# Patient Record
Sex: FEMALE | Race: OTHER | NOT HISPANIC OR LATINO | URBAN - METROPOLITAN AREA
[De-identification: names, ages, dates, MRNs, and addresses within clinical notes are randomized per-mention and may not be internally consistent; named-entity substitution may affect disease eponyms.]

---

## 2022-11-02 ENCOUNTER — INPATIENT (INPATIENT)
Facility: HOSPITAL | Age: 67
LOS: 6 days | Discharge: HOME | DRG: 628 | End: 2022-11-09
Attending: HOSPITALIST | Admitting: HOSPITALIST
Payer: MEDICARE

## 2022-11-02 VITALS
DIASTOLIC BLOOD PRESSURE: 89 MMHG | TEMPERATURE: 98 F | RESPIRATION RATE: 18 BRPM | WEIGHT: 130.07 LBS | HEIGHT: 57 IN | HEART RATE: 146 BPM | SYSTOLIC BLOOD PRESSURE: 138 MMHG | OXYGEN SATURATION: 99 %

## 2022-11-02 DIAGNOSIS — E11.10 TYPE 2 DIABETES MELLITUS WITH KETOACIDOSIS WITHOUT COMA: ICD-10-CM

## 2022-11-02 LAB
ACETONE SERPL-MCNC: ABNORMAL
ALBUMIN SERPL ELPH-MCNC: 3.9 G/DL — SIGNIFICANT CHANGE UP (ref 3.3–5)
ALP SERPL-CCNC: 74 U/L — SIGNIFICANT CHANGE UP (ref 30–120)
ALT FLD-CCNC: 29 U/L DA — SIGNIFICANT CHANGE UP (ref 10–60)
ANION GAP SERPL CALC-SCNC: 27 MMOL/L — HIGH (ref 5–17)
APPEARANCE UR: CLEAR — SIGNIFICANT CHANGE UP
APTT BLD: 24.4 SEC — LOW (ref 27.5–35.5)
AST SERPL-CCNC: 6 U/L — LOW (ref 10–40)
BASE EXCESS BLDV CALC-SCNC: -18.7 MMOL/L — LOW (ref -2–3)
BASOPHILS # BLD AUTO: 0.01 K/UL — SIGNIFICANT CHANGE UP (ref 0–0.2)
BASOPHILS NFR BLD AUTO: 0.1 % — SIGNIFICANT CHANGE UP (ref 0–2)
BILIRUB SERPL-MCNC: 0.6 MG/DL — SIGNIFICANT CHANGE UP (ref 0.2–1.2)
BILIRUB UR-MCNC: NEGATIVE — SIGNIFICANT CHANGE UP
BUN SERPL-MCNC: 34 MG/DL — HIGH (ref 7–23)
CALCIUM SERPL-MCNC: 9.6 MG/DL — SIGNIFICANT CHANGE UP (ref 8.4–10.5)
CHLORIDE SERPL-SCNC: 95 MMOL/L — LOW (ref 96–108)
CO2 SERPL-SCNC: 11 MMOL/L — LOW (ref 22–31)
COLOR SPEC: YELLOW — SIGNIFICANT CHANGE UP
CREAT SERPL-MCNC: 1.41 MG/DL — HIGH (ref 0.5–1.3)
DIFF PNL FLD: NEGATIVE — SIGNIFICANT CHANGE UP
EGFR: 40 ML/MIN/1.73M2 — LOW
EOSINOPHIL # BLD AUTO: 0.01 K/UL — SIGNIFICANT CHANGE UP (ref 0–0.5)
EOSINOPHIL NFR BLD AUTO: 0.1 % — SIGNIFICANT CHANGE UP (ref 0–6)
FLUAV AG NPH QL: SIGNIFICANT CHANGE UP
FLUBV AG NPH QL: SIGNIFICANT CHANGE UP
GLUCOSE SERPL-MCNC: 711 MG/DL — CRITICAL HIGH (ref 70–99)
GLUCOSE UR QL: 1000 MG/DL
HCO3 BLDV-SCNC: 10 MMOL/L — CRITICAL LOW (ref 22–29)
HCT VFR BLD CALC: 38.8 % — SIGNIFICANT CHANGE UP (ref 34.5–45)
HGB BLD-MCNC: 11.7 G/DL — SIGNIFICANT CHANGE UP (ref 11.5–15.5)
HOROWITZ INDEX BLDV+IHG-RTO: 21 — SIGNIFICANT CHANGE UP
IMM GRANULOCYTES NFR BLD AUTO: 0.6 % — SIGNIFICANT CHANGE UP (ref 0–0.9)
INR BLD: 0.97 RATIO — SIGNIFICANT CHANGE UP (ref 0.88–1.16)
KETONES UR-MCNC: ABNORMAL
LACTATE SERPL-SCNC: <0.3 MMOL/L — LOW (ref 0.7–2)
LEUKOCYTE ESTERASE UR-ACNC: NEGATIVE — SIGNIFICANT CHANGE UP
LYMPHOCYTES # BLD AUTO: 15.2 % — SIGNIFICANT CHANGE UP (ref 13–44)
LYMPHOCYTES # BLD AUTO: 2.06 K/UL — SIGNIFICANT CHANGE UP (ref 1–3.3)
MCHC RBC-ENTMCNC: 21.5 PG — LOW (ref 27–34)
MCHC RBC-ENTMCNC: 30.2 GM/DL — LOW (ref 32–36)
MCV RBC AUTO: 71.5 FL — LOW (ref 80–100)
MONOCYTES # BLD AUTO: 0.6 K/UL — SIGNIFICANT CHANGE UP (ref 0–0.9)
MONOCYTES NFR BLD AUTO: 4.4 % — SIGNIFICANT CHANGE UP (ref 2–14)
NEUTROPHILS # BLD AUTO: 10.76 K/UL — HIGH (ref 1.8–7.4)
NEUTROPHILS NFR BLD AUTO: 79.6 % — HIGH (ref 43–77)
NITRITE UR-MCNC: NEGATIVE — SIGNIFICANT CHANGE UP
NRBC # BLD: 0 /100 WBCS — SIGNIFICANT CHANGE UP (ref 0–0)
PCO2 BLDV: 25 MMHG — LOW (ref 39–42)
PH BLDV: 7.19 — LOW (ref 7.32–7.43)
PH UR: 5 — SIGNIFICANT CHANGE UP (ref 5–8)
PLATELET # BLD AUTO: 520 K/UL — HIGH (ref 150–400)
PO2 BLDV: 38 MMHG — SIGNIFICANT CHANGE UP (ref 25–45)
POTASSIUM SERPL-MCNC: 6.2 MMOL/L — CRITICAL HIGH (ref 3.5–5.3)
POTASSIUM SERPL-SCNC: 6.2 MMOL/L — CRITICAL HIGH (ref 3.5–5.3)
PROT SERPL-MCNC: 7.8 G/DL — SIGNIFICANT CHANGE UP (ref 6–8.3)
PROT UR-MCNC: 30 MG/DL
PROTHROM AB SERPL-ACNC: 11.5 SEC — SIGNIFICANT CHANGE UP (ref 10.5–13.4)
RBC # BLD: 5.43 M/UL — HIGH (ref 3.8–5.2)
RBC # FLD: 22.4 % — HIGH (ref 10.3–14.5)
RSV RNA NPH QL NAA+NON-PROBE: SIGNIFICANT CHANGE UP
SAO2 % BLDV: 67.2 % — SIGNIFICANT CHANGE UP (ref 67–88)
SARS-COV-2 RNA SPEC QL NAA+PROBE: SIGNIFICANT CHANGE UP
SODIUM SERPL-SCNC: 133 MMOL/L — LOW (ref 135–145)
SP GR SPEC: 1.01 — SIGNIFICANT CHANGE UP (ref 1.01–1.02)
UROBILINOGEN FLD QL: NEGATIVE MG/DL — SIGNIFICANT CHANGE UP
WBC # BLD: 13.52 K/UL — HIGH (ref 3.8–10.5)
WBC # FLD AUTO: 13.52 K/UL — HIGH (ref 3.8–10.5)

## 2022-11-02 PROCEDURE — 93010 ELECTROCARDIOGRAM REPORT: CPT

## 2022-11-02 PROCEDURE — 71045 X-RAY EXAM CHEST 1 VIEW: CPT | Mod: 26

## 2022-11-02 PROCEDURE — 99285 EMERGENCY DEPT VISIT HI MDM: CPT

## 2022-11-02 PROCEDURE — 99223 1ST HOSP IP/OBS HIGH 75: CPT

## 2022-11-02 RX ORDER — INSULIN HUMAN 100 [IU]/ML
6 INJECTION, SOLUTION SUBCUTANEOUS
Qty: 100 | Refills: 0 | Status: DISCONTINUED | OUTPATIENT
Start: 2022-11-02 | End: 2022-11-03

## 2022-11-02 RX ORDER — HEPARIN SODIUM 5000 [USP'U]/ML
5000 INJECTION INTRAVENOUS; SUBCUTANEOUS EVERY 8 HOURS
Refills: 0 | Status: COMPLETED | OUTPATIENT
Start: 2022-11-02 | End: 2022-11-07

## 2022-11-02 RX ORDER — INSULIN HUMAN 100 [IU]/ML
6 INJECTION, SOLUTION SUBCUTANEOUS ONCE
Refills: 0 | Status: COMPLETED | OUTPATIENT
Start: 2022-11-02 | End: 2022-11-02

## 2022-11-02 RX ORDER — SIMVASTATIN 20 MG/1
40 TABLET, FILM COATED ORAL AT BEDTIME
Refills: 0 | Status: DISCONTINUED | OUTPATIENT
Start: 2022-11-02 | End: 2022-11-09

## 2022-11-02 RX ORDER — HYDRALAZINE HCL 50 MG
10 TABLET ORAL EVERY 4 HOURS
Refills: 0 | Status: DISCONTINUED | OUTPATIENT
Start: 2022-11-02 | End: 2022-11-09

## 2022-11-02 RX ORDER — CEFTRIAXONE 500 MG/1
1000 INJECTION, POWDER, FOR SOLUTION INTRAMUSCULAR; INTRAVENOUS EVERY 24 HOURS
Refills: 0 | Status: DISCONTINUED | OUTPATIENT
Start: 2022-11-02 | End: 2022-11-03

## 2022-11-02 RX ORDER — SODIUM CHLORIDE 9 MG/ML
1000 INJECTION, SOLUTION INTRAVENOUS ONCE
Refills: 0 | Status: COMPLETED | OUTPATIENT
Start: 2022-11-02 | End: 2022-11-02

## 2022-11-02 RX ORDER — ONDANSETRON 8 MG/1
4 TABLET, FILM COATED ORAL EVERY 4 HOURS
Refills: 0 | Status: DISCONTINUED | OUTPATIENT
Start: 2022-11-02 | End: 2022-11-09

## 2022-11-02 RX ORDER — SODIUM CHLORIDE 9 MG/ML
1850 INJECTION INTRAMUSCULAR; INTRAVENOUS; SUBCUTANEOUS ONCE
Refills: 0 | Status: COMPLETED | OUTPATIENT
Start: 2022-11-02 | End: 2022-11-02

## 2022-11-02 RX ORDER — SODIUM CHLORIDE 9 MG/ML
1000 INJECTION, SOLUTION INTRAVENOUS
Refills: 0 | Status: DISCONTINUED | OUTPATIENT
Start: 2022-11-02 | End: 2022-11-03

## 2022-11-02 RX ADMIN — INSULIN HUMAN 6 UNIT(S)/HR: 100 INJECTION, SOLUTION SUBCUTANEOUS at 23:44

## 2022-11-02 RX ADMIN — SODIUM CHLORIDE 1850 MILLILITER(S): 9 INJECTION INTRAMUSCULAR; INTRAVENOUS; SUBCUTANEOUS at 23:43

## 2022-11-02 RX ADMIN — INSULIN HUMAN 6 UNIT(S): 100 INJECTION, SOLUTION SUBCUTANEOUS at 23:12

## 2022-11-02 RX ADMIN — Medication 10 MILLIGRAM(S): at 23:43

## 2022-11-02 RX ADMIN — CEFTRIAXONE 100 MILLIGRAM(S): 500 INJECTION, POWDER, FOR SOLUTION INTRAMUSCULAR; INTRAVENOUS at 23:44

## 2022-11-02 NOTE — ED PROVIDER NOTE - NSICDXPASTMEDICALHX_GEN_ALL_CORE_FT
PAST MEDICAL HISTORY:  Depression     Eczema     H/O: hypertension     HLD (hyperlipidemia)     Psoriasis-like skin disease     Type 2 diabetes mellitus

## 2022-11-02 NOTE — H&P ADULT - NSHPREVIEWOFSYSTEMS_GEN_ALL_CORE
REVIEW OF SYSTEMS:  CONSTITUTIONAL:    +fever, no weight loss  EYES:    no eye pain or visual disturbances or discharge  ENMT:     no difficulty hearing or tinnitus or vertigo, no sinus or throat pain  NECK:    no pain or stiffness  RESPIRATORY:    no cough or wheezing or chills or hemoptysis, no shortness of breath  CARDIOVASCULAR:    no chest pain or palpitations or dizziness or leg swelling  GASTROINTESTINAL:    +nausea/vomiting, no abdominal or epigastric pain, no diarrhea or constipation. no melena or hematochezia.  GENITOURINARY:    no dysuria or frequency or hematuria or incontinence  NEUROLOGICAL:    no headaches or memory loss or loss of strength or numbness or tremors  SKIN:    no itching or burning or rashes or lesions   LYMPH NODES:    no enlarged glands  ENDOCRINE:    no heat or cold intolerance, no hair loss, no polydipsia or polyuria  MUSCULOSKELETAL:    chronic back pain, no joint pain or swelling, no muscle pain  PSYCHIATRIC:    no depression or anxiety or mood swings or difficulty sleeping  HEME/LYMPH:    no easy bruising or bleeding gums  ALLERGY AND IMMUNOLOGIC:    no hives or eczema

## 2022-11-02 NOTE — H&P ADULT - NSHPPHYSICALEXAM_GEN_ALL_CORE
PHYSICAL EXAM:  Vital Signs Last 24 Hrs  T(C): 36.7 (02 Nov 2022 21:40), Max: 36.7 (02 Nov 2022 21:40)  T(F): 98 (02 Nov 2022 21:40), Max: 98 (02 Nov 2022 21:40)  HR: 146 (02 Nov 2022 21:40) (146 - 146)  BP: 138/89 (02 Nov 2022 21:40) (138/89 - 138/89)  BP(mean): --  RR: 18 (02 Nov 2022 21:40) (18 - 18)  SpO2: 99% (02 Nov 2022 21:40) (99% - 99%)    Parameters below as of 02 Nov 2022 21:40  Patient On (Oxygen Delivery Method): room air      GENERAL:     age appropriate female in NAD  HEAD:     atraumatic, normocephalic  EYES:     EOMI, conjunctiva and sclera clear  RESPIRATORY:     clear to auscultation bilaterally, no rales or rhonchi or wheezing or rubs  CARDIOVASCULAR:     tachycardic, no murmurs or rubs or gallops, 2+ peripheral pulses  GASTROINTESTINAL:     soft, tender to palpation in lower quadrants but no rebound or guarding, slightly distended, bowel sounds present  EXTREMITIES:     no clubbing or cyanosis or edema  MUSCULOSKELETAL:     no joint pain or swelling or deformities  NERVOUS SYSTEM:     motor strength intact with 5/5 B/L upper and lower extremities, no gross sensory deficits  SKIN:     no rashes or lesions  PSYCH:     appropriate, alert and orientated x3, good concentration

## 2022-11-02 NOTE — H&P ADULT - HISTORY OF PRESENT ILLNESS
67F with DM2 not on insulin, HLD, HTN who present with one day of feeling  67F with DM2 not on insulin, HLD, HTN who present with one day of nausea/vomiting and fever.  Patient reports falling last week around 6 days ago but cannot recall mechanism of fall.  Injured her left side.  Went to urgent care over the weekend and was prescribed prednisone 10mg BID.  Has taken it for the past 4 days.  Then starting yesterday patient started to feel unwell.  Was nauseous with vomiting and also was found to have a fever.  Came here for further evaluation.     In the ED:    Triage vitals - /89    RR 18, 99%  T 98F  Labs significant for WBC 13.5, plt 520, K 6.2, Cr 1.41, glucose 711, large acetone  VBG 7.19/25/38  Was ordered for NS 1850 bolus and given 6 units of insulin    Patient is being admitted to [facility] for DKA management.

## 2022-11-02 NOTE — ED PROVIDER NOTE - NSICDXFAMILYHX_GEN_ALL_CORE_FT
FAMILY HISTORY:  Father  Still living? Unknown  Family history of coronary artery disease, Age at diagnosis: Age Unknown    Mother  Still living? Unknown  Family history of cerebrovascular accident (CVA), Age at diagnosis: Age Unknown  Family history of coronary artery disease, Age at diagnosis: Age Unknown    Sibling  Still living? Unknown  Family history of cerebrovascular accident (CVA), Age at diagnosis: Age Unknown
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
## 2022-11-02 NOTE — CONSULT NOTE ADULT - ASSESSMENT
68 y/o female pmhx NIDDM, HLD, HTN presented to ER nausea vomiting and abdominal pain.    Assessment:  1. DKA  2. BEN   3. Sepsis, unknown source   4. Hyperkalemia   5. HTN    Plan:  - Started on insulin infusion, accu checks q1hr.   - Aggressive IVF hydration. Give additional 1L LR. Start LR @ 125cc/hr, will switch to D5LR once BG <250  - Will transition to long acting insulin once AG is closed. Check HgA1c  - Serial BMPs q4hr. Monitor Lytes   - BEN likley pre renal from dehydration/ vomiting IVFs. Trend BUN/Crt. Hyperkalemia, hold off on treatment for now w/ IVFs and Insulin gtt. Repeat in 4 hours   - PRN hydralazine for SBP >170.   - Febrile/ WBC 13k. start Rocephin.  Send blood cultures. Check UA and urine culture. RVP pending. CT abd/pelvis pending. Procal in am  - Heparin for DVT ppx

## 2022-11-02 NOTE — H&P ADULT - NSHPLABSRESULTS_GEN_ALL_CORE
LABS:                        11.7   13.52<H> )-----------( 520<H>    ( 2022 22:36 )             38.8     133<L>  |  95<L>  |  34<H>  ----------------------------<  711<HH>    2022 22:36  6.2<HH>   |  11<L>  |  1.41<H>        Ca 9.6           2022 22:36        TPro  7.8    /  Alb  3.9    /  TBili  0.6    /  DBili  x      /  AST  6<L>   /  ALT  29     /  AlkPhos  74     2022 22:36    PT/INR - ( 2022 22:36 )   PT: 11.5 ;   INR: 0.97          PTT - ( 2022 22:36 )  PTT:24.4<L>    Urinalysis Basic - ( 2022 23:10 )    Color: Yellow / Appearance: Clear / S.010 / pH: x  Gluc: x / Ketone: Large  / Bili: Negative / Urobili: Negative mg/dL   Blood: x / Protein: 30 mg/dL / Nitrite: Negative   Leuk Esterase: Negative / RBC: x / WBC x   Sq Epi: x / Non Sq Epi: x / Bacteria: x    Acetone, Serum: Large: Method: Nitroprusside Chromatographic Assay (22 @ 22:36)  Blood Gas Profile - Venous (22 @ 22:25)    pH, Venous: 7.19    pCO2, Venous: 25 mmHg    pO2, Venous: 38 mmHg    HCO3, Venous: 10: TYPE:(C=Critical, N=Notification, A=Abnormal) C    Base Excess, Venous: -18.7 mmol/L    Oxygen Saturation, Venous: 67.2 %    FIO2, Venous: 21.0    EKG:   sinus tachycardia, TWI in aVL, questionable ST depression in aVL and V2  Radiology:
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
## 2022-11-02 NOTE — H&P ADULT - ASSESSMENT
67F with DM2 not on insulin, HLD, HTN who present with one day of nausea/vomiting and fever.   Found to be in DKA in setting of an unknown infection.      DKA - per previous notes, patient was on levemir 35units qHS and novolog 10units qAC but patient said she is not taking any insulin.  Also patient was on prednisone which would add to her hyperglycemia as well.    - admitted to [facility]  - continue with insulin drip  - BMP q4h -> monitor electrolytes, [place] K (though currently is hyperkalemic at the moment)  - FS q1h while on insulin drip and follow DKA protocol   - would need endocrinology consult  - likely will need home insulin regiment on discharge  - CC consult  - hold metformin     Abdominal pain/fever - currently no obvious source  - obtain UA and UCX  - f/u cultures   - obtain and f/u CT chest, abdomen, and pelvis    HTN/HLD  - cont with simvstatin  - was also on metoprolol at one point -> may need to be added back if BP not controlled    Preventive measures  - heparin subq for DVT ppx

## 2022-11-02 NOTE — ED PROVIDER NOTE - OBJECTIVE STATEMENT
67 y.o. F c/o 1 day vomiting, high BP and glucose, states started having fever yesterday, denies abd pain, last bm yesterday, has chronic low back pain, was put on steroids for this last week from [facility], no urinary symptoms

## 2022-11-02 NOTE — H&P ADULT - NSICDXFAMILYHX_GEN_ALL_CORE_FT
FAMILY HISTORY:  FH: diabetes mellitus  FH: hypertension    Father  Still living? Unknown  Family history of coronary artery disease, Age at diagnosis: Age Unknown    Mother  Still living? Unknown  Family history of cerebrovascular accident (CVA), Age at diagnosis: Age Unknown  Family history of coronary artery disease, Age at diagnosis: Age Unknown    Sibling  Still living? Unknown  Family history of cerebrovascular accident (CVA), Age at diagnosis: Age Unknown

## 2022-11-03 DIAGNOSIS — E11.65 TYPE 2 DIABETES MELLITUS WITH HYPERGLYCEMIA: ICD-10-CM

## 2022-11-03 LAB
A1C WITH ESTIMATED AVERAGE GLUCOSE RESULT: 12.7 % — HIGH (ref 4–5.6)
ALBUMIN SERPL ELPH-MCNC: 3 G/DL — LOW (ref 3.3–5)
ALP SERPL-CCNC: 61 U/L — SIGNIFICANT CHANGE UP (ref 30–120)
ALT FLD-CCNC: 24 U/L DA — SIGNIFICANT CHANGE UP (ref 10–60)
ANION GAP SERPL CALC-SCNC: 11 MMOL/L — SIGNIFICANT CHANGE UP (ref 5–17)
ANION GAP SERPL CALC-SCNC: 15 MMOL/L — SIGNIFICANT CHANGE UP (ref 5–17)
AST SERPL-CCNC: 8 U/L — LOW (ref 10–40)
BILIRUB SERPL-MCNC: 0.3 MG/DL — SIGNIFICANT CHANGE UP (ref 0.2–1.2)
BUN SERPL-MCNC: 21 MG/DL — SIGNIFICANT CHANGE UP (ref 7–23)
BUN SERPL-MCNC: 24 MG/DL — HIGH (ref 7–23)
CALCIUM SERPL-MCNC: 7.9 MG/DL — LOW (ref 8.4–10.5)
CALCIUM SERPL-MCNC: 8.3 MG/DL — LOW (ref 8.4–10.5)
CHLORIDE SERPL-SCNC: 109 MMOL/L — HIGH (ref 96–108)
CHLORIDE SERPL-SCNC: 109 MMOL/L — HIGH (ref 96–108)
CO2 SERPL-SCNC: 16 MMOL/L — LOW (ref 22–31)
CO2 SERPL-SCNC: 18 MMOL/L — LOW (ref 22–31)
CREAT SERPL-MCNC: 1 MG/DL — SIGNIFICANT CHANGE UP (ref 0.5–1.3)
CREAT SERPL-MCNC: 1.1 MG/DL — SIGNIFICANT CHANGE UP (ref 0.5–1.3)
EGFR: 54 ML/MIN/1.73M2 — LOW
EGFR: 61 ML/MIN/1.73M2 — SIGNIFICANT CHANGE UP
ESTIMATED AVERAGE GLUCOSE: 318 MG/DL — HIGH (ref 68–114)
GLUCOSE SERPL-MCNC: 232 MG/DL — HIGH (ref 70–99)
GLUCOSE SERPL-MCNC: 299 MG/DL — HIGH (ref 70–99)
HCT VFR BLD CALC: 33.2 % — LOW (ref 34.5–45)
HCV AB S/CO SERPL IA: 0.05 S/CO — SIGNIFICANT CHANGE UP (ref 0–0.99)
HCV AB SERPL-IMP: SIGNIFICANT CHANGE UP
HGB BLD-MCNC: 10.2 G/DL — LOW (ref 11.5–15.5)
MAGNESIUM SERPL-MCNC: 1.3 MG/DL — LOW (ref 1.6–2.6)
MAGNESIUM SERPL-MCNC: 1.7 MG/DL — SIGNIFICANT CHANGE UP (ref 1.6–2.6)
MCHC RBC-ENTMCNC: 21.6 PG — LOW (ref 27–34)
MCHC RBC-ENTMCNC: 30.7 GM/DL — LOW (ref 32–36)
MCV RBC AUTO: 70.2 FL — LOW (ref 80–100)
NRBC # BLD: 0 /100 WBCS — SIGNIFICANT CHANGE UP (ref 0–0)
PHOSPHATE SERPL-MCNC: 1 MG/DL — CRITICAL LOW (ref 2.5–4.5)
PHOSPHATE SERPL-MCNC: 1.5 MG/DL — LOW (ref 2.5–4.5)
PLATELET # BLD AUTO: 417 K/UL — HIGH (ref 150–400)
POTASSIUM SERPL-MCNC: 4.4 MMOL/L — SIGNIFICANT CHANGE UP (ref 3.5–5.3)
POTASSIUM SERPL-MCNC: 5.1 MMOL/L — SIGNIFICANT CHANGE UP (ref 3.5–5.3)
POTASSIUM SERPL-SCNC: 4.4 MMOL/L — SIGNIFICANT CHANGE UP (ref 3.5–5.3)
POTASSIUM SERPL-SCNC: 5.1 MMOL/L — SIGNIFICANT CHANGE UP (ref 3.5–5.3)
PROCALCITONIN SERPL-MCNC: 0.12 NG/ML — HIGH (ref 0.02–0.1)
PROT SERPL-MCNC: 6 G/DL — SIGNIFICANT CHANGE UP (ref 6–8.3)
RBC # BLD: 4.73 M/UL — SIGNIFICANT CHANGE UP (ref 3.8–5.2)
RBC # FLD: 21.2 % — HIGH (ref 10.3–14.5)
SODIUM SERPL-SCNC: 138 MMOL/L — SIGNIFICANT CHANGE UP (ref 135–145)
SODIUM SERPL-SCNC: 140 MMOL/L — SIGNIFICANT CHANGE UP (ref 135–145)
WBC # BLD: 17.58 K/UL — HIGH (ref 3.8–10.5)
WBC # FLD AUTO: 17.58 K/UL — HIGH (ref 3.8–10.5)

## 2022-11-03 PROCEDURE — 71250 CT THORAX DX C-: CPT | Mod: 26

## 2022-11-03 PROCEDURE — 74176 CT ABD & PELVIS W/O CONTRAST: CPT | Mod: 26

## 2022-11-03 PROCEDURE — 99251: CPT

## 2022-11-03 PROCEDURE — 99233 SBSQ HOSP IP/OBS HIGH 50: CPT

## 2022-11-03 RX ORDER — GABAPENTIN 400 MG/1
100 CAPSULE ORAL EVERY 8 HOURS
Refills: 0 | Status: DISCONTINUED | OUTPATIENT
Start: 2022-11-03 | End: 2022-11-08

## 2022-11-03 RX ORDER — INSULIN LISPRO 100/ML
VIAL (ML) SUBCUTANEOUS
Refills: 0 | Status: DISCONTINUED | OUTPATIENT
Start: 2022-11-03 | End: 2022-11-09

## 2022-11-03 RX ORDER — METFORMIN HYDROCHLORIDE 850 MG/1
500 TABLET ORAL DAILY
Refills: 0 | Status: DISCONTINUED | OUTPATIENT
Start: 2022-11-03 | End: 2022-11-09

## 2022-11-03 RX ORDER — SODIUM CHLORIDE 9 MG/ML
1000 INJECTION INTRAMUSCULAR; INTRAVENOUS; SUBCUTANEOUS ONCE
Refills: 0 | Status: COMPLETED | OUTPATIENT
Start: 2022-11-03 | End: 2022-11-03

## 2022-11-03 RX ORDER — GLUCAGON INJECTION, SOLUTION 0.5 MG/.1ML
1 INJECTION, SOLUTION SUBCUTANEOUS ONCE
Refills: 0 | Status: DISCONTINUED | OUTPATIENT
Start: 2022-11-03 | End: 2022-11-09

## 2022-11-03 RX ORDER — DEXTROSE 50 % IN WATER 50 %
25 SYRINGE (ML) INTRAVENOUS ONCE
Refills: 0 | Status: DISCONTINUED | OUTPATIENT
Start: 2022-11-03 | End: 2022-11-09

## 2022-11-03 RX ORDER — POTASSIUM PHOSPHATE, MONOBASIC POTASSIUM PHOSPHATE, DIBASIC 236; 224 MG/ML; MG/ML
15 INJECTION, SOLUTION INTRAVENOUS ONCE
Refills: 0 | Status: COMPLETED | OUTPATIENT
Start: 2022-11-03 | End: 2022-11-03

## 2022-11-03 RX ORDER — LISINOPRIL 2.5 MG/1
5 TABLET ORAL DAILY
Refills: 0 | Status: DISCONTINUED | OUTPATIENT
Start: 2022-11-03 | End: 2022-11-09

## 2022-11-03 RX ORDER — TRAMADOL HYDROCHLORIDE 50 MG/1
25 TABLET ORAL EVERY 4 HOURS
Refills: 0 | Status: ACTIVE | OUTPATIENT
Start: 2022-11-03 | End: 2022-11-10

## 2022-11-03 RX ORDER — PANTOPRAZOLE SODIUM 20 MG/1
40 TABLET, DELAYED RELEASE ORAL
Refills: 0 | Status: DISCONTINUED | OUTPATIENT
Start: 2022-11-03 | End: 2022-11-09

## 2022-11-03 RX ORDER — ACETAMINOPHEN 500 MG
650 TABLET ORAL EVERY 6 HOURS
Refills: 0 | Status: ACTIVE | OUTPATIENT
Start: 2022-11-03 | End: 2023-10-02

## 2022-11-03 RX ORDER — SODIUM CHLORIDE 9 MG/ML
1000 INJECTION, SOLUTION INTRAVENOUS
Refills: 0 | Status: DISCONTINUED | OUTPATIENT
Start: 2022-11-03 | End: 2022-11-03

## 2022-11-03 RX ORDER — INSULIN LISPRO 100/ML
5 VIAL (ML) SUBCUTANEOUS
Refills: 0 | Status: DISCONTINUED | OUTPATIENT
Start: 2022-11-03 | End: 2022-11-09

## 2022-11-03 RX ORDER — DEXTROSE 50 % IN WATER 50 %
12.5 SYRINGE (ML) INTRAVENOUS ONCE
Refills: 0 | Status: DISCONTINUED | OUTPATIENT
Start: 2022-11-03 | End: 2022-11-09

## 2022-11-03 RX ORDER — INSULIN LISPRO 100/ML
VIAL (ML) SUBCUTANEOUS AT BEDTIME
Refills: 0 | Status: DISCONTINUED | OUTPATIENT
Start: 2022-11-03 | End: 2022-11-09

## 2022-11-03 RX ORDER — HYDROMORPHONE HYDROCHLORIDE 2 MG/ML
0.5 INJECTION INTRAMUSCULAR; INTRAVENOUS; SUBCUTANEOUS ONCE
Refills: 0 | Status: DISCONTINUED | OUTPATIENT
Start: 2022-11-03 | End: 2022-11-03

## 2022-11-03 RX ORDER — SODIUM CHLORIDE 9 MG/ML
1000 INJECTION, SOLUTION INTRAVENOUS
Refills: 0 | Status: DISCONTINUED | OUTPATIENT
Start: 2022-11-03 | End: 2022-11-09

## 2022-11-03 RX ORDER — MAGNESIUM SULFATE 500 MG/ML
2 VIAL (ML) INJECTION ONCE
Refills: 0 | Status: COMPLETED | OUTPATIENT
Start: 2022-11-03 | End: 2022-11-03

## 2022-11-03 RX ORDER — INSULIN LISPRO 100/ML
4 VIAL (ML) SUBCUTANEOUS ONCE
Refills: 0 | Status: COMPLETED | OUTPATIENT
Start: 2022-11-03 | End: 2022-11-03

## 2022-11-03 RX ORDER — DEXTROSE 50 % IN WATER 50 %
15 SYRINGE (ML) INTRAVENOUS ONCE
Refills: 0 | Status: DISCONTINUED | OUTPATIENT
Start: 2022-11-03 | End: 2022-11-09

## 2022-11-03 RX ORDER — INSULIN GLARGINE 100 [IU]/ML
10 INJECTION, SOLUTION SUBCUTANEOUS ONCE
Refills: 0 | Status: COMPLETED | OUTPATIENT
Start: 2022-11-03 | End: 2022-11-03

## 2022-11-03 RX ORDER — CYCLOBENZAPRINE HYDROCHLORIDE 10 MG/1
5 TABLET, FILM COATED ORAL THREE TIMES A DAY
Refills: 0 | Status: DISCONTINUED | OUTPATIENT
Start: 2022-11-03 | End: 2022-11-09

## 2022-11-03 RX ORDER — INSULIN GLARGINE 100 [IU]/ML
20 INJECTION, SOLUTION SUBCUTANEOUS AT BEDTIME
Refills: 0 | Status: DISCONTINUED | OUTPATIENT
Start: 2022-11-03 | End: 2022-11-09

## 2022-11-03 RX ADMIN — TRAMADOL HYDROCHLORIDE 25 MILLIGRAM(S): 50 TABLET ORAL at 19:50

## 2022-11-03 RX ADMIN — POTASSIUM PHOSPHATE, MONOBASIC POTASSIUM PHOSPHATE, DIBASIC 62.5 MILLIMOLE(S): 236; 224 INJECTION, SOLUTION INTRAVENOUS at 12:17

## 2022-11-03 RX ADMIN — SODIUM CHLORIDE 125 MILLILITER(S): 9 INJECTION, SOLUTION INTRAVENOUS at 02:16

## 2022-11-03 RX ADMIN — PANTOPRAZOLE SODIUM 40 MILLIGRAM(S): 20 TABLET, DELAYED RELEASE ORAL at 10:13

## 2022-11-03 RX ADMIN — SODIUM CHLORIDE 1000 MILLILITER(S): 9 INJECTION, SOLUTION INTRAVENOUS at 00:53

## 2022-11-03 RX ADMIN — TRAMADOL HYDROCHLORIDE 25 MILLIGRAM(S): 50 TABLET ORAL at 20:50

## 2022-11-03 RX ADMIN — Medication 8: at 16:51

## 2022-11-03 RX ADMIN — Medication 650 MILLIGRAM(S): at 12:53

## 2022-11-03 RX ADMIN — HYDROMORPHONE HYDROCHLORIDE 0.5 MILLIGRAM(S): 2 INJECTION INTRAMUSCULAR; INTRAVENOUS; SUBCUTANEOUS at 02:26

## 2022-11-03 RX ADMIN — HEPARIN SODIUM 5000 UNIT(S): 5000 INJECTION INTRAVENOUS; SUBCUTANEOUS at 05:53

## 2022-11-03 RX ADMIN — Medication 5 UNIT(S): at 16:51

## 2022-11-03 RX ADMIN — HEPARIN SODIUM 5000 UNIT(S): 5000 INJECTION INTRAVENOUS; SUBCUTANEOUS at 22:30

## 2022-11-03 RX ADMIN — Medication 30 MILLILITER(S): at 23:56

## 2022-11-03 RX ADMIN — ONDANSETRON 4 MILLIGRAM(S): 8 TABLET, FILM COATED ORAL at 00:54

## 2022-11-03 RX ADMIN — Medication 25 GRAM(S): at 10:13

## 2022-11-03 RX ADMIN — Medication 4 UNIT(S): at 14:39

## 2022-11-03 RX ADMIN — INSULIN GLARGINE 20 UNIT(S): 100 INJECTION, SOLUTION SUBCUTANEOUS at 22:31

## 2022-11-03 RX ADMIN — Medication 650 MILLIGRAM(S): at 13:52

## 2022-11-03 RX ADMIN — HYDROMORPHONE HYDROCHLORIDE 0.5 MILLIGRAM(S): 2 INJECTION INTRAMUSCULAR; INTRAVENOUS; SUBCUTANEOUS at 02:41

## 2022-11-03 RX ADMIN — SIMVASTATIN 40 MILLIGRAM(S): 20 TABLET, FILM COATED ORAL at 22:30

## 2022-11-03 RX ADMIN — INSULIN GLARGINE 10 UNIT(S): 100 INJECTION, SOLUTION SUBCUTANEOUS at 08:38

## 2022-11-03 RX ADMIN — SODIUM CHLORIDE 125 MILLILITER(S): 9 INJECTION, SOLUTION INTRAVENOUS at 06:28

## 2022-11-03 RX ADMIN — GABAPENTIN 100 MILLIGRAM(S): 400 CAPSULE ORAL at 22:31

## 2022-11-03 RX ADMIN — HEPARIN SODIUM 5000 UNIT(S): 5000 INJECTION INTRAVENOUS; SUBCUTANEOUS at 14:11

## 2022-11-03 RX ADMIN — SODIUM CHLORIDE 1000 MILLILITER(S): 9 INJECTION INTRAMUSCULAR; INTRAVENOUS; SUBCUTANEOUS at 10:13

## 2022-11-03 NOTE — DIETITIAN INITIAL EVALUATION ADULT - ETIOLOGY
related to increased physiological demand to promote healing  related to inability to meet sufficient protein-energy needs in setting of  appetite and PO intake

## 2022-11-03 NOTE — DIETITIAN NUTRITION RISK NOTIFICATION - ADDITIONAL COMMENTS/DIETITIAN RECOMMENDATIONS
Recommend Glucerna 1x/day to optimize PO intake and provide an additional 220kcal, 10g protein per serving   Provide encouragement/assistance as needed during meal times to increase PO intake

## 2022-11-03 NOTE — DIETITIAN INITIAL EVALUATION ADULT - SIGNS/SYMPTOMS
as evidenced by pt with pressure injury to sacrum stage 2 as evidenced by pt consuming <75% EER x~6mo, 20% wt loss x ~6mo and fat/muscle depletion

## 2022-11-03 NOTE — CONSULT NOTE ADULT - PROBLEM SELECTOR RECOMMENDATION 9
Type 2 A1c pending% adm: DKA  Recommend endocrine-[facility] on consult  FU appt: TBA  DSC recommendations: return to home with modified regimen and glucose monitoring  diabetes education provided  Diabetes support info and cell # [phone] given   Goal 100-180 mg/dL; 140-180 mg/dL in critical care areas

## 2022-11-03 NOTE — DIETITIAN INITIAL EVALUATION ADULT - PERTINENT LABORATORY DATA
11-03    138  |  109<H>  |  21  ----------------------------<  299<H>  4.4   |  18<L>  |  1.00    Ca    7.9<L>      03 Nov 2022 11:50  Phos  1.0     11-03  Mg     1.7     11-03    TPro  6.0  /  Alb  3.0<L>  /  TBili  0.3  /  DBili  x   /  AST  8<L>  /  ALT  24  /  AlkPhos  61  11-03  POCT Blood Glucose.: 257 mg/dL (11-03-22 @ 12:15)

## 2022-11-03 NOTE — PROVIDER CONTACT NOTE (EICU) - RECOMMENDATIONS
Treatment with addition 2 L fluid bolus and reassessment.  Insulin infusion with titration  Monitor electrolytes  CXR with NAD  CT A/P  urinalysis with cx, al. C.diff  Case discussed with ICU PA

## 2022-11-03 NOTE — CONSULT NOTE ADULT - ASSESSMENT
66 y/o female with NIDDM on metformin, HTN, HLD with 1 day h/o fever, nausea, vomiting, and abd pain. She had a fall last week for which she was seen at urgent care. She was prescribed prednisone and a muscle relaxant. In the eR pt was found to be hypotensive and tachycardic.  68 y/o female with NIDDM on metformin, HTN, HLD with 1 day h/o fever, nausea, vomiting, and abd pain. She had a fall last week for which she was seen at urgent care. She was prescribed prednisone and a muscle relaxant. In the eR pt was found to be hypotensive and tachycardic.     fall  DM  HLD  HTN  BEN  met Acidosis  abd pain    serial labs  insulin gtt taper  hourly FS  I and O  monitor VS and HD  serial renal indices  replete lytes  hydration  DM education  cvs rx regimen and BP control  imaging reviewed  Cx in progress  emp ABX in progress  dvt p  fall prec
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
## 2022-11-03 NOTE — CONSULT NOTE ADULT - ASSESSMENT
Physical Exam:   Vital Signs Last 24 Hrs  T(C): 36.5 (03 Nov 2022 00:43), Max: 36.7 (02 Nov 2022 21:40)  T(F): 97.7 (03 Nov 2022 00:43), Max: 98.1 (03 Nov 2022 00:15)  HR: 121 (03 Nov 2022 06:00) (119 - 146)  BP: 140/77 (03 Nov 2022 06:00) (131/64 - 192/93)  BP(mean): 88 (03 Nov 2022 06:00) (83 - 120)  RR: 23 (03 Nov 2022 06:00) (16 - 29)  SpO2: 100% (03 Nov 2022 00:15) (99% - 100%)    Parameters below as of 03 Nov 2022 00:43  Patient On (Oxygen Delivery Method): room air             CAPILLARY BLOOD GLUCOSE      POCT Blood Glucose.: 246 mg/dL (03 Nov 2022 07:08)  POCT Blood Glucose.: 227 mg/dL (03 Nov 2022 06:17)  POCT Blood Glucose.: 252 mg/dL (03 Nov 2022 05:09)  POCT Blood Glucose.: 276 mg/dL (03 Nov 2022 04:00)  POCT Blood Glucose.: 339 mg/dL (03 Nov 2022 02:59)  POCT Blood Glucose.: 392 mg/dL (03 Nov 2022 02:01)  POCT Blood Glucose.: 508 mg/dL (03 Nov 2022 00:43)  POCT Blood Glucose.: >600 mg/dL (02 Nov 2022 21:53)      Cholesterol, Serum: 113 mg/dL (05.19.21 @ 08:36)     HDL Cholesterol, Serum: 22 mg/dL (05.19.21 @ 08:36)     LDL Cholesterol Calculated: 66 mg/dL (05.19.21 @ 08:36)     DIET: CC  >50%

## 2022-11-03 NOTE — DIETITIAN INITIAL EVALUATION ADULT - ORAL INTAKE PTA/DIET HISTORY
Pt reports poor PO intake for ~5 days PTA due to GI distress (throwing up), previous to GI distress pt reports fair appetite and PO intake for ~6 months. Pt reports an allergy to fish (hives and itchiness) food and nutrition department made aware.

## 2022-11-03 NOTE — PROVIDER CONTACT NOTE (EICU) - BACKGROUND
66 y/o female with NIDDM on metformin, HTN, HLD with 1 day h/o fever, nausea, vomiting, and abd pain. She had a fall last week for which she was seen at urgent care. She was prescribed prednisone and a muscle relaxant. In the eR pt was found to be hypotensive and tachycardic.   Glu 700. Given 6 units of insulin and ordered 2L IVF bolus. Bicarb 11.

## 2022-11-03 NOTE — DIETITIAN INITIAL EVALUATION ADULT - OTHER INFO
Pt seen for nutrition assessment secondary to referral, admitted for DKA. Pt with hx of T2DM with HbA1c 12.7% (11/3) reports taking metformin at home, per documents previously on Levemir/ Novolog daily but stopped due to lost of insurance. Pt does not follow up with endocrinologist and states she has a glucometer at home but does not check finger sticks, pt on insulin regimen in-house. Upon admission pt with a wt of 118.1 lbs, reports a 30 lb wt loss in ~6 months due to decreased appetite/PO intake.     Pt seen this morning placed on consistent carbohydrate clear liquid diet, now on consistent carbohydrate diet. Reports tolerating clear liquid diet well with no GI distress noted and denies difficulty chewing/ swallowing of foods. Pt made aware food preferences to be obtained daily to optimize PO intake. Recommend adding Glucerna 1x/day to optimize PO intake and provide an additional 220kcal, 10g protein per serving.  Pt receptive to nutrition education, provided education on Carbohydrate Consistent diet including sources of carbohydrates, portion sizes, pairing protein with carbohydrates, limiting sugar sweetened beverages in diet and the importance of consistent eating pattern to help optimize glycemic control. Pt verbalized understating, with no nutrition-related questions at this time. Made aware RD remains available as needed and will continue to follow up to monitor pt's nutrition status.

## 2022-11-03 NOTE — DIETITIAN INITIAL EVALUATION ADULT - ADD RECOMMEND
1) Continue with current diet order 2) Monitor need for diet reinforcement 3) Add Glucerna 1x/day to optimize PO intake 4)RD to remain available

## 2022-11-03 NOTE — PROGRESS NOTE ADULT - ASSESSMENT
67F with DM2 not on insulin, HLD, HTN who present with one day of nausea/vomiting.   Found to be in DKA in setting of prednisone use for back pain.     DKA - per previous notes, patient was on levemir 35units qHS and novolog 10units qAC but patient said she is not taking any insulin.  Also patient was on prednisone which would add to her hyperglycemia as well.    - admitted to [facility] - will downgrade today  - insulin drip discontinued, patient transitioned to basal/bolus insulin with lispro coverage scale  - restart low dose metformin    Abdominal pain/fever   - there was never a true fever  - f/u cultures that were sent  - CT showed esophageal inflammation (likely secondary to vomiting) and findings in lumbar spine     Back pain with neuropathy  - will need new MRI of lumbar spine  - pain management Tylenol, tramadol, flexeril, gabapentin  - Ortho spine eval  - PT eval when cleared by orthopedics.    HTN/HLD  - cont with simvastatin  - start lisinopril for BP control     Preventive measures  - heparin subq for DVT ppx   67F with DM2 not on insulin, HLD, HTN who present with one day of nausea/vomiting.   Found to be in DKA in setting of prednisone use for back pain.     DKA - per previous notes, patient was on levemir 35units qHS and novolog 10units qAC but patient said she is not taking any insulin.  Also patient was on prednisone which would add to her hyperglycemia as well.    - admitted to [facility] - will downgrade today  - insulin drip discontinued, patient transitioned to basal/bolus insulin with lispro coverage scale  - restart low dose metformin    Abdominal pain/fever   - there was never a true fever  - leukocytosis likely due to prednisone use and reaction to DKA  - f/u cultures that were sent  - CT showed esophageal inflammation (likely secondary to vomiting) and findings in lumbar spine     Back pain with neuropathy  - will need new MRI of lumbar spine  - pain management Tylenol, tramadol, flexeril, gabapentin  - Ortho spine eval  - PT eval when cleared by orthopedics.    HTN/HLD  - cont with simvastatin  - start lisinopril for BP control     Preventive measures  - heparin subq for DVT ppx   67F with DM2 not on insulin, HLD, HTN who present with one day of nausea/vomiting.   Found to be in DKA in setting of prednisone use for back pain.     DKA   - per previous notes, patient was on levemir 35units qHS and novolog 10units qAC but patient said she is not taking any insulin.  Also patient was on prednisone which would add to her hyperglycemia as well.    - admitted to [facility] - will downgrade today  - insulin drip discontinued, patient transitioned to basal/bolus insulin with lispro coverage scale  - restart low dose metformin  - CDE input appreciated, endo consult pending    Abdominal pain/fever   - there was never a true fever  - leukocytosis likely due to prednisone use and reaction to DKA  - f/u cultures that were sent  - CT showed esophageal inflammation (likely secondary to vomiting) and findings in lumbar spine     Back pain with neuropathy  - will need new MRI of lumbar spine  - pain management Tylenol, tramadol, flexeril, gabapentin  - Ortho spine eval  - PT eval when cleared by orthopedics.    Sinus Tachycardia  - pt reports she is always tachycardic in the hospital due to anxiety  - encourage oral fluids  - caffeine free diet     HTN/HLD  - cont with simvastatin  - start lisinopril for BP control     Preventive measures  - heparin subq for DVT ppx

## 2022-11-03 NOTE — DIETITIAN INITIAL EVALUATION ADULT - REASON FOR ADMISSION
As per chart "The pt is a 67F with DM2 not on insulin, HLD, HTN who present with one day of nausea/vomiting and fever.  Patient reports falling last week around 6 days ago but cannot recall mechanism of fall.  Injured her left side.  Went to urgent care over the weekend and was prescribed prednisone 10mg BID.  Has taken it for the past 4 days.  Then starting yesterday patient started to feel unwell.  Was nauseous with vomiting and also was found to have a fever.  Came here for further evaluation."

## 2022-11-03 NOTE — PATIENT PROFILE ADULT - FALL HARM RISK - RISK INTERVENTIONS
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
## 2022-11-03 NOTE — DIETITIAN INITIAL EVALUATION ADULT - PERTINENT MEDS FT
MEDICATIONS  (STANDING):  cefTRIAXone   IVPB 1000 milliGRAM(s) IV Intermittent every 24 hours  dextrose 5%. 1000 milliLiter(s) (50 mL/Hr) IV Continuous <Continuous>  dextrose 5%. 1000 milliLiter(s) (100 mL/Hr) IV Continuous <Continuous>  dextrose 50% Injectable 25 Gram(s) IV Push once  dextrose 50% Injectable 12.5 Gram(s) IV Push once  dextrose 50% Injectable 25 Gram(s) IV Push once  glucagon  Injectable 1 milliGRAM(s) IntraMuscular once  heparin   Injectable 5000 Unit(s) SubCutaneous every 8 hours  insulin glargine Injectable (LANTUS) 20 Unit(s) SubCutaneous at bedtime  insulin lispro (ADMELOG) corrective regimen sliding scale   SubCutaneous three times a day before meals  insulin lispro (ADMELOG) corrective regimen sliding scale   SubCutaneous at bedtime  insulin lispro Injectable (ADMELOG) 5 Unit(s) SubCutaneous three times a day before meals  lisinopril 5 milliGRAM(s) Oral daily  pantoprazole    Tablet 40 milliGRAM(s) Oral before breakfast  simvastatin 40 milliGRAM(s) Oral at bedtime    MEDICATIONS  (PRN):  acetaminophen     Tablet .. 650 milliGRAM(s) Oral every 6 hours PRN Temp greater or equal to 38C (100.4F), Mild Pain (1 - 3)  dextrose Oral Gel 15 Gram(s) Oral once PRN Blood Glucose LESS THAN 70 milliGRAM(s)/deciliter  hydrALAZINE Injectable 10 milliGRAM(s) IV Push every 4 hours PRN SBP >170  ondansetron Injectable 4 milliGRAM(s) IV Push every 4 hours PRN Nausea and/or Vomiting

## 2022-11-04 ENCOUNTER — TRANSCRIPTION ENCOUNTER (OUTPATIENT)
Age: 67
End: 2022-11-04

## 2022-11-04 ENCOUNTER — OUTPATIENT (OUTPATIENT)
Dept: OUTPATIENT SERVICES | Facility: HOSPITAL | Age: 67
LOS: 1 days | End: 2022-11-04
Payer: COMMERCIAL

## 2022-11-04 ENCOUNTER — RESULT REVIEW (OUTPATIENT)
Age: 67
End: 2022-11-04

## 2022-11-04 DIAGNOSIS — E11.10 TYPE 2 DIABETES MELLITUS WITH KETOACIDOSIS WITHOUT COMA: ICD-10-CM

## 2022-11-04 LAB
ANION GAP SERPL CALC-SCNC: 10 MMOL/L — SIGNIFICANT CHANGE UP (ref 5–17)
BASOPHILS # BLD AUTO: 0 K/UL — SIGNIFICANT CHANGE UP (ref 0–0.2)
BASOPHILS NFR BLD AUTO: 0 % — SIGNIFICANT CHANGE UP (ref 0–2)
BUN SERPL-MCNC: 13 MG/DL — SIGNIFICANT CHANGE UP (ref 7–23)
CALCIUM SERPL-MCNC: 8 MG/DL — LOW (ref 8.4–10.5)
CHLORIDE SERPL-SCNC: 107 MMOL/L — SIGNIFICANT CHANGE UP (ref 96–108)
CO2 SERPL-SCNC: 23 MMOL/L — SIGNIFICANT CHANGE UP (ref 22–31)
CREAT SERPL-MCNC: 0.62 MG/DL — SIGNIFICANT CHANGE UP (ref 0.5–1.3)
EGFR: 96 ML/MIN/1.73M2 — SIGNIFICANT CHANGE UP
EOSINOPHIL # BLD AUTO: 0.03 K/UL — SIGNIFICANT CHANGE UP (ref 0–0.5)
EOSINOPHIL NFR BLD AUTO: 0.3 % — SIGNIFICANT CHANGE UP (ref 0–6)
GLUCOSE SERPL-MCNC: 186 MG/DL — HIGH (ref 70–99)
HCT VFR BLD CALC: 30.3 % — LOW (ref 34.5–45)
HGB BLD-MCNC: 9.6 G/DL — LOW (ref 11.5–15.5)
IMM GRANULOCYTES NFR BLD AUTO: 0.3 % — SIGNIFICANT CHANGE UP (ref 0–0.9)
LYMPHOCYTES # BLD AUTO: 3.8 K/UL — HIGH (ref 1–3.3)
LYMPHOCYTES # BLD AUTO: 35.8 % — SIGNIFICANT CHANGE UP (ref 13–44)
MAGNESIUM SERPL-MCNC: 1.6 MG/DL — SIGNIFICANT CHANGE UP (ref 1.6–2.6)
MCHC RBC-ENTMCNC: 21.6 PG — LOW (ref 27–34)
MCHC RBC-ENTMCNC: 31.7 GM/DL — LOW (ref 32–36)
MCV RBC AUTO: 68.1 FL — LOW (ref 80–100)
MONOCYTES # BLD AUTO: 0.71 K/UL — SIGNIFICANT CHANGE UP (ref 0–0.9)
MONOCYTES NFR BLD AUTO: 6.7 % — SIGNIFICANT CHANGE UP (ref 2–14)
NEUTROPHILS # BLD AUTO: 6.03 K/UL — SIGNIFICANT CHANGE UP (ref 1.8–7.4)
NEUTROPHILS NFR BLD AUTO: 56.9 % — SIGNIFICANT CHANGE UP (ref 43–77)
NRBC # BLD: 0 /100 WBCS — SIGNIFICANT CHANGE UP (ref 0–0)
PHOSPHATE SERPL-MCNC: 2 MG/DL — LOW (ref 2.5–4.5)
PLATELET # BLD AUTO: 321 K/UL — SIGNIFICANT CHANGE UP (ref 150–400)
POTASSIUM SERPL-MCNC: 3.9 MMOL/L — SIGNIFICANT CHANGE UP (ref 3.5–5.3)
POTASSIUM SERPL-SCNC: 3.9 MMOL/L — SIGNIFICANT CHANGE UP (ref 3.5–5.3)
RBC # BLD: 4.45 M/UL — SIGNIFICANT CHANGE UP (ref 3.8–5.2)
RBC # FLD: 20 % — HIGH (ref 10.3–14.5)
SODIUM SERPL-SCNC: 140 MMOL/L — SIGNIFICANT CHANGE UP (ref 135–145)
WBC # BLD: 10.6 K/UL — HIGH (ref 3.8–10.5)
WBC # FLD AUTO: 10.6 K/UL — HIGH (ref 3.8–10.5)

## 2022-11-04 PROCEDURE — 99223 1ST HOSP IP/OBS HIGH 75: CPT

## 2022-11-04 PROCEDURE — 72148 MRI LUMBAR SPINE W/O DYE: CPT

## 2022-11-04 PROCEDURE — 93306 TTE W/DOPPLER COMPLETE: CPT | Mod: 26

## 2022-11-04 PROCEDURE — 99233 SBSQ HOSP IP/OBS HIGH 50: CPT

## 2022-11-04 PROCEDURE — 99231 SBSQ HOSP IP/OBS SF/LOW 25: CPT

## 2022-11-04 PROCEDURE — 72148 MRI LUMBAR SPINE W/O DYE: CPT | Mod: 26

## 2022-11-04 RX ORDER — POTASSIUM PHOSPHATE, MONOBASIC POTASSIUM PHOSPHATE, DIBASIC 236; 224 MG/ML; MG/ML
30 INJECTION, SOLUTION INTRAVENOUS ONCE
Refills: 0 | Status: COMPLETED | OUTPATIENT
Start: 2022-11-04 | End: 2022-11-04

## 2022-11-04 RX ORDER — HYDROMORPHONE HYDROCHLORIDE 2 MG/ML
0.5 INJECTION INTRAMUSCULAR; INTRAVENOUS; SUBCUTANEOUS ONCE
Refills: 0 | Status: DISCONTINUED | OUTPATIENT
Start: 2022-11-04 | End: 2022-11-04

## 2022-11-04 RX ORDER — SODIUM,POTASSIUM PHOSPHATES 278-250MG
1 POWDER IN PACKET (EA) ORAL
Refills: 0 | Status: DISCONTINUED | OUTPATIENT
Start: 2022-11-04 | End: 2022-11-04

## 2022-11-04 RX ORDER — HYDROMORPHONE HYDROCHLORIDE 2 MG/ML
0.5 INJECTION INTRAMUSCULAR; INTRAVENOUS; SUBCUTANEOUS ONCE
Refills: 0 | Status: DISCONTINUED | OUTPATIENT
Start: 2022-11-04 | End: 2022-11-05

## 2022-11-04 RX ADMIN — Medication 4: at 12:02

## 2022-11-04 RX ADMIN — PANTOPRAZOLE SODIUM 40 MILLIGRAM(S): 20 TABLET, DELAYED RELEASE ORAL at 06:08

## 2022-11-04 RX ADMIN — SIMVASTATIN 40 MILLIGRAM(S): 20 TABLET, FILM COATED ORAL at 22:02

## 2022-11-04 RX ADMIN — HEPARIN SODIUM 5000 UNIT(S): 5000 INJECTION INTRAVENOUS; SUBCUTANEOUS at 06:08

## 2022-11-04 RX ADMIN — INSULIN GLARGINE 20 UNIT(S): 100 INJECTION, SOLUTION SUBCUTANEOUS at 21:55

## 2022-11-04 RX ADMIN — Medication 4: at 16:57

## 2022-11-04 RX ADMIN — Medication 2: at 21:56

## 2022-11-04 RX ADMIN — HEPARIN SODIUM 5000 UNIT(S): 5000 INJECTION INTRAVENOUS; SUBCUTANEOUS at 14:50

## 2022-11-04 RX ADMIN — TRAMADOL HYDROCHLORIDE 25 MILLIGRAM(S): 50 TABLET ORAL at 22:30

## 2022-11-04 RX ADMIN — TRAMADOL HYDROCHLORIDE 25 MILLIGRAM(S): 50 TABLET ORAL at 12:08

## 2022-11-04 RX ADMIN — HYDROMORPHONE HYDROCHLORIDE 0.5 MILLIGRAM(S): 2 INJECTION INTRAMUSCULAR; INTRAVENOUS; SUBCUTANEOUS at 00:27

## 2022-11-04 RX ADMIN — Medication 5 UNIT(S): at 16:55

## 2022-11-04 RX ADMIN — GABAPENTIN 100 MILLIGRAM(S): 400 CAPSULE ORAL at 21:35

## 2022-11-04 RX ADMIN — TRAMADOL HYDROCHLORIDE 25 MILLIGRAM(S): 50 TABLET ORAL at 14:00

## 2022-11-04 RX ADMIN — TRAMADOL HYDROCHLORIDE 25 MILLIGRAM(S): 50 TABLET ORAL at 22:06

## 2022-11-04 RX ADMIN — HYDROMORPHONE HYDROCHLORIDE 0.5 MILLIGRAM(S): 2 INJECTION INTRAMUSCULAR; INTRAVENOUS; SUBCUTANEOUS at 00:12

## 2022-11-04 RX ADMIN — LISINOPRIL 5 MILLIGRAM(S): 2.5 TABLET ORAL at 06:07

## 2022-11-04 RX ADMIN — GABAPENTIN 100 MILLIGRAM(S): 400 CAPSULE ORAL at 06:07

## 2022-11-04 RX ADMIN — GABAPENTIN 100 MILLIGRAM(S): 400 CAPSULE ORAL at 14:48

## 2022-11-04 RX ADMIN — Medication 5 UNIT(S): at 08:04

## 2022-11-04 RX ADMIN — POTASSIUM PHOSPHATE, MONOBASIC POTASSIUM PHOSPHATE, DIBASIC 83.33 MILLIMOLE(S): 236; 224 INJECTION, SOLUTION INTRAVENOUS at 16:57

## 2022-11-04 RX ADMIN — HEPARIN SODIUM 5000 UNIT(S): 5000 INJECTION INTRAVENOUS; SUBCUTANEOUS at 21:35

## 2022-11-04 RX ADMIN — Medication 4: at 08:05

## 2022-11-04 RX ADMIN — Medication 1 PACKET(S): at 12:13

## 2022-11-04 NOTE — DISCHARGE NOTE PROVIDER - CARE PROVIDERS DIRECT ADDRESSES
,DirectAddress_Unknown ,DirectAddress_Unknown,[email or web address]
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
## 2022-11-04 NOTE — CONSULT NOTE ADULT - PROBLEM SELECTOR RECOMMENDATION 9
dka - s/p insulin gtt   s/p iv hydration  s/p electrolyte replacement  transitioned to sc mdii: lantus 20 units qhs + admelog 5 units 3x/day before meals  cont admelog corrective scale coverage qac/qhs  cont cons cho diet  goal bg less than 180mg/dl in hosp setting  pt to be d/stanley on long-acting insulin 20 units qhs + glp-1 agonist. to  samples in office as her insurance does not begin until December

## 2022-11-04 NOTE — DISCHARGE NOTE PROVIDER - NSDCCPCAREPLAN_GEN_ALL_CORE_FT
PRINCIPAL DISCHARGE DIAGNOSIS  Diagnosis: DKA (diabetic ketoacidosis)  Assessment and Plan of Treatment: Go Directly to Dr Perlmans office.      SECONDARY DISCHARGE DIAGNOSES  Diagnosis: Lumbar herniated disc  Assessment and Plan of Treatment:      PRINCIPAL DISCHARGE DIAGNOSIS  Diagnosis: DKA (diabetic ketoacidosis)  Assessment and Plan of Treatment: Go Directly to Dr Perlmans office.      SECONDARY DISCHARGE DIAGNOSES  Diagnosis: Lumbar herniated disc  Assessment and Plan of Treatment: Keep incision clean and dry. Change dressing daily.  Apply ice packs to lower back for pain relief and swelling.  No heavy lifting. Do not lift more than 10 pounds.  Avoid twisting and bending over at waist.  Observe low back precautions as described by the Physical Therapist.  Walking is your best exercise.  It is best not to remain in one position for long periods such as long car rides.  Smoking should be avoided.  Tobacco products are associated with back problems.   Call the doctor with questions, fevers, severe headache, Pain medicine has been prescribed for you, as needed, and it often causes constipation.  Take docusate sodium (Colace) 100mg 3x daily, while taking narcotic pain medication.   For Constipation :   • Increase your water intake. Drink at least 8 glasses of water daily.  • Try adding fiber to your diet by eating fruits, vegetables and foods that are rich in grains.  • If you do experience constipation, you may take an over-the-counter laxative such as, Senokot, Miralax or  Milk of Magnesia. or bladder dysfunction, new numbness/weakness or new pain not relieved by medication, ice pack and change of position.

## 2022-11-04 NOTE — DISCHARGE NOTE PROVIDER - HOSPITAL COURSE
67F with DM type 2 not on insulin, HLD, HTN who present with one day of nausea/vomiting and fever.  Patient reports falling last week around 6 days ago but cannot recall mechanism of fall.  Injured her left side.  Went to urgent care over the weekend and was prescribed prednisone 10mg BID.  Has taken it for the past 4 days.  Then starting yesterday patient started to feel unwell.  Was nauseous with vomiting and also was found to have a fever.  Came here for further evaluation.     In the ED:    Triage vitals - /89    RR 18, 99%  T 98F  Labs significant for WBC 13.5, plt 520, K 6.2, Cr 1.41, glucose 711, large acetone  VBG 7.19/25/38  Was ordered for NS 1850 bolus and given 6 units of insulin    Patient admitted to [facility] for DKA management.      Type 2 DM with DKA   - due to uncontrolled DM acutely worsened by steroid use.   - admitted to [facility]  - insulin drip until gap closed, patient transitioned to basal/bolus insulin with lispro coverage scale  - restarted on metformin  - CDE, Endo, RD consults appreciated  - to be discharged directly to Dr Perlmans office for outpatient management    Abdominal pain/fever   - there was never a true fever  - leukocytosis likely due to prednisone use and reaction to DKA  - cultures that were sent NGTD  - CT showed esophageal inflammation (likely secondary to vomiting) and findings in lumbar spine     Back pain with neuropathy  - MRI showed: Moderate disc degeneration at L2-3 and L5-S1 with loss of disc height and associated degenerative endplate changes. This bulges are noted at L2-3 L4-5 and L5-S1 which flatten the ventral thecal sac and minimally narrow the BILATERAL neural foramina. Tiny central disc herniation noted at L4-5 which indents the ventral thecal sac. Small RIGHT paracentral disc herniation at L5-S1 compresses the descending RIGHT S1 nerve root.  - s/p L5-S1 discectomy    Sinus Tachycardia  - pt reports she is always tachycardic in the hospital due to anxiety  - encourage oral fluids  - caffeine free diet     HTN/HLD  - cont with simvastatin  - losartan for BP control and renal protection    DC home with home care PT         67F with DM type 2 not on insulin, HLD, HTN who present with one day of nausea/vomiting and fever.  Patient reports falling last week around 6 days ago but cannot recall mechanism of fall.  Injured her left side.  Went to urgent care over the weekend and was prescribed prednisone 10mg BID.  Has taken it for the past 4 days.  Then starting yesterday patient started to feel unwell.  Was nauseous with vomiting and also was found to have a fever.  Came here for further evaluation.     In the ED:    Triage vitals - /89    RR 18, 99%  T 98F  Labs significant for WBC 13.5, plt 520, K 6.2, Cr 1.41, glucose 711, large acetone  VBG 7.19/25/38  Was ordered for NS 1850 bolus and given 6 units of insulin    Patient admitted to [facility] for DKA management.      Type 2 DM with DKA   - due to uncontrolled DM acutely worsened by steroid use.   - admitted to [facility]  - insulin drip until gap closed, patient transitioned to basal/bolus insulin with lispro coverage scale  - restarted on metformin  - CDE, Endo, RD consults appreciated  - to be discharged directly to Dr Perlmans office for outpatient management    Abdominal pain/fever   - there was never a true fever  - leukocytosis likely due to prednisone use and reaction to DKA  - cultures that were sent NGTD  - CT showed esophageal inflammation (likely secondary to vomiting) and findings in lumbar spine     Back pain with neuropathy  - MRI showed: Moderate disc degeneration at L2-3 and L5-S1 with loss of disc height and associated degenerative endplate changes. This bulges are noted at L2-3 L4-5 and L5-S1 which flatten the ventral thecal sac and minimally narrow the BILATERAL neural foramina. Tiny central disc herniation noted at L4-5 which indents the ventral thecal sac. Small RIGHT paracentral disc herniation at L5-S1 compresses the descending RIGHT S1 nerve root.  - s/p L5-S1 discectomy    Sinus Tachycardia  - pt reports she is always tachycardic in the hospital due to anxiety  - encourage oral fluids  - caffeine free diet     HTN/HLD  - cont with simvastatin  - losartan for BP control and renal protection    DC home with home care PT             67F with DM type 2 not on insulin, HLD, HTN who present with one day of nausea/vomiting and fever.  Patient reports falling last week around 6 days ago but cannot recall mechanism of fall.  Injured her left side.  Went to urgent care over the weekend and was prescribed prednisone 10mg BID.  Has taken it for the past 4 days.  Then starting yesterday patient started to feel unwell.  Was nauseous with vomiting and also was found to have a fever.  Came here for further evaluation.     In the ED:    Triage vitals - /89    RR 18, 99%  T 98F  Labs significant for WBC 13.5, plt 520, K 6.2, Cr 1.41, glucose 711, large acetone  VBG 7.19/25/38  Was ordered for NS 1850 bolus and given 6 units of insulin    Patient admitted to [facility] for DKA management.      Type 2 DM with DKA   - due to uncontrolled DM (due to non-compliance) acutely worsened by steroid use.   - admitted to [facility]  - insulin drip until gap closed, patient transitioned to basal/bolus insulin with lispro coverage scale  - restarted on metformin  - CDE, Endo, RD consults appreciated  - to be discharged directly to Dr Perlmans office for outpatient management    Abdominal pain/fever   - there was never a true fever  - leukocytosis likely due to prednisone use and reaction to DKA  - cultures that were sent NGTD  - CT showed esophageal inflammation (likely secondary to vomiting) and findings in lumbar spine     Back pain with neuropathy  - MRI showed: Moderate disc degeneration at L2-3 and L5-S1 with loss of disc height and associated degenerative endplate changes. This bulges are noted at L2-3 L4-5 and L5-S1 which flatten the ventral thecal sac and minimally narrow the BILATERAL neural foramina. Tiny central disc herniation noted at L4-5 which indents the ventral thecal sac. Small RIGHT paracentral disc herniation at L5-S1 compresses the descending RIGHT S1 nerve root.  - s/p L5-S1 discectomy by Dr Leal    Sinus Tachycardia  - pt reports she is always tachycardic in the hospital due to anxiety  - encourage oral fluids  - caffeine free diet     HTN/HLD  - cont with simvastatin  - losartan for BP control and renal protection    DC home with home care PT

## 2022-11-04 NOTE — PROGRESS NOTE ADULT - ASSESSMENT
Physical Exam:   Vital Signs Last 24 Hrs  T(C): 37 (04 Nov 2022 08:20), Max: 37.3 (03 Nov 2022 16:03)  T(F): 98.6 (04 Nov 2022 08:20), Max: 99.1 (03 Nov 2022 16:03)  HR: 115 (04 Nov 2022 08:20) (103 - 121)  BP: 166/96 (04 Nov 2022 08:20) (126/68 - 170/95)  BP(mean): 111 (04 Nov 2022 08:20) (80 - 116)  RR: 19 (04 Nov 2022 08:20) (15 - 27)  SpO2: 96% (04 Nov 2022 08:20) (95% - 99%)    Parameters below as of 04 Nov 2022 08:20  Patient On (Oxygen Delivery Method): room air             CAPILLARY BLOOD GLUCOSE      POCT Blood Glucose.: 226 mg/dL (04 Nov 2022 07:56)  POCT Blood Glucose.: 138 mg/dL (03 Nov 2022 22:28)  POCT Blood Glucose.: 310 mg/dL (03 Nov 2022 16:50)  POCT Blood Glucose.: 359 mg/dL (03 Nov 2022 14:15)  POCT Blood Glucose.: 257 mg/dL (03 Nov 2022 12:15)      Cholesterol, Serum: 113 mg/dL (05.19.21 @ 08:36)     HDL Cholesterol, Serum: 22 mg/dL (05.19.21 @ 08:36)     LDL Cholesterol Calculated: 66 mg/dL (05.19.21 @ 08:36)     DIET: CC  >50%

## 2022-11-04 NOTE — DISCHARGE NOTE PROVIDER - CARE PROVIDER_API CALL
Perlman, Craig D ()  Internal Medicine  [address] [place] [address], [address]  [place] [address]  Phone: [phone]  Fax: [phone]  Follow Up Time:    Perlman, Craig D (DO)  Internal Medicine  [address] [facility], [address]  [place] [address]  Phone: [phone]  Fax: [phone]  Follow Up Time:     Hudson Leal (MD)  Orthopaedic Surgery  [address] [facility], [address]  [place] [address]  Phone: [phone]  Fax: [phone]  Follow Up Time:    Perlman, Craig D (DO)  Internal Medicine  [address] [facility], [address]  [place] [address]  Phone: [phone]  Fax: [phone]  Follow Up Time:     Hudson Leal (MD)  Orthopaedic Surgery  [address] [facility], [facility] [address]  [place] [address]  Phone: [phone]  Fax: [phone]  Follow Up Time: 2 weeks

## 2022-11-04 NOTE — DISCHARGE NOTE PROVIDER - NSFOLLOWUPCLINICS_GEN_ALL_ED_FT
[facility] General Internal Medicine  General Internal Medicine  [address] [place] [address]  Phone: [phone]  Fax:

## 2022-11-04 NOTE — DISCHARGE NOTE PROVIDER - NSDCFUADDINST_GEN_ALL_CORE_FT
Call your doctor if you experience:  • An increase in pain not controlled by pain medication or change in activity or  position.  • Temperature greater than 101° F.  • Redness, increased swelling or foul smelling drainage from or around the  incision.  • Numbness, tingling or a change in color or temperature of the operative site.  • Call your doctor immediately if you experience chest pain, shortness of breath or calf pain.

## 2022-11-04 NOTE — DISCHARGE NOTE PROVIDER - NSDCFUADDAPPT_GEN_ALL_CORE_FT
a1c 12.7% patient will go to Perlman's office at [facility] for insulin/meter and supplies

## 2022-11-04 NOTE — PROGRESS NOTE ADULT - PROBLEM SELECTOR PLAN 1
Type 2 A1c 12.7% adm  Recommend endocrine-Perlman on consult  FU appt: Perlman's office for insulin/meter/supplies  DSC recommendations: return to home including insulin regimen and glucose monitoring  diabetes education provided  Diabetes support info and cell # [phone] given   Goal 100-180 mg/dL; 140-180 mg/dL in critical care areas

## 2022-11-04 NOTE — PROGRESS NOTE ADULT - ASSESSMENT
66 y/o female with NIDDM on metformin, HTN, HLD with 1 day h/o fever, nausea, vomiting, and abd pain. She had a fall last week for which she was seen at urgent care. She was prescribed prednisone and a muscle relaxant. In the eR pt was found to be hypotensive and tachycardic.     fall  DM  HLD  HTN  BEN  met Acidosis  abd pain

## 2022-11-04 NOTE — DISCHARGE NOTE PROVIDER - PROVIDER TOKENS
PROVIDER:[TOKEN:[[id number]]] PROVIDER:[TOKEN:[[id number]]],PROVIDER:[TOKEN:[[id number]]] PROVIDER:[TOKEN:[[id number]]],PROVIDER:[TOKEN:[[id number]],FOLLOWUP:[2 weeks]]
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
## 2022-11-04 NOTE — DISCHARGE NOTE PROVIDER - NSDCMRMEDTOKEN_GEN_ALL_CORE_FT
predniSONE 10 mg oral tablet: 1 tab(s) orally 2 times a day  simvastatin 40 mg oral tablet: 1 tab(s) orally once a day (at bedtime)   losartan 25 mg oral tablet: 1 tab(s) orally once a day  metFORMIN 1000 mg oral tablet: 1 tab(s) orally 2 times a day  simvastatin 40 mg oral tablet: 1 tab(s) orally once a day (at bedtime)   acetaminophen 500 mg oral tablet: 2 tab(s) orally every 8 hours  losartan 25 mg oral tablet: 1 tab(s) orally once a day  metFORMIN 1000 mg oral tablet: 1 tab(s) orally 2 times a day  oxyCODONE 5 mg oral tablet: 1 tab(s) orally every 4 hours, As Needed  -for severe pain MDD:6    G89.18      Reference #: [id number]  simvastatin 40 mg oral tablet: 1 tab(s) orally once a day (at bedtime)

## 2022-11-04 NOTE — PROGRESS NOTE ADULT - ASSESSMENT
67F with DM2 not on insulin, HLD, HTN who present with one day of nausea/vomiting.   Found to be in DKA in setting of prednisone use for back pain.     DM Type 2 with hyperglycemia,  DKA - resolved  - per previous notes, patient was on levemir 35units qHS and novolog 10units qAC but patient said she is not taking any insulin.  Also patient was on prednisone which would add to her hyperglycemia as well.    - insulin drip discontinued, patient transitioned to basal/bolus insulin with lispro coverage scale  - restart low dose metformin  - CDE input appreciated, endo consult appreciated  - DC plan home with Lantus + GLP-1 via Dr Perlman's office.       Abdominal pain/fever   - there was never a true fever  - leukocytosis likely due to prednisone use and reaction to DKA  - f/u cultures that were sent  - CT showed esophageal inflammation (likely secondary to vomiting) and findings in lumbar spine     Back pain with neuropathy  - will need new MRI of lumbar spine - planned for today  - pain management Tylenol, tramadol, flexeril, gabapentin  - Ortho spine eval appreciated  - PT eval when cleared by orthopedics.    Sinus Tachycardia  - pt reports she is always tachycardic in the hospital due to anxiety  - encourage oral fluids  - caffeine free diet   - Check TTE    HTN/HLD  - cont with simvastatin  - start lisinopril for BP control     Preventive measures  - heparin subq for DVT ppx

## 2022-11-05 ENCOUNTER — TRANSCRIPTION ENCOUNTER (OUTPATIENT)
Age: 67
End: 2022-11-05

## 2022-11-05 LAB
CULTURE RESULTS: SIGNIFICANT CHANGE UP
SPECIMEN SOURCE: SIGNIFICANT CHANGE UP

## 2022-11-05 PROCEDURE — 99233 SBSQ HOSP IP/OBS HIGH 50: CPT

## 2022-11-05 RX ADMIN — Medication 5 UNIT(S): at 12:46

## 2022-11-05 RX ADMIN — HEPARIN SODIUM 5000 UNIT(S): 5000 INJECTION INTRAVENOUS; SUBCUTANEOUS at 21:41

## 2022-11-05 RX ADMIN — Medication 5 UNIT(S): at 17:22

## 2022-11-05 RX ADMIN — GABAPENTIN 100 MILLIGRAM(S): 400 CAPSULE ORAL at 21:41

## 2022-11-05 RX ADMIN — TRAMADOL HYDROCHLORIDE 25 MILLIGRAM(S): 50 TABLET ORAL at 23:34

## 2022-11-05 RX ADMIN — Medication 5 UNIT(S): at 07:58

## 2022-11-05 RX ADMIN — SIMVASTATIN 40 MILLIGRAM(S): 20 TABLET, FILM COATED ORAL at 21:41

## 2022-11-05 RX ADMIN — HYDROMORPHONE HYDROCHLORIDE 0.5 MILLIGRAM(S): 2 INJECTION INTRAMUSCULAR; INTRAVENOUS; SUBCUTANEOUS at 00:30

## 2022-11-05 RX ADMIN — GABAPENTIN 100 MILLIGRAM(S): 400 CAPSULE ORAL at 06:11

## 2022-11-05 RX ADMIN — HEPARIN SODIUM 5000 UNIT(S): 5000 INJECTION INTRAVENOUS; SUBCUTANEOUS at 06:11

## 2022-11-05 RX ADMIN — Medication 2: at 17:22

## 2022-11-05 RX ADMIN — GABAPENTIN 100 MILLIGRAM(S): 400 CAPSULE ORAL at 14:28

## 2022-11-05 RX ADMIN — ONDANSETRON 4 MILLIGRAM(S): 8 TABLET, FILM COATED ORAL at 08:34

## 2022-11-05 RX ADMIN — TRAMADOL HYDROCHLORIDE 25 MILLIGRAM(S): 50 TABLET ORAL at 15:15

## 2022-11-05 RX ADMIN — PANTOPRAZOLE SODIUM 40 MILLIGRAM(S): 20 TABLET, DELAYED RELEASE ORAL at 06:14

## 2022-11-05 RX ADMIN — TRAMADOL HYDROCHLORIDE 25 MILLIGRAM(S): 50 TABLET ORAL at 14:31

## 2022-11-05 RX ADMIN — LISINOPRIL 5 MILLIGRAM(S): 2.5 TABLET ORAL at 06:30

## 2022-11-05 RX ADMIN — HYDROMORPHONE HYDROCHLORIDE 0.5 MILLIGRAM(S): 2 INJECTION INTRAMUSCULAR; INTRAVENOUS; SUBCUTANEOUS at 00:10

## 2022-11-05 RX ADMIN — INSULIN GLARGINE 20 UNIT(S): 100 INJECTION, SOLUTION SUBCUTANEOUS at 21:42

## 2022-11-05 RX ADMIN — HEPARIN SODIUM 5000 UNIT(S): 5000 INJECTION INTRAVENOUS; SUBCUTANEOUS at 14:29

## 2022-11-05 RX ADMIN — Medication 2: at 07:57

## 2022-11-05 RX ADMIN — METFORMIN HYDROCHLORIDE 500 MILLIGRAM(S): 850 TABLET ORAL at 12:45

## 2022-11-05 NOTE — PROGRESS NOTE ADULT - ASSESSMENT
68 y/o female with NIDDM on metformin, HTN, HLD with 1 day h/o fever, nausea, vomiting, and abd pain. She had a fall last week for which she was seen at urgent care. She was prescribed prednisone and a muscle relaxant. In the eR pt was found to be hypotensive and tachycardic.     fall  DM  HLD  HTN  BEN  met Acidosis  abd pain      s/p DKA  BEN resolved  ENDO eval noted  DM rx regimen in progress  Ortho eval noted for back pain - leg pain - LS Spine disease  s/p SPCU stay  pain assessment

## 2022-11-05 NOTE — DISCHARGE NOTE NURSING/CASE MANAGEMENT/SOCIAL WORK - PATIENT PORTAL LINK FT
You can access the FollowMyHealth Patient Portal offered by [facility] by registering at the following website: [email or web address]. By joining [employer]’s FollowMyHealth portal, you will also be able to view your health information using other applications (apps) compatible with our system.

## 2022-11-05 NOTE — PROGRESS NOTE ADULT - ASSESSMENT
67F HTN, HLD, DM2 and Back Pain initially admitted for DKA    DM2   Initially admitted for DKA which is resolved; Etiology Steroids  Metformin + Lantus 20U + Admelog 5U TID  Endocrine on board and appreciate their recommendations    Back pain   Was given course of steroids which is now stopped  Pain control with Tramadol+ Flexeril and Gabapentin  MRI confirms lumbar disc disease with compression  Consultation with Mel and appreciate recommendations  May opt for surgical intervention and if so patient is medically optimized     Sinus Tachycardia  Anxiety and Stress driven primarily  TTE done and reviewed  Cardiology consultation noted     HTN/HLD  Lisinopril + Statin    Diet  Regular    DVT Prophylaxis  Heparin SC    Disposition   Discharge planning pending hospital course

## 2022-11-05 NOTE — CONSULT NOTE ADULT - CONSULT REASON
Left leg pain
DKA
Pre-operative evaluation, tachycardia
dka
DKA  DM
67y  diabetes mellitus uncontrolled type 2

## 2022-11-05 NOTE — CONSULT NOTE ADULT - ASSESSMENT
The patient is a 67 year old female with a history of HTN, HL, DM who was admitted with back pain and DKA.    Plan:  - ECG with sinus tachycardia and otherwise nonspecific findings  - Telemetry with sinus tachycardia; no other tachyarrhythmias noted  - Tachycardia likely multifactorial from anxiety, stress, hyperglycemia, etc.  - Echo with normal LV systolic function, no significant valve issues  - Continue lisinopril 5 mg daily  - If BP trends up significantly, can start low dose metoprolol  - Plan for back surgery. There are no active cardiac issues. The patient is at intermediate risk for cardiac events for an intermediate risk surgery. The patient is optimized to proceed from a cardiac standpoint.

## 2022-11-05 NOTE — DISCHARGE NOTE NURSING/CASE MANAGEMENT/SOCIAL WORK - NSSCNAMETXT_GEN_ALL_CORE
[facility] (formerly [facility])   [address] [place] [address]

## 2022-11-05 NOTE — CONSULT NOTE ADULT - SUBJECTIVE AND OBJECTIVE BOX
History of Present Illness: The patient is a 67 year old female with a history of HTN, HL, DM who was admitted with back pain and DKA. She initially had nausea and vomiting. She had been started on prednisone for back pain. While in hospital, she has been noted to be tachycardic. She denies chest pain, shortness of breath, dizziness. She has been anxious and stressed in the hospital.    Past Medical/Surgical History:  HTN, HL, DM    Medications:  Home Medications:  predniSONE 10 mg oral tablet: 1 tab(s) orally 2 times a day (03 Nov 2022 11:05)  simvastatin 40 mg oral tablet: 1 tab(s) orally once a day (at bedtime) (03 Nov 2022 11:05)      Family History: Non-contributory family history of premature cardiovascular atherosclerotic disease    Social History: No tobacco, alcohol or drug use    Review of Systems:  General: No fevers, chills, weight gain  Skin: No rashes, color changes  Cardiovascular: No chest pain, orthopnea  Respiratory: No shortness of breath, cough  Gastrointestinal: No nausea, abdominal pain  Genitourinary: No incontinence, pain with urination  Musculoskeletal: No pain, swelling, decreased range of motion  Neurological: No headache, weakness  Psychiatric: No depression, anxiety  Endocrine: No weight gain, increased thirst  All other systems are comprehensively negative.    Physical Exam:  Vitals:        Vital Signs Last 24 Hrs  T(C): 37 (05 Nov 2022 07:46), Max: 37 (05 Nov 2022 07:46)  T(F): 98.6 (05 Nov 2022 07:46), Max: 98.6 (05 Nov 2022 07:46)  HR: 104 (05 Nov 2022 07:46) (100 - 118)  BP: 115/64 (05 Nov 2022 07:46) (107/59 - 156/98)  BP(mean): 82 (04 Nov 2022 16:00) (82 - 92)  RR: 20 (05 Nov 2022 07:46) (18 - 25)  SpO2: 97% (05 Nov 2022 07:46) (97% - 100%)  Parameters below as of 05 Nov 2022 07:46  Patient On (Oxygen Delivery Method): room air  General: NAD  HEENT: MMM  Neck: No JVD, no carotid bruit  Lungs: CTAB  CV: RRR, nl S1/S2, no M/R/G  Abdomen: S/NT/ND, +BS  Extremities: No LE edema, no cyanosis  Neuro: AAOx3, non-focal  Skin: No rash    Labs:                        9.6    10.60 )-----------( 321      ( 04 Nov 2022 06:00 )             30.3     11-04    140  |  107  |  13  ----------------------------<  186<H>  3.9   |  23  |  0.62    Ca    8.0<L>      04 Nov 2022 06:00  Phos  2.0     11-04  Mg     1.6     11-04              ECG/Telemetry: Sinus tachycardia, LAD, poor R wave progression, nonspecific ST abnormality

## 2022-11-05 NOTE — DISCHARGE NOTE NURSING/CASE MANAGEMENT/SOCIAL WORK - NSDCFUADDAPPT_GEN_ALL_CORE_FT
a1c 12.7% patient will go to Perlman's office at [facility] for insulin/meter and supplies

## 2022-11-06 PROCEDURE — 99232 SBSQ HOSP IP/OBS MODERATE 35: CPT

## 2022-11-06 PROCEDURE — 73562 X-RAY EXAM OF KNEE 3: CPT | Mod: 26,LT

## 2022-11-06 RX ADMIN — HEPARIN SODIUM 5000 UNIT(S): 5000 INJECTION INTRAVENOUS; SUBCUTANEOUS at 05:44

## 2022-11-06 RX ADMIN — LISINOPRIL 5 MILLIGRAM(S): 2.5 TABLET ORAL at 05:43

## 2022-11-06 RX ADMIN — HEPARIN SODIUM 5000 UNIT(S): 5000 INJECTION INTRAVENOUS; SUBCUTANEOUS at 14:42

## 2022-11-06 RX ADMIN — Medication 4: at 12:13

## 2022-11-06 RX ADMIN — Medication 650 MILLIGRAM(S): at 23:30

## 2022-11-06 RX ADMIN — GABAPENTIN 100 MILLIGRAM(S): 400 CAPSULE ORAL at 21:26

## 2022-11-06 RX ADMIN — INSULIN GLARGINE 20 UNIT(S): 100 INJECTION, SOLUTION SUBCUTANEOUS at 21:26

## 2022-11-06 RX ADMIN — TRAMADOL HYDROCHLORIDE 25 MILLIGRAM(S): 50 TABLET ORAL at 14:42

## 2022-11-06 RX ADMIN — PANTOPRAZOLE SODIUM 40 MILLIGRAM(S): 20 TABLET, DELAYED RELEASE ORAL at 05:43

## 2022-11-06 RX ADMIN — TRAMADOL HYDROCHLORIDE 25 MILLIGRAM(S): 50 TABLET ORAL at 00:15

## 2022-11-06 RX ADMIN — GABAPENTIN 100 MILLIGRAM(S): 400 CAPSULE ORAL at 14:43

## 2022-11-06 RX ADMIN — TRAMADOL HYDROCHLORIDE 25 MILLIGRAM(S): 50 TABLET ORAL at 21:55

## 2022-11-06 RX ADMIN — TRAMADOL HYDROCHLORIDE 25 MILLIGRAM(S): 50 TABLET ORAL at 06:30

## 2022-11-06 RX ADMIN — METFORMIN HYDROCHLORIDE 500 MILLIGRAM(S): 850 TABLET ORAL at 12:14

## 2022-11-06 RX ADMIN — TRAMADOL HYDROCHLORIDE 25 MILLIGRAM(S): 50 TABLET ORAL at 15:30

## 2022-11-06 RX ADMIN — SIMVASTATIN 40 MILLIGRAM(S): 20 TABLET, FILM COATED ORAL at 21:24

## 2022-11-06 RX ADMIN — GABAPENTIN 100 MILLIGRAM(S): 400 CAPSULE ORAL at 05:43

## 2022-11-06 RX ADMIN — HEPARIN SODIUM 5000 UNIT(S): 5000 INJECTION INTRAVENOUS; SUBCUTANEOUS at 21:26

## 2022-11-06 RX ADMIN — TRAMADOL HYDROCHLORIDE 25 MILLIGRAM(S): 50 TABLET ORAL at 21:25

## 2022-11-06 RX ADMIN — Medication 5 UNIT(S): at 16:54

## 2022-11-06 RX ADMIN — Medication 5 UNIT(S): at 12:13

## 2022-11-06 RX ADMIN — TRAMADOL HYDROCHLORIDE 25 MILLIGRAM(S): 50 TABLET ORAL at 05:43

## 2022-11-06 RX ADMIN — Medication 5 UNIT(S): at 08:02

## 2022-11-06 NOTE — PROGRESS NOTE ADULT - ASSESSMENT
67F HTN, HLD, DM2 and Back Pain initially admitted for DKA    DM2   Initially admitted for DKA which is resolved; Etiology Steroids + uncontrolled DM  Metformin + Lantus 20U + Admelog 5U TID  FS acceptable, continue to monitor plus Moderate coverage  Endocrine on board and appreciate their recommendations    Back pain   Was given course of steroids which is now stopped  Pain control with Tramadol+ Flexeril and Gabapentin  MRI confirms lumbar disc disease with compression  Consultation with Mel and appreciate recommendations  May opt for surgical intervention and if so patient is medically optimized     Sinus Tachycardia  Anxiety and Stress driven primarily  TTE done and reviewed  Cardiology consultation noted     HTN/HLD  Lisinopril + Statin    Diet  Regular    DVT Prophylaxis  Heparin SC    Disposition   Discharge planning pending hospital course

## 2022-11-06 NOTE — PROGRESS NOTE ADULT - ASSESSMENT
66 y/o female with NIDDM on metformin, HTN, HLD with 1 day h/o fever, nausea, vomiting, and abd pain. She had a fall last week for which she was seen at urgent care. She was prescribed prednisone and a muscle relaxant. In the eR pt was found to be hypotensive and tachycardic.     fall  DM  HLD  HTN  BEN  met Acidosis  abd pain    cardio eval noted  dc planning  cm following    s/p DKA  BEN resolved  ENDO eval noted  DM rx regimen in progress  Ortho eval noted for back pain - leg pain - LS Spine disease  s/p SPCU stay  pain assessment

## 2022-11-06 NOTE — CHART NOTE - NSCHARTNOTEFT_GEN_A_CORE
Assessment: As per chart "The pt is a 67F with DM2 not on insulin, HLD, HTN who present with one day of nausea/vomiting and fever.  Patient reports falling last week around 6 days ago but cannot recall mechanism of fall.  Injured her left side.  Went to urgent care over the weekend and was prescribed prednisone 10mg BID.  Has taken it for the past 4 days.  Then starting yesterday patient started to feel unwell.  Was nauseous with vomiting and also was found to have a fever.  Came here for further evaluation."      Pt seen for malnutrition follow-up. Visited patient in room, presents with fair appetite/po intake, consuming ~50% of meals, was drinking <1 can of glucerna. Denies n/v/d/c, last BM yesterday. Reported vomiting yesterday, now resolved. No reported difficulty chewing or swallowing. NKFA. Pertinent medications/nutrition labs reviewed; noted -188 Walter Zaman, MS, RD 24hrs; In house ordered for Lantus 20 units, lispro sliding scale to aid in glucose management. Recommended Arnold (7 gm arginine/7 gm glutamine/1.5 gm hmb) x1 packet qd to aid in wound healing, encouraged po and glucerna x1 per day to optimize intake. Pt receptive, no nutrition related questions at this time. RD to continue to monitor nutrition status per protocol.     Factors impacting intake: [ ] none [ ] nausea  [ x] vomiting [ ] diarrhea [ ] constipation  [ ]chewing problems [ ] swallowing issues  [ x] other: fair appetite    Diet Prescription: Diet, Consistent Carbohydrate/No Snacks:   No Caffeine  Supplement Feeding Modality:  Oral  Glucerna Shake Cans or Servings Per Day:  1       Frequency:  Daily (22 @ 16:54)    Intake: fair    Daily Weight in k.6 (2022 04:39)  Weight in k.6 (2022 00:43)    -- Weight appears to be stable in house, will continue to monitor weight trends as able     Pertinent Medications: MEDICATIONS  (STANDING):  dextrose 5%. 1000 milliLiter(s) (100 mL/Hr) IV Continuous <Continuous>  dextrose 5%. 1000 milliLiter(s) (50 mL/Hr) IV Continuous <Continuous>  dextrose 50% Injectable 25 Gram(s) IV Push once  dextrose 50% Injectable 12.5 Gram(s) IV Push once  dextrose 50% Injectable 25 Gram(s) IV Push once  gabapentin 100 milliGRAM(s) Oral every 8 hours  glucagon  Injectable 1 milliGRAM(s) IntraMuscular once  heparin   Injectable 5000 Unit(s) SubCutaneous every 8 hours  insulin glargine Injectable (LANTUS) 20 Unit(s) SubCutaneous at bedtime  insulin lispro (ADMELOG) corrective regimen sliding scale   SubCutaneous three times a day before meals  insulin lispro (ADMELOG) corrective regimen sliding scale   SubCutaneous at bedtime  insulin lispro Injectable (ADMELOG) 5 Unit(s) SubCutaneous three times a day before meals  lisinopril 5 milliGRAM(s) Oral daily  metFORMIN 500 milliGRAM(s) Oral daily  pantoprazole    Tablet 40 milliGRAM(s) Oral before breakfast  simvastatin 40 milliGRAM(s) Oral at bedtime    MEDICATIONS  (PRN):  acetaminophen     Tablet .. 650 milliGRAM(s) Oral every 6 hours PRN Temp greater or equal to 38C (100.4F), Mild Pain (1 - 3)  aluminum hydroxide/magnesium hydroxide/simethicone Suspension 30 milliLiter(s) Oral every 6 hours PRN Dyspepsia  cyclobenzaprine 5 milliGRAM(s) Oral three times a day PRN Muscle Spasm  dextrose Oral Gel 15 Gram(s) Oral once PRN Blood Glucose LESS THAN 70 milliGRAM(s)/deciliter  hydrALAZINE Injectable 10 milliGRAM(s) IV Push every 4 hours PRN SBP >170  ondansetron Injectable 4 milliGRAM(s) IV Push every 4 hours PRN Nausea and/or Vomiting  traMADol 25 milliGRAM(s) Oral every 4 hours PRN Severe Pain (7 - 10)    Pertinent Labs:  Na140 mmol/L Glu 186 mg/dL<H> K+ 3.9 mmol/L Cr  0.62 mg/dL BUN 13 mg/dL  Phos 2.0 mg/dL<L>  Alb 3.0 g/dL<L>     CAPILLARY BLOOD GLUCOSE      POCT Blood Glucose.: 112 mg/dL (2022 07:30)  POCT Blood Glucose.: 114 mg/dL (2022 21:28)  POCT Blood Glucose.: 176 mg/dL (2022 17:04)  POCT Blood Glucose.: 144 mg/dL (2022 12:14)      Edema per flowsheets: none noted  Skin: pressure injury stage 2 to sacrum      Estimated Needs:   [x ] no change since previous assessment  [ ] recalculated:     Previous Nutrition Diagnosis:   [ ] Inadequate Energy Intake [ ]Inadequate Oral Intake [ ] Excessive Energy Intake   [ ] Underweight [x ] Increased Nutrient Needs [ ] Overweight/Obesity [ ] Altered Nutrition related lab values  [ ] Altered GI Function [ ] Unintended Weight Loss [ ] Food & Nutrition Related Knowledge Deficit [x] Malnutrition     Nutrition Diagnosis is [ x] ongoing  [ ] resolved [ ] not applicable     New Nutrition Diagnosis: [x ] Altered Nutrition related lab values related to endocrine dysfunction vs lack of prior education as evidenced by A1c 11.2%, DKA      Interventions: continue with current diet order, encourage po/ONS intake as needed  Recommend  [ ] Change Diet To:  [x ] Nutrition Supplement: add Arnold (7 gm arginine/7 gm glutamine/1.5 gm hmb) x1 packet qd   [ ] Nutrition Support  [ x] Other: reinforce diet education as able    Monitoring and Evaluation:   [X ] Intake [ x ] Tolerance to diet prescription [ x ] weights [ x ] labs[ x ] follow up per protocol  [ ] other: Assessment: As per chart "The pt is a 67F with DM2 not on insulin, HLD, HTN who present with one day of nausea/vomiting and fever.  Patient reports falling last week around 6 days ago but cannot recall mechanism of fall.  Injured her left side.  Went to urgent care over the weekend and was prescribed prednisone 10mg BID.  Has taken it for the past 4 days.  Then starting yesterday patient started to feel unwell.  Was nauseous with vomiting and also was found to have a fever.  Came here for further evaluation."      Pt seen for malnutrition follow-up. Visited patient in room, presents with fair appetite/po intake, consuming ~50% of meals, was drinking <1 can of glucerna. Denies n/v/d/c, last BM yesterday. Reported vomiting yesterday, now resolved. No reported difficulty chewing or swallowing. NKFA. Pertinent medications/nutrition labs reviewed; noted -188 X24hrs; In house ordered for Lantus 20 units, lispro sliding scale to aid in glucose management. Recommended Arnold (7 gm arginine/7 gm glutamine/1.5 gm hmb) x1 packet qd to aid in wound healing, encouraged po and glucerna x1 per day to optimize intake. Pt receptive, no nutrition related questions at this time. RD to continue to monitor nutrition status per protocol.     Factors impacting intake: [ ] none [ ] nausea  [ x] vomiting [ ] diarrhea [ ] constipation  [ ]chewing problems [ ] swallowing issues  [ x] other: fair appetite    Diet Prescription: Diet, Consistent Carbohydrate/No Snacks:   No Caffeine  Supplement Feeding Modality:  Oral  Glucerna Shake Cans or Servings Per Day:  1       Frequency:  Daily (22 @ 16:54)    Intake: fair    Daily Weight in k.6 (2022 04:39)  Weight in k.6 (2022 00:43)    -- Weight appears to be stable in house, will continue to monitor weight trends as able     Pertinent Medications: MEDICATIONS  (STANDING):  dextrose 5%. 1000 milliLiter(s) (100 mL/Hr) IV Continuous <Continuous>  dextrose 5%. 1000 milliLiter(s) (50 mL/Hr) IV Continuous <Continuous>  dextrose 50% Injectable 25 Gram(s) IV Push once  dextrose 50% Injectable 12.5 Gram(s) IV Push once  dextrose 50% Injectable 25 Gram(s) IV Push once  gabapentin 100 milliGRAM(s) Oral every 8 hours  glucagon  Injectable 1 milliGRAM(s) IntraMuscular once  heparin   Injectable 5000 Unit(s) SubCutaneous every 8 hours  insulin glargine Injectable (LANTUS) 20 Unit(s) SubCutaneous at bedtime  insulin lispro (ADMELOG) corrective regimen sliding scale   SubCutaneous three times a day before meals  insulin lispro (ADMELOG) corrective regimen sliding scale   SubCutaneous at bedtime  insulin lispro Injectable (ADMELOG) 5 Unit(s) SubCutaneous three times a day before meals  lisinopril 5 milliGRAM(s) Oral daily  metFORMIN 500 milliGRAM(s) Oral daily  pantoprazole    Tablet 40 milliGRAM(s) Oral before breakfast  simvastatin 40 milliGRAM(s) Oral at bedtime    MEDICATIONS  (PRN):  acetaminophen     Tablet .. 650 milliGRAM(s) Oral every 6 hours PRN Temp greater or equal to 38C (100.4F), Mild Pain (1 - 3)  aluminum hydroxide/magnesium hydroxide/simethicone Suspension 30 milliLiter(s) Oral every 6 hours PRN Dyspepsia  cyclobenzaprine 5 milliGRAM(s) Oral three times a day PRN Muscle Spasm  dextrose Oral Gel 15 Gram(s) Oral once PRN Blood Glucose LESS THAN 70 milliGRAM(s)/deciliter  hydrALAZINE Injectable 10 milliGRAM(s) IV Push every 4 hours PRN SBP >170  ondansetron Injectable 4 milliGRAM(s) IV Push every 4 hours PRN Nausea and/or Vomiting  traMADol 25 milliGRAM(s) Oral every 4 hours PRN Severe Pain (7 - 10)    Pertinent Labs:  Na140 mmol/L Glu 186 mg/dL<H> K+ 3.9 mmol/L Cr  0.62 mg/dL BUN 13 mg/dL  Phos 2.0 mg/dL<L>  Alb 3.0 g/dL<L>     CAPILLARY BLOOD GLUCOSE      POCT Blood Glucose.: 112 mg/dL (2022 07:30)  POCT Blood Glucose.: 114 mg/dL (2022 21:28)  POCT Blood Glucose.: 176 mg/dL (2022 17:04)  POCT Blood Glucose.: 144 mg/dL (2022 12:14)      Edema per flowsheets: none noted  Skin: pressure injury stage 2 to sacrum      Estimated Needs:   [x ] no change since previous assessment  [ ] recalculated:     Previous Nutrition Diagnosis:   [ ] Inadequate Energy Intake [ ]Inadequate Oral Intake [ ] Excessive Energy Intake   [ ] Underweight [x ] Increased Nutrient Needs [ ] Overweight/Obesity [ ] Altered Nutrition related lab values  [ ] Altered GI Function [ ] Unintended Weight Loss [ ] Food & Nutrition Related Knowledge Deficit [x] Malnutrition     Nutrition Diagnosis is [ x] ongoing  [ ] resolved [ ] not applicable     New Nutrition Diagnosis: [x ] Altered Nutrition related lab values related to endocrine dysfunction vs lack of prior education as evidenced by A1c 11.2%, DKA      Interventions: continue with current diet order, encourage po/ONS intake as needed  Recommend  [ ] Change Diet To:  [x ] Nutrition Supplement: add Arnold (7 gm arginine/7 gm glutamine/1.5 gm hmb) x1 packet qd   [ ] Nutrition Support  [ x] Other: reinforce diet education as able    Monitoring and Evaluation:   [X ] Intake [ x ] Tolerance to diet prescription [ x ] weights [ x ] labs[ x ] follow up per protocol  [ ] other: Assessment: As per chart "The pt is a 67F with DM2 not on insulin, HLD, HTN who present with one day of nausea/vomiting and fever.  Patient reports falling last week around 6 days ago but cannot recall mechanism of fall.  Injured her left side.  Went to urgent care over the weekend and was prescribed prednisone 10mg BID.  Has taken it for the past 4 days.  Then starting yesterday patient started to feel unwell.  Was nauseous with vomiting and also was found to have a fever.  Came here for further evaluation."      Pt seen for malnutrition follow-up. Visited patient in room, presents with fair appetite/po intake, consuming ~50% of meals, was drinking <1 can of glucerna. Denies n/v/d/c, last BM yesterday. Reported vomiting yesterday, now resolved. No reported difficulty chewing or swallowing. Pertinent medications/nutrition labs reviewed; noted -188 X24hrs; In house ordered for Lantus 20 units, lispro sliding scale to aid in glucose management. Recommended Arnold (7 gm arginine/7 gm glutamine/1.5 gm hmb) x1 packet qd to aid in wound healing, encouraged po and glucerna x1 per day to optimize intake. Pt receptive, no nutrition related questions at this time. RD to continue to monitor nutrition status per protocol.     Factors impacting intake: [ ] none [ ] nausea  [ x] vomiting [ ] diarrhea [ ] constipation  [ ]chewing problems [ ] swallowing issues  [ x] other: fair appetite    Diet Prescription: Diet, Consistent Carbohydrate/No Snacks:   No Caffeine  Supplement Feeding Modality:  Oral  Glucerna Shake Cans or Servings Per Day:  1       Frequency:  Daily (22 @ 16:54)    Intake: fair    Daily Weight in k.6 (2022 04:39)  Weight in k.6 (2022 00:43)    -- Weight appears to be stable in house, will continue to monitor weight trends as able     Pertinent Medications: MEDICATIONS  (STANDING):  dextrose 5%. 1000 milliLiter(s) (100 mL/Hr) IV Continuous <Continuous>  dextrose 5%. 1000 milliLiter(s) (50 mL/Hr) IV Continuous <Continuous>  dextrose 50% Injectable 25 Gram(s) IV Push once  dextrose 50% Injectable 12.5 Gram(s) IV Push once  dextrose 50% Injectable 25 Gram(s) IV Push once  gabapentin 100 milliGRAM(s) Oral every 8 hours  glucagon  Injectable 1 milliGRAM(s) IntraMuscular once  heparin   Injectable 5000 Unit(s) SubCutaneous every 8 hours  insulin glargine Injectable (LANTUS) 20 Unit(s) SubCutaneous at bedtime  insulin lispro (ADMELOG) corrective regimen sliding scale   SubCutaneous three times a day before meals  insulin lispro (ADMELOG) corrective regimen sliding scale   SubCutaneous at bedtime  insulin lispro Injectable (ADMELOG) 5 Unit(s) SubCutaneous three times a day before meals  lisinopril 5 milliGRAM(s) Oral daily  metFORMIN 500 milliGRAM(s) Oral daily  pantoprazole    Tablet 40 milliGRAM(s) Oral before breakfast  simvastatin 40 milliGRAM(s) Oral at bedtime    MEDICATIONS  (PRN):  acetaminophen     Tablet .. 650 milliGRAM(s) Oral every 6 hours PRN Temp greater or equal to 38C (100.4F), Mild Pain (1 - 3)  aluminum hydroxide/magnesium hydroxide/simethicone Suspension 30 milliLiter(s) Oral every 6 hours PRN Dyspepsia  cyclobenzaprine 5 milliGRAM(s) Oral three times a day PRN Muscle Spasm  dextrose Oral Gel 15 Gram(s) Oral once PRN Blood Glucose LESS THAN 70 milliGRAM(s)/deciliter  hydrALAZINE Injectable 10 milliGRAM(s) IV Push every 4 hours PRN SBP >170  ondansetron Injectable 4 milliGRAM(s) IV Push every 4 hours PRN Nausea and/or Vomiting  traMADol 25 milliGRAM(s) Oral every 4 hours PRN Severe Pain (7 - 10)    Pertinent Labs:  Na140 mmol/L Glu 186 mg/dL<H> K+ 3.9 mmol/L Cr  0.62 mg/dL BUN 13 mg/dL  Phos 2.0 mg/dL<L>  Alb 3.0 g/dL<L>     CAPILLARY BLOOD GLUCOSE      POCT Blood Glucose.: 112 mg/dL (2022 07:30)  POCT Blood Glucose.: 114 mg/dL (2022 21:28)  POCT Blood Glucose.: 176 mg/dL (2022 17:04)  POCT Blood Glucose.: 144 mg/dL (2022 12:14)      Edema per flowsheets: none noted  Skin: pressure injury stage 2 to sacrum      Estimated Needs:   [x ] no change since previous assessment  [ ] recalculated:     Previous Nutrition Diagnosis:   [ ] Inadequate Energy Intake [ ]Inadequate Oral Intake [ ] Excessive Energy Intake   [ ] Underweight [x ] Increased Nutrient Needs [ ] Overweight/Obesity [ ] Altered Nutrition related lab values  [ ] Altered GI Function [ ] Unintended Weight Loss [ ] Food & Nutrition Related Knowledge Deficit [x] Malnutrition     Nutrition Diagnosis is [ x] ongoing  [ ] resolved [ ] not applicable     New Nutrition Diagnosis: [x ] Altered Nutrition related lab values related to endocrine dysfunction vs lack of prior education as evidenced by A1c 11.2%, DKA      Interventions: continue with current diet order, encourage po/ONS intake as needed  Recommend  [ ] Change Diet To:  [x ] Nutrition Supplement: add Arnold (7 gm arginine/7 gm glutamine/1.5 gm hmb) x1 packet qd   [ ] Nutrition Support  [ x] Other: reinforce diet education as able    Monitoring and Evaluation:   [X ] Intake [ x ] Tolerance to diet prescription [ x ] weights [ x ] labs[ x ] follow up per protocol  [ ] other:

## 2022-11-07 ENCOUNTER — TRANSCRIPTION ENCOUNTER (OUTPATIENT)
Age: 67
End: 2022-11-07

## 2022-11-07 DIAGNOSIS — M54.16 RADICULOPATHY, LUMBAR REGION: ICD-10-CM

## 2022-11-07 LAB — SARS-COV-2 RNA SPEC QL NAA+PROBE: SIGNIFICANT CHANGE UP

## 2022-11-07 PROCEDURE — 99233 SBSQ HOSP IP/OBS HIGH 50: CPT

## 2022-11-07 RX ADMIN — TRAMADOL HYDROCHLORIDE 25 MILLIGRAM(S): 50 TABLET ORAL at 08:23

## 2022-11-07 RX ADMIN — GABAPENTIN 100 MILLIGRAM(S): 400 CAPSULE ORAL at 14:46

## 2022-11-07 RX ADMIN — Medication 5 UNIT(S): at 08:01

## 2022-11-07 RX ADMIN — HEPARIN SODIUM 5000 UNIT(S): 5000 INJECTION INTRAVENOUS; SUBCUTANEOUS at 06:35

## 2022-11-07 RX ADMIN — INSULIN GLARGINE 20 UNIT(S): 100 INJECTION, SOLUTION SUBCUTANEOUS at 21:51

## 2022-11-07 RX ADMIN — LISINOPRIL 5 MILLIGRAM(S): 2.5 TABLET ORAL at 06:35

## 2022-11-07 RX ADMIN — Medication 5 UNIT(S): at 12:39

## 2022-11-07 RX ADMIN — GABAPENTIN 100 MILLIGRAM(S): 400 CAPSULE ORAL at 21:32

## 2022-11-07 RX ADMIN — Medication 2: at 17:14

## 2022-11-07 RX ADMIN — TRAMADOL HYDROCHLORIDE 25 MILLIGRAM(S): 50 TABLET ORAL at 07:53

## 2022-11-07 RX ADMIN — PANTOPRAZOLE SODIUM 40 MILLIGRAM(S): 20 TABLET, DELAYED RELEASE ORAL at 06:35

## 2022-11-07 RX ADMIN — GABAPENTIN 100 MILLIGRAM(S): 400 CAPSULE ORAL at 06:34

## 2022-11-07 RX ADMIN — TRAMADOL HYDROCHLORIDE 25 MILLIGRAM(S): 50 TABLET ORAL at 20:30

## 2022-11-07 RX ADMIN — TRAMADOL HYDROCHLORIDE 25 MILLIGRAM(S): 50 TABLET ORAL at 19:41

## 2022-11-07 RX ADMIN — METFORMIN HYDROCHLORIDE 500 MILLIGRAM(S): 850 TABLET ORAL at 12:49

## 2022-11-07 RX ADMIN — TRAMADOL HYDROCHLORIDE 25 MILLIGRAM(S): 50 TABLET ORAL at 14:50

## 2022-11-07 RX ADMIN — SIMVASTATIN 40 MILLIGRAM(S): 20 TABLET, FILM COATED ORAL at 21:32

## 2022-11-07 RX ADMIN — Medication 5 UNIT(S): at 17:15

## 2022-11-07 RX ADMIN — TRAMADOL HYDROCHLORIDE 25 MILLIGRAM(S): 50 TABLET ORAL at 23:50

## 2022-11-07 RX ADMIN — HEPARIN SODIUM 5000 UNIT(S): 5000 INJECTION INTRAVENOUS; SUBCUTANEOUS at 14:46

## 2022-11-07 RX ADMIN — HEPARIN SODIUM 5000 UNIT(S): 5000 INJECTION INTRAVENOUS; SUBCUTANEOUS at 21:32

## 2022-11-07 RX ADMIN — TRAMADOL HYDROCHLORIDE 25 MILLIGRAM(S): 50 TABLET ORAL at 15:20

## 2022-11-07 NOTE — PROGRESS NOTE ADULT - ASSESSMENT
67F HTN, HLD, DM2 and Back Pain initially admitted for DKA    DM2   Initially admitted for DKA which is resolved; Etiology Steroids + uncontrolled DM  Metformin + Lantus 20U + Admelog 5U TID  FS acceptable, continue to monitor plus Moderate coverage  Endocrine on board and appreciate their recommendations    Back pain   Was given course of steroids which is now stopped  Pain control with Tramadol+ Flexeril and Gabapentin  MRI confirms lumbar disc disease with compression  Consultation with Mel and appreciate recommendations  Family deciding on surgical intervention vs epidural vs medical management  Patient is medically optimized if surgical intervention is planned    Sinus Tachycardia  Anxiety and Stress driven primarily  TTE done and reviewed  Cardiology consultation noted     HTN/HLD  Lisinopril + Statin    Diet  Consistent carb    DVT Prophylaxis  Heparin SC    Disposition   Discharge planning pending hospital course

## 2022-11-08 ENCOUNTER — TRANSCRIPTION ENCOUNTER (OUTPATIENT)
Age: 67
End: 2022-11-08

## 2022-11-08 ENCOUNTER — RESULT REVIEW (OUTPATIENT)
Age: 67
End: 2022-11-08

## 2022-11-08 LAB
ANION GAP SERPL CALC-SCNC: 8 MMOL/L — SIGNIFICANT CHANGE UP (ref 5–17)
BLD GP AB SCN SERPL QL: SIGNIFICANT CHANGE UP
CO2 SERPL-SCNC: 28 MMOL/L — SIGNIFICANT CHANGE UP (ref 22–31)
CULTURE RESULTS: SIGNIFICANT CHANGE UP
EGFR: 98 ML/MIN/1.73M2 — SIGNIFICANT CHANGE UP
HCT VFR BLD CALC: 33.7 % — LOW (ref 34.5–45)
HGB BLD-MCNC: 10.1 G/DL — LOW (ref 11.5–15.5)
MCHC RBC-ENTMCNC: 21.5 PG — LOW (ref 27–34)
NRBC # BLD: 0 /100 WBCS — SIGNIFICANT CHANGE UP (ref 0–0)
PLATELET # BLD AUTO: 303 K/UL — SIGNIFICANT CHANGE UP (ref 150–400)
SODIUM SERPL-SCNC: 137 MMOL/L — SIGNIFICANT CHANGE UP (ref 135–145)
WBC # BLD: 9.62 K/UL — SIGNIFICANT CHANGE UP (ref 3.8–10.5)
WBC # FLD AUTO: 9.62 K/UL — SIGNIFICANT CHANGE UP (ref 3.8–10.5)

## 2022-11-08 PROCEDURE — 99232 SBSQ HOSP IP/OBS MODERATE 35: CPT

## 2022-11-08 PROCEDURE — 63030 LAMOT DCMPRN NRV RT 1 LMBR: CPT | Mod: LT

## 2022-11-08 PROCEDURE — 88304 TISSUE EXAM BY PATHOLOGIST: CPT | Mod: 26

## 2022-11-08 DEVICE — SURGIFOAM PAD SZ 100: Type: IMPLANTABLE DEVICE | Status: FUNCTIONAL

## 2022-11-08 DEVICE — KIT SURGIFLO HEMOSTATIC MATRIX: Type: IMPLANTABLE DEVICE | Status: FUNCTIONAL

## 2022-11-08 DEVICE — SEALANT VISTASEAL FIBRIN HUMAN 4ML: Type: IMPLANTABLE DEVICE | Status: FUNCTIONAL

## 2022-11-08 DEVICE — BONE WAX 2.5GM: Type: IMPLANTABLE DEVICE | Status: FUNCTIONAL

## 2022-11-08 DEVICE — SPONGE HSTAT GELFOAM 12X7MM: Type: IMPLANTABLE DEVICE | Status: FUNCTIONAL

## 2022-11-08 DEVICE — KIT SURGIFLO MATRIX W THROMBIN: Type: IMPLANTABLE DEVICE | Status: FUNCTIONAL

## 2022-11-08 RX ORDER — ACETAMINOPHEN 500 MG
1000 TABLET ORAL ONCE
Refills: 0 | Status: COMPLETED | OUTPATIENT
Start: 2022-11-08 | End: 2022-11-08

## 2022-11-08 RX ORDER — LOSARTAN POTASSIUM 100 MG/1
1 TABLET, FILM COATED ORAL
Qty: 0 | Refills: 0 | DISCHARGE

## 2022-11-08 RX ORDER — LOSARTAN POTASSIUM 100 MG/1
1 TABLET, FILM COATED ORAL
Qty: 30 | Refills: 0
Start: 2022-11-08 | End: 2022-12-07

## 2022-11-08 RX ORDER — SODIUM CHLORIDE 9 MG/ML
1000 INJECTION, SOLUTION INTRAVENOUS
Refills: 0 | Status: DISCONTINUED | OUTPATIENT
Start: 2022-11-08 | End: 2022-11-08

## 2022-11-08 RX ORDER — ONDANSETRON 8 MG/1
4 TABLET, FILM COATED ORAL ONCE
Refills: 0 | Status: DISCONTINUED | OUTPATIENT
Start: 2022-11-08 | End: 2022-11-08

## 2022-11-08 RX ORDER — LISINOPRIL 2.5 MG/1
1 TABLET ORAL
Qty: 30 | Refills: 0
Start: 2022-11-08 | End: 2022-12-07

## 2022-11-08 RX ORDER — OXYCODONE HYDROCHLORIDE 5 MG/1
5 TABLET ORAL EVERY 4 HOURS
Refills: 0 | Status: DISCONTINUED | OUTPATIENT
Start: 2022-11-08 | End: 2022-11-09

## 2022-11-08 RX ORDER — ACETAMINOPHEN 500 MG
1000 TABLET ORAL EVERY 8 HOURS
Refills: 0 | Status: DISCONTINUED | OUTPATIENT
Start: 2022-11-09 | End: 2022-11-09

## 2022-11-08 RX ORDER — SODIUM CHLORIDE 9 MG/ML
1000 INJECTION, SOLUTION INTRAVENOUS
Refills: 0 | Status: DISCONTINUED | OUTPATIENT
Start: 2022-11-08 | End: 2022-11-09

## 2022-11-08 RX ORDER — METFORMIN HYDROCHLORIDE 850 MG/1
1 TABLET ORAL
Qty: 60 | Refills: 0
Start: 2022-11-08 | End: 2022-12-07

## 2022-11-08 RX ORDER — METFORMIN HYDROCHLORIDE 850 MG/1
1 TABLET ORAL
Qty: 0 | Refills: 0 | DISCHARGE

## 2022-11-08 RX ORDER — HYDROMORPHONE HYDROCHLORIDE 2 MG/ML
0.5 INJECTION INTRAMUSCULAR; INTRAVENOUS; SUBCUTANEOUS
Refills: 0 | Status: DISCONTINUED | OUTPATIENT
Start: 2022-11-08 | End: 2022-11-08

## 2022-11-08 RX ORDER — TRAMADOL HYDROCHLORIDE 50 MG/1
25 TABLET ORAL EVERY 4 HOURS
Refills: 0 | Status: DISCONTINUED | OUTPATIENT
Start: 2022-11-08 | End: 2022-11-09

## 2022-11-08 RX ORDER — CEFAZOLIN SODIUM 1 G
2000 VIAL (EA) INJECTION EVERY 8 HOURS
Refills: 0 | Status: COMPLETED | OUTPATIENT
Start: 2022-11-08 | End: 2022-11-09

## 2022-11-08 RX ORDER — SIMVASTATIN 20 MG/1
1 TABLET, FILM COATED ORAL
Qty: 30 | Refills: 0
Start: 2022-11-08 | End: 2022-12-07

## 2022-11-08 RX ORDER — HYDROMORPHONE HYDROCHLORIDE 2 MG/ML
1 INJECTION INTRAMUSCULAR; INTRAVENOUS; SUBCUTANEOUS
Refills: 0 | Status: DISCONTINUED | OUTPATIENT
Start: 2022-11-08 | End: 2022-11-08

## 2022-11-08 RX ADMIN — Medication 4: at 17:03

## 2022-11-08 RX ADMIN — LISINOPRIL 5 MILLIGRAM(S): 2.5 TABLET ORAL at 06:10

## 2022-11-08 RX ADMIN — Medication 400 MILLIGRAM(S): at 21:16

## 2022-11-08 RX ADMIN — Medication 1000 MILLIGRAM(S): at 22:18

## 2022-11-08 RX ADMIN — TRAMADOL HYDROCHLORIDE 25 MILLIGRAM(S): 50 TABLET ORAL at 00:30

## 2022-11-08 RX ADMIN — SIMVASTATIN 40 MILLIGRAM(S): 20 TABLET, FILM COATED ORAL at 21:16

## 2022-11-08 RX ADMIN — Medication 5 UNIT(S): at 17:04

## 2022-11-08 RX ADMIN — Medication 2: at 21:16

## 2022-11-08 RX ADMIN — PANTOPRAZOLE SODIUM 40 MILLIGRAM(S): 20 TABLET, DELAYED RELEASE ORAL at 06:10

## 2022-11-08 RX ADMIN — GABAPENTIN 100 MILLIGRAM(S): 400 CAPSULE ORAL at 06:10

## 2022-11-08 RX ADMIN — OXYCODONE HYDROCHLORIDE 5 MILLIGRAM(S): 5 TABLET ORAL at 17:42

## 2022-11-08 RX ADMIN — Medication 100 MILLIGRAM(S): at 20:33

## 2022-11-08 RX ADMIN — OXYCODONE HYDROCHLORIDE 5 MILLIGRAM(S): 5 TABLET ORAL at 17:12

## 2022-11-08 RX ADMIN — SODIUM CHLORIDE 75 MILLILITER(S): 9 INJECTION, SOLUTION INTRAVENOUS at 14:47

## 2022-11-08 RX ADMIN — INSULIN GLARGINE 20 UNIT(S): 100 INJECTION, SOLUTION SUBCUTANEOUS at 21:17

## 2022-11-08 NOTE — PHYSICAL THERAPY INITIAL EVALUATION ADULT - TRANSFER TRAINING, PT EVAL
sit to stand with RW independently in 3-5 sessions
2-4 sessions: pt will be able to perform sit <-> stand with cgAx1

## 2022-11-08 NOTE — PHYSICAL THERAPY INITIAL EVALUATION ADULT - ACTIVE RANGE OF MOTION EXAMINATION, REHAB EVAL
spine precautions/bilateral upper extremity Active ROM was WFL (within functional limits)/bilateral  lower extremity Active ROM was WFL (within functional limits)
bilateral upper extremity Active ROM was WFL (within functional limits)/bilateral  lower extremity Active ROM was WFL (within functional limits)

## 2022-11-08 NOTE — PRE-OP CHECKLIST - SELECT TESTS ORDERED
BMP/CBC/CMP/PT/PTT/INR/Hepatic Function/Type and Screen/Urinalysis/EKG/CXR/COVID-19
BMP/CBC/PT/PTT/INR

## 2022-11-08 NOTE — PROGRESS NOTE ADULT - ASSESSMENT
66 y/o female with NIDDM on metformin, HTN, HLD with 1 day h/o fever, nausea, vomiting, and abd pain. She had a fall last week for which she was seen at urgent care. She was prescribed prednisone and a muscle relaxant. In the eR pt was found to be hypotensive and tachycardic.     fall  DM  HLD  HTN  BEN  met Acidosis  abd pain    plan for ortho surgery  vs noted  labs reviewed  cardio follow up noted    s/p DKA  BEN resolved  ENDO eval noted  DM rx regimen in progress  Ortho eval noted for back pain - leg pain - LS Spine disease  s/p [facility] stay  pain assessment

## 2022-11-08 NOTE — PHYSICAL THERAPY INITIAL EVALUATION ADULT - BED MOBILITY LIMITATIONS, REHAB EVAL
decreased ability to use legs for bridging/pushing
decreased ability to use arms for pushing/pulling/decreased ability to use legs for bridging/pushing
repositioning

## 2022-11-08 NOTE — PHYSICAL THERAPY INITIAL EVALUATION ADULT - ADDITIONAL COMMENTS
Pt lives alone but has been residing with son and 2 daughters in a house with 0 TIO and no steps inside. Pt owns a rolling walker which she has been using to ambulate prior to admission due to occasional buckling of the right knee from an old accident. Pt reports that her son works from home and will be available to assist pt as needed after discharge.
Pt lives alone but has been residing with son and 2 daughters in a house with 0 TIO and no steps inside. Pt owns a rolling walker which she has been using to ambulate prior to admission. Pt stated she will be hiring home health aide

## 2022-11-08 NOTE — PROGRESS NOTE ADULT - ASSESSMENT
67F HTN, HLD, DM2 and Back Pain initially admitted for DKA    DM2   Initially admitted for DKA which is resolved; Etiology Steroids + uncontrolled DM  Metformin + Lantus 20U + Admelog 5U TID  FS acceptable, continue to monitor plus Moderate coverage  Endocrine on board and appreciate their recommendations    Back pain   now s/p lumbar discectomy  PT/ OT  Pain control    Sinus Tachycardia  Anxiety and Stress driven primarily  TTE done and reviewed  Cardiology consultation noted   avoid caffeine     HTN/HLD  Lisinopril + Statin    Diet  Consistent carb    DVT Prophylaxis  PAS only post spine surgery    Disposition   Discharge planning hopefully home tomorrow if cleared by PT/OT    meds to beds being arranged.

## 2022-11-08 NOTE — PHYSICAL THERAPY INITIAL EVALUATION ADULT - NSPTDISCHREC_GEN_A_CORE
Home PT
home with asst and [employer]. pt reports son works from home and can assist pt as needed./Home PT

## 2022-11-08 NOTE — PHYSICAL THERAPY INITIAL EVALUATION ADULT - PERTINENT HX OF CURRENT PROBLEM, REHAB EVAL
pt is s/p above surgery today
67F with DM2 not on insulin, HLD, HTN who present with one day of nausea/vomiting and fever.  Patient reports falling last week around 6 days ago but cannot recall mechanism of fall.  Injured her left side.  Went to urgent care over the weekend and was prescribed prednisone 10mg BID.  Has taken it for the past 4 days.  Then starting yesterday patient started to feel unwell.  Was nauseous with vomiting and also was found to have a fever.  Came here for further evaluation.

## 2022-11-08 NOTE — PHYSICAL THERAPY INITIAL EVALUATION ADULT - CRITERIA FOR SKILLED THERAPEUTIC INTERVENTIONS
impairments found/anticipated discharge recommendation
impairments found/anticipated equipment needs at discharge/anticipated discharge recommendation

## 2022-11-08 NOTE — PHYSICAL THERAPY INITIAL EVALUATION ADULT - BED MOBILITY TRAINING, PT EVAL
supine to sit independently in 3-5 sessions
2-4 sessions: pt will be able to perform supine <-> sit with minAx1

## 2022-11-09 VITALS
DIASTOLIC BLOOD PRESSURE: 74 MMHG | TEMPERATURE: 98 F | OXYGEN SATURATION: 99 % | SYSTOLIC BLOOD PRESSURE: 147 MMHG | HEART RATE: 94 BPM | RESPIRATION RATE: 16 BRPM

## 2022-11-09 PROCEDURE — 93306 TTE W/DOPPLER COMPLETE: CPT

## 2022-11-09 PROCEDURE — 97530 THERAPEUTIC ACTIVITIES: CPT

## 2022-11-09 PROCEDURE — 99239 HOSP IP/OBS DSCHRG MGMT >30: CPT

## 2022-11-09 PROCEDURE — 88304 TISSUE EXAM BY PATHOLOGIST: CPT

## 2022-11-09 PROCEDURE — 97161 PT EVAL LOW COMPLEX 20 MIN: CPT

## 2022-11-09 PROCEDURE — 93005 ELECTROCARDIOGRAM TRACING: CPT

## 2022-11-09 PROCEDURE — 73562 X-RAY EXAM OF KNEE 3: CPT

## 2022-11-09 PROCEDURE — 82009 KETONE BODYS QUAL: CPT

## 2022-11-09 PROCEDURE — 71250 CT THORAX DX C-: CPT

## 2022-11-09 PROCEDURE — 87040 BLOOD CULTURE FOR BACTERIA: CPT

## 2022-11-09 PROCEDURE — C1889: CPT

## 2022-11-09 PROCEDURE — 83036 HEMOGLOBIN GLYCOSYLATED A1C: CPT

## 2022-11-09 PROCEDURE — 83605 ASSAY OF LACTIC ACID: CPT

## 2022-11-09 PROCEDURE — 83735 ASSAY OF MAGNESIUM: CPT

## 2022-11-09 PROCEDURE — 85730 THROMBOPLASTIN TIME PARTIAL: CPT

## 2022-11-09 PROCEDURE — 80053 COMPREHEN METABOLIC PANEL: CPT

## 2022-11-09 PROCEDURE — 84145 PROCALCITONIN (PCT): CPT

## 2022-11-09 PROCEDURE — 86803 HEPATITIS C AB TEST: CPT

## 2022-11-09 PROCEDURE — 97116 GAIT TRAINING THERAPY: CPT

## 2022-11-09 PROCEDURE — 85027 COMPLETE CBC AUTOMATED: CPT

## 2022-11-09 PROCEDURE — 86850 RBC ANTIBODY SCREEN: CPT

## 2022-11-09 PROCEDURE — 87637 SARSCOV2&INF A&B&RSV AMP PRB: CPT

## 2022-11-09 PROCEDURE — 87635 SARS-COV-2 COVID-19 AMP PRB: CPT

## 2022-11-09 PROCEDURE — 74176 CT ABD & PELVIS W/O CONTRAST: CPT

## 2022-11-09 PROCEDURE — 76000 FLUOROSCOPY <1 HR PHYS/QHP: CPT

## 2022-11-09 PROCEDURE — 71045 X-RAY EXAM CHEST 1 VIEW: CPT

## 2022-11-09 PROCEDURE — 80048 BASIC METABOLIC PNL TOTAL CA: CPT

## 2022-11-09 PROCEDURE — 82962 GLUCOSE BLOOD TEST: CPT

## 2022-11-09 PROCEDURE — 99285 EMERGENCY DEPT VISIT HI MDM: CPT | Mod: 25

## 2022-11-09 PROCEDURE — 86901 BLOOD TYPING SEROLOGIC RH(D): CPT

## 2022-11-09 PROCEDURE — 84100 ASSAY OF PHOSPHORUS: CPT

## 2022-11-09 PROCEDURE — 97165 OT EVAL LOW COMPLEX 30 MIN: CPT

## 2022-11-09 PROCEDURE — 87086 URINE CULTURE/COLONY COUNT: CPT

## 2022-11-09 PROCEDURE — 85610 PROTHROMBIN TIME: CPT

## 2022-11-09 PROCEDURE — 81001 URINALYSIS AUTO W/SCOPE: CPT

## 2022-11-09 PROCEDURE — 36415 COLL VENOUS BLD VENIPUNCTURE: CPT

## 2022-11-09 PROCEDURE — 86900 BLOOD TYPING SEROLOGIC ABO: CPT

## 2022-11-09 PROCEDURE — 82803 BLOOD GASES ANY COMBINATION: CPT

## 2022-11-09 PROCEDURE — 85025 COMPLETE CBC W/AUTO DIFF WBC: CPT

## 2022-11-09 PROCEDURE — 97164 PT RE-EVAL EST PLAN CARE: CPT

## 2022-11-09 RX ORDER — INSULIN GLARGINE 100 [IU]/ML
20 INJECTION, SOLUTION SUBCUTANEOUS
Qty: 0 | Refills: 0 | DISCHARGE
Start: 2022-11-09

## 2022-11-09 RX ORDER — ACETAMINOPHEN 500 MG
2 TABLET ORAL
Qty: 0 | Refills: 0 | DISCHARGE
Start: 2022-11-09

## 2022-11-09 RX ORDER — OXYCODONE HYDROCHLORIDE 5 MG/1
1 TABLET ORAL
Qty: 42 | Refills: 0
Start: 2022-11-09 | End: 2022-11-15

## 2022-11-09 RX ADMIN — PANTOPRAZOLE SODIUM 40 MILLIGRAM(S): 20 TABLET, DELAYED RELEASE ORAL at 05:39

## 2022-11-09 RX ADMIN — OXYCODONE HYDROCHLORIDE 5 MILLIGRAM(S): 5 TABLET ORAL at 07:43

## 2022-11-09 RX ADMIN — OXYCODONE HYDROCHLORIDE 5 MILLIGRAM(S): 5 TABLET ORAL at 12:38

## 2022-11-09 RX ADMIN — OXYCODONE HYDROCHLORIDE 5 MILLIGRAM(S): 5 TABLET ORAL at 08:13

## 2022-11-09 RX ADMIN — Medication 4: at 08:00

## 2022-11-09 RX ADMIN — Medication 100 MILLIGRAM(S): at 03:45

## 2022-11-09 RX ADMIN — METFORMIN HYDROCHLORIDE 500 MILLIGRAM(S): 850 TABLET ORAL at 12:36

## 2022-11-09 RX ADMIN — Medication 1000 MILLIGRAM(S): at 05:39

## 2022-11-09 RX ADMIN — Medication 1000 MILLIGRAM(S): at 06:05

## 2022-11-09 RX ADMIN — OXYCODONE HYDROCHLORIDE 5 MILLIGRAM(S): 5 TABLET ORAL at 01:15

## 2022-11-09 RX ADMIN — OXYCODONE HYDROCHLORIDE 5 MILLIGRAM(S): 5 TABLET ORAL at 13:08

## 2022-11-09 RX ADMIN — Medication 1000 MILLIGRAM(S): at 14:46

## 2022-11-09 RX ADMIN — Medication 4: at 12:33

## 2022-11-09 RX ADMIN — OXYCODONE HYDROCHLORIDE 5 MILLIGRAM(S): 5 TABLET ORAL at 00:36

## 2022-11-09 RX ADMIN — LISINOPRIL 5 MILLIGRAM(S): 2.5 TABLET ORAL at 05:39

## 2022-11-09 RX ADMIN — Medication 5 UNIT(S): at 08:00

## 2022-11-09 RX ADMIN — Medication 5 UNIT(S): at 12:34

## 2022-11-09 NOTE — PROGRESS NOTE ADULT - PROVIDER SPECIALTY LIST ADULT
Cardiology
Cardiology
Hospitalist
Hospitalist
Orthopedics
Pulmonology
Cardiology
Cardiology
Critical Care
Orthopedics
Critical Care
Hospitalist
Pulmonology
Pulmonology
Critical Care
Diabetes
Hospitalist

## 2022-11-09 NOTE — PROGRESS NOTE ADULT - NUTRITIONAL ASSESSMENT
This patient has been assessed with a concern for Malnutrition and has been determined to have a diagnosis/diagnoses of Severe protein-calorie malnutrition.    This patient is being managed with:   Diet Consistent Carbohydrate/No Snacks-  No Caffeine  Supplement Feeding Modality:  Oral  Glucerna Shake Cans or Servings Per Day:  1       Frequency:  Daily  Entered: Nov  3 2022  4:54PM    
This patient has been assessed with a concern for Malnutrition and has been determined to have a diagnosis/diagnoses of Severe protein-calorie malnutrition.    This patient is being managed with:   Diet Consistent Carbohydrate/No Snacks-  No Caffeine  Supplement Feeding Modality:  Oral  Glucerna Shake Cans or Servings Per Day:  1       Frequency:  Daily  Entered: Nov  3 2022  4:54PM    
This patient has been assessed with a concern for Malnutrition and has been determined to have a diagnosis/diagnoses of Severe protein-calorie malnutrition.    This patient is being managed with:   Diet NPO after Midnight-     NPO Start Date: 07-Nov-2022   NPO Start Time: 23:59  Entered: Nov 7 2022  2:29PM    Diet Consistent Carbohydrate/No Snacks-  No Caffeine  Supplement Feeding Modality:  Oral  Glucerna Shake Cans or Servings Per Day:  1       Frequency:  Daily  Entered: Nov  3 2022  4:54PM      This patient has been assessed with a concern for Malnutrition and has been determined to have a diagnosis/diagnoses of Severe protein-calorie malnutrition.    This patient is being managed with:   Diet NPO after Midnight-     NPO Start Date: 07-Nov-2022   NPO Start Time: 23:59  Entered: Nov 7 2022  2:29PM    Diet Consistent Carbohydrate/No Snacks-  No Caffeine  Supplement Feeding Modality:  Oral  Glucerna Shake Cans or Servings Per Day:  1       Frequency:  Daily  Entered: Nov  3 2022  4:54PM    
This patient has been assessed with a concern for Malnutrition and has been determined to have a diagnosis/diagnoses of Severe protein-calorie malnutrition.    This patient is being managed with:   Diet Consistent Carbohydrate/No Snacks-  No Caffeine  Supplement Feeding Modality:  Oral  Glucerna Shake Cans or Servings Per Day:  1       Frequency:  Daily  Entered: Nov  3 2022  4:54PM    

## 2022-11-09 NOTE — OCCUPATIONAL THERAPY INITIAL EVALUATION ADULT - LIVES WITH, PROFILE
Upon discharge, pt reports she is going to stay at her daughter's home. Daughter's home has 6 steps to enter, 0 steps inside with a walk in shower. Pt reports her daughter works from home and is able to provide assistance throughout the day./children

## 2022-11-09 NOTE — PROGRESS NOTE ADULT - ASSESSMENT
68 y/o female with NIDDM on metformin, HTN, HLD with 1 day h/o fever, nausea, vomiting, and abd pain. She had a fall last week for which she was seen at urgent care. She was prescribed prednisone and a muscle relaxant. In the eR pt was found to be hypotensive and tachycardic.     fall  DM  HLD  HTN  BEN  met Acidosis  abd pain    POD 1  Lumbar Discectomy   vs noted  labs reviewed  PT and Ortho follow up    s/p DKA  BEN resolved  ENDO eval noted  DM rx regimen in progress  Ortho eval noted for back pain - leg pain - LS Spine disease  s/p [facility] stay  pain assessment

## 2022-11-09 NOTE — OCCUPATIONAL THERAPY INITIAL EVALUATION ADULT - DIAGNOSIS, OT EVAL
Pt with impaired strength, balance, endurance impacting pt's ability to complete ADLs, functional mobility/transfers.
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
## 2022-11-09 NOTE — PROGRESS NOTE ADULT - ASSESSMENT
67F HTN, HLD, DM2 and Back Pain initially admitted for DKA    DM2   Initially admitted for DKA which is resolved; Etiology Steroids + uncontrolled DM  Metformin + Lantus 20U + Admelog 5U TID  FS acceptable, continue to monitor plus Moderate coverage  Endocrine on board and appreciate their recommendations  Reviewed with patient and daughter - to go directly to Dr Perlman's office from the hospital and he will provide samples for medication and supplies.     Back pain   now s/p lumbar discectomy  PT/ OT  Pain control  plan for dc today.     Sinus Tachycardia  Anxiety and Stress driven primarily  TTE done and reviewed  Cardiology consultation noted   avoid caffeine     HTN/HLD  Lisinopril + Statin    Diet  Consistent carb    DVT Prophylaxis  PAS only post spine surgery    Disposition   Discharge planning to Dr Perlman's office then home with daughter  meds to beds            67F HTN, HLD, DM2 and Back Pain initially admitted for DKA    DM2   Initially admitted for DKA which is resolved; Etiology Steroids + uncontrolled DM  Metformin + Lantus 20U + Admelog 5U TID  FS acceptable, continue to monitor plus Moderate coverage  Endocrine on board and appreciate their recommendations  slight increase in FS today likely due to lucía-op decadron.  would not change doses of medications at this time.   Reviewed with patient and daughter - to go directly to Dr Perlman's office from the hospital and he will provide samples for medication and supplies.     Back pain   now s/p lumbar discectomy  PT/ OT  Pain control  plan for dc today.     Sinus Tachycardia  Anxiety and Stress driven primarily  TTE done and reviewed  Cardiology consultation noted   avoid caffeine     HTN/HLD  Lisinopril + Statin    Diet  Consistent carb    DVT Prophylaxis  PAS only post spine surgery    Disposition   Discharge planning to Dr Perlman's office then home with daughter  meds to beds

## 2022-11-09 NOTE — PROGRESS NOTE ADULT - ASSESSMENT
The patient is a 67 year old female with a history of HTN, HL, DM who was admitted with back pain and DKA.    Plan:  - ECG with sinus tachycardia and otherwise nonspecific findings  - Telemetry with sinus tachycardia; no other tachyarrhythmias noted  - Tachycardia likely multifactorial from anxiety, stress, hyperglycemia, etc.  - Echo with normal LV systolic function, no significant valve issues  - Continue lisinopril 5 mg daily  - Discharge planning

## 2022-11-09 NOTE — OCCUPATIONAL THERAPY INITIAL EVALUATION ADULT - PERTINENT HX OF CURRENT PROBLEM, REHAB EVAL
67 y.o. female DM on metformin, HTN, HLD with 1 day h/o fever, nausea, vomiting, and abd pain. She had a fall last week for which she was seen at urgent care. She was prescribed prednisone and a muscle relaxant. In the ER pt was found to be hypotensive and tachycardic. Pt diagnosed with DKA. During admission, pt had MRI of lumbar spine significant for moderate disc degeneration at L2-3 and L5-S1 with loss of disc height and associated degenerative endplate changes. Tiny central disc herniation noted at L4-5 which indents the ventral thecal sac. Small RIGHT paracentral disc herniation at L5-S1 compresses the descending RIGHT S1 nerve root. Pt now s/p L5-S1 microdiscectomy 11/8/22.

## 2022-11-28 ENCOUNTER — NON-APPOINTMENT (OUTPATIENT)
Age: 67
End: 2022-11-28

## 2022-11-28 ENCOUNTER — APPOINTMENT (OUTPATIENT)
Dept: ORTHOPEDIC SURGERY | Facility: CLINIC | Age: 67
End: 2022-11-28
Payer: SELF-PAY

## 2022-11-28 DIAGNOSIS — M54.16 RADICULOPATHY, LUMBAR REGION: ICD-10-CM

## 2022-11-28 PROCEDURE — 73502 X-RAY EXAM HIP UNI 2-3 VIEWS: CPT

## 2022-11-28 PROCEDURE — 99024 POSTOP FOLLOW-UP VISIT: CPT

## 2022-11-28 PROCEDURE — 72100 X-RAY EXAM L-S SPINE 2/3 VWS: CPT

## 2022-11-28 RX ORDER — TRAMADOL HYDROCHLORIDE 50 MG/1
50 TABLET, COATED ORAL
Qty: 30 | Refills: 0 | Status: ACTIVE | COMMUNITY
Start: 2022-11-28 | End: 1900-01-01

## 2022-11-28 RX ORDER — CYCLOBENZAPRINE HYDROCHLORIDE 10 MG/1
10 TABLET, FILM COATED ORAL
Qty: 30 | Refills: 0 | Status: ACTIVE | COMMUNITY
Start: 2022-10-27

## 2022-11-28 RX ORDER — OXYCODONE 5 MG/1
5 TABLET ORAL
Qty: 42 | Refills: 0 | Status: ACTIVE | COMMUNITY
Start: 2022-11-09

## 2022-11-28 RX ORDER — GABAPENTIN 100 MG/1
100 CAPSULE ORAL 3 TIMES DAILY
Qty: 90 | Refills: 0 | Status: ACTIVE | COMMUNITY
Start: 2022-11-28 | End: 1900-01-01

## 2022-11-28 RX ORDER — PREDNISONE 20 MG/1
20 TABLET ORAL
Qty: 10 | Refills: 0 | Status: ACTIVE | COMMUNITY
Start: 2022-10-27

## 2022-11-28 RX ORDER — LOSARTAN POTASSIUM 25 MG/1
25 TABLET, FILM COATED ORAL
Qty: 30 | Refills: 0 | Status: ACTIVE | COMMUNITY
Start: 2022-11-08

## 2022-11-28 RX ORDER — SIMVASTATIN 40 MG/1
40 TABLET, FILM COATED ORAL
Qty: 30 | Refills: 0 | Status: ACTIVE | COMMUNITY
Start: 2022-11-08

## 2022-11-28 RX ORDER — OXYCODONE AND ACETAMINOPHEN 5; 325 MG/1; MG/1
5-325 TABLET ORAL
Qty: 20 | Refills: 0 | Status: ACTIVE | COMMUNITY
Start: 2022-09-16

## 2022-11-28 RX ORDER — METFORMIN HYDROCHLORIDE 1000 MG/1
1000 TABLET, COATED ORAL
Qty: 60 | Refills: 0 | Status: ACTIVE | COMMUNITY
Start: 2022-11-08

## 2022-11-28 RX ORDER — AMLODIPINE BESYLATE 5 MG/1
5 TABLET ORAL
Qty: 14 | Refills: 0 | Status: ACTIVE | COMMUNITY
Start: 2022-08-24

## 2022-11-28 RX ORDER — LIDOCAINE 5% 700 MG/1
5 PATCH TOPICAL
Qty: 30 | Refills: 0 | Status: ACTIVE | COMMUNITY
Start: 2022-10-27
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
## 2022-12-02 ENCOUNTER — INPATIENT (INPATIENT)
Facility: HOSPITAL | Age: 67
LOS: 3 days | Discharge: NURSING, REHAB OR LONG-TERM CARE | DRG: 552 | End: 2022-12-06
Attending: STUDENT IN AN ORGANIZED HEALTH CARE EDUCATION/TRAINING PROGRAM | Admitting: STUDENT IN AN ORGANIZED HEALTH CARE EDUCATION/TRAINING PROGRAM
Payer: MEDICARE

## 2022-12-02 VITALS
SYSTOLIC BLOOD PRESSURE: 150 MMHG | OXYGEN SATURATION: 97 % | RESPIRATION RATE: 18 BRPM | HEART RATE: 120 BPM | TEMPERATURE: 98 F | HEIGHT: 56 IN | DIASTOLIC BLOOD PRESSURE: 84 MMHG | WEIGHT: 130.07 LBS

## 2022-12-02 DIAGNOSIS — E11.9 TYPE 2 DIABETES MELLITUS WITHOUT COMPLICATIONS: ICD-10-CM

## 2022-12-02 DIAGNOSIS — Z29.9 ENCOUNTER FOR PROPHYLACTIC MEASURES, UNSPECIFIED: ICD-10-CM
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
## 2022-12-02 DIAGNOSIS — E78.5 HYPERLIPIDEMIA, UNSPECIFIED: ICD-10-CM

## 2022-12-02 DIAGNOSIS — M54.9 DORSALGIA, UNSPECIFIED: ICD-10-CM

## 2022-12-02 DIAGNOSIS — R53.1 WEAKNESS: ICD-10-CM

## 2022-12-02 DIAGNOSIS — E86.0 DEHYDRATION: ICD-10-CM

## 2022-12-02 LAB
ALBUMIN SERPL ELPH-MCNC: 3.5 G/DL — SIGNIFICANT CHANGE UP (ref 3.3–5)
ALP SERPL-CCNC: 67 U/L — SIGNIFICANT CHANGE UP (ref 30–120)
ALT FLD-CCNC: 25 U/L DA — SIGNIFICANT CHANGE UP (ref 10–60)
ANION GAP SERPL CALC-SCNC: 13 MMOL/L — SIGNIFICANT CHANGE UP (ref 5–17)
ANISOCYTOSIS BLD QL: SLIGHT — SIGNIFICANT CHANGE UP
APPEARANCE UR: CLEAR — SIGNIFICANT CHANGE UP
AST SERPL-CCNC: 24 U/L — SIGNIFICANT CHANGE UP (ref 10–40)
BACTERIA # UR AUTO: NEGATIVE — SIGNIFICANT CHANGE UP
BASOPHILS # BLD AUTO: 0.04 K/UL — SIGNIFICANT CHANGE UP (ref 0–0.2)
BASOPHILS NFR BLD AUTO: 0.5 % — SIGNIFICANT CHANGE UP (ref 0–2)
BILIRUB SERPL-MCNC: 0.2 MG/DL — SIGNIFICANT CHANGE UP (ref 0.2–1.2)
BILIRUB UR-MCNC: NEGATIVE — SIGNIFICANT CHANGE UP
BUN SERPL-MCNC: 33 MG/DL — HIGH (ref 7–23)
CALCIUM SERPL-MCNC: 8.1 MG/DL — LOW (ref 8.4–10.5)
CHLORIDE SERPL-SCNC: 105 MMOL/L — SIGNIFICANT CHANGE UP (ref 96–108)
CO2 SERPL-SCNC: 23 MMOL/L — SIGNIFICANT CHANGE UP (ref 22–31)
COLOR SPEC: YELLOW — SIGNIFICANT CHANGE UP
CREAT SERPL-MCNC: 0.78 MG/DL — SIGNIFICANT CHANGE UP (ref 0.5–1.3)
DIFF PNL FLD: NEGATIVE — SIGNIFICANT CHANGE UP
EGFR: 82 ML/MIN/1.73M2 — SIGNIFICANT CHANGE UP
ELLIPTOCYTES BLD QL SMEAR: SLIGHT — SIGNIFICANT CHANGE UP
EOSINOPHIL # BLD AUTO: 0.34 K/UL — SIGNIFICANT CHANGE UP (ref 0–0.5)
EOSINOPHIL NFR BLD AUTO: 4 % — SIGNIFICANT CHANGE UP (ref 0–6)
EPI CELLS # UR: SIGNIFICANT CHANGE UP
GLUCOSE BLDC GLUCOMTR-MCNC: 120 MG/DL — HIGH (ref 70–99)
GLUCOSE SERPL-MCNC: 119 MG/DL — HIGH (ref 70–99)
GLUCOSE UR QL: NEGATIVE MG/DL — SIGNIFICANT CHANGE UP
HCT VFR BLD CALC: 34.5 % — SIGNIFICANT CHANGE UP (ref 34.5–45)
HGB BLD-MCNC: 10.2 G/DL — LOW (ref 11.5–15.5)
IMM GRANULOCYTES NFR BLD AUTO: 0.2 % — SIGNIFICANT CHANGE UP (ref 0–0.9)
KETONES UR-MCNC: NEGATIVE — SIGNIFICANT CHANGE UP
LEUKOCYTE ESTERASE UR-ACNC: ABNORMAL
LYMPHOCYTES # BLD AUTO: 3.33 K/UL — HIGH (ref 1–3.3)
LYMPHOCYTES # BLD AUTO: 39.6 % — SIGNIFICANT CHANGE UP (ref 13–44)
MANUAL SMEAR VERIFICATION: SIGNIFICANT CHANGE UP
MCHC RBC-ENTMCNC: 21.5 PG — LOW (ref 27–34)
MCHC RBC-ENTMCNC: 29.6 GM/DL — LOW (ref 32–36)
MCV RBC AUTO: 72.8 FL — LOW (ref 80–100)
MICROCYTES BLD QL: SLIGHT — SIGNIFICANT CHANGE UP
MONOCYTES # BLD AUTO: 0.49 K/UL — SIGNIFICANT CHANGE UP (ref 0–0.9)
MONOCYTES NFR BLD AUTO: 5.8 % — SIGNIFICANT CHANGE UP (ref 2–14)
NEUTROPHILS # BLD AUTO: 4.19 K/UL — SIGNIFICANT CHANGE UP (ref 1.8–7.4)
NEUTROPHILS NFR BLD AUTO: 49.9 % — SIGNIFICANT CHANGE UP (ref 43–77)
NITRITE UR-MCNC: NEGATIVE — SIGNIFICANT CHANGE UP
NRBC # BLD: 0 /100 WBCS — SIGNIFICANT CHANGE UP (ref 0–0)
OVALOCYTES BLD QL SMEAR: SLIGHT — SIGNIFICANT CHANGE UP
PH UR: 5 — SIGNIFICANT CHANGE UP (ref 5–8)
PLAT MORPH BLD: NORMAL — SIGNIFICANT CHANGE UP
PLATELET # BLD AUTO: 374 K/UL — SIGNIFICANT CHANGE UP (ref 150–400)
POIKILOCYTOSIS BLD QL AUTO: SLIGHT — SIGNIFICANT CHANGE UP
POTASSIUM SERPL-MCNC: 4 MMOL/L — SIGNIFICANT CHANGE UP (ref 3.5–5.3)
POTASSIUM SERPL-SCNC: 4 MMOL/L — SIGNIFICANT CHANGE UP (ref 3.5–5.3)
PROT SERPL-MCNC: 7.1 G/DL — SIGNIFICANT CHANGE UP (ref 6–8.3)
PROT UR-MCNC: 30 MG/DL
RBC # BLD: 4.74 M/UL — SIGNIFICANT CHANGE UP (ref 3.8–5.2)
RBC # FLD: 16.6 % — HIGH (ref 10.3–14.5)
RBC BLD AUTO: SIGNIFICANT CHANGE UP
RBC CASTS # UR COMP ASSIST: NEGATIVE /HPF — SIGNIFICANT CHANGE UP (ref 0–4)
SARS-COV-2 RNA SPEC QL NAA+PROBE: SIGNIFICANT CHANGE UP
SODIUM SERPL-SCNC: 141 MMOL/L — SIGNIFICANT CHANGE UP (ref 135–145)
SP GR SPEC: 1.01 — SIGNIFICANT CHANGE UP (ref 1.01–1.02)
UROBILINOGEN FLD QL: NEGATIVE MG/DL — SIGNIFICANT CHANGE UP
WBC # BLD: 8.41 K/UL — SIGNIFICANT CHANGE UP (ref 3.8–10.5)
WBC # FLD AUTO: 8.41 K/UL — SIGNIFICANT CHANGE UP (ref 3.8–10.5)
WBC UR QL: SIGNIFICANT CHANGE UP

## 2022-12-02 PROCEDURE — 73502 X-RAY EXAM HIP UNI 2-3 VIEWS: CPT | Mod: 26,LT

## 2022-12-02 PROCEDURE — 73562 X-RAY EXAM OF KNEE 3: CPT | Mod: 26,LT

## 2022-12-02 PROCEDURE — 73610 X-RAY EXAM OF ANKLE: CPT | Mod: 26,LT

## 2022-12-02 PROCEDURE — 99223 1ST HOSP IP/OBS HIGH 75: CPT

## 2022-12-02 PROCEDURE — 99285 EMERGENCY DEPT VISIT HI MDM: CPT

## 2022-12-02 PROCEDURE — 71045 X-RAY EXAM CHEST 1 VIEW: CPT | Mod: 26

## 2022-12-02 RX ORDER — DEXTROSE 50 % IN WATER 50 %
25 SYRINGE (ML) INTRAVENOUS ONCE
Refills: 0 | Status: DISCONTINUED | OUTPATIENT
Start: 2022-12-02 | End: 2022-12-06

## 2022-12-02 RX ORDER — MORPHINE SULFATE 50 MG/1
2 CAPSULE, EXTENDED RELEASE ORAL EVERY 4 HOURS
Refills: 0 | Status: DISCONTINUED | OUTPATIENT
Start: 2022-12-02 | End: 2022-12-04

## 2022-12-02 RX ORDER — SIMVASTATIN 20 MG/1
40 TABLET, FILM COATED ORAL AT BEDTIME
Refills: 0 | Status: DISCONTINUED | OUTPATIENT
Start: 2022-12-02 | End: 2022-12-06

## 2022-12-02 RX ORDER — SODIUM CHLORIDE 9 MG/ML
1000 INJECTION, SOLUTION INTRAVENOUS ONCE
Refills: 0 | Status: COMPLETED | OUTPATIENT
Start: 2022-12-02 | End: 2022-12-02

## 2022-12-02 RX ORDER — DEXTROSE 50 % IN WATER 50 %
15 SYRINGE (ML) INTRAVENOUS ONCE
Refills: 0 | Status: DISCONTINUED | OUTPATIENT
Start: 2022-12-02 | End: 2022-12-06

## 2022-12-02 RX ORDER — DEXTROSE 50 % IN WATER 50 %
12.5 SYRINGE (ML) INTRAVENOUS ONCE
Refills: 0 | Status: DISCONTINUED | OUTPATIENT
Start: 2022-12-02 | End: 2022-12-06

## 2022-12-02 RX ORDER — ACETAMINOPHEN 500 MG
650 TABLET ORAL EVERY 6 HOURS
Refills: 0 | Status: DISCONTINUED | OUTPATIENT
Start: 2022-12-02 | End: 2022-12-06

## 2022-12-02 RX ORDER — LOSARTAN POTASSIUM 100 MG/1
25 TABLET, FILM COATED ORAL DAILY
Refills: 0 | Status: DISCONTINUED | OUTPATIENT
Start: 2022-12-02 | End: 2022-12-06

## 2022-12-02 RX ORDER — INSULIN LISPRO 100/ML
VIAL (ML) SUBCUTANEOUS AT BEDTIME
Refills: 0 | Status: DISCONTINUED | OUTPATIENT
Start: 2022-12-02 | End: 2022-12-06

## 2022-12-02 RX ORDER — GLUCAGON INJECTION, SOLUTION 0.5 MG/.1ML
1 INJECTION, SOLUTION SUBCUTANEOUS ONCE
Refills: 0 | Status: DISCONTINUED | OUTPATIENT
Start: 2022-12-02 | End: 2022-12-06

## 2022-12-02 RX ORDER — INSULIN LISPRO 100/ML
VIAL (ML) SUBCUTANEOUS
Refills: 0 | Status: DISCONTINUED | OUTPATIENT
Start: 2022-12-02 | End: 2022-12-06

## 2022-12-02 RX ORDER — INSULIN GLARGINE 100 [IU]/ML
20 INJECTION, SOLUTION SUBCUTANEOUS AT BEDTIME
Refills: 0 | Status: DISCONTINUED | OUTPATIENT
Start: 2022-12-02 | End: 2022-12-05

## 2022-12-02 RX ORDER — MORPHINE SULFATE 50 MG/1
4 CAPSULE, EXTENDED RELEASE ORAL EVERY 4 HOURS
Refills: 0 | Status: DISCONTINUED | OUTPATIENT
Start: 2022-12-02 | End: 2022-12-04

## 2022-12-02 RX ORDER — SODIUM CHLORIDE 9 MG/ML
1000 INJECTION, SOLUTION INTRAVENOUS
Refills: 0 | Status: DISCONTINUED | OUTPATIENT
Start: 2022-12-02 | End: 2022-12-06

## 2022-12-02 RX ORDER — METFORMIN HYDROCHLORIDE 850 MG/1
1000 TABLET ORAL
Refills: 0 | Status: DISCONTINUED | OUTPATIENT
Start: 2022-12-02 | End: 2022-12-06

## 2022-12-02 RX ADMIN — SIMVASTATIN 40 MILLIGRAM(S): 20 TABLET, FILM COATED ORAL at 22:39

## 2022-12-02 RX ADMIN — INSULIN GLARGINE 20 UNIT(S): 100 INJECTION, SOLUTION SUBCUTANEOUS at 22:39

## 2022-12-02 RX ADMIN — MORPHINE SULFATE 2 MILLIGRAM(S): 50 CAPSULE, EXTENDED RELEASE ORAL at 21:42

## 2022-12-02 RX ADMIN — SODIUM CHLORIDE 1000 MILLILITER(S): 9 INJECTION, SOLUTION INTRAVENOUS at 22:29

## 2022-12-02 RX ADMIN — MORPHINE SULFATE 2 MILLIGRAM(S): 50 CAPSULE, EXTENDED RELEASE ORAL at 21:22

## 2022-12-02 NOTE — ED PROVIDER NOTE - CLINICAL SUMMARY MEDICAL DECISION MAKING FREE TEXT BOX
67 year old female with recent lumbar disc surgery, frequent falls, lower back and leg pain. May need admission for rehab. Plan: labs, X-ray, urine, and Ortho evaluation.

## 2022-12-02 NOTE — ED PROVIDER NOTE - OBJECTIVE STATEMENT
67 year old female with PMHx of DM2, HLD, HTN, depression, and vertebral disc surgery 3 weeks ago presents to the ED complaining of left leg pain, back pain, and frequent falls. Denies any other injury or symptom. Pt was recently admitted to the hospital for DKA and chronic lower back pain, ended up having surgery. Pt's daughter spoke with Dr. Leal who recommended possible readmission and rehab. Pt taking Tramadol for pain without improvement. No PCP at present

## 2022-12-02 NOTE — PATIENT PROFILE ADULT - DOES PATIENT HAVE ADVANCE DIRECTIVE
Mother/guardian Deysi Cash advised of lab results on behalf of her child Mary Cash.  Continue current dose levothyroxine, and low vitamin D level needs to be repleted.  Microscopic hematuria.  Constipation with stool evident KUB, improved after one dose of Miralax.  Menarche ago 9 years. Fairly regular with LMP 2 months ago reported however chart shows documentation of:  Patient's last menstrual period was 03/15/2022.   Repeat urinalysis, clarify LMP, add STD testing routine age 11 years and up.  Patient reports not sexually active.    PLAN:  Keep follow up scheduled with PCP and will forward to endocrinologist at [facility] to manage hypothyroidism, low vitamin D today.  Her next appointment with Dr. Giana Quiroz is 05/2022.  
Yes

## 2022-12-02 NOTE — ED ADULT NURSE NOTE - NSIMPLEMENTINTERV_GEN_ALL_ED
Implemented All Fall Risk Interventions:  [place] to call system. Call bell, personal items and telephone within reach. Instruct patient to call for assistance. Room bathroom lighting operational. Non-slip footwear when patient is off stretcher. Physically safe environment: no spills, clutter or unnecessary equipment. Stretcher in lowest position, wheels locked, appropriate side rails in place. Provide visual cue, wrist band, yellow gown, etc. Monitor gait and stability. Monitor for mental status changes and reorient to person, place, and time. Review medications for side effects contributing to fall risk. Reinforce activity limits and safety measures with patient and family.

## 2022-12-02 NOTE — H&P ADULT - NSHPADDITIONALINFOADULT_GEN_ALL_CORE
Management plan was discussed with patient at the bedside, & her daughter (HCP) on phone as per her request, they understand & agree.

## 2022-12-02 NOTE — ED PROVIDER NOTE - WR ORDER NAME 1
Sunbright fixation of periprosthetic fracture right distal femur.  Her wound looks excellent.  She is progressing well the fracture but due to her age and overall debility is progressing very slowly with therapy plan she will be discharged to nursing facility   Xray Chest 1 View AP/PA

## 2022-12-02 NOTE — ED PROVIDER NOTE - CARE PLAN
1 Principal Discharge DX:	Back pain  Secondary Diagnosis:	Frequent falls  Secondary Diagnosis:	Diabetes

## 2022-12-02 NOTE — ED PROVIDER NOTE - ENMT, MLM
Airway patent, Nasal mucosa clear. Mouth with normal mucosa. Throat has no vesicles, no oropharyngeal exudates and uvula is midline. Scalp atraumatic, non-tender. Neck supple, non-tender, FROM intact.

## 2022-12-02 NOTE — H&P ADULT - ASSESSMENT
66 y/o F with PMH of HTN, DM type 2, Dyslipidemia, and Depression presented with pain & repeated falls.

## 2022-12-02 NOTE — H&P ADULT - NSHPREVIEWOFSYSTEMS_GEN_ALL_CORE
-    CONSTITUTIONAL: No fever or chills.  EYES: No eye pain, visual disturbances, or discharge.  ENMT:  (+) longstanding difficulty hearing, no vertigo, sinus or throat pain.  NECK: No pain or stiffness.	  RESPIRATORY: No cough, wheezing, or hemoptysis; No shortness of breath.  CARDIOVASCULAR: No chest pain, palpitations, dizziness, or leg swelling.  GASTROINTESTINAL: No abdominal pain, no nausea, vomiting, or hematemesis; No diarrhea or Change in bowel habits. No melena or hematochezia.  GENITOURINARY: No dysuria, frequency, hematuria, or incontinence.  NEUROLOGICAL: No headaches, focal muscle weakness, new numbness, or tremors.  SKIN: No itching, burning or rashes.  MUSCULOSKELETAL: (+) generalized back pain, B/L knee pains L>R, no joint swelling.  PSYCHIATRIC: No depression, anxiety, or agitation.  HEME/LYMPH: No easy bruising, bleeding gums, or nose bleed.  ALLERGY AND IMMUNOLOGIC: No hives or eczema.

## 2022-12-02 NOTE — ED ADULT NURSE NOTE - OBJECTIVE STATEMENT
67 YOF A&OX3 presents for generalized back pain. pt states has back pain, b/l hip pain and left ankle pain. pt states fell on Monday. pt denies sob, chest pain, n/v/d, headaches, dizziness, numbness/tingling.

## 2022-12-02 NOTE — H&P ADULT - PROBLEM SELECTOR PLAN 1
multiple since was discharged post-op, with multiple pain full sites, no fractures or dislocations shown in X-rays as per my review pending official reports, pain control, fall precautions, admitted for [place] placement,  consult

## 2022-12-02 NOTE — H&P ADULT - HISTORY OF PRESENT ILLNESS
This is a 68 y/o F with PMH of HTN, DM type 2, Dyslipidemia, and Depression who presented with pain & repeated falls. Patient was discharged home on November 9th after admission for DKA ended up having lumbar microdiscectomy for her radiculopathy, was unable to go to [facility] on discharge because of lack of insurance, reports that her knee johnny up with multipe falls that adding to her pain, now having pains allover the back, left ribs, hips & knees, so daughter brought her back to the ED. Denies any head traumas, no dizziness or fainting.

## 2022-12-02 NOTE — H&P ADULT - NSHPPHYSICALEXAM_GEN_ALL_CORE
-    Vital Signs Last 24 Hrs  T(C): 36.6 (02 Dec 2022 17:02), Max: 36.6 (02 Dec 2022 17:02)  T(F): 97.9 (02 Dec 2022 17:02), Max: 97.9 (02 Dec 2022 17:02)  HR: 120 (02 Dec 2022 17:02) (120 - 120)  BP: 150/84 (02 Dec 2022 17:02) (150/84 - 150/84)  BP(mean): --  RR: 18 (02 Dec 2022 17:02) (18 - 18)  SpO2: 97% (02 Dec 2022 17:02) (97% - 97%)    Parameters below as of 02 Dec 2022 17:02  Patient On (Oxygen Delivery Method): room air        PHYSICAL EXAM:  		  GENERAL: NAD, well-groomed, well-developed.  HEAD:  Atraumatic, Norm cephalic.  EYES: PERRLA, conjunctiva clear.  ENMT: no nasal discharge, mildly dry MM.   NECK: Supple, No JVD.  NERVOUS SYSTEM:  Alert & oriented X3, neurologically intact grossly, no lower extremity sensory loss, negative B/L EPR .  CHEST/LUNG: Good air entry B/L, no rales, rhonchi, or wheezing, (+) tendernes over lower left ribs.  HEART: Normal S1 & S2, no murmurs, or extra sounds.  ABDOMEN: Soft, non-tender, non-distended; bowel sounds present, no palpable masses or organomegaly.  EXTREMITIES:  No clubbing, cyanosis, or edema.  VASCULAR: 2+ radial, DPA / PTA pulses B/L.  SKIN: No rashes or lesions.  PSYCH: normal affect & behavior.

## 2022-12-02 NOTE — H&P ADULT - NSHPLABSRESULTS_GEN_ALL_CORE
-                     10.2   8.41   )----------(  374       ( 02 Dec 2022 18:02 )               34.5      141    |  105    |  33     ----------------------------<  119        ( 02 Dec 2022 18:02 )  4.0     |  23     |  0.78     Ca    8.1        ( 02 Dec 2022 18:02 )    TPro  7.1    /  Alb  3.5    /  TBili  0.2    /  DBili  x      /  AST  24     /  ALT  25     /  AlkPhos  67     ( 02 Dec 2022 18:02 )    LIVER FUNCTIONS - ( 02 Dec 2022 18:02 )  Alb: 3.5 g/dL / Pro: 7.1 g/dL / ALK PHOS: 67 U/L / ALT: 25 U/L DA / AST: 24 U/L / GGT: x               COVID-19 PCR: NotDetec (02 Dec 2022 18:02)  COVID-19 PCR: NotDetec (07 Nov 2022 19:03)        PELVIS X-RAY, ANKLE X-RAY LEFT, LEFT KNEE X-RAY:    As per my review all show no fractures or dislocations.        CXR:    As per my review shows normal cardiac shadow size, aortic arch calcifications, clear lung fields B/L, no pulmonary infiltrates, pleural effusion, or pneumothorax. Pending official report.      -

## 2022-12-02 NOTE — H&P ADULT - PROBLEM SELECTOR PLAN 6
started her on B/L intermittent pneumatic compression device while in bed for mechanical DVT prophylaxis, in addition to full ambulation as tolerated with fall precautions.

## 2022-12-02 NOTE — H&P ADULT - PROBLEM SELECTOR PLAN 4
uncontrolled, glycohemoglobin was 12.7% on November 3rd, was admitted a month ago for DKA associated with medication non compliance & had no insurance at that time, F.within acceptable range tonight, continue on Lantus 20 units sub Q at bedtime in addition to Metformin & corrective insulin Lispro scale coverage before meals & at bedtime.

## 2022-12-02 NOTE — PATIENT PROFILE ADULT - FALL HARM RISK - RISK INTERVENTIONS

## 2022-12-02 NOTE — ED PROVIDER NOTE - MUSCULOSKELETAL, MLM
Healing surgical wound lumbar spine with steri-strips in place, no drainage or TTP. Minor TTP left hip, knee, and ankle. No bony deformity. Old surgical scar right knee and lower leg, non-tender. FROM, pulses, and sensations intact.

## 2022-12-03 LAB
ANION GAP SERPL CALC-SCNC: 10 MMOL/L — SIGNIFICANT CHANGE UP (ref 5–17)
BUN SERPL-MCNC: 22 MG/DL — SIGNIFICANT CHANGE UP (ref 7–23)
CALCIUM SERPL-MCNC: 8.2 MG/DL — LOW (ref 8.4–10.5)
CHLORIDE SERPL-SCNC: 105 MMOL/L — SIGNIFICANT CHANGE UP (ref 96–108)
CO2 SERPL-SCNC: 29 MMOL/L — SIGNIFICANT CHANGE UP (ref 22–31)
CREAT SERPL-MCNC: 0.69 MG/DL — SIGNIFICANT CHANGE UP (ref 0.5–1.3)
EGFR: 94 ML/MIN/1.73M2 — SIGNIFICANT CHANGE UP
GLUCOSE SERPL-MCNC: 75 MG/DL — SIGNIFICANT CHANGE UP (ref 70–99)
POTASSIUM SERPL-MCNC: 3.4 MMOL/L — LOW (ref 3.5–5.3)
POTASSIUM SERPL-SCNC: 3.4 MMOL/L — LOW (ref 3.5–5.3)
SODIUM SERPL-SCNC: 144 MMOL/L — SIGNIFICANT CHANGE UP (ref 135–145)

## 2022-12-03 PROCEDURE — 99233 SBSQ HOSP IP/OBS HIGH 50: CPT

## 2022-12-03 RX ORDER — POTASSIUM CHLORIDE 20 MEQ
40 PACKET (EA) ORAL EVERY 4 HOURS
Refills: 0 | Status: COMPLETED | OUTPATIENT
Start: 2022-12-03 | End: 2022-12-03

## 2022-12-03 RX ORDER — ENOXAPARIN SODIUM 100 MG/ML
40 INJECTION SUBCUTANEOUS EVERY 24 HOURS
Refills: 0 | Status: DISCONTINUED | OUTPATIENT
Start: 2022-12-03 | End: 2022-12-06

## 2022-12-03 RX ADMIN — MORPHINE SULFATE 2 MILLIGRAM(S): 50 CAPSULE, EXTENDED RELEASE ORAL at 19:15

## 2022-12-03 RX ADMIN — MORPHINE SULFATE 2 MILLIGRAM(S): 50 CAPSULE, EXTENDED RELEASE ORAL at 11:56

## 2022-12-03 RX ADMIN — METFORMIN HYDROCHLORIDE 1000 MILLIGRAM(S): 850 TABLET ORAL at 09:33

## 2022-12-03 RX ADMIN — MORPHINE SULFATE 2 MILLIGRAM(S): 50 CAPSULE, EXTENDED RELEASE ORAL at 06:43

## 2022-12-03 RX ADMIN — MORPHINE SULFATE 2 MILLIGRAM(S): 50 CAPSULE, EXTENDED RELEASE ORAL at 22:54

## 2022-12-03 RX ADMIN — MORPHINE SULFATE 2 MILLIGRAM(S): 50 CAPSULE, EXTENDED RELEASE ORAL at 18:51

## 2022-12-03 RX ADMIN — INSULIN GLARGINE 20 UNIT(S): 100 INJECTION, SOLUTION SUBCUTANEOUS at 21:50

## 2022-12-03 RX ADMIN — ENOXAPARIN SODIUM 40 MILLIGRAM(S): 100 INJECTION SUBCUTANEOUS at 21:33

## 2022-12-03 RX ADMIN — MORPHINE SULFATE 2 MILLIGRAM(S): 50 CAPSULE, EXTENDED RELEASE ORAL at 01:45

## 2022-12-03 RX ADMIN — LOSARTAN POTASSIUM 25 MILLIGRAM(S): 100 TABLET, FILM COATED ORAL at 06:43

## 2022-12-03 RX ADMIN — Medication 40 MILLIEQUIVALENT(S): at 18:22

## 2022-12-03 RX ADMIN — METFORMIN HYDROCHLORIDE 1000 MILLIGRAM(S): 850 TABLET ORAL at 18:23

## 2022-12-03 RX ADMIN — Medication 40 MILLIEQUIVALENT(S): at 14:26

## 2022-12-03 RX ADMIN — SIMVASTATIN 40 MILLIGRAM(S): 20 TABLET, FILM COATED ORAL at 21:32

## 2022-12-03 RX ADMIN — MORPHINE SULFATE 2 MILLIGRAM(S): 50 CAPSULE, EXTENDED RELEASE ORAL at 01:25

## 2022-12-03 RX ADMIN — MORPHINE SULFATE 2 MILLIGRAM(S): 50 CAPSULE, EXTENDED RELEASE ORAL at 22:30

## 2022-12-03 RX ADMIN — MORPHINE SULFATE 2 MILLIGRAM(S): 50 CAPSULE, EXTENDED RELEASE ORAL at 12:10

## 2022-12-03 NOTE — PHYSICAL THERAPY INITIAL EVALUATION ADULT - PERTINENT HX OF CURRENT PROBLEM, REHAB EVAL
This is a 66 y/o F with PMH of HTN, DM type 2, Dyslipidemia, and Depression who presented with pain & repeated falls. Patient was discharged home on November 9th after admission for DKA ended up having lumbar microdiscectomy for her radiculopathy, was unable to go to [facility] on discharge because of lack of insurance, reports that her knee johnny up with multiple falls that adding to her pain, now having pains all over the back, left ribs, hips & knees, so daughter brought her back to the ED. All x-rays neg for fx.
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
## 2022-12-03 NOTE — PHYSICAL THERAPY INITIAL EVALUATION ADULT - ADDITIONAL COMMENTS
Lives alone but is now staying with her dtr. in apt.  10 TIO with HR; none inside. Owns [employer]

## 2022-12-03 NOTE — PHYSICAL THERAPY INITIAL EVALUATION ADULT - RANGE OF MOTION EXAMINATION, REHAB EVAL
left hip flex ~1/4 range due to pain/bilateral upper extremity ROM was WFL (within functional limits)/Right LE ROM was WFL (within functional limits)/deficits as listed below

## 2022-12-04 LAB
ANION GAP SERPL CALC-SCNC: 9 MMOL/L — SIGNIFICANT CHANGE UP (ref 5–17)
BASOPHILS # BLD AUTO: 0.04 K/UL — SIGNIFICANT CHANGE UP (ref 0–0.2)
BASOPHILS NFR BLD AUTO: 0.4 % — SIGNIFICANT CHANGE UP (ref 0–2)
BUN SERPL-MCNC: 21 MG/DL — SIGNIFICANT CHANGE UP (ref 7–23)
CALCIUM SERPL-MCNC: 8.3 MG/DL — LOW (ref 8.4–10.5)
CHLORIDE SERPL-SCNC: 106 MMOL/L — SIGNIFICANT CHANGE UP (ref 96–108)
CO2 SERPL-SCNC: 27 MMOL/L — SIGNIFICANT CHANGE UP (ref 22–31)
CREAT SERPL-MCNC: 0.63 MG/DL — SIGNIFICANT CHANGE UP (ref 0.5–1.3)
EGFR: 96 ML/MIN/1.73M2 — SIGNIFICANT CHANGE UP
EOSINOPHIL # BLD AUTO: 0.5 K/UL — SIGNIFICANT CHANGE UP (ref 0–0.5)
EOSINOPHIL NFR BLD AUTO: 5.4 % — SIGNIFICANT CHANGE UP (ref 0–6)
GLUCOSE SERPL-MCNC: 63 MG/DL — LOW (ref 70–99)
HCT VFR BLD CALC: 33.2 % — LOW (ref 34.5–45)
HGB BLD-MCNC: 9.9 G/DL — LOW (ref 11.5–15.5)
IMM GRANULOCYTES NFR BLD AUTO: 0.2 % — SIGNIFICANT CHANGE UP (ref 0–0.9)
LYMPHOCYTES # BLD AUTO: 5.14 K/UL — HIGH (ref 1–3.3)
LYMPHOCYTES # BLD AUTO: 55.4 % — HIGH (ref 13–44)
MCHC RBC-ENTMCNC: 21.7 PG — LOW (ref 27–34)
MCHC RBC-ENTMCNC: 29.8 GM/DL — LOW (ref 32–36)
MCV RBC AUTO: 72.8 FL — LOW (ref 80–100)
MONOCYTES # BLD AUTO: 0.59 K/UL — SIGNIFICANT CHANGE UP (ref 0–0.9)
MONOCYTES NFR BLD AUTO: 6.4 % — SIGNIFICANT CHANGE UP (ref 2–14)
NEUTROPHILS # BLD AUTO: 2.99 K/UL — SIGNIFICANT CHANGE UP (ref 1.8–7.4)
NEUTROPHILS NFR BLD AUTO: 32.2 % — LOW (ref 43–77)
NRBC # BLD: 0 /100 WBCS — SIGNIFICANT CHANGE UP (ref 0–0)
PLATELET # BLD AUTO: 344 K/UL — SIGNIFICANT CHANGE UP (ref 150–400)
POTASSIUM SERPL-MCNC: 4.6 MMOL/L — SIGNIFICANT CHANGE UP (ref 3.5–5.3)
POTASSIUM SERPL-SCNC: 4.6 MMOL/L — SIGNIFICANT CHANGE UP (ref 3.5–5.3)
RBC # BLD: 4.56 M/UL — SIGNIFICANT CHANGE UP (ref 3.8–5.2)
RBC # FLD: 16 % — HIGH (ref 10.3–14.5)
SODIUM SERPL-SCNC: 142 MMOL/L — SIGNIFICANT CHANGE UP (ref 135–145)
WBC # BLD: 9.28 K/UL — SIGNIFICANT CHANGE UP (ref 3.8–10.5)
WBC # FLD AUTO: 9.28 K/UL — SIGNIFICANT CHANGE UP (ref 3.8–10.5)

## 2022-12-04 PROCEDURE — 93970 EXTREMITY STUDY: CPT | Mod: 26

## 2022-12-04 PROCEDURE — 99233 SBSQ HOSP IP/OBS HIGH 50: CPT

## 2022-12-04 RX ORDER — MORPHINE SULFATE 50 MG/1
2 CAPSULE, EXTENDED RELEASE ORAL EVERY 4 HOURS
Refills: 0 | Status: DISCONTINUED | OUTPATIENT
Start: 2022-12-04 | End: 2022-12-06

## 2022-12-04 RX ORDER — HYDROMORPHONE HYDROCHLORIDE 2 MG/ML
2 INJECTION INTRAMUSCULAR; INTRAVENOUS; SUBCUTANEOUS EVERY 4 HOURS
Refills: 0 | Status: DISCONTINUED | OUTPATIENT
Start: 2022-12-04 | End: 2022-12-06

## 2022-12-04 RX ORDER — HYDROMORPHONE HYDROCHLORIDE 2 MG/ML
4 INJECTION INTRAMUSCULAR; INTRAVENOUS; SUBCUTANEOUS EVERY 4 HOURS
Refills: 0 | Status: DISCONTINUED | OUTPATIENT
Start: 2022-12-04 | End: 2022-12-06

## 2022-12-04 RX ORDER — POLYETHYLENE GLYCOL 3350 17 G/17G
17 POWDER, FOR SOLUTION ORAL DAILY
Refills: 0 | Status: DISCONTINUED | OUTPATIENT
Start: 2022-12-04 | End: 2022-12-06

## 2022-12-04 RX ADMIN — MORPHINE SULFATE 2 MILLIGRAM(S): 50 CAPSULE, EXTENDED RELEASE ORAL at 20:10

## 2022-12-04 RX ADMIN — SIMVASTATIN 40 MILLIGRAM(S): 20 TABLET, FILM COATED ORAL at 21:20

## 2022-12-04 RX ADMIN — LOSARTAN POTASSIUM 25 MILLIGRAM(S): 100 TABLET, FILM COATED ORAL at 06:09

## 2022-12-04 RX ADMIN — METFORMIN HYDROCHLORIDE 1000 MILLIGRAM(S): 850 TABLET ORAL at 17:48

## 2022-12-04 RX ADMIN — Medication 2: at 13:14

## 2022-12-04 RX ADMIN — ENOXAPARIN SODIUM 40 MILLIGRAM(S): 100 INJECTION SUBCUTANEOUS at 21:20

## 2022-12-04 RX ADMIN — MORPHINE SULFATE 2 MILLIGRAM(S): 50 CAPSULE, EXTENDED RELEASE ORAL at 11:34

## 2022-12-04 RX ADMIN — INSULIN GLARGINE 20 UNIT(S): 100 INJECTION, SOLUTION SUBCUTANEOUS at 21:20

## 2022-12-04 RX ADMIN — MORPHINE SULFATE 2 MILLIGRAM(S): 50 CAPSULE, EXTENDED RELEASE ORAL at 03:30

## 2022-12-04 RX ADMIN — MORPHINE SULFATE 2 MILLIGRAM(S): 50 CAPSULE, EXTENDED RELEASE ORAL at 06:40

## 2022-12-04 RX ADMIN — MORPHINE SULFATE 2 MILLIGRAM(S): 50 CAPSULE, EXTENDED RELEASE ORAL at 06:29

## 2022-12-04 RX ADMIN — METFORMIN HYDROCHLORIDE 1000 MILLIGRAM(S): 850 TABLET ORAL at 11:29

## 2022-12-04 RX ADMIN — MORPHINE SULFATE 2 MILLIGRAM(S): 50 CAPSULE, EXTENDED RELEASE ORAL at 02:33

## 2022-12-04 RX ADMIN — MORPHINE SULFATE 2 MILLIGRAM(S): 50 CAPSULE, EXTENDED RELEASE ORAL at 19:55

## 2022-12-04 RX ADMIN — MORPHINE SULFATE 2 MILLIGRAM(S): 50 CAPSULE, EXTENDED RELEASE ORAL at 11:45

## 2022-12-04 RX ADMIN — HYDROMORPHONE HYDROCHLORIDE 2 MILLIGRAM(S): 2 INJECTION INTRAMUSCULAR; INTRAVENOUS; SUBCUTANEOUS at 17:48

## 2022-12-05 PROCEDURE — 99233 SBSQ HOSP IP/OBS HIGH 50: CPT

## 2022-12-05 PROCEDURE — 99222 1ST HOSP IP/OBS MODERATE 55: CPT

## 2022-12-05 RX ORDER — INSULIN GLARGINE 100 [IU]/ML
16 INJECTION, SOLUTION SUBCUTANEOUS AT BEDTIME
Refills: 0 | Status: DISCONTINUED | OUTPATIENT
Start: 2022-12-05 | End: 2022-12-06

## 2022-12-05 RX ORDER — ONDANSETRON 8 MG/1
4 TABLET, FILM COATED ORAL ONCE
Refills: 0 | Status: COMPLETED | OUTPATIENT
Start: 2022-12-05 | End: 2022-12-05

## 2022-12-05 RX ADMIN — HYDROMORPHONE HYDROCHLORIDE 4 MILLIGRAM(S): 2 INJECTION INTRAMUSCULAR; INTRAVENOUS; SUBCUTANEOUS at 09:32

## 2022-12-05 RX ADMIN — HYDROMORPHONE HYDROCHLORIDE 4 MILLIGRAM(S): 2 INJECTION INTRAMUSCULAR; INTRAVENOUS; SUBCUTANEOUS at 00:40

## 2022-12-05 RX ADMIN — HYDROMORPHONE HYDROCHLORIDE 4 MILLIGRAM(S): 2 INJECTION INTRAMUSCULAR; INTRAVENOUS; SUBCUTANEOUS at 01:15

## 2022-12-05 RX ADMIN — METFORMIN HYDROCHLORIDE 1000 MILLIGRAM(S): 850 TABLET ORAL at 07:56

## 2022-12-05 RX ADMIN — HYDROMORPHONE HYDROCHLORIDE 4 MILLIGRAM(S): 2 INJECTION INTRAMUSCULAR; INTRAVENOUS; SUBCUTANEOUS at 20:34

## 2022-12-05 RX ADMIN — HYDROMORPHONE HYDROCHLORIDE 4 MILLIGRAM(S): 2 INJECTION INTRAMUSCULAR; INTRAVENOUS; SUBCUTANEOUS at 05:06

## 2022-12-05 RX ADMIN — HYDROMORPHONE HYDROCHLORIDE 4 MILLIGRAM(S): 2 INJECTION INTRAMUSCULAR; INTRAVENOUS; SUBCUTANEOUS at 21:34

## 2022-12-05 RX ADMIN — ONDANSETRON 4 MILLIGRAM(S): 8 TABLET, FILM COATED ORAL at 08:47

## 2022-12-05 RX ADMIN — HYDROMORPHONE HYDROCHLORIDE 4 MILLIGRAM(S): 2 INJECTION INTRAMUSCULAR; INTRAVENOUS; SUBCUTANEOUS at 14:50

## 2022-12-05 RX ADMIN — HYDROMORPHONE HYDROCHLORIDE 4 MILLIGRAM(S): 2 INJECTION INTRAMUSCULAR; INTRAVENOUS; SUBCUTANEOUS at 10:26

## 2022-12-05 RX ADMIN — Medication 2: at 17:14

## 2022-12-05 RX ADMIN — HYDROMORPHONE HYDROCHLORIDE 4 MILLIGRAM(S): 2 INJECTION INTRAMUSCULAR; INTRAVENOUS; SUBCUTANEOUS at 05:50

## 2022-12-05 RX ADMIN — METFORMIN HYDROCHLORIDE 1000 MILLIGRAM(S): 850 TABLET ORAL at 17:15

## 2022-12-05 RX ADMIN — ENOXAPARIN SODIUM 40 MILLIGRAM(S): 100 INJECTION SUBCUTANEOUS at 22:17

## 2022-12-05 RX ADMIN — INSULIN GLARGINE 16 UNIT(S): 100 INJECTION, SOLUTION SUBCUTANEOUS at 22:18

## 2022-12-05 RX ADMIN — HYDROMORPHONE HYDROCHLORIDE 4 MILLIGRAM(S): 2 INJECTION INTRAMUSCULAR; INTRAVENOUS; SUBCUTANEOUS at 14:00

## 2022-12-05 RX ADMIN — SIMVASTATIN 40 MILLIGRAM(S): 20 TABLET, FILM COATED ORAL at 22:17

## 2022-12-05 RX ADMIN — MORPHINE SULFATE 2 MILLIGRAM(S): 50 CAPSULE, EXTENDED RELEASE ORAL at 23:30

## 2022-12-05 RX ADMIN — LOSARTAN POTASSIUM 25 MILLIGRAM(S): 100 TABLET, FILM COATED ORAL at 05:06

## 2022-12-05 NOTE — CONSULT NOTE ADULT - CONSULT REASON
67y A1C with Estimated Average Glucose Result: 12.7 % (11-03-22 @ 06:00)   diabetes mellitus uncontrolled type 2
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
## 2022-12-05 NOTE — CONSULT NOTE ADULT - PROBLEM SELECTOR RECOMMENDATION 9
Type 2 A1c 12.7% adm falls  spoke w/ Dr. Kelly  lower Lantus to 16 units @ HS  Recommend endocrine-Perlman onconsult  FU appt: TBA  DSC recommendations: return to home regimen and glucose monitoring  diabetes education provided as documented above  Diabetes support info and cell # [phone] given   Goal 100-180 mg/dL; 140-180 mg/dL in critical care areas

## 2022-12-05 NOTE — CONSULT NOTE ADULT - ASSESSMENT
Physical Exam:   Vital Signs Last 24 Hrs  T(C): 36.8 (05 Dec 2022 07:45), Max: 37 (04 Dec 2022 15:00)  T(F): 98.2 (05 Dec 2022 07:45), Max: 98.6 (04 Dec 2022 15:00)  HR: 115 (05 Dec 2022 07:45) (109 - 120)  BP: 124/71 (05 Dec 2022 07:45) (115/75 - 148/87)  BP(mean): --  RR: 18 (05 Dec 2022 07:45) (17 - 18)  SpO2: 99% (05 Dec 2022 07:45) (95% - 99%)    Parameters below as of 05 Dec 2022 07:45  Patient On (Oxygen Delivery Method): room air     CAPILLARY BLOOD GLUCOSE      POCT Blood Glucose.: 109 mg/dL (05 Dec 2022 12:37)  POCT Blood Glucose.: 73 mg/dL (05 Dec 2022 07:34)  POCT Blood Glucose.: 172 mg/dL (04 Dec 2022 21:18)  POCT Blood Glucose.: 127 mg/dL (04 Dec 2022 16:43)      Cholesterol, Serum: 113 mg/dL (05.19.21 @ 08:36)     HDL Cholesterol, Serum: 22 mg/dL (05.19.21 @ 08:36)     LDL Cholesterol Calculated: 66 mg/dL (05.19.21 @ 08:36)     DIET: CC  >50%

## 2022-12-06 ENCOUNTER — TRANSCRIPTION ENCOUNTER (OUTPATIENT)
Age: 67
End: 2022-12-06

## 2022-12-06 VITALS
RESPIRATION RATE: 18 BRPM | OXYGEN SATURATION: 100 % | DIASTOLIC BLOOD PRESSURE: 84 MMHG | HEART RATE: 113 BPM | TEMPERATURE: 98 F | SYSTOLIC BLOOD PRESSURE: 147 MMHG

## 2022-12-06 LAB — SARS-COV-2 RNA SPEC QL NAA+PROBE: SIGNIFICANT CHANGE UP

## 2022-12-06 PROCEDURE — 99239 HOSP IP/OBS DSCHRG MGMT >30: CPT

## 2022-12-06 PROCEDURE — 36415 COLL VENOUS BLD VENIPUNCTURE: CPT

## 2022-12-06 PROCEDURE — 73502 X-RAY EXAM HIP UNI 2-3 VIEWS: CPT

## 2022-12-06 PROCEDURE — 93005 ELECTROCARDIOGRAM TRACING: CPT

## 2022-12-06 PROCEDURE — 97110 THERAPEUTIC EXERCISES: CPT

## 2022-12-06 PROCEDURE — 97530 THERAPEUTIC ACTIVITIES: CPT

## 2022-12-06 PROCEDURE — 81001 URINALYSIS AUTO W/SCOPE: CPT

## 2022-12-06 PROCEDURE — 73562 X-RAY EXAM OF KNEE 3: CPT

## 2022-12-06 PROCEDURE — 93970 EXTREMITY STUDY: CPT

## 2022-12-06 PROCEDURE — 85025 COMPLETE CBC W/AUTO DIFF WBC: CPT

## 2022-12-06 PROCEDURE — 80053 COMPREHEN METABOLIC PANEL: CPT

## 2022-12-06 PROCEDURE — 73610 X-RAY EXAM OF ANKLE: CPT

## 2022-12-06 PROCEDURE — 93010 ELECTROCARDIOGRAM REPORT: CPT

## 2022-12-06 PROCEDURE — 99285 EMERGENCY DEPT VISIT HI MDM: CPT

## 2022-12-06 PROCEDURE — 80048 BASIC METABOLIC PNL TOTAL CA: CPT

## 2022-12-06 PROCEDURE — 97116 GAIT TRAINING THERAPY: CPT

## 2022-12-06 PROCEDURE — 97161 PT EVAL LOW COMPLEX 20 MIN: CPT

## 2022-12-06 PROCEDURE — 71045 X-RAY EXAM CHEST 1 VIEW: CPT

## 2022-12-06 PROCEDURE — 82962 GLUCOSE BLOOD TEST: CPT

## 2022-12-06 PROCEDURE — 87635 SARS-COV-2 COVID-19 AMP PRB: CPT

## 2022-12-06 RX ORDER — POLYETHYLENE GLYCOL 3350 17 G/17G
17 POWDER, FOR SOLUTION ORAL
Qty: 0 | Refills: 0 | DISCHARGE
Start: 2022-12-06

## 2022-12-06 RX ORDER — METOPROLOL TARTRATE 50 MG
1 TABLET ORAL
Qty: 0 | Refills: 0 | DISCHARGE
Start: 2022-12-06

## 2022-12-06 RX ORDER — INSULIN GLARGINE 100 [IU]/ML
16 INJECTION, SOLUTION SUBCUTANEOUS
Qty: 0 | Refills: 0 | DISCHARGE
Start: 2022-12-06

## 2022-12-06 RX ORDER — HYDROMORPHONE HYDROCHLORIDE 2 MG/ML
1 INJECTION INTRAMUSCULAR; INTRAVENOUS; SUBCUTANEOUS
Qty: 0 | Refills: 0 | DISCHARGE
Start: 2022-12-06

## 2022-12-06 RX ORDER — METOPROLOL TARTRATE 50 MG
25 TABLET ORAL DAILY
Refills: 0 | Status: DISCONTINUED | OUTPATIENT
Start: 2022-12-06 | End: 2022-12-06

## 2022-12-06 RX ADMIN — LOSARTAN POTASSIUM 25 MILLIGRAM(S): 100 TABLET, FILM COATED ORAL at 05:41

## 2022-12-06 RX ADMIN — HYDROMORPHONE HYDROCHLORIDE 4 MILLIGRAM(S): 2 INJECTION INTRAMUSCULAR; INTRAVENOUS; SUBCUTANEOUS at 06:56

## 2022-12-06 RX ADMIN — METFORMIN HYDROCHLORIDE 1000 MILLIGRAM(S): 850 TABLET ORAL at 08:22

## 2022-12-06 RX ADMIN — MORPHINE SULFATE 2 MILLIGRAM(S): 50 CAPSULE, EXTENDED RELEASE ORAL at 00:00

## 2022-12-06 RX ADMIN — HYDROMORPHONE HYDROCHLORIDE 4 MILLIGRAM(S): 2 INJECTION INTRAMUSCULAR; INTRAVENOUS; SUBCUTANEOUS at 05:41

## 2022-12-06 RX ADMIN — HYDROMORPHONE HYDROCHLORIDE 4 MILLIGRAM(S): 2 INJECTION INTRAMUSCULAR; INTRAVENOUS; SUBCUTANEOUS at 11:37

## 2022-12-06 RX ADMIN — Medication 2: at 11:50

## 2022-12-06 RX ADMIN — Medication 25 MILLIGRAM(S): at 06:54

## 2022-12-06 NOTE — DISCHARGE NOTE PROVIDER - CARE PROVIDER_API CALL
Hudson Leal (MD)  Orthopaedic Surgery  [address], [address]  [place] [address]  Phone: [phone]  Fax: [phone]  Follow Up Time:

## 2022-12-06 NOTE — PROGRESS NOTE ADULT - ASSESSMENT
67F HTN, HLD, DM2, and Depression admitted for Repeated Falls at home.     Frequent Falls / Generalized Weakness   Multiple falls since prior discharge; Imaging reviewed with no acute fractures   PT to be consulted for recommendations on placement  Mostly from deconditioning     Diabetes Mellitus 2   Metformin + Lantus 20U at bedtime  ISS for coverage; Maintain BS < 180    HTN / HLD  BP controlled;   Continue Losartan + Statin    Diet  Regular    DVT Prophylaxis  Lovenox    Disposition   Case management working with patient regarding discharge
67F HTN, HLD, DM2, and Depression admitted for Repeated Falls at home.     Frequent Falls / Generalized Weakness   Multiple falls since prior discharge; Imaging reviewed with no acute fractures   PT to be consulted for recommendations on placement  Mostly from deconditioning     Diabetes Mellitus 2   Metformin + Lantus 20U at bedtime  ISS for coverage; Maintain BS < 180    HTN / HLD  BP controlled;   Continue Losartan + Statin    Diet  Regular    DVT Prophylaxis  Lovenox    Disposition   Will discuss with Case management regarding placement 
67F HTN, HLD, DM2, and Depression admitted for Repeated Falls at home.     Frequent Falls / Generalized Weakness   Multiple falls since prior discharge; Imaging reviewed with no acute fractures   PT to be consulted for recommendations on placement  Mostly from deconditioning     Diabetes Mellitus 2   Metformin + Lantus 20U at bedtime  ISS for coverage; Maintain BS < 180    HTN / HLD  BP controlled;   Continue Losartan + Statin    Diet  Regular    DVT Prophylaxis  Lovenox    Disposition   Discharge planning pending hospital course 
67F HTN, HLD, DM2, and Depression admitted for Repeated Falls at home.     Frequent Falls / Generalized Weakness   Multiple falls since prior discharge; Imaging reviewed with no acute fractures   PT to be consulted for recommendations on placement  Mostly from deconditioning     Diabetes Mellitus 2   Metformin + Lantus 20U at bedtime  ISS for coverage; Maintain BS < 180    HTN / HLD  BP controlled;   Continue Losartan + Statin    Diet  Regular    DVT Prophylaxis  Lovenox    Disposition   Case management working with patient regarding discharge

## 2022-12-06 NOTE — DISCHARGE NOTE PROVIDER - NSDCMRMEDTOKEN_GEN_ALL_CORE_FT
acetaminophen 500 mg oral tablet: 2 tab(s) orally every 8 hours  HYDROmorphone 2 mg oral tablet: 1 tab(s) orally every 4 hours, As needed, Moderate Pain (4 - 6)  HYDROmorphone 4 mg oral tablet: 1 tab(s) orally every 4 hours, As needed, Severe Pain (7 - 10)  insulin glargine 100 units/mL subcutaneous solution: 16 unit(s) subcutaneous once a day (at bedtime)  losartan 25 mg oral tablet: 1 tab(s) orally once a day  metFORMIN 1000 mg oral tablet: 1 tab(s) orally 2 times a day  metoprolol succinate 25 mg oral tablet, extended release: 1 tab(s) orally once a day  polyethylene glycol 3350 oral powder for reconstitution: 17 gram(s) orally once a day  simvastatin 40 mg oral tablet: 1 tab(s) orally once a day (at bedtime)

## 2022-12-06 NOTE — DISCHARGE NOTE PROVIDER - NSDCCPCAREPLAN_GEN_ALL_CORE_FT
PRINCIPAL DISCHARGE DIAGNOSIS  Diagnosis: Back pain  Assessment and Plan of Treatment: You were admitted for back pain and deconditioning. Recommended for rehab placment to enable stregnth training.      SECONDARY DISCHARGE DIAGNOSES  Diagnosis: Frequent falls  Assessment and Plan of Treatment: Due to deconditioning.    Diagnosis: Diabetes  Assessment and Plan of Treatment: Will resume oral medicaitons and Insulin

## 2022-12-06 NOTE — DISCHARGE NOTE NURSING/CASE MANAGEMENT/SOCIAL WORK - PATIENT PORTAL LINK FT
You can access the FollowMyHealth Patient Portal offered by [facility] by registering at the following website: [email or web address]. By joining [facility]’s FollowMyHealth portal, you will also be able to view your health information using other applications (apps) compatible with our system.

## 2022-12-06 NOTE — PROGRESS NOTE ADULT - SUBJECTIVE AND OBJECTIVE BOX
Subjective: Patient seen and examined. Doing well with no overnight events. Pain controlled. Waiting for placement.     MEDICATIONS  (STANDING):  dextrose 5%. 1000 milliLiter(s) (50 mL/Hr) IV Continuous <Continuous>  dextrose 5%. 1000 milliLiter(s) (100 mL/Hr) IV Continuous <Continuous>  dextrose 50% Injectable 25 Gram(s) IV Push once  dextrose 50% Injectable 12.5 Gram(s) IV Push once  dextrose 50% Injectable 25 Gram(s) IV Push once  enoxaparin Injectable 40 milliGRAM(s) SubCutaneous every 24 hours  glucagon  Injectable 1 milliGRAM(s) IntraMuscular once  insulin glargine Injectable (LANTUS) 16 Unit(s) SubCutaneous at bedtime  insulin lispro (ADMELOG) corrective regimen sliding scale   SubCutaneous three times a day before meals  insulin lispro (ADMELOG) corrective regimen sliding scale   SubCutaneous at bedtime  losartan 25 milliGRAM(s) Oral daily  metFORMIN 1000 milliGRAM(s) Oral two times a day  metoprolol succinate ER 25 milliGRAM(s) Oral daily  polyethylene glycol 3350 17 Gram(s) Oral daily  simvastatin 40 milliGRAM(s) Oral at bedtime    MEDICATIONS  (PRN):  acetaminophen     Tablet .. 650 milliGRAM(s) Oral every 6 hours PRN Mild Pain (1 - 3)  dextrose Oral Gel 15 Gram(s) Oral once PRN Blood Glucose LESS THAN 70 milliGRAM(s)/deciliter  HYDROmorphone   Tablet 2 milliGRAM(s) Oral every 4 hours PRN Moderate Pain (4 - 6)  HYDROmorphone   Tablet 4 milliGRAM(s) Oral every 4 hours PRN Severe Pain (7 - 10)  morphine  - Injectable 2 milliGRAM(s) IV Push every 4 hours PRN Breakthrough      Allergies    aspirin (Rash)  clindamycin (Unknown)  contrast media (iron oxide-based) (Nephrotoxicity)  fish (Hives)  sulfa drugs (Rash)  vancomycin (Rash)    Intolerances        Vital Signs Last 24 Hrs  T(C): 36.8 (06 Dec 2022 07:57), Max: 36.9 (05 Dec 2022 23:06)  T(F): 98.3 (06 Dec 2022 07:57), Max: 98.5 (05 Dec 2022 23:06)  HR: 113 (06 Dec 2022 07:57) (105 - 120)  BP: 147/84 (06 Dec 2022 07:57) (124/71 - 152/91)  BP(mean): --  RR: 18 (06 Dec 2022 07:57) (17 - 18)  SpO2: 100% (06 Dec 2022 07:57) (95% - 100%)    Parameters below as of 06 Dec 2022 07:57  Patient On (Oxygen Delivery Method): room air        PHYSICAL EXAM:  GENERAL: NAD, well-groomed, well-developed  HEAD:  Atraumatic, Normocephalic  ENMT: Moist mucous membranes,   NECK: Supple, No JVD, Normal thyroid  NERVOUS SYSTEM:  All 4 extremities mobile, no gross sensory deficits.   CHEST/LUNG: Clear to auscultation bilaterally; No rales, rhonchi, wheezing, or rubs  HEART: Regular rate and rhythm; No murmurs, rubs, or gallops  ABDOMEN: Soft, Nontender, Nondistended; Bowel sounds present  EXTREMITIES:  2+ Peripheral Pulses, No clubbing, cyanosis, or edema      LABS:              CAPILLARY BLOOD GLUCOSE      POCT Blood Glucose.: 105 mg/dL (06 Dec 2022 07:39)  POCT Blood Glucose.: 153 mg/dL (05 Dec 2022 21:57)  POCT Blood Glucose.: 184 mg/dL (05 Dec 2022 17:09)  POCT Blood Glucose.: 109 mg/dL (05 Dec 2022 12:37)      RADIOLOGY & ADDITIONAL TESTS:    Imaging Personally Reviewed:  [ ] YES     Consultant(s) Notes Reviewed:      Care Discussed with Consultants/Other Providers:    Advanced Directives: [ ] DNR  [ ] No feeding tube  [ ] MOLST in chart  [ ] MOLST completed today  [ ] Unknown  
Subjective: Patient seen and examined. No overnight events. Doing well.     MEDICATIONS  (STANDING):  dextrose 5%. 1000 milliLiter(s) (50 mL/Hr) IV Continuous <Continuous>  dextrose 5%. 1000 milliLiter(s) (100 mL/Hr) IV Continuous <Continuous>  dextrose 50% Injectable 25 Gram(s) IV Push once  dextrose 50% Injectable 12.5 Gram(s) IV Push once  dextrose 50% Injectable 25 Gram(s) IV Push once  glucagon  Injectable 1 milliGRAM(s) IntraMuscular once  insulin glargine Injectable (LANTUS) 20 Unit(s) SubCutaneous at bedtime  insulin lispro (ADMELOG) corrective regimen sliding scale   SubCutaneous three times a day before meals  insulin lispro (ADMELOG) corrective regimen sliding scale   SubCutaneous at bedtime  losartan 25 milliGRAM(s) Oral daily  metFORMIN 1000 milliGRAM(s) Oral two times a day  potassium chloride    Tablet ER 40 milliEquivalent(s) Oral every 4 hours  simvastatin 40 milliGRAM(s) Oral at bedtime    MEDICATIONS  (PRN):  acetaminophen     Tablet .. 650 milliGRAM(s) Oral every 6 hours PRN Mild Pain (1 - 3)  dextrose Oral Gel 15 Gram(s) Oral once PRN Blood Glucose LESS THAN 70 milliGRAM(s)/deciliter  morphine  - Injectable 2 milliGRAM(s) IV Push every 4 hours PRN Moderate Pain (4 - 6)  morphine  - Injectable 4 milliGRAM(s) IV Push every 4 hours PRN Severe Pain (7 - 10)      Allergies    aspirin (Rash)  clindamycin (Unknown)  contrast media (iron oxide-based) (Nephrotoxicity)  fish (Hives)  sulfa drugs (Rash)  vancomycin (Rash)    Intolerances        Vital Signs Last 24 Hrs  T(C): 37 (03 Dec 2022 08:43), Max: 37 (03 Dec 2022 08:43)  T(F): 98.6 (03 Dec 2022 08:43), Max: 98.6 (03 Dec 2022 08:43)  HR: 108 (03 Dec 2022 11:45) (105 - 120)  BP: 133/81 (03 Dec 2022 11:45) (113/73 - 150/84)  BP(mean): --  RR: 18 (03 Dec 2022 11:45) (17 - 20)  SpO2: 99% (03 Dec 2022 11:45) (95% - 99%)    Parameters below as of 03 Dec 2022 11:45  Patient On (Oxygen Delivery Method): room air        PHYSICAL EXAM:  GENERAL: NAD, well-groomed, well-developed  HEAD:  Atraumatic, Normocephalic  ENMT: Moist mucous membranes,   NECK: Supple, No JVD, Normal thyroid  NERVOUS SYSTEM:  All 4 extremities mobile, no gross sensory deficits.   CHEST/LUNG: Clear to auscultation bilaterally; No rales, rhonchi, wheezing, or rubs  HEART: Regular rate and rhythm; No murmurs, rubs, or gallops  ABDOMEN: Soft, Nontender, Nondistended; Bowel sounds present  EXTREMITIES:  2+ Peripheral Pulses, No clubbing, cyanosis, or edema      LABS:                        10.2   8.41  )-----------( 374      ( 02 Dec 2022 18:02 )             34.5     03 Dec 2022 06:27    144    |  105    |  22     ----------------------------<  75     3.4     |  29     |  0.69     Ca    8.2        03 Dec 2022 06:27    TPro  7.1    /  Alb  3.5    /  TBili  0.2    /  DBili  x      /  AST  24     /  ALT  25     /  AlkPhos  67     02 Dec 2022 18:02      Urinalysis Basic - ( 02 Dec 2022 21:10 )    Color: Yellow / Appearance: Clear / S.015 / pH: x  Gluc: x / Ketone: Negative  / Bili: Negative / Urobili: Negative mg/dL   Blood: x / Protein: 30 mg/dL / Nitrite: Negative   Leuk Esterase: Trace / RBC: Negative /HPF / WBC 0-2   Sq Epi: x / Non Sq Epi: Occasional / Bacteria: Negative      CAPILLARY BLOOD GLUCOSE      POCT Blood Glucose.: 104 mg/dL (03 Dec 2022 12:45)  POCT Blood Glucose.: 85 mg/dL (03 Dec 2022 09:30)  POCT Blood Glucose.: 231 mg/dL (02 Dec 2022 22:26)  POCT Blood Glucose.: 120 mg/dL (02 Dec 2022 18:05)      RADIOLOGY & ADDITIONAL TESTS:    Imaging Personally Reviewed:  [ ] YES     Consultant(s) Notes Reviewed:      Care Discussed with Consultants/Other Providers:    Advanced Directives: [ ] DNR  [ ] No feeding tube  [ ] MOLST in chart  [ ] MOLST completed today  [ ] Unknown  
Subjective: Doing well with no overnight events.     MEDICATIONS  (STANDING):  dextrose 5%. 1000 milliLiter(s) (50 mL/Hr) IV Continuous <Continuous>  dextrose 5%. 1000 milliLiter(s) (100 mL/Hr) IV Continuous <Continuous>  dextrose 50% Injectable 25 Gram(s) IV Push once  dextrose 50% Injectable 12.5 Gram(s) IV Push once  dextrose 50% Injectable 25 Gram(s) IV Push once  enoxaparin Injectable 40 milliGRAM(s) SubCutaneous every 24 hours  glucagon  Injectable 1 milliGRAM(s) IntraMuscular once  insulin glargine Injectable (LANTUS) 20 Unit(s) SubCutaneous at bedtime  insulin lispro (ADMELOG) corrective regimen sliding scale   SubCutaneous three times a day before meals  insulin lispro (ADMELOG) corrective regimen sliding scale   SubCutaneous at bedtime  losartan 25 milliGRAM(s) Oral daily  metFORMIN 1000 milliGRAM(s) Oral two times a day  polyethylene glycol 3350 17 Gram(s) Oral daily  simvastatin 40 milliGRAM(s) Oral at bedtime    MEDICATIONS  (PRN):  acetaminophen     Tablet .. 650 milliGRAM(s) Oral every 6 hours PRN Mild Pain (1 - 3)  dextrose Oral Gel 15 Gram(s) Oral once PRN Blood Glucose LESS THAN 70 milliGRAM(s)/deciliter  HYDROmorphone   Tablet 2 milliGRAM(s) Oral every 4 hours PRN Moderate Pain (4 - 6)  HYDROmorphone   Tablet 4 milliGRAM(s) Oral every 4 hours PRN Severe Pain (7 - 10)  morphine  - Injectable 2 milliGRAM(s) IV Push every 4 hours PRN Breakthrough      Allergies    aspirin (Rash)  clindamycin (Unknown)  contrast media (iron oxide-based) (Nephrotoxicity)  fish (Hives)  sulfa drugs (Rash)  vancomycin (Rash)    Intolerances        Vital Signs Last 24 Hrs  T(C): 36.8 (05 Dec 2022 07:45), Max: 37 (04 Dec 2022 15:00)  T(F): 98.2 (05 Dec 2022 07:45), Max: 98.6 (04 Dec 2022 15:00)  HR: 115 (05 Dec 2022 07:45) (109 - 120)  BP: 124/71 (05 Dec 2022 07:45) (115/75 - 148/87)  BP(mean): --  RR: 18 (05 Dec 2022 07:45) (17 - 18)  SpO2: 99% (05 Dec 2022 07:45) (95% - 99%)    Parameters below as of 05 Dec 2022 07:45  Patient On (Oxygen Delivery Method): room air        PHYSICAL EXAM:  GENERAL: NAD, well-groomed, well-developed  HEAD:  Atraumatic, Normocephalic  ENMT: Moist mucous membranes,   NECK: Supple, No JVD, Normal thyroid  NERVOUS SYSTEM:  All 4 extremities mobile, no gross sensory deficits.   CHEST/LUNG: Clear to auscultation bilaterally; No rales, rhonchi, wheezing, or rubs  HEART: Regular rate and rhythm; No murmurs, rubs, or gallops  ABDOMEN: Soft, Nontender, Nondistended; Bowel sounds present  EXTREMITIES:  2+ Peripheral Pulses, No clubbing, cyanosis, or edema      LABS:      Ca    8.3        04 Dec 2022 06:53          CAPILLARY BLOOD GLUCOSE      POCT Blood Glucose.: 73 mg/dL (05 Dec 2022 07:34)  POCT Blood Glucose.: 172 mg/dL (04 Dec 2022 21:18)  POCT Blood Glucose.: 127 mg/dL (04 Dec 2022 16:43)  POCT Blood Glucose.: 182 mg/dL (04 Dec 2022 13:13)  POCT Blood Glucose.: 192 mg/dL (04 Dec 2022 12:07)      RADIOLOGY & ADDITIONAL TESTS:    Imaging Personally Reviewed:  [ ] YES     Consultant(s) Notes Reviewed:      Care Discussed with Consultants/Other Providers:    Advanced Directives: [ ] DNR  [ ] No feeding tube  [ ] MOLST in chart  [ ] MOLST completed today  [ ] Unknown  
Subjective: Patient resting comfortably. No overnight events.     MEDICATIONS  (STANDING):  dextrose 5%. 1000 milliLiter(s) (50 mL/Hr) IV Continuous <Continuous>  dextrose 5%. 1000 milliLiter(s) (100 mL/Hr) IV Continuous <Continuous>  dextrose 50% Injectable 25 Gram(s) IV Push once  dextrose 50% Injectable 12.5 Gram(s) IV Push once  dextrose 50% Injectable 25 Gram(s) IV Push once  enoxaparin Injectable 40 milliGRAM(s) SubCutaneous every 24 hours  glucagon  Injectable 1 milliGRAM(s) IntraMuscular once  insulin glargine Injectable (LANTUS) 20 Unit(s) SubCutaneous at bedtime  insulin lispro (ADMELOG) corrective regimen sliding scale   SubCutaneous at bedtime  insulin lispro (ADMELOG) corrective regimen sliding scale   SubCutaneous three times a day before meals  losartan 25 milliGRAM(s) Oral daily  metFORMIN 1000 milliGRAM(s) Oral two times a day  simvastatin 40 milliGRAM(s) Oral at bedtime    MEDICATIONS  (PRN):  acetaminophen     Tablet .. 650 milliGRAM(s) Oral every 6 hours PRN Mild Pain (1 - 3)  dextrose Oral Gel 15 Gram(s) Oral once PRN Blood Glucose LESS THAN 70 milliGRAM(s)/deciliter  morphine  - Injectable 2 milliGRAM(s) IV Push every 4 hours PRN Moderate Pain (4 - 6)  morphine  - Injectable 4 milliGRAM(s) IV Push every 4 hours PRN Severe Pain (7 - 10)      Allergies    aspirin (Rash)  clindamycin (Unknown)  contrast media (iron oxide-based) (Nephrotoxicity)  fish (Hives)  sulfa drugs (Rash)  vancomycin (Rash)    Intolerances        Vital Signs Last 24 Hrs  T(C): 37 (04 Dec 2022 07:53), Max: 37.1 (03 Dec 2022 19:00)  T(F): 98.6 (04 Dec 2022 07:53), Max: 98.7 (03 Dec 2022 19:00)  HR: 116 (04 Dec 2022 07:53) (104 - 116)  BP: 122/71 (04 Dec 2022 07:53) (122/71 - 159/91)  BP(mean): --  RR: 18 (04 Dec 2022 07:53) (16 - 18)  SpO2: 97% (04 Dec 2022 07:53) (97% - 100%)    Parameters below as of 04 Dec 2022 07:53  Patient On (Oxygen Delivery Method): room air        PHYSICAL EXAM:  GENERAL: NAD, well-groomed, well-developed  HEAD:  Atraumatic, Normocephalic  ENMT: Moist mucous membranes,   NECK: Supple, No JVD, Normal thyroid  NERVOUS SYSTEM:  All 4 extremities mobile, no gross sensory deficits.   CHEST/LUNG: Clear to auscultation bilaterally; No rales, rhonchi, wheezing, or rubs  HEART: Regular rate and rhythm; No murmurs, rubs, or gallops  ABDOMEN: Soft, Nontender, Nondistended; Bowel sounds present  EXTREMITIES:  2+ Peripheral Pulses, No clubbing, cyanosis, or edema      LABS:                        9.9    9.28  )-----------( 344      ( 04 Dec 2022 06:53 )             33.2     04 Dec 2022 06:53    142    |  106    |  21     ----------------------------<  63     4.6     |  27     |  0.63     Ca    8.3        04 Dec 2022 06:53        Urinalysis Basic - ( 02 Dec 2022 21:10 )    Color: Yellow / Appearance: Clear / S.015 / pH: x  Gluc: x / Ketone: Negative  / Bili: Negative / Urobili: Negative mg/dL   Blood: x / Protein: 30 mg/dL / Nitrite: Negative   Leuk Esterase: Trace / RBC: Negative /HPF / WBC 0-2   Sq Epi: x / Non Sq Epi: Occasional / Bacteria: Negative      CAPILLARY BLOOD GLUCOSE      POCT Blood Glucose.: 72 mg/dL (04 Dec 2022 07:53)  POCT Blood Glucose.: 135 mg/dL (03 Dec 2022 21:35)  POCT Blood Glucose.: 126 mg/dL (03 Dec 2022 16:43)  POCT Blood Glucose.: 104 mg/dL (03 Dec 2022 12:45)  POCT Blood Glucose.: 85 mg/dL (03 Dec 2022 09:30)      RADIOLOGY & ADDITIONAL TESTS:    Imaging Personally Reviewed:  [ ] YES     Consultant(s) Notes Reviewed:      Care Discussed with Consultants/Other Providers:    Advanced Directives: [ ] DNR  [ ] No feeding tube  [ ] MOLST in chart  [ ] MOLST completed today  [ ] Unknown

## 2022-12-06 NOTE — DISCHARGE NOTE PROVIDER - HOSPITAL COURSE
67F HTN, HLD, DM2, and Depression admitted for Repeated Falls at home.     Frequent Falls / Generalized Weakness   Multiple falls since prior discharge; Imaging reviewed with no acute fractures   Mostly from deconditioning     Diabetes Mellitus 2   Metformin + Lantus 20U at bedtime  ISS for coverage; Maintain BS < 180    HTN / HLD  BP controlled;   Continue Losartan + Statin    DVT Prophylaxis  Lovenox    Disposition   Discharged to rehab

## 2022-12-06 NOTE — DISCHARGE NOTE NURSING/CASE MANAGEMENT/SOCIAL WORK - NSDCPEFALRISK_GEN_ALL_CORE
For information on Fall & Injury Prevention, visit: [email or web address] OR  [email or web address] OR  https://www.cdc.gov/steadi/patient.html

## 2022-12-21 ENCOUNTER — EMERGENCY (EMERGENCY)
Facility: HOSPITAL | Age: 67
LOS: 1 days | Discharge: HOME | End: 2022-12-21
Attending: EMERGENCY MEDICINE | Admitting: INTERNAL MEDICINE
Payer: MEDICAID

## 2022-12-21 VITALS
OXYGEN SATURATION: 99 % | WEIGHT: 108.03 LBS | DIASTOLIC BLOOD PRESSURE: 81 MMHG | TEMPERATURE: 98 F | HEART RATE: 99 BPM | RESPIRATION RATE: 16 BRPM | SYSTOLIC BLOOD PRESSURE: 138 MMHG | HEIGHT: 56 IN

## 2022-12-21 PROCEDURE — 73562 X-RAY EXAM OF KNEE 3: CPT | Mod: 26,50

## 2022-12-21 PROCEDURE — 99283 EMERGENCY DEPT VISIT LOW MDM: CPT

## 2022-12-21 PROCEDURE — 73562 X-RAY EXAM OF KNEE 3: CPT

## 2022-12-21 RX ORDER — OXYCODONE HYDROCHLORIDE 5 MG/1
5 TABLET ORAL ONCE
Refills: 0 | Status: DISCONTINUED | OUTPATIENT
Start: 2022-12-21 | End: 2022-12-21

## 2022-12-21 RX ORDER — ACETAMINOPHEN 500 MG
975 TABLET ORAL ONCE
Refills: 0 | Status: COMPLETED | OUTPATIENT
Start: 2022-12-21 | End: 2022-12-21

## 2022-12-21 RX ADMIN — OXYCODONE HYDROCHLORIDE 5 MILLIGRAM(S): 5 TABLET ORAL at 17:42

## 2022-12-21 RX ADMIN — OXYCODONE HYDROCHLORIDE 5 MILLIGRAM(S): 5 TABLET ORAL at 16:42

## 2022-12-21 RX ADMIN — Medication 975 MILLIGRAM(S): at 16:46

## 2022-12-21 RX ADMIN — Medication 975 MILLIGRAM(S): at 15:46

## 2022-12-21 NOTE — ED PROVIDER NOTE - PATIENT PORTAL LINK FT
You can access the FollowMyHealth Patient Portal offered by [facility] by registering at the following website: [email or web address]. By joining [email or web address] [email or web address] portal, you will also be able to view your health information using other applications (apps) compatible with our system.

## 2022-12-21 NOTE — ED ADULT NURSE NOTE - OBJECTIVE STATEMENT
Patient brought in by EMS for complaint of B/L knee pain. Patient states to have fallen tow days ago and landing on her knee. Denies hitting her head or any LOC. Patient states to have difficulty on walking with walker. HX of R knee surgery.

## 2022-12-21 NOTE — ED PROVIDER NOTE - NSFOLLOWUPINSTRUCTIONS_ED_ALL_ED_FT
-- Follow up with ortho referral.    -- Return to the ER for worsening or persistent symptoms, and/or ANY NEW OR CONCERNING SYMPTOMS.
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
## 2022-12-21 NOTE — CHART NOTE - NSCHARTNOTEFT_GEN_A_CORE
As per MD recommendation patient is to follow up with orthopedist.  ANGEL spoke with patient, confirmed she is currently residing short term at [facility] [phone].  ANGEL called facility and spoke with RN Supervisor Jocelyne who confirmed facility will schedule follow up for patient.

## 2022-12-21 NOTE — ED PROVIDER NOTE - CARE PROVIDER_API CALL
Kuldeep Machado)  Orthopaedic Sports Medicine; Orthopaedic Surgery  [address] [address]  Phone: [phone]  Fax: [phone]  Follow Up Time:

## 2022-12-21 NOTE — ED ADULT TRIAGE NOTE - WEIGHT IN LBS
DISPLAY PLAN FREE TEXT DISPLAY PLAN FREE TEXT DISPLAY PLAN FREE TEXT DISPLAY PLAN FREE TEXT DISPLAY PLAN FREE TEXT DISPLAY PLAN FREE TEXT 108

## 2022-12-22 ENCOUNTER — NON-APPOINTMENT (OUTPATIENT)
Age: 67
End: 2022-12-22

## 2023-01-03 ENCOUNTER — APPOINTMENT (OUTPATIENT)
Age: 68
End: 2023-01-03
Payer: MEDICAID

## 2023-01-03 VITALS — BODY MASS INDEX: 24.3 KG/M2 | HEIGHT: 56 IN | WEIGHT: 108 LBS

## 2023-01-03 DIAGNOSIS — T84.84XA PAIN DUE TO INTERNAL ORTHOPEDIC PROSTHETIC DEVICES, IMPLANTS AND GRAFTS, INITIAL ENCOUNTER: ICD-10-CM

## 2023-01-03 DIAGNOSIS — M51.26 OTHER INTERVERTEBRAL DISC DISPLACEMENT, LUMBAR REGION: ICD-10-CM

## 2023-01-03 PROCEDURE — 99213 OFFICE O/P EST LOW 20 MIN: CPT

## 2023-01-03 NOTE — PHYSICAL EXAM
[de-identified] : Patient is WDWN, alert, and in no acute distress. Breathing is unlabored. She is grossly oriented to person, place, and time.\par \par She is accompanied by aides.\par Her daughter was called on the phone today during today's visit.\par \par Right Knee:\par She presents to the office NWB, with the assistance of a wheelchair.\par Knee immobilizer brace in place, which was removed for physical exam.\par Notable weakness to the quadriceps with associated numbness along the anterior thigh.\par She is not able to actively extend at the right knee, however on exam there does not appear to be a deformity at the quadriceps tendon consistent with rupture.\par Full arc of motion to the right ankle and toes. No numbness noted at the toes. \par \par Left Knee:\par Notable weakness to the quadriceps with associated numbness along the anterior thigh.\par She is not able to actively extend at the right knee, however on exam there does not appear to be a deformity at the quadriceps tendon consistent with rupture.\par She has full arc of motion to the left ankle and toes, with normal sensation. [de-identified] : EXAM: XR KNEE AP LAT OBL 3 VIEWS BI\par PROCEDURE DATE: 12/21/2022\par IMPRESSION:\par Right Knee:\par Lateral pins are seen in the upper tibia in alignment is quite good at this level. There are slight degenerative changes in the tibial spine area.\par There is a deformed healed fracture of the upper shaft of the fibula.\par There are few bone fragment seen around the fibular fracture and the soft tissues.\par No acute finding.\par \par Left Knee:\par No effusion. \par Bones unremarkable.\par \par KARLA LOWRY MD; Attending Radiologist\par This document has been electronically signed. Dec 21 2022 3:28PM\par \par ------------------------------------------------------------------------------------------------------------------------------------------------------------------------------------ \par \par EXAM: XR ANKLE COMP MIN 3 VIEWS LT\par PROCEDURE DATE: 12/02/2022\par IMPRESSION:\par Unfused ossicle inferior to medial malleolus\par Posterior calcaneal spurs.\par No fracture, dislocation or osseous lesion.\par Tibiotalar articulation, medial and lateral malleoli are intact.\par Soft tissues unremarkable.\par \par KARLA GURROLA MD; Attending Radiologist\par This document has been electronically signed. Dec 2 2022 9:25PM

## 2023-01-03 NOTE — DISCUSSION/SUMMARY
[de-identified] : The underlying pathophysiology was reviewed with the patient. XR films were reviewed with the patient. Discussed at length the nature of the patient’s condition. \par \par At this time, I spoke with the patient and her daughter who was called on the phone during today's appointment about the etiology of her symptoms as well as her prognosis. I told them, that it is concerning given that she is unable to actively extend her knee without a gross injury or neurological event. I would recommend she first follow up with Dr. Leal as she is status post lumbar diskectomy on 11/8/22. We also discussed a full neurological work up/exam with a possible EMG to test nerve function but again after she discusses this with Dr. Leal. She notes she has a follow up appointment with Dr. Leal tomorrow on Wednesday 1/4/23.\par \par Her daughter who was called on the phone during today's visit, stated her mother was told to remain as NWB while in rehab. I told them that, I am fine with her WBAT as there are no new fractures noted and evidence of a prior fracture seen on xrays, is inconsequential due to its age. I recommended she continue with use of the knee immobilizer brace for support and stability.\par \par Paperwork was filled out for [facility].\par \par All questions answered, understanding verbalized. Patient in agreement with plan of care. Follow up as needed.

## 2023-01-03 NOTE — ADDENDUM
[FreeTextEntry1] : I, Jahaira Anaya wrote this note acting as a scribe for Dr. Antonio Menon on Jan 03, 2023.
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
## 2023-01-03 NOTE — END OF VISIT
[FreeTextEntry3] : All medical record entries made by the Scribe were at my,  Dr. Antonio Menon MD., direction and personally dictated by me on 01/03/2023. I have personally reviewed the chart and agree that the record accurately reflects my personal performance of the history, physical exam, assessment and plan. 
66

## 2023-01-04 ENCOUNTER — APPOINTMENT (OUTPATIENT)
Dept: ORTHOPEDIC SURGERY | Facility: CLINIC | Age: 68
End: 2023-01-04
Payer: MEDICAID

## 2023-01-04 VITALS
HEIGHT: 56 IN | HEART RATE: 96 BPM | BODY MASS INDEX: 24.3 KG/M2 | WEIGHT: 108 LBS | DIASTOLIC BLOOD PRESSURE: 71 MMHG | SYSTOLIC BLOOD PRESSURE: 115 MMHG

## 2023-01-04 DIAGNOSIS — M62.81 MUSCLE WEAKNESS (GENERALIZED): ICD-10-CM

## 2023-01-04 DIAGNOSIS — M51.37 OTHER INTERVERTEBRAL DISC DEGENERATION, LUMBOSACRAL REGION: ICD-10-CM

## 2023-01-04 PROCEDURE — 99213 OFFICE O/P EST LOW 20 MIN: CPT | Mod: 24

## 2023-01-04 NOTE — HISTORY OF PRESENT ILLNESS
[___ Months Post Op] : [unfilled] months post op [8] : the patient reports pain that is 8/10 in severity [Chills] : no chills [Constipation] : no constipation [Diarrhea] : no diarrhea [Dysuria] : no dysuria [Fever] : no fever [Nausea] : no nausea [Vomiting] : no vomiting [Clean/Dry/Intact] : clean, dry and intact [Erythema] : not erythematous [Discharge] : absent of discharge [Swelling] : not swollen [Dehiscence] : not dehisced [Vascular Intact] : ~T peripheral vascular exam normal [Negative Dexter's] : maneuvers demonstrated a negative Dexter's sign [Slow Progress] : is progressing slowly [Excellent Pain Control] : has excellent pain control [No Sign of Infection] : is showing no signs of infection [de-identified] : Microdiskectomy 11/8/22 left L5-S1 [de-identified] : Patient is here 2 months post operative office visit. Patient seems to be falling stating her knees are giving out she states bilateral leg weakness unable to ambulate uses wheelchair to get around. Patient states saw Dr. Menon yesterday xrays of her spine and bilateral knees she is non weightbearing at the facility they will not let her walk due to her falling. Dr. Menon feels re evaluation by spine surgeon and to see Neurologist for her weakness bilateral legs right knee immobolizer .  She reports feeling better after left L5 microdiscectomy as well as walking with a walker.  However 1 week after surgery she had acute collapse with buckling of both knees and weakness of her thigh muscles. [de-identified] : Seen in a wheelchair.  Seen with an aide from the rehab facility.  Well-healed lumbar incision.  Seen with a right knee immobilizer.  Dense weakness of bilateral hip flexors and knee extensors.  4+ to 5 EHL ankle dorsiflexion plantarflexion bilaterally.  Slight dysesthesias bilateral lower extremities.  Absent reflexes knees and ankles bilaterally. [de-identified] : Patient presents with unusual bilateral numbness and weakness of the legs with pain radiating down to her legs.  Given the bilateral symmetric nature of her presentation I doubt spinal pathology at this time.  However given her recent left L5-S1 microdiscectomy and decompression recommended MRI of the lumbar spine with and without contrast.  MRI of the pelvis was also recommended to assess for any lumbar plexopathy.\par \par Recommendation was made for her to see a neurologist for nerve conduction studies and EMG of the lower extremities.  The patient reports being able to walk 1 week after her surgery with an acute weakness and collapse and knee buckling bilaterally.  This is an unusual presentation and further work-up neurologically is warranted.  If the neurologic work-up is unremarkable she may need further evaluation by knee surgeon for assessment of bilateral knee pathology.\par \par I will see her back after the neurologic evaluation including the MRI of the lumbar spine and pelvis as well as neuro consult.\par \par The patient was educated regarding their condition, treatment options as well as prescribed course of treatment. \par Risks and benefits as well as alternatives to the proposed treatment were also provided to the patient \par They were given the opportunity to have all their questions answered to their satisfaction.\par \par Vital signs were reviewed with the patient and the patient was instructed to followup with their primary care provider for further management. There were no PAs or scribes used in the evaluation, exam or treatment plan discussion. The surgeon was the primary evaluating or treating physician as noted above.

## 2023-02-09 ENCOUNTER — APPOINTMENT (OUTPATIENT)
Dept: MRI IMAGING | Facility: HOSPITAL | Age: 68
End: 2023-02-09

## 2023-02-09 ENCOUNTER — INPATIENT (INPATIENT)
Facility: HOSPITAL | Age: 68
LOS: 14 days | Discharge: NURSING, REHAB OR LONG-TERM CARE | DRG: 871 | End: 2023-02-24
Attending: STUDENT IN AN ORGANIZED HEALTH CARE EDUCATION/TRAINING PROGRAM | Admitting: STUDENT IN AN ORGANIZED HEALTH CARE EDUCATION/TRAINING PROGRAM
Payer: MEDICARE

## 2023-02-09 VITALS
TEMPERATURE: 98 F | WEIGHT: 97 LBS | HEART RATE: 118 BPM | OXYGEN SATURATION: 95 % | RESPIRATION RATE: 18 BRPM | HEIGHT: 56 IN | DIASTOLIC BLOOD PRESSURE: 77 MMHG | SYSTOLIC BLOOD PRESSURE: 144 MMHG

## 2023-02-09 DIAGNOSIS — I10 ESSENTIAL (PRIMARY) HYPERTENSION: ICD-10-CM

## 2023-02-09 DIAGNOSIS — E78.5 HYPERLIPIDEMIA, UNSPECIFIED: ICD-10-CM

## 2023-02-09 DIAGNOSIS — J18.9 PNEUMONIA, UNSPECIFIED ORGANISM: ICD-10-CM

## 2023-02-09 DIAGNOSIS — Z29.9 ENCOUNTER FOR PROPHYLACTIC MEASURES, UNSPECIFIED: ICD-10-CM

## 2023-02-09 DIAGNOSIS — E11.9 TYPE 2 DIABETES MELLITUS WITHOUT COMPLICATIONS: ICD-10-CM

## 2023-02-09 DIAGNOSIS — D72.829 ELEVATED WHITE BLOOD CELL COUNT, UNSPECIFIED: ICD-10-CM

## 2023-02-09 LAB
ALBUMIN SERPL ELPH-MCNC: 2.9 G/DL — LOW (ref 3.3–5)
ALP SERPL-CCNC: 65 U/L — SIGNIFICANT CHANGE UP (ref 40–120)
ALT FLD-CCNC: 26 U/L — SIGNIFICANT CHANGE UP (ref 10–45)
ANION GAP SERPL CALC-SCNC: 14 MMOL/L — SIGNIFICANT CHANGE UP (ref 5–17)
ANISOCYTOSIS BLD QL: SLIGHT — SIGNIFICANT CHANGE UP
APPEARANCE UR: CLEAR — SIGNIFICANT CHANGE UP
APTT BLD: 26.5 SEC — LOW (ref 27.5–35.5)
AST SERPL-CCNC: 30 U/L — SIGNIFICANT CHANGE UP (ref 10–40)
BACTERIA # UR AUTO: NEGATIVE — SIGNIFICANT CHANGE UP
BASE EXCESS BLDV CALC-SCNC: 1.9 MMOL/L — SIGNIFICANT CHANGE UP (ref -2–3)
BASE EXCESS BLDV CALC-SCNC: 2.6 MMOL/L — SIGNIFICANT CHANGE UP (ref -2–3)
BASOPHILS # BLD AUTO: 0 K/UL — SIGNIFICANT CHANGE UP (ref 0–0.2)
BASOPHILS NFR BLD AUTO: 0 % — SIGNIFICANT CHANGE UP (ref 0–2)
BILIRUB SERPL-MCNC: 0.2 MG/DL — SIGNIFICANT CHANGE UP (ref 0.2–1.2)
BILIRUB UR-MCNC: NEGATIVE — SIGNIFICANT CHANGE UP
BUN SERPL-MCNC: 12 MG/DL — SIGNIFICANT CHANGE UP (ref 7–23)
CA-I SERPL-SCNC: 0.99 MMOL/L — LOW (ref 1.15–1.33)
CA-I SERPL-SCNC: 1.01 MMOL/L — LOW (ref 1.15–1.33)
CALCIUM SERPL-MCNC: 7.5 MG/DL — LOW (ref 8.4–10.5)
CHLORIDE BLDV-SCNC: 106 MMOL/L — SIGNIFICANT CHANGE UP (ref 96–108)
CHLORIDE BLDV-SCNC: 107 MMOL/L — SIGNIFICANT CHANGE UP (ref 96–108)
CHLORIDE SERPL-SCNC: 104 MMOL/L — SIGNIFICANT CHANGE UP (ref 96–108)
CO2 BLDV-SCNC: 27 MMOL/L — HIGH (ref 22–26)
CO2 BLDV-SCNC: 27 MMOL/L — HIGH (ref 22–26)
CO2 SERPL-SCNC: 25 MMOL/L — SIGNIFICANT CHANGE UP (ref 22–31)
COLOR SPEC: YELLOW — SIGNIFICANT CHANGE UP
CREAT SERPL-MCNC: 0.43 MG/DL — LOW (ref 0.5–1.3)
CRP SERPL-MCNC: 18 MG/L — HIGH (ref 0–4)
DACRYOCYTES BLD QL SMEAR: SLIGHT — SIGNIFICANT CHANGE UP
DIFF PNL FLD: ABNORMAL
EGFR: 105 ML/MIN/1.73M2 — SIGNIFICANT CHANGE UP
ELLIPTOCYTES BLD QL SMEAR: SLIGHT — SIGNIFICANT CHANGE UP
EOSINOPHIL # BLD AUTO: 0.15 K/UL — SIGNIFICANT CHANGE UP (ref 0–0.5)
EOSINOPHIL NFR BLD AUTO: 0.9 % — SIGNIFICANT CHANGE UP (ref 0–6)
EPI CELLS # UR: 2 /HPF — SIGNIFICANT CHANGE UP
GAS PNL BLDV: 139 MMOL/L — SIGNIFICANT CHANGE UP (ref 136–145)
GAS PNL BLDV: 140 MMOL/L — SIGNIFICANT CHANGE UP (ref 136–145)
GAS PNL BLDV: SIGNIFICANT CHANGE UP
GIANT PLATELETS BLD QL SMEAR: PRESENT — SIGNIFICANT CHANGE UP
GLUCOSE BLDV-MCNC: 136 MG/DL — HIGH (ref 70–99)
GLUCOSE BLDV-MCNC: 137 MG/DL — HIGH (ref 70–99)
GLUCOSE SERPL-MCNC: 140 MG/DL — HIGH (ref 70–99)
GLUCOSE UR QL: ABNORMAL
HCO3 BLDV-SCNC: 26 MMOL/L — SIGNIFICANT CHANGE UP (ref 22–29)
HCO3 BLDV-SCNC: 26 MMOL/L — SIGNIFICANT CHANGE UP (ref 22–29)
HCT VFR BLD CALC: 27.4 % — LOW (ref 34.5–45)
HCT VFR BLDA CALC: 26 % — LOW (ref 34.5–46.5)
HCT VFR BLDA CALC: 27 % — LOW (ref 34.5–46.5)
HGB BLD CALC-MCNC: 8.7 G/DL — LOW (ref 11.7–16.1)
HGB BLD CALC-MCNC: 8.9 G/DL — LOW (ref 11.7–16.1)
HGB BLD-MCNC: 8.6 G/DL — LOW (ref 11.5–15.5)
HYALINE CASTS # UR AUTO: 6 /LPF — HIGH (ref 0–2)
INR BLD: 1.05 RATIO — SIGNIFICANT CHANGE UP (ref 0.88–1.16)
KETONES UR-MCNC: ABNORMAL
LACTATE BLDV-MCNC: 1.7 MMOL/L — SIGNIFICANT CHANGE UP (ref 0.5–2)
LACTATE BLDV-MCNC: 3.5 MMOL/L — HIGH (ref 0.5–2)
LEUKOCYTE ESTERASE UR-ACNC: NEGATIVE — SIGNIFICANT CHANGE UP
LYMPHOCYTES # BLD AUTO: 18.4 % — SIGNIFICANT CHANGE UP (ref 13–44)
LYMPHOCYTES # BLD AUTO: 3 K/UL — SIGNIFICANT CHANGE UP (ref 1–3.3)
MANUAL SMEAR VERIFICATION: SIGNIFICANT CHANGE UP
MCHC RBC-ENTMCNC: 20.8 PG — LOW (ref 27–34)
MCHC RBC-ENTMCNC: 31.4 GM/DL — LOW (ref 32–36)
MCV RBC AUTO: 66.2 FL — LOW (ref 80–100)
METAMYELOCYTES # FLD: 2.6 % — HIGH (ref 0–0)
MICROCYTES BLD QL: SLIGHT — SIGNIFICANT CHANGE UP
MONOCYTES # BLD AUTO: 1.01 K/UL — HIGH (ref 0–0.9)
MONOCYTES NFR BLD AUTO: 6.2 % — SIGNIFICANT CHANGE UP (ref 2–14)
NEUTROPHILS # BLD AUTO: 11.72 K/UL — HIGH (ref 1.8–7.4)
NEUTROPHILS NFR BLD AUTO: 71.9 % — SIGNIFICANT CHANGE UP (ref 43–77)
NITRITE UR-MCNC: NEGATIVE — SIGNIFICANT CHANGE UP
PCO2 BLDV: 34 MMHG — LOW (ref 39–42)
PCO2 BLDV: 35 MMHG — LOW (ref 39–42)
PH BLDV: 7.47 — HIGH (ref 7.32–7.43)
PH BLDV: 7.49 — HIGH (ref 7.32–7.43)
PH UR: 6.5 — SIGNIFICANT CHANGE UP (ref 5–8)
PLAT MORPH BLD: NORMAL — SIGNIFICANT CHANGE UP
PLATELET # BLD AUTO: 547 K/UL — HIGH (ref 150–400)
PO2 BLDV: 34 MMHG — SIGNIFICANT CHANGE UP (ref 25–45)
PO2 BLDV: 39 MMHG — SIGNIFICANT CHANGE UP (ref 25–45)
POIKILOCYTOSIS BLD QL AUTO: SLIGHT — SIGNIFICANT CHANGE UP
POLYCHROMASIA BLD QL SMEAR: SLIGHT — SIGNIFICANT CHANGE UP
POTASSIUM BLDV-SCNC: 2.4 MMOL/L — CRITICAL LOW (ref 3.5–5.1)
POTASSIUM BLDV-SCNC: 2.6 MMOL/L — CRITICAL LOW (ref 3.5–5.1)
POTASSIUM SERPL-MCNC: 2.5 MMOL/L — CRITICAL LOW (ref 3.5–5.3)
POTASSIUM SERPL-SCNC: 2.5 MMOL/L — CRITICAL LOW (ref 3.5–5.3)
PROCALCITONIN SERPL-MCNC: 0.16 NG/ML — HIGH (ref 0.02–0.1)
PROT SERPL-MCNC: 5.6 G/DL — LOW (ref 6–8.3)
PROT UR-MCNC: ABNORMAL
PROTHROM AB SERPL-ACNC: 12.2 SEC — SIGNIFICANT CHANGE UP (ref 10.5–13.4)
RAPID RVP RESULT: SIGNIFICANT CHANGE UP
RBC # BLD: 4.14 M/UL — SIGNIFICANT CHANGE UP (ref 3.8–5.2)
RBC # FLD: 14.4 % — SIGNIFICANT CHANGE UP (ref 10.3–14.5)
RBC BLD AUTO: ABNORMAL
RBC CASTS # UR COMP ASSIST: 5 /HPF — HIGH (ref 0–4)
SAO2 % BLDV: 64.3 % — LOW (ref 67–88)
SAO2 % BLDV: 73 % — SIGNIFICANT CHANGE UP (ref 67–88)
SARS-COV-2 RNA SPEC QL NAA+PROBE: SIGNIFICANT CHANGE UP
SCHISTOCYTES BLD QL AUTO: SLIGHT — SIGNIFICANT CHANGE UP
SODIUM SERPL-SCNC: 143 MMOL/L — SIGNIFICANT CHANGE UP (ref 135–145)
SP GR SPEC: 1.02 — SIGNIFICANT CHANGE UP (ref 1.01–1.02)
TARGETS BLD QL SMEAR: SLIGHT — SIGNIFICANT CHANGE UP
UROBILINOGEN FLD QL: NEGATIVE — SIGNIFICANT CHANGE UP
WBC # BLD: 16.3 K/UL — HIGH (ref 3.8–10.5)
WBC # FLD AUTO: 16.3 K/UL — HIGH (ref 3.8–10.5)
WBC UR QL: 2 /HPF — SIGNIFICANT CHANGE UP (ref 0–5)

## 2023-02-09 PROCEDURE — 71250 CT THORAX DX C-: CPT | Mod: 26,MD

## 2023-02-09 PROCEDURE — 71045 X-RAY EXAM CHEST 1 VIEW: CPT | Mod: 26

## 2023-02-09 PROCEDURE — 99285 EMERGENCY DEPT VISIT HI MDM: CPT | Mod: GC

## 2023-02-09 PROCEDURE — 99223 1ST HOSP IP/OBS HIGH 75: CPT | Mod: GC

## 2023-02-09 PROCEDURE — 74176 CT ABD & PELVIS W/O CONTRAST: CPT | Mod: 26,MD

## 2023-02-09 RX ORDER — INSULIN LISPRO 100/ML
VIAL (ML) SUBCUTANEOUS AT BEDTIME
Refills: 0 | Status: DISCONTINUED | OUTPATIENT
Start: 2023-02-09 | End: 2023-02-14

## 2023-02-09 RX ORDER — ONDANSETRON 8 MG/1
4 TABLET, FILM COATED ORAL EVERY 8 HOURS
Refills: 0 | Status: DISCONTINUED | OUTPATIENT
Start: 2023-02-09 | End: 2023-02-09

## 2023-02-09 RX ORDER — DEXTROSE 50 % IN WATER 50 %
15 SYRINGE (ML) INTRAVENOUS ONCE
Refills: 0 | Status: DISCONTINUED | OUTPATIENT
Start: 2023-02-09 | End: 2023-02-24

## 2023-02-09 RX ORDER — DEXTROSE 50 % IN WATER 50 %
12.5 SYRINGE (ML) INTRAVENOUS ONCE
Refills: 0 | Status: DISCONTINUED | OUTPATIENT
Start: 2023-02-09 | End: 2023-02-24

## 2023-02-09 RX ORDER — MAGNESIUM HYDROXIDE 400 MG/1
15 TABLET, CHEWABLE ORAL
Qty: 0 | Refills: 0 | DISCHARGE

## 2023-02-09 RX ORDER — INSULIN GLARGINE 100 [IU]/ML
9 INJECTION, SOLUTION SUBCUTANEOUS AT BEDTIME
Refills: 0 | Status: DISCONTINUED | OUTPATIENT
Start: 2023-02-10 | End: 2023-02-10

## 2023-02-09 RX ORDER — SODIUM CHLORIDE 9 MG/ML
1000 INJECTION, SOLUTION INTRAVENOUS
Refills: 0 | Status: DISCONTINUED | OUTPATIENT
Start: 2023-02-09 | End: 2023-02-24

## 2023-02-09 RX ORDER — MAGNESIUM SULFATE 500 MG/ML
1 VIAL (ML) INJECTION ONCE
Refills: 0 | Status: COMPLETED | OUTPATIENT
Start: 2023-02-09 | End: 2023-02-09

## 2023-02-09 RX ORDER — MAGNESIUM HYDROXIDE 400 MG/1
30 TABLET, CHEWABLE ORAL
Qty: 0 | Refills: 0 | DISCHARGE

## 2023-02-09 RX ORDER — DULOXETINE HYDROCHLORIDE 30 MG/1
2 CAPSULE, DELAYED RELEASE ORAL
Qty: 0 | Refills: 0 | DISCHARGE

## 2023-02-09 RX ORDER — POTASSIUM CHLORIDE 20 MEQ
40 PACKET (EA) ORAL ONCE
Refills: 0 | Status: COMPLETED | OUTPATIENT
Start: 2023-02-09 | End: 2023-02-09

## 2023-02-09 RX ORDER — LANOLIN ALCOHOL/MO/W.PET/CERES
3 CREAM (GRAM) TOPICAL AT BEDTIME
Refills: 0 | Status: DISCONTINUED | OUTPATIENT
Start: 2023-02-09 | End: 2023-02-21

## 2023-02-09 RX ORDER — SODIUM CHLORIDE 9 MG/ML
1000 INJECTION INTRAMUSCULAR; INTRAVENOUS; SUBCUTANEOUS
Refills: 0 | Status: COMPLETED | OUTPATIENT
Start: 2023-02-09 | End: 2023-02-09

## 2023-02-09 RX ORDER — INSULIN LISPRO 100/ML
3 VIAL (ML) SUBCUTANEOUS
Refills: 0 | Status: DISCONTINUED | OUTPATIENT
Start: 2023-02-09 | End: 2023-02-12

## 2023-02-09 RX ORDER — GLUCAGON INJECTION, SOLUTION 0.5 MG/.1ML
1 INJECTION, SOLUTION SUBCUTANEOUS ONCE
Refills: 0 | Status: DISCONTINUED | OUTPATIENT
Start: 2023-02-09 | End: 2023-02-24

## 2023-02-09 RX ORDER — SODIUM CHLORIDE 9 MG/ML
1000 INJECTION INTRAMUSCULAR; INTRAVENOUS; SUBCUTANEOUS ONCE
Refills: 0 | Status: DISCONTINUED | OUTPATIENT
Start: 2023-02-09 | End: 2023-02-09

## 2023-02-09 RX ORDER — LIDOCAINE 4 G/100G
1 CREAM TOPICAL
Qty: 0 | Refills: 0 | DISCHARGE

## 2023-02-09 RX ORDER — DEXTROSE 50 % IN WATER 50 %
25 SYRINGE (ML) INTRAVENOUS ONCE
Refills: 0 | Status: DISCONTINUED | OUTPATIENT
Start: 2023-02-09 | End: 2023-02-24

## 2023-02-09 RX ORDER — POTASSIUM CHLORIDE 20 MEQ
10 PACKET (EA) ORAL ONCE
Refills: 0 | Status: COMPLETED | OUTPATIENT
Start: 2023-02-09 | End: 2023-02-09

## 2023-02-09 RX ORDER — SODIUM CHLORIDE 9 MG/ML
1000 INJECTION INTRAMUSCULAR; INTRAVENOUS; SUBCUTANEOUS ONCE
Refills: 0 | Status: COMPLETED | OUTPATIENT
Start: 2023-02-09 | End: 2023-02-09

## 2023-02-09 RX ORDER — AZTREONAM 2 G
1000 VIAL (EA) INJECTION ONCE
Refills: 0 | Status: COMPLETED | OUTPATIENT
Start: 2023-02-09 | End: 2023-02-09

## 2023-02-09 RX ORDER — ACETAMINOPHEN 500 MG
650 TABLET ORAL EVERY 6 HOURS
Refills: 0 | Status: DISCONTINUED | OUTPATIENT
Start: 2023-02-09 | End: 2023-02-09

## 2023-02-09 RX ORDER — INSULIN LISPRO 100/ML
VIAL (ML) SUBCUTANEOUS
Refills: 0 | Status: DISCONTINUED | OUTPATIENT
Start: 2023-02-09 | End: 2023-02-14

## 2023-02-09 RX ADMIN — SODIUM CHLORIDE 75 MILLILITER(S): 9 INJECTION INTRAMUSCULAR; INTRAVENOUS; SUBCUTANEOUS at 18:10

## 2023-02-09 RX ADMIN — Medication 100 GRAM(S): at 21:31

## 2023-02-09 RX ADMIN — SODIUM CHLORIDE 1000 MILLILITER(S): 9 INJECTION INTRAMUSCULAR; INTRAVENOUS; SUBCUTANEOUS at 15:49

## 2023-02-09 RX ADMIN — Medication 50 MILLIGRAM(S): at 15:49

## 2023-02-09 RX ADMIN — Medication 40 MILLIEQUIVALENT(S): at 21:35

## 2023-02-09 RX ADMIN — SODIUM CHLORIDE 75 MILLILITER(S): 9 INJECTION INTRAMUSCULAR; INTRAVENOUS; SUBCUTANEOUS at 22:29

## 2023-02-09 RX ADMIN — Medication 100 MILLIEQUIVALENT(S): at 19:14

## 2023-02-09 RX ADMIN — Medication 100 MILLIEQUIVALENT(S): at 18:09

## 2023-02-09 RX ADMIN — Medication 3 MILLIGRAM(S): at 22:57

## 2023-02-09 RX ADMIN — Medication 100 MILLIEQUIVALENT(S): at 20:16

## 2023-02-09 RX ADMIN — Medication 100 MILLIGRAM(S): at 15:49

## 2023-02-09 NOTE — H&P ADULT - ASSESSMENT
67 F PMH DMT2, multiple falls, s/p lumbar microdiscectomy for radiculopathy presenting from [facility] with acute onset of malaise and wet coughing but without fevers, shortness of breath, or chest pain with CT chest non-contrast imaging with patchy/nodular opacities in bilateral lower lobes. Labs notable for leukocytosis and tachycardia with exam findings notable for wheezing/ronchus breath sounds. These findings concerning for sepsis secondary to pneumonia.

## 2023-02-09 NOTE — H&P ADULT - PROBLEM SELECTOR PLAN 6
-Hold home Amlodipine 10mg qd but consider resuming once sepsis resolves  -Resume home metoprolol 25mg once sepsis resolves and patient not in tachycardia as to not blunt physiologic response in septic context -Resume Amlodipine 5mg qd   -Resume metoprolol 25mg qd

## 2023-02-09 NOTE — ED ADULT NURSE NOTE - OBJECTIVE STATEMENT
Received pt in assigned room a&Xo3 sent from [facility] for eval of elevated WBC, pt has been In hr Received pt in assigned room a&Xo3 sent from [facility] for eval of elevated WBC & heart rate , pt recovering from back surgery & receiving therapy to help with her mobility. Pt reports no complaints of chest pain, palps, dizziness,  fever, chills, or urinary s/s, pt has a midline noted to RUE placed a few days ago, pt unsure of the reason,  resps even and non labored, skin intact, able to use midline for lab draw & med administration . medicated per MD orders NS bolus in progress, pt is resting in nad, family remains at bedside, will continue to monitor pt

## 2023-02-09 NOTE — H&P ADULT - NSHPREVIEWOFSYSTEMS_GEN_ALL_CORE
REVIEW OF SYSTEMS: As indicated above; otherwise negative    CONSTITUTIONAL: No weakness, fevers or chills  EYES/ENT: No visual changes; no vertigo or throat pain   NECK: No pain or stiffness  RESPIRATORY: No cough, wheezing, hemoptysis; No shortness of breath  CARDIOVASCULAR: No chest pain or palpitations  GASTROINTESTINAL: No abdominal or epigastric pain. No nausea, vomiting, or hematemesis; no diarrhea or constipation; no melena or hematochezia.  GENITOURINARY: No dysuria, frequency or hematuria  NEUROLOGICAL: No numbness or weakness  SKIN: No itching, rashes REVIEW OF SYSTEMS: As indicated above; otherwise negative    CONSTITUTIONAL: No fevers but reported malaise  NECK: No reported pain  RESPIRATORY: Has wet cough but no shortness of breath  CARDIOVASCULAR: No chest pain or palpitations at this time  GASTROINTESTINAL: No abdominal pain. No nausea, vomiting; has diarrhea as per HPI; but no melena or hematochezia.  GENITOURINARY: No dysuria or hematuria  NEUROLOGICAL: No numbness or weakness  SKIN: No itching, rashes

## 2023-02-09 NOTE — H&P ADULT - PROBLEM SELECTOR PLAN 3
Reported spinal surgery for herniated disc in patient with lumbar radiculopathy history per chart review.  -Resume hydromorphone 4mg for severe pain q4hrs prn  -Tylenol 1000mg q8hrs for pain prn  -Lidocaine patch for pain prn  -gabapentin 30mg TID Hypokalemia with serum K ~2.2 in setting of reported watery stools which may be diarrhea  -Replete electrolytes as appropriate  -Follow up AM labs  -No known cardiac history

## 2023-02-09 NOTE — H&P ADULT - NSHPPHYSICALEXAM_GEN_ALL_CORE
Vital Signs Last 24 Hrs  T(C): 36.5 (09 Feb 2023 21:45), Max: 37.2 (09 Feb 2023 16:55)  T(F): 97.7 (09 Feb 2023 21:45), Max: 99 (09 Feb 2023 16:55)  HR: 106 (09 Feb 2023 21:45) (104 - 118)  BP: 140/79 (09 Feb 2023 21:45) (134/83 - 144/77)  BP(mean): 99 (09 Feb 2023 21:45) (99 - 99)  RR: 16 (09 Feb 2023 21:45) (16 - 19)  SpO2: 97% (09 Feb 2023 21:45) (95% - 98%)    Parameters below as of 09 Feb 2023 21:45  Patient On (Oxygen Delivery Method): room air    PHYSICAL EXAM:  GENERAL: NAD, lying in bed comfortably  HEAD: Atraumatic, normocephalic appearing  EYES: EOMI, PERRLA, conjunctiva and sclera clear  ENT: Moist mucous membranes  NECK: Supple, No JVD; no palpable pre-auricular, post-auricular, occipital, mandibular, submental, supra-clavicular, or infra-clavicular lymph nodes   CHEST/LUNG: Clear to auscultation bilaterally; No rales, rhonchi, wheezing, or rubs. Unlabored respirations  HEART: Regular rate and rhythm; No murmurs, rubs, or gallops  ABDOMEN: Bowel sounds present; Soft, nontender to light and deep palpation, nondistended  EXTREMITIES:  2+ Peripheral radial pulses; brisk capillary refill. No clubbing, cyanosis, or bilateral LE edema  NERVOUS SYSTEM:  Alert & Oriented to situation, speech clear. No gross motor sensory deficits   MSK: FROM all 4 extremities, full and equal strength  PSYCH: Appropriate affect  SKIN: No obvious rashes or lesions Vital Signs Last 24 Hrs  T(C): 36.5 (09 Feb 2023 21:45), Max: 37.2 (09 Feb 2023 16:55)  T(F): 97.7 (09 Feb 2023 21:45), Max: 99 (09 Feb 2023 16:55)  HR: 106 (09 Feb 2023 21:45) (104 - 118)  BP: 140/79 (09 Feb 2023 21:45) (134/83 - 144/77)  BP(mean): 99 (09 Feb 2023 21:45) (99 - 99)  RR: 16 (09 Feb 2023 21:45) (16 - 19)  SpO2: 97% (09 Feb 2023 21:45) (95% - 98%)    Parameters below as of 09 Feb 2023 21:45  Patient On (Oxygen Delivery Method): room air    PHYSICAL EXAM:  GENERAL: NAD, lying in bed comfortably  HEAD: Atraumatic, normocephalic appearing  EYES: Sclera clear  NECK: No palpable pre-auricular, post-auricular, occipital, mandibular, submental, supra-clavicular, or infra-clavicular lymph nodes   CHEST/LUNG: Inspiratory ronchus sounds and expiratory wheezing on R>L lung fields; breathing on room air with unlabored respirations  HEART: Tachycardic rate and regular rhythm; No obvius murmurs, rubs, or gallops  ABDOMEN: Soft, nontender to light and deep palpation, nondistended  EXTREMITIES:  Peripheral radial pulses intact; no bilateral LE edema  NERVOUS SYSTEM:  Alert & Oriented to situation, speech clear. No gross motor sensory deficits   PSYCH: Appropriate affect  SKIN: No obvious rashes or lesions

## 2023-02-09 NOTE — H&P ADULT - NSICDXPASTMEDICALHX_GEN_ALL_CORE_FT
PAST MEDICAL HISTORY:  Depression     Eczema     H/O: hypertension     HLD (hyperlipidemia)     Lumbar radiculopathy     Muscle wasting and atrophy, not elsewhere classified, unspecified site     Psoriasis-like skin disease     Type 2 diabetes mellitus      PAST MEDICAL HISTORY:  Depression     Eczema     H/O: hypertension     HLD (hyperlipidemia)     Lumbar radiculopathy     Psoriasis-like skin disease     Type 2 diabetes mellitus

## 2023-02-09 NOTE — ED PROVIDER NOTE - PROGRESS NOTE DETAILS
Victor Manuel: CTr noted, labs noted, will replete K and mg, cover for MRSA as pt ahs RF from facility, unclear what etiology of septic emboli could be? Does have inwelling line, will need further w/u as an inpatient, tba hosp, family aware of results.

## 2023-02-09 NOTE — H&P ADULT - PROBLEM SELECTOR PLAN 2
Reported watery stools but without blood or pitch black color with frequency of about 2 per day. May be in setting of recent antibiotic use. Rule developing GI infection such C. diff. Low suspicion at this time given not clinically diarrhea.  -CT A/P non-contrast noted Diffuse wall thickening of the proximal duodenum with periduodenal stranding, concerning for duodenitis/peptic ulcer disease.  -Follow up GI PCR  -Continue to monitor for now

## 2023-02-09 NOTE — ED ADULT NURSE NOTE - BREATH SOUNDS, MLM
Clindamycin Pregnancy And Lactation Text: This medication can be used in pregnancy if certain situations. Clindamycin is also present in breast milk. Clear

## 2023-02-09 NOTE — H&P ADULT - NSHPLABSRESULTS_GEN_ALL_CORE
LABS:                        8.6    16.30 )-----------( 547      ( 2023 16:12 )             27.4         143  |  104  |  12  ----------------------------<  140<H>  2.5<LL>   |  25  |  0.43<L>    Ca    7.5<L>      2023 16:12  Mg     .8         TPro  5.6<L>  /  Alb  2.9<L>  /  TBili  0.2  /  DBili  x   /  AST  30  /  ALT  26  /  AlkPhos  65  02-09    PT/INR - ( 2023 16:12 )   PT: 12.2 sec;   INR: 1.05 ratio         PTT - ( 2023 16:12 )  PTT:26.5 sec      Urinalysis Basic - ( 2023 17:07 )    Color: Yellow / Appearance: Clear / S.022 / pH: x  Gluc: x / Ketone: Small  / Bili: Negative / Urobili: Negative   Blood: x / Protein: 30 mg/dL / Nitrite: Negative   Leuk Esterase: Negative / RBC: 5 /hpf / WBC 2 /HPF   Sq Epi: x / Non Sq Epi: 2 /hpf / Bacteria: Negative LABS:                        8.6    16.30 )-----------( 547      ( 2023 16:12 )             27.4         143  |  104  |  12  ----------------------------<  140<H>  2.5<LL>   |  25  |  0.43<L>    Ca    7.5<L>      2023 16:12  Mg     .8         TPro  5.6<L>  /  Alb  2.9<L>  /  TBili  0.2  /  DBili  x   /  AST  30  /  ALT  26  /  AlkPhos  65      PT/INR - ( 2023 16:12 )   PT: 12.2 sec;   INR: 1.05 ratio          PTT - ( 2023 16:12 )  PTT:26.5 sec      Urinalysis Basic - ( 2023 17:07 )    Color: Yellow / Appearance: Clear / S.022 / pH: x  Gluc: x / Ketone: Small  / Bili: Negative / Urobili: Negative   Blood: x / Protein: 30 mg/dL / Nitrite: Negative   Leuk Esterase: Negative / RBC: 5 /hpf / WBC 2 /HPF   Sq Epi: x / Non Sq Epi: 2 /hpf / Bacteria: Negative      ---------------------------------  < from: CT Chest No Cont (23 @ 18:03) >    IMPRESSION:  Nodular and patchy opacities predominantly in the bilateral lower lobes   with many that are centrally necrotic concerning for infection.   Differential diagnosis includes metastatic disease and septic emboli.  Diffuse wall thickening of the proximal duodenum with periduodenal   stranding, concerning for duodenitis/peptic ulcer disease.  Nonobstructing right renal calculus  Right thyroid nodule may be evaluated with ultrasound on a nonemergent   basis.  --- End of Report ---< from: CT Abdomen and Pelvis No Cont (23 @ 18:03) >    IMPRESSION:  Nodular and patchy opacities predominantly in the bilateral lower lobes   with many that are centrally necrotic concerning for infection.   Differential diagnosis includes metastatic disease and septic emboli.  Diffuse wall thickening of the proximal duodenum with periduodenal   stranding, concerning for duodenitis/peptic ulcer disease.  Nonobstructive right renal calculus  Right thyroid nodule may be evaluated with ultrasound on a nonemergent   basis.  --- End of Report ---

## 2023-02-09 NOTE — H&P ADULT - HISTORY OF PRESENT ILLNESS
67 F PMH DMT2, HTN, HLD, multiple falls, s/p lumbar microdiscectomy for radiculopathy presenting from [facility] with 5days of acute onset of malaise and wet coughing but without fevers, shortness of breath, or chest pain. Patient says she was subsequently started on antibiotics and then developed non-bloody watery diarrhea since Monday with 2 episodes at most per day, and of which she says may be from the antibiotics. Currently, she reports being unable to walk after her recent spinal surgery for a herniated disc. Otherwise, patient reports abscence of palpitations (except sometimes when in the hospital), nausea, vomiting, dysuria, hematuria, or LE edema (except for RLE for which suffered a mechanical fall at [facility] and was found to have a fracture, as per patient). As per ED chart note, she was found to have elevated wbc and fevers in the setting of cough, congestion and positive UA. Pt reports 4 days ago her O2 was low and she needed to be on O2 and there was concern for PNA. Pt was started on meropenem on 2/5 for possible UTI vs PNA. Workup outpatient on 2/6-2/8 showed WBC of 16.25. However, despite antibiotic treament, patient was not improving.

## 2023-02-09 NOTE — ED PROVIDER NOTE - CHIEF COMPLAINT
Pt ws notify of recommendations, he will call pain clinic for med refills    The patient is a 67y Female complaining of

## 2023-02-09 NOTE — H&P ADULT - PROBLEM SELECTOR PLAN 7
Noted to have right thyroid nodule on imaging  -TSH in AM   -May be evaluated with ultrasound on a nonemergent  basis

## 2023-02-09 NOTE — H&P ADULT - PROBLEM SELECTOR PLAN 5
History DMT2 on insulin (long acting 16units and short acting 6units).  -Placed on low insulin sliding scale  -Lantus 9 units and lispro 3units  -Follow up AM POCT and adjust insulin needs as appropriate  -Diabetic diet

## 2023-02-09 NOTE — H&P ADULT - PROBLEM SELECTOR PLAN 1
Leukocytosis + tachycardia = SIRS+ in conjunction with CT imaging with patchy/nodular opacities in setting of coughing concerning for sepsis secondary to pneumonia likely CAP. Ddx legionella pneumonia given patient with reported watery stools; however does not meet diarrhea definition of at least 3 unformed stools per day.    -S/p NS 1L and currently on NS 75cc/hr  -Patient recently on meropenem in [place] for pneumonia/UTI?  -S/p aztreonam x1 and doxycycline x1 in ED  -Continue with Ceftriaxone and aztreonam for common CAP and atypical coverage  -Follow up blood and urine cultures  -Follow up urine legionella ag and urine strep ag  -Monitor respiratory status   -Chest PT and mucinex and robitussin as prn  -Would follow up CT chest non-contrast Leukocytosis + tachycardia = SIRS+ in conjunction with CT imaging with patchy/nodular opacities in setting of coughing concerning for sepsis secondary to pneumonia likely HAP given her recent surgery and [place] placement. Ddx: legionella pneumonia given patient with reported watery stools; however does not meet diarrhea definition of at least 3 unformed stools per day.    -S/p NS 1L and currently on NS 75cc/hr  -Patient recently on meropenem in [place] for pneumonia/UTI?  -S/p aztreonam x1 and doxycycline x1 in ED  -Continue with Zosyn for possible HAP pseudomonal coverage and vaconmycin for MRSA coverage  -Follow up blood and urine cultures  -Follow up urine legionella ag and urine strep ag  -Follow up MRSA/MSSA PCR; if negative dc vanocmyin  -Chest PT and Mucinex and Robitussin as prn Leukocytosis + tachycardia = SIRS+ in conjunction with CT imaging with patchy/nodular opacities in setting of coughing concerning for sepsis secondary to pneumonia likely HAP given her recent surgery and [place] placement. Ddx: legionella pneumonia given patient with reported watery stools; however does not meet diarrhea definition of at least 3 unformed stools per day.    -S/p NS ~2L; continue with NS 75cc/hr x 12hrs  -Patient recently on meropenem in [place] for pneumonia/UTI?  -S/p aztreonam x1 and doxycycline x1 in ED  -Continue with Zosyn for possible HAP pseudomonal coverage; cannot do vancomycin for MRSA coverage as patient noted to have vancomycin allergy   -Follow up blood and urine cultures  -Follow up urine legionella ag and urine strep ag  -Follow up MRSA/MSSA PCR  -Monitor fever curve and hemodynamics Leukocytosis + tachycardia = SIRS+ in conjunction with CT imaging with patchy/nodular opacities in setting of coughing concerning for sepsis secondary to pneumonia likely HAP given her recent surgery and [place] placement. Ddx: legionella pneumonia given patient with reported watery stools; however does not meet diarrhea definition of at least 3 unformed stools per day.    -S/p NS ~2L; continue with NS 75cc/hr x 12hrs  -Patient recently on meropenem in [place] for pneumonia/UTI?  -S/p aztreonam x1 and doxycycline x1 in ED  -Continue with Zosyn for possible HAP pseudomonal coverage; cannot do vancomycin for MRSA coverage as patient noted to have vancomycin allergy   -Follow up blood and urine cultures  -Follow up urine legionella ag and urine strep ag  -Follow up MRSA/MSSA PCR; if positive and/or patient appears more toxic or worsening health course can consider linezolid for MRSA coverage but may consult ID    -Monitor fever curve and hemodynamics

## 2023-02-09 NOTE — H&P ADULT - PROBLEM SELECTOR PLAN 4
Hypokalemia with serum K ~2.2 in setting of reported watery stools which may be diarrhea  -Replete electrolytes as appropriate  -Follow up AM labs  -No cardiac history Reported spinal surgery for herniated disc in patient with lumbar radiculopathy history per chart review.  -Resume hydromorphone 4mg for severe pain q4hrs prn  -Tylenol 1000mg q8hrs for pain prn  -Lidocaine patch for pain prn  -gabapentin 300mg TID

## 2023-02-09 NOTE — ED PROVIDER NOTE - ATTENDING WITH...
Patient Instructions/Information    Liquid Nitrogen Treatment      Freezing with liquid nitrogen is accompanied by a stinging, burning sensation which usually lasts for ten to fifteen minutes but may persist for several hours.  Tylenol may be used for pain if needed.    A blister usually develops and then forms a dry crust.  The crust should peel off in two to three weeks.  Sometimes the treated area may be a little red after the crust falls off, but this will fade with time.  You should apply the polysporin or Vaseline ointment three times a day to the crusted area to speed up healing.      It is usually best to leave the blister unopened.  However, if the blister is very large, uncomfortable or forms a blood blister, it may be opened with a sterilized needle.    You may take a bath or shower, shampoo your hair or apply cosmetics over the treated area.    If there are any questions or problems, call [facility].      Patient Information/Instructions    Wound Care After Skin Biopsy    1.  Keep area dry for 24 hours.    2.  After the first 24 hours, gently cleanse the biopsy site 1-2 times each day with soap and water, pat dry and apply ointment (such as polysporin or vaseline) and a new bandage to the area until it is healed.  Keeping the area covered will allow it to heal more quickly than if it is left open to the air.    3.  If stitches have been placed in the site, continue the daily wound care outlined above, until the stitches are removed in our office.    4.  Please call our office immediately if any signs of infection occur at the biopsy site (increased pain, pus or red streaks from the site) or if bleeding or excessive swelling occurs.  A thin pink rim surrounding the site is normal.  We can be reached at [phone].    5.  If bleeding occurs within the first 24 hours after surgery, apply firm pressure and ice to the area for 10-20 minutes.  If this does not stop the bleeding, please call the  office.    6.  You will be notified of results by letter or phone call (or at your re-check appointment).  If a month or more has passed since your procedure and you have not received results, please call our office.    Sofia Woodson M.D.  [facility]  Dermatology Department ([phone])     Resident

## 2023-02-09 NOTE — ED ADULT NURSE REASSESSMENT NOTE - NS ED NURSE REASSESS COMMENT FT1
repeat VBG sent, afebrile via rectal temp, not on sepsis protocol per MD , HR improved, reports no new complaints, pending dispo

## 2023-02-09 NOTE — ED PROVIDER NOTE - PHYSICAL EXAMINATION
GENERAL: well appearing in no acute distress, non-toxic appearing  HEAD: normocephalic, atraumatic  HEENT: normal conjunctiva, oral mucosa moist,  CARDIAC: tachycardic, no appreciable murmurs, 2+ pulses in UE/LE b/l  PULM: nonlabored respirations, rales auscultated in the B/L bases, no wheezes   GI: abdomen nondistended, soft, nontender, no guarding, rebound tenderness  NEURO: no focal motor or sensory deficits,  AAOx3  MSK: no peripheral edema, no calf tenderness b/l  SKIN: well-perfused, extremities warm, no visible rashes, healed graft site in the RLE   PSYCH: appropriate mood and affect

## 2023-02-09 NOTE — ED PROVIDER NOTE - OBJECTIVE STATEMENT
67 year old female hx of htn, dm, hld, s/p lumbar microdiscectomy for radiculopathy, multiple falls, sent in from physical rehab for eval of elevated wbc and fevers in the setting of cough, congestion and positive UA. Pt reports 4 days ago her O2 was low and she needed to be on O2 and there was concern for PNA. Pt was started on merropenem on 2/5 for possible UTI vs PNA. Pt c/o nonproductive cough, denies cp, sob, abdominal pain, pain with urination, ha, n/v. Records sent in with patient were reviewed: Workup outpatient on 2/6-2/8 showed WBC of 16.25.

## 2023-02-09 NOTE — ED PROVIDER NOTE - CLINICAL SUMMARY MEDICAL DECISION MAKING FREE TEXT BOX
che 67 f from rehab with recetn cough, congestion and pos urine started with midline on meropenem with persistant fever and tachycardia - no cp or sob - sats 95 ra lungs rales base - abd soft- picc linee/misline appears nontender noerythem a- with persistant fever broaden to doxy and aztreoneam  - dry cyt chest abd as pt ho anyphylasix to eval for alternative source other than urine - will need admisison che 67 f from rehab with recetn cough, congestion and pos urine started with midline on meropenem with persistent fever and tachycardia - no cp or sob - sats 95 ra lungs rales base - abd soft- picc linee/misline appears nontender noerythem a- with persistant fever broaden to doxy and aztreoneam  - dry cyt chest abd as pt ho anyphylasix to eval for alternative source other than urine - will need admisison      67 year old female presenting for eval of elevated WBC and fevers despite tx with Meropenem for poss UTI vs PNA, pt is tachycardic, 95% on RA with rales auscultated in the b/l bases. Pt on merropenem, allergic to multiple antibiotics, will broaden abx coverage to doxy and axtrenonam and get cbc, cmp, blood cx, ua and culture, and CT chest abd pelvis looking for source.

## 2023-02-10 DIAGNOSIS — Z98.890 OTHER SPECIFIED POSTPROCEDURAL STATES: ICD-10-CM

## 2023-02-10 DIAGNOSIS — E87.6 HYPOKALEMIA: ICD-10-CM

## 2023-02-10 DIAGNOSIS — E04.1 NONTOXIC SINGLE THYROID NODULE: ICD-10-CM

## 2023-02-10 DIAGNOSIS — R19.5 OTHER FECAL ABNORMALITIES: ICD-10-CM

## 2023-02-10 LAB
A1C WITH ESTIMATED AVERAGE GLUCOSE RESULT: 7.8 % — HIGH (ref 4–5.6)
ALBUMIN SERPL ELPH-MCNC: 2.8 G/DL — LOW (ref 3.3–5)
ALP SERPL-CCNC: 67 U/L — SIGNIFICANT CHANGE UP (ref 40–120)
ALT FLD-CCNC: 25 U/L — SIGNIFICANT CHANGE UP (ref 10–45)
ANION GAP SERPL CALC-SCNC: 14 MMOL/L — SIGNIFICANT CHANGE UP (ref 5–17)
AST SERPL-CCNC: 23 U/L — SIGNIFICANT CHANGE UP (ref 10–40)
BILIRUB SERPL-MCNC: 0.3 MG/DL — SIGNIFICANT CHANGE UP (ref 0.2–1.2)
BUN SERPL-MCNC: 6 MG/DL — LOW (ref 7–23)
CALCIUM SERPL-MCNC: 7.1 MG/DL — LOW (ref 8.4–10.5)
CHLORIDE SERPL-SCNC: 101 MMOL/L — SIGNIFICANT CHANGE UP (ref 96–108)
CHOLEST SERPL-MCNC: 75 MG/DL — SIGNIFICANT CHANGE UP
CO2 SERPL-SCNC: 23 MMOL/L — SIGNIFICANT CHANGE UP (ref 22–31)
CREAT SERPL-MCNC: 0.32 MG/DL — LOW (ref 0.5–1.3)
EGFR: 113 ML/MIN/1.73M2 — SIGNIFICANT CHANGE UP
ERYTHROCYTE [SEDIMENTATION RATE] IN BLOOD: 92 MM/HR — HIGH (ref 0–20)
ESTIMATED AVERAGE GLUCOSE: 177 MG/DL — HIGH (ref 68–114)
GI PCR PANEL: SIGNIFICANT CHANGE UP
GLUCOSE BLDC GLUCOMTR-MCNC: 154 MG/DL — HIGH (ref 70–99)
GLUCOSE BLDC GLUCOMTR-MCNC: 222 MG/DL — HIGH (ref 70–99)
GLUCOSE BLDC GLUCOMTR-MCNC: 263 MG/DL — HIGH (ref 70–99)
GLUCOSE BLDC GLUCOMTR-MCNC: 290 MG/DL — HIGH (ref 70–99)
GLUCOSE SERPL-MCNC: 272 MG/DL — HIGH (ref 70–99)
HCT VFR BLD CALC: 28.3 % — LOW (ref 34.5–45)
HDLC SERPL-MCNC: 23 MG/DL — LOW
HGB BLD-MCNC: 9 G/DL — LOW (ref 11.5–15.5)
LEGIONELLA AG UR QL: NEGATIVE — SIGNIFICANT CHANGE UP
LIPID PNL WITH DIRECT LDL SERPL: 21 MG/DL — SIGNIFICANT CHANGE UP
MCHC RBC-ENTMCNC: 20.9 PG — LOW (ref 27–34)
MCHC RBC-ENTMCNC: 31.8 GM/DL — LOW (ref 32–36)
MCV RBC AUTO: 65.8 FL — LOW (ref 80–100)
NON HDL CHOLESTEROL: 52 MG/DL — SIGNIFICANT CHANGE UP
NRBC # BLD: 0 /100 WBCS — SIGNIFICANT CHANGE UP (ref 0–0)
PLATELET # BLD AUTO: 552 K/UL — HIGH (ref 150–400)
POTASSIUM SERPL-MCNC: 3.7 MMOL/L — SIGNIFICANT CHANGE UP (ref 3.5–5.3)
POTASSIUM SERPL-SCNC: 3.7 MMOL/L — SIGNIFICANT CHANGE UP (ref 3.5–5.3)
PROT SERPL-MCNC: 5.9 G/DL — LOW (ref 6–8.3)
RBC # BLD: 4.3 M/UL — SIGNIFICANT CHANGE UP (ref 3.8–5.2)
RBC # FLD: 14.6 % — HIGH (ref 10.3–14.5)
SODIUM SERPL-SCNC: 138 MMOL/L — SIGNIFICANT CHANGE UP (ref 135–145)
TRIGL SERPL-MCNC: 154 MG/DL — HIGH
TSH SERPL-MCNC: 3.33 UIU/ML — SIGNIFICANT CHANGE UP (ref 0.27–4.2)
WBC # BLD: 17.03 K/UL — HIGH (ref 3.8–10.5)
WBC # FLD AUTO: 17.03 K/UL — HIGH (ref 3.8–10.5)

## 2023-02-10 PROCEDURE — 93306 TTE W/DOPPLER COMPLETE: CPT | Mod: 26

## 2023-02-10 PROCEDURE — 76536 US EXAM OF HEAD AND NECK: CPT | Mod: 26

## 2023-02-10 PROCEDURE — 99223 1ST HOSP IP/OBS HIGH 75: CPT

## 2023-02-10 PROCEDURE — 99233 SBSQ HOSP IP/OBS HIGH 50: CPT | Mod: GC

## 2023-02-10 RX ORDER — PIPERACILLIN AND TAZOBACTAM 4; .5 G/20ML; G/20ML
3.38 INJECTION, POWDER, LYOPHILIZED, FOR SOLUTION INTRAVENOUS ONCE
Refills: 0 | Status: COMPLETED | OUTPATIENT
Start: 2023-02-10 | End: 2023-02-10

## 2023-02-10 RX ORDER — POTASSIUM CHLORIDE 20 MEQ
20 PACKET (EA) ORAL DAILY
Refills: 0 | Status: DISCONTINUED | OUTPATIENT
Start: 2023-02-10 | End: 2023-02-17

## 2023-02-10 RX ORDER — POTASSIUM CHLORIDE 20 MEQ
40 PACKET (EA) ORAL EVERY 4 HOURS
Refills: 0 | Status: COMPLETED | OUTPATIENT
Start: 2023-02-10 | End: 2023-02-10

## 2023-02-10 RX ORDER — SIMVASTATIN 20 MG/1
40 TABLET, FILM COATED ORAL AT BEDTIME
Refills: 0 | Status: DISCONTINUED | OUTPATIENT
Start: 2023-02-10 | End: 2023-02-21

## 2023-02-10 RX ORDER — HYDROMORPHONE HYDROCHLORIDE 2 MG/ML
4 INJECTION INTRAMUSCULAR; INTRAVENOUS; SUBCUTANEOUS EVERY 4 HOURS
Refills: 0 | Status: DISCONTINUED | OUTPATIENT
Start: 2023-02-10 | End: 2023-02-15

## 2023-02-10 RX ORDER — METOPROLOL TARTRATE 50 MG
25 TABLET ORAL DAILY
Refills: 0 | Status: DISCONTINUED | OUTPATIENT
Start: 2023-02-10 | End: 2023-02-16

## 2023-02-10 RX ORDER — GABAPENTIN 400 MG/1
300 CAPSULE ORAL THREE TIMES A DAY
Refills: 0 | Status: DISCONTINUED | OUTPATIENT
Start: 2023-02-10 | End: 2023-02-16

## 2023-02-10 RX ORDER — INSULIN GLARGINE 100 [IU]/ML
13 INJECTION, SOLUTION SUBCUTANEOUS AT BEDTIME
Refills: 0 | Status: DISCONTINUED | OUTPATIENT
Start: 2023-02-10 | End: 2023-02-13

## 2023-02-10 RX ORDER — ENOXAPARIN SODIUM 100 MG/ML
40 INJECTION SUBCUTANEOUS EVERY 24 HOURS
Refills: 0 | Status: DISCONTINUED | OUTPATIENT
Start: 2023-02-10 | End: 2023-02-22

## 2023-02-10 RX ORDER — LACTOBACILLUS ACIDOPHILUS 100MM CELL
1 CAPSULE ORAL DAILY
Refills: 0 | Status: DISCONTINUED | OUTPATIENT
Start: 2023-02-10 | End: 2023-02-24

## 2023-02-10 RX ORDER — PIPERACILLIN AND TAZOBACTAM 4; .5 G/20ML; G/20ML
3.38 INJECTION, POWDER, LYOPHILIZED, FOR SOLUTION INTRAVENOUS EVERY 8 HOURS
Refills: 0 | Status: COMPLETED | OUTPATIENT
Start: 2023-02-10 | End: 2023-02-17

## 2023-02-10 RX ORDER — SODIUM CHLORIDE 9 MG/ML
1000 INJECTION INTRAMUSCULAR; INTRAVENOUS; SUBCUTANEOUS
Refills: 0 | Status: DISCONTINUED | OUTPATIENT
Start: 2023-02-10 | End: 2023-02-10

## 2023-02-10 RX ORDER — ACETAMINOPHEN 500 MG
1000 TABLET ORAL EVERY 8 HOURS
Refills: 0 | Status: DISCONTINUED | OUTPATIENT
Start: 2023-02-10 | End: 2023-02-24

## 2023-02-10 RX ORDER — LIDOCAINE 4 G/100G
1 CREAM TOPICAL DAILY
Refills: 0 | Status: DISCONTINUED | OUTPATIENT
Start: 2023-02-10 | End: 2023-02-24

## 2023-02-10 RX ORDER — AMLODIPINE BESYLATE 2.5 MG/1
5 TABLET ORAL DAILY
Refills: 0 | Status: DISCONTINUED | OUTPATIENT
Start: 2023-02-10 | End: 2023-02-24

## 2023-02-10 RX ORDER — SODIUM CHLORIDE 9 MG/ML
1000 INJECTION INTRAMUSCULAR; INTRAVENOUS; SUBCUTANEOUS
Refills: 0 | Status: DISCONTINUED | OUTPATIENT
Start: 2023-02-10 | End: 2023-02-12

## 2023-02-10 RX ADMIN — AMLODIPINE BESYLATE 5 MILLIGRAM(S): 2.5 TABLET ORAL at 17:31

## 2023-02-10 RX ADMIN — Medication 1000 MILLIGRAM(S): at 14:32

## 2023-02-10 RX ADMIN — Medication 3 UNIT(S): at 12:16

## 2023-02-10 RX ADMIN — Medication 3 UNIT(S): at 07:50

## 2023-02-10 RX ADMIN — SIMVASTATIN 40 MILLIGRAM(S): 20 TABLET, FILM COATED ORAL at 22:06

## 2023-02-10 RX ADMIN — Medication 25 MILLIGRAM(S): at 17:30

## 2023-02-10 RX ADMIN — Medication 40 MILLIEQUIVALENT(S): at 01:06

## 2023-02-10 RX ADMIN — INSULIN GLARGINE 13 UNIT(S): 100 INJECTION, SOLUTION SUBCUTANEOUS at 22:05

## 2023-02-10 RX ADMIN — LIDOCAINE 1 PATCH: 4 CREAM TOPICAL at 12:18

## 2023-02-10 RX ADMIN — ENOXAPARIN SODIUM 40 MILLIGRAM(S): 100 INJECTION SUBCUTANEOUS at 05:33

## 2023-02-10 RX ADMIN — PIPERACILLIN AND TAZOBACTAM 200 GRAM(S): 4; .5 INJECTION, POWDER, LYOPHILIZED, FOR SOLUTION INTRAVENOUS at 02:43

## 2023-02-10 RX ADMIN — Medication 1000 MILLIGRAM(S): at 03:10

## 2023-02-10 RX ADMIN — Medication 2: at 12:17

## 2023-02-10 RX ADMIN — Medication 1000 MILLIGRAM(S): at 23:50

## 2023-02-10 RX ADMIN — Medication 20 MILLIEQUIVALENT(S): at 12:17

## 2023-02-10 RX ADMIN — PIPERACILLIN AND TAZOBACTAM 25 GRAM(S): 4; .5 INJECTION, POWDER, LYOPHILIZED, FOR SOLUTION INTRAVENOUS at 22:07

## 2023-02-10 RX ADMIN — Medication 40 MILLIEQUIVALENT(S): at 05:33

## 2023-02-10 RX ADMIN — Medication 3 MILLIGRAM(S): at 22:12

## 2023-02-10 RX ADMIN — PIPERACILLIN AND TAZOBACTAM 25 GRAM(S): 4; .5 INJECTION, POWDER, LYOPHILIZED, FOR SOLUTION INTRAVENOUS at 13:24

## 2023-02-10 RX ADMIN — GABAPENTIN 300 MILLIGRAM(S): 400 CAPSULE ORAL at 22:06

## 2023-02-10 RX ADMIN — Medication 3: at 17:31

## 2023-02-10 RX ADMIN — Medication 3: at 07:50

## 2023-02-10 RX ADMIN — PIPERACILLIN AND TAZOBACTAM 25 GRAM(S): 4; .5 INJECTION, POWDER, LYOPHILIZED, FOR SOLUTION INTRAVENOUS at 05:32

## 2023-02-10 RX ADMIN — Medication 3 UNIT(S): at 17:31

## 2023-02-10 RX ADMIN — Medication 1000 MILLIGRAM(S): at 02:10

## 2023-02-10 RX ADMIN — LIDOCAINE 1 PATCH: 4 CREAM TOPICAL at 19:00

## 2023-02-10 RX ADMIN — GABAPENTIN 300 MILLIGRAM(S): 400 CAPSULE ORAL at 05:33

## 2023-02-10 RX ADMIN — GABAPENTIN 300 MILLIGRAM(S): 400 CAPSULE ORAL at 13:24

## 2023-02-10 NOTE — PROGRESS NOTE ADULT - PROBLEM SELECTOR PLAN 7
Noted to have right thyroid nodule on imaging  - f/u TSH  - can f/u outpatient for further eval Noted to have right thyroid nodule on imaging  - TSH 3.33  - f/u thyroid ultrasound for further characterization

## 2023-02-10 NOTE — PROGRESS NOTE ADULT - PROBLEM SELECTOR PLAN 3
Hypokalemia with serum K ~2.2 in setting of reported watery stools which may be diarrhea  - replete electrolytes as appropriate

## 2023-02-10 NOTE — PROVIDER CONTACT NOTE (MEDICATION) - ASSESSMENT
08/07/22 2043   Patient Assessment/Suction   Level of Consciousness (AVPU) alert   Respiratory Effort Unlabored   Expansion/Accessory Muscles/Retractions expansion symmetric   All Lung Fields Breath Sounds clear   Rhythm/Pattern, Respiratory depth regular;pattern regular   Cough Frequency infrequent   Cough Type good;nonproductive   PRE-TX-O2   O2 Device (Oxygen Therapy) room air   SpO2 (!) 94 %   Pulse Oximetry Type Continuous   $ Pulse Oximetry - Multiple Charge Pulse Oximetry - Multiple   Pulse 69   Resp 14   Education   $ Education DME Oxygen;15 min      Pt received PO potassium during the med pass but not the IV zosyn.

## 2023-02-10 NOTE — PROGRESS NOTE ADULT - PROBLEM SELECTOR PLAN 5
History DMT2 on insulin (long acting 16units and short acting 6units), A1c 12.7 11/2022  - f/u repeat A1c  - c/w Lantus 9U qhs, Admelog 3U TID and THOMAS; adjust per pt's insulin requirements Placed on low insulin sliding History DMT2 on insulin (long acting 16units and short acting 6units), A1c 12.7 11/2022  - f/u repeat A1c  - increased Lantus to 13U qhs; c/w Admelog 3U TID and THOMAS and adjust per pt's insulin requirements

## 2023-02-10 NOTE — PROGRESS NOTE ADULT - ATTENDING COMMENTS
67F pmh DMT2, multiple falls, s/p lumbar microdiscectomy for radiculopathy presenting from [facility] with sepsis 2/2 to acute bacterial PNA, CT chest with  ndular and patchy opacities predominantly in the bilateral lower lobes with many that are centrally necrotic concerning for infection.   Differential diagnosis includes metastatic disease and septic emboli    # Sepsis 2/2 acute bacterial PNA with central nectoric areas r/o metastatic disease, septic emboli  - P/w chronic cough, recent hospitalization followed by [facility] stay  - previously treated with Meropenem   - tachycardic + elevated wbc + CT chest imaging suggesting lung infection meeting sepsis criteria    - CT chest: Nodular and patchy opacities predominantly in B/L lower lobes many that are centrally necrotic concerning for infection. DDx metastatic dz vs septic emboli   - IV Zosyn for now. unclear if this is adequate given has been on Arlene before. f/u MRSA swab  sputum cx if able  - ID c/s-  -urine legionella neg, f/u strep pneuo abc, can c/w atypical coverage for now  - f/u bcx, ucx    - Age appropriate cancer screening opt-  -check 2d echo     # Diarrhea   -Diffuse wall thickening of the proximal duodenum with periduodenal stranding, concerning for duodenitis/peptic ulcer disease.  - Reports loose stool 3 episode/ day    - iso recent abx use (meropenem) , hospitalization, rehab course     - GI PCR neg, check c diff   - needs opt colonscopy    # HypoK    - Could be component of diarrhea, abx use    - EKG sinus tachy   -Trend & replete accordingly .    # thyroid nodule  check thyroid us    # type 2 dm  uncontrolled  increase lantus to 13uqhs  on basal bolus at home  check a1c  mod aiss fs tid ac    restart cymbalata 67F pmh DMT2, multiple falls, s/p lumbar microdiscectomy for radiculopathy presenting from [facility] with sepsis 2/2 to acute bacterial PNA, CT chest with  ndular and patchy opacities predominantly in the bilateral lower lobes with many that are centrally necrotic concerning for infection.   Differential diagnosis includes metastatic disease and septic emboli    # Sepsis 2/2 acute bacterial PNA with central nectoric areas r/o metastatic disease, septic emboli  - P/w chronic cough, recent hospitalization followed by [place] stay  - previously treated with Meropenem   - tachycardic + elevated wbc + CT chest imaging suggesting lung infection meeting sepsis criteria    - CT chest: Nodular and patchy opacities predominantly in B/L lower lobes many that are centrally necrotic concerning for infection. DDx metastatic dz vs septic emboli   - IV Zosyn for now. unclear if this is adequate given has been on Arlene before. f/u MRSA swab  sputum cx if able  - ID c/s-  -urine legionella neg, f/u strep pneuo abc, can c/w atypical coverage for now  - f/u bcx, ucx    - Age appropriate cancer screening opt-  -check 2d echo     # Diarrhea   -Diffuse wall thickening of the proximal duodenum with periduodenal stranding, concerning for duodenitis/peptic ulcer disease.  - Reports loose stool 3 episode/ day    - iso recent abx use (meropenem) , hospitalization, rehab course     - GI PCR neg, check c diff   - needs opt colonscopy    # HypoK    - Could be component of diarrhea, abx use    - EKG sinus tachy   -Trend & replete accordingly .    # thyroid nodule  check thyroid us    # type 2 dm  uncontrolled  increase lantus to 13uqhs  on basal bolus at home  check a1c  mod aiss fs tid ac    restart Cymbalta, BP meds- pt normotensive

## 2023-02-10 NOTE — PROGRESS NOTE ADULT - SUBJECTIVE AND OBJECTIVE BOX
*******************************  Bhargav Camargo MD (PGY-1)  Internal Medicine  Contact via [employer] TEAMS  [facility] Pager: [phone]  [facility] Pager: [phone]  *******************************    PROGRESS NOTE:     Patient is a 67y old  Female who presents with a chief complaint of Pneumonia (2023 23:15)    INTERVAL EVENTS: No acute overnight events.     SUBJECTIVE: Patient seen and examined at bedside. This morning, the patient is comfortable and doing well. No acute complaints. Denies fevers, chills, N/V/D, chest pain, SOB, abdominal pain.    MEDICATIONS  (STANDING):  dextrose 5%. 1000 milliLiter(s) (50 mL/Hr) IV Continuous <Continuous>  dextrose 5%. 1000 milliLiter(s) (100 mL/Hr) IV Continuous <Continuous>  dextrose 50% Injectable 25 Gram(s) IV Push once  dextrose 50% Injectable 12.5 Gram(s) IV Push once  dextrose 50% Injectable 25 Gram(s) IV Push once  enoxaparin Injectable 40 milliGRAM(s) SubCutaneous every 24 hours  gabapentin 300 milliGRAM(s) Oral three times a day  glucagon  Injectable 1 milliGRAM(s) IntraMuscular once  insulin glargine Injectable (LANTUS) 9 Unit(s) SubCutaneous at bedtime  insulin lispro (ADMELOG) corrective regimen sliding scale   SubCutaneous three times a day before meals  insulin lispro (ADMELOG) corrective regimen sliding scale   SubCutaneous at bedtime  insulin lispro Injectable (ADMELOG) 3 Unit(s) SubCutaneous three times a day before meals  lidocaine   4% Patch 1 Patch Transdermal daily  piperacillin/tazobactam IVPB.. 3.375 Gram(s) IV Intermittent every 8 hours  potassium chloride    Tablet ER 20 milliEquivalent(s) Oral daily  simvastatin 40 milliGRAM(s) Oral at bedtime  sodium chloride 0.9%. 1000 milliLiter(s) (75 mL/Hr) IV Continuous <Continuous>    MEDICATIONS  (PRN):  acetaminophen     Tablet .. 1000 milliGRAM(s) Oral every 8 hours PRN Severe Pain (7 - 10)  dextrose Oral Gel 15 Gram(s) Oral once PRN Blood Glucose LESS THAN 70 milliGRAM(s)/deciliter  HYDROmorphone   Tablet 4 milliGRAM(s) Oral every 4 hours PRN Severe Pain (7 - 10)  melatonin 3 milliGRAM(s) Oral at bedtime PRN Insomnia    CAPILLARY BLOOD GLUCOSE    POCT Blood Glucose.: 290 mg/dL (10 Feb 2023 07:28)    I&O's Summary    PHYSICAL EXAM:  Vital Signs Last 24 Hrs  T(C): 36.7 (10 Feb 2023 05:16), Max: 37.2 (2023 16:55)  T(F): 98.1 (10 Feb 2023 05:16), Max: 99 (2023 16:55)  HR: 110 (10 Feb 2023 05:16) (104 - 118)  BP: 125/77 (10 Feb 2023 05:16) (125/77 - 144/77)  BP(mean): 99 (2023 21:45) (99 - 99)  RR: 18 (10 Feb 2023 05:16) (16 - 19)  SpO2: 98% (10 Feb 2023 05:16) (95% - 98%)    Parameters below as of 10 Feb 2023 05:16  Patient On (Oxygen Delivery Method): room air    GENERAL: NAD, lying in bed comfortably  NECK: Supple, no JVD  HEART: S1, S2, tachycardic rate but Regular rhythm, no murmurs, rubs, or gallops  LUNGS: inspiratory rhonchi and expiratory wheezing, R>L but unlabored on RA  ABDOMEN: Soft, nontender, nondistended, +BS  EXTREMITIES: 2+ peripheral pulses bilaterally. No clubbing, cyanosis, or edema  NERVOUS SYSTEM:  A&Ox3, no focal deficits   SKIN: No rashes or lesions    LABS:                        9.0    17.03 )-----------( 552      ( 10 Feb 2023 05:36 )             28.3     02-10    138  |  101  |  6<L>  ----------------------------<  272<H>  3.7   |  23  |  0.32<L>    Ca    7.1<L>      10 Feb 2023 05:36  Mg     .8     02-09    TPro  5.9<L>  /  Alb  2.8<L>  /  TBili  0.3  /  DBili  x   /  AST  23  /  ALT  25  /  AlkPhos  67  02-10    PT/INR - ( 2023 16:12 )   PT: 12.2 sec;   INR: 1.05 ratio    PTT - ( 2023 16:12 )  PTT:26.5 sec    Urinalysis Basic - ( 2023 17:07 )    Color: Yellow / Appearance: Clear / S.022 / pH: x  Gluc: x / Ketone: Small  / Bili: Negative / Urobili: Negative   Blood: x / Protein: 30 mg/dL / Nitrite: Negative   Leuk Esterase: Negative / RBC: 5 /hpf / WBC 2 /HPF   Sq Epi: x / Non Sq Epi: 2 /hpf / Bacteria: Negative    RADIOLOGY & ADDITIONAL TESTS:  Results Reviewed:   Imaging Personally Reviewed:  Electrocardiogram Personally Reviewed:  Tele:    COORDINATION OF CARE:  Care Discussed with Consultants/Other Providers [Y/N]:  Prior or Outpatient Records Reviewed [Y/N]: *******************************  Bhargav Camargo MD (PGY-1)  Internal Medicine  Contact via [employer] TEAMS  [facility] Pager: [phone]  [facility] Pager: [phone]  *******************************    PROGRESS NOTE:     Patient is a 67y old  Female who presents with a chief complaint of Pneumonia (2023 23:15)    INTERVAL EVENTS: No acute overnight events.     SUBJECTIVE: Patient seen and examined at bedside. This morning, the patient is comfortable and doing well. No acute complaints. Denies fevers, chills, N/V/D, chest pain, SOB, abdominal pain.    MEDICATIONS  (STANDING):  dextrose 5%. 1000 milliLiter(s) (50 mL/Hr) IV Continuous <Continuous>  dextrose 5%. 1000 milliLiter(s) (100 mL/Hr) IV Continuous <Continuous>  dextrose 50% Injectable 25 Gram(s) IV Push once  dextrose 50% Injectable 12.5 Gram(s) IV Push once  dextrose 50% Injectable 25 Gram(s) IV Push once  enoxaparin Injectable 40 milliGRAM(s) SubCutaneous every 24 hours  gabapentin 300 milliGRAM(s) Oral three times a day  glucagon  Injectable 1 milliGRAM(s) IntraMuscular once  insulin glargine Injectable (LANTUS) 9 Unit(s) SubCutaneous at bedtime  insulin lispro (ADMELOG) corrective regimen sliding scale   SubCutaneous three times a day before meals  insulin lispro (ADMELOG) corrective regimen sliding scale   SubCutaneous at bedtime  insulin lispro Injectable (ADMELOG) 3 Unit(s) SubCutaneous three times a day before meals  lidocaine   4% Patch 1 Patch Transdermal daily  piperacillin/tazobactam IVPB.. 3.375 Gram(s) IV Intermittent every 8 hours  potassium chloride    Tablet ER 20 milliEquivalent(s) Oral daily  simvastatin 40 milliGRAM(s) Oral at bedtime  sodium chloride 0.9%. 1000 milliLiter(s) (75 mL/Hr) IV Continuous <Continuous>    MEDICATIONS  (PRN):  acetaminophen     Tablet .. 1000 milliGRAM(s) Oral every 8 hours PRN Severe Pain (7 - 10)  dextrose Oral Gel 15 Gram(s) Oral once PRN Blood Glucose LESS THAN 70 milliGRAM(s)/deciliter  HYDROmorphone   Tablet 4 milliGRAM(s) Oral every 4 hours PRN Severe Pain (7 - 10)  melatonin 3 milliGRAM(s) Oral at bedtime PRN Insomnia    CAPILLARY BLOOD GLUCOSE    POCT Blood Glucose.: 290 mg/dL (10 Feb 2023 07:28)    I&O's Summary    PHYSICAL EXAM:  Vital Signs Last 24 Hrs  T(C): 36.7 (10 Feb 2023 05:16), Max: 37.2 (2023 16:55)  T(F): 98.1 (10 Feb 2023 05:16), Max: 99 (2023 16:55)  HR: 110 (10 Feb 2023 05:16) (104 - 118)  BP: 125/77 (10 Feb 2023 05:16) (125/77 - 144/77)  BP(mean): 99 (2023 21:45) (99 - 99)  RR: 18 (10 Feb 2023 05:16) (16 - 19)  SpO2: 98% (10 Feb 2023 05:16) (95% - 98%)    Parameters below as of 10 Feb 2023 05:16  Patient On (Oxygen Delivery Method): room air    GENERAL: NAD, lying in bed comfortably  NECK: Supple, no JVD  HEART: S1, S2, tachycardic rate but Regular rhythm, no murmurs, rubs, or gallops  LUNGS: inspiratory rhonchi and expiratory wheezing, R>L but unlabored on RA  ABDOMEN: Soft, nontender, nondistended, +BS  EXTREMITIES: 2+ peripheral pulses bilaterally. No clubbing, cyanosis, or edema  NERVOUS SYSTEM:  A&Ox3, no focal deficits   SKIN: No rashes or lesions    LABS:                        9.0    17.03 )-----------( 552      ( 10 Feb 2023 05:36 )             28.3     02-10    138  |  101  |  6<L>  ----------------------------<  272<H>  3.7   |  23  |  0.32<L>    Ca    7.1<L>      10 Feb 2023 05:36  Mg     .8     02-09    TPro  5.9<L>  /  Alb  2.8<L>  /  TBili  0.3  /  DBili  x   /  AST  23  /  ALT  25  /  AlkPhos  67  02-10    PT/INR - ( 2023 16:12 )   PT: 12.2 sec;   INR: 1.05 ratio    PTT - ( 2023 16:12 )  PTT:26.5 sec    Urinalysis Basic - ( 2023 17:07 )    Color: Yellow / Appearance: Clear / S.022 / pH: x  Gluc: x / Ketone: Small  / Bili: Negative / Urobili: Negative   Blood: x / Protein: 30 mg/dL / Nitrite: Negative   Leuk Esterase: Negative / RBC: 5 /hpf / WBC 2 /HPF   Sq Epi: x / Non Sq Epi: 2 /hpf / Bacteria: Negative    RADIOLOGY & ADDITIONAL TESTS:  Results Reviewed:   Imaging Personally Reviewed:  < from: CT Abdomen and Pelvis No Cont (23 @ 18:03) >  IMPRESSION:  Nodular and patchy opacities predominantly in the bilateral lower lobes   with many that are centrally necrotic concerning for infection.   Differential diagnosis includes metastatic disease and septic emboli.  Diffuse wall thickening of the proximal duodenum with periduodenal   stranding, concerning for duodenitis/peptic ulcer disease.  Nonobstructing right renal calculus  Right thyroid nodule may be evaluated with ultrasound on a nonemergent   basis.    Electrocardiogram Personally Reviewed:  Tele:    COORDINATION OF CARE:  Care Discussed with Consultants/Other Providers [Y/N]:  Prior or Outpatient Records Reviewed [Y/N]:

## 2023-02-10 NOTE — PROGRESS NOTE ADULT - PROBLEM SELECTOR PLAN 4
Reported spinal surgery for herniated disc in patient with lumbar radiculopathy history per chart review.  - c/w hydromorphone 4mg for severe pain q4hrs prn  - c/w Tylenol 1000mg q8hrs for pain prn  - c/w Lidocaine patch for pain prn  - c/w gabapentin 300mg TID

## 2023-02-10 NOTE — CONSULT NOTE ADULT - ASSESSMENT
68 yo F DM2, falls, microdiscectomy, from [facility] with cough, fevers  No fever, has leukocytosis  Initially from [place] with cough/fevers, given abx, then high volume diarrhea  CT with nodular/patchy opacities in bilateral lower lobes; thickening of duodenum, duodenitis, renal stone  PCT minimally elevated  RVP neg  GI PCR neg  Legionella urine ag neg  UA neg/equivocal  From [facility] (decades ago), no history of exposure to TB  Uncertain etiology pulmonary lesions at this point  Lung lesion DDx: Malignancy, fungal, septic emboli, NTM/TB, lung abscess?  Overall,  1) Lung Lesions  - Septic emboli vs metastatic disease by radiology; lesionsnot present on CT in 11/2022  - Zosyn 3.375g q 8, empiric  - Check sputum culture  - Although I have low suspicion, cannot definitively rule out TB clinically, would isolate (airborne), and obtain sputum samples AFB x 3  - Check Quant gold, LDH, urine histoplasma  - If prelim workup negative, would consult Pulmonary for bronchoscopy  - F/U BCXs x 2  - TTE reassuring, hold on HAJA for now  2) Diarrhea  - If ongoing high volume diarrhea, Check C diff PCR  - Monitor--antibiotic associated diarrhea?  - Follow up pending stool tests  3) Leukocytosis  - Reactive to underlying lung process?  - Trend to normal    Fidel Lester MD  Contact on TEAMS messaging from 9am - 5pm  From 5pm-9am, on weekends, or if no response call [phone]
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
## 2023-02-10 NOTE — PROGRESS NOTE ADULT - PROBLEM SELECTOR PLAN 1
Leukocytosis + tachycardia = SIRS+ in conjunction with CT imaging with patchy/nodular opacities in setting of coughing concerning for sepsis secondary to pneumonia likely HAP given her recent surgery and [place] placement. Ddx: legionella pneumonia given patient with reported watery stools; however does not meet diarrhea definition of at least 3 unformed stools per day.    - s/p NS ~2L; continue with NS 75cc/hr x 12hrs  - patient recently on meropenem in [place] for pneumonia/UTI?  - s/p aztreonam x1 and doxycycline x1 in ED  - f/u BCx, UCx, urine legionella, urine strep, MRSA/MSSA  - c/w Zosyn (2/9- ) for possible HAP pseudomonal coverage; cannot do vancomycin for MRSA coverage as patient noted to have vancomycin allergy   - if pt MRSA positive and/or patient appears more toxic or worsening health course, can consider linezolid for MRSA coverage  - monitor WBC count, temperature curve and hemodynamics Leukocytosis + tachycardia = SIRS+ in conjunction with CT imaging with patchy/nodular opacities in setting of coughing concerning for sepsis secondary to pneumonia likely HAP given her recent surgery and [place] placement. Ddx: legionella pneumonia given patient with reported watery stools; however does not meet diarrhea definition of at least 3 unformed stools per day.    - s/p NS ~2L; continue with NS 75cc/hr x 12hrs  - patient recently on meropenem in [place] for pneumonia/UTI?  - s/p aztreonam x1 and doxycycline x1 in ED  - CTAP (2/9): Nodular and patchy opacities predominantly in the bilateral lower lobes with many that are centrally necrotic concerning for infection. Differential diagnosis includes metastatic disease and septic emboli  - TTE (2/10): EF 67%, grossly normal findings  - f/u BCx, UCx, urine legionella, urine strep, MRSA/MSSA  - ID consulted; f/u recs  - c/w Zosyn (2/9- ) for possible HAP pseudomonal coverage; cannot do vancomycin for MRSA coverage as patient noted to have vancomycin allergy   - if pt MRSA positive and/or patient appears more toxic or worsening health course, can consider linezolid for MRSA coverage  - monitor WBC count, temperature curve and hemodynamics

## 2023-02-10 NOTE — PROGRESS NOTE ADULT - PROBLEM SELECTOR PLAN 6
On Amlodipine 5mg qd and metoprolol 25mg qd at home  - SBPs acceptable; hold antihypertensives i/s/o sepsis On Amlodipine 5mg qd and metoprolol 25mg qd at home  - c/w amlodipine 5mg qd  - c/w metoprolol 25mg qd

## 2023-02-10 NOTE — PROGRESS NOTE ADULT - PROBLEM SELECTOR PLAN 9
DVT prophylaxis: Lovenox 40mg qd  Diet: consistent carbs  Disposition: Pending clinical improvement  Code Status: full code

## 2023-02-10 NOTE — PROGRESS NOTE ADULT - PROBLEM SELECTOR PLAN 2
Reported watery stools but without blood or pitch black color with frequency of about 2 per day. May be in setting of recent antibiotic use. Rule out developing GI infection such as C. diff. Low suspicion at this time given not clinically diarrhea.  - CT A/P non-contrast noted Diffuse wall thickening of the proximal duodenum with periduodenal stranding, c/f duodenitis/peptic ulcer disease  - f/u GI PCR  - continue to monitor for now Reported watery stools but without blood or pitch black color with frequency of about 2 per day. May be in setting of recent antibiotic use. Rule out developing GI infection such as C. diff. Low suspicion at this time given not clinically diarrhea.  - CT A/P non-contrast noted Diffuse wall thickening of the proximal duodenum with periduodenal stranding, c/f duodenitis/peptic ulcer disease  - GI PCR negative; f/u stool cx, stool O+P, c diff  - continue to monitor for now

## 2023-02-10 NOTE — PROGRESS NOTE ADULT - ASSESSMENT
67 F PMH DMT2, multiple falls, s/p lumbar microdiscectomy for radiculopathy presenting from [facility] with acute onset of malaise and wet coughing but without fevers, shortness of breath, or chest pain with CT chest non-contrast imaging with patchy/nodular opacities in bilateral lower lobes. Labs notable for leukocytosis and tachycardia with exam findings notable for wheezing/ronchus breath sounds. These findings concerning for sepsis secondary to pneumonia.     67 F PMH DMT2, multiple falls, s/p lumbar microdiscectomy for radiculopathy presenting from [facility] with acute onset of malaise and wet coughing but without fevers, shortness of breath, or chest pain with CT chest non-contrast imaging with patchy/nodular opacities in bilateral lower lobes, centrally necrotic c/f infection vs. metastatic disease vs. septic emboli. Labs notable for leukocytosis and tachycardia with exam findings notable for wheezing/ronchus breath sounds. These findings concerning for sepsis secondary to pneumonia.

## 2023-02-11 ENCOUNTER — TRANSCRIPTION ENCOUNTER (OUTPATIENT)
Age: 68
End: 2023-02-11

## 2023-02-11 LAB
A1C WITH ESTIMATED AVERAGE GLUCOSE RESULT: 7.9 % — HIGH (ref 4–5.6)
ALBUMIN SERPL ELPH-MCNC: 2.6 G/DL — LOW (ref 3.3–5)
ALP SERPL-CCNC: 67 U/L — SIGNIFICANT CHANGE UP (ref 40–120)
ALT FLD-CCNC: 22 U/L — SIGNIFICANT CHANGE UP (ref 10–45)
ANION GAP SERPL CALC-SCNC: 13 MMOL/L — SIGNIFICANT CHANGE UP (ref 5–17)
ANION GAP SERPL CALC-SCNC: 14 MMOL/L — SIGNIFICANT CHANGE UP (ref 5–17)
AST SERPL-CCNC: 29 U/L — SIGNIFICANT CHANGE UP (ref 10–40)
BASE EXCESS BLDV CALC-SCNC: 2.4 MMOL/L — SIGNIFICANT CHANGE UP (ref -2–3)
BILIRUB SERPL-MCNC: 0.3 MG/DL — SIGNIFICANT CHANGE UP (ref 0.2–1.2)
BUN SERPL-MCNC: 5 MG/DL — LOW (ref 7–23)
BUN SERPL-MCNC: 6 MG/DL — LOW (ref 7–23)
C DIFF GDH STL QL: NEGATIVE — SIGNIFICANT CHANGE UP
C DIFF GDH STL QL: SIGNIFICANT CHANGE UP
CA-I SERPL-SCNC: 0.98 MMOL/L — LOW (ref 1.15–1.33)
CALCIUM SERPL-MCNC: 7.1 MG/DL — LOW (ref 8.4–10.5)
CALCIUM SERPL-MCNC: 7.1 MG/DL — LOW (ref 8.4–10.5)
CHLORIDE BLDV-SCNC: 97 MMOL/L — SIGNIFICANT CHANGE UP (ref 96–108)
CHLORIDE SERPL-SCNC: 98 MMOL/L — SIGNIFICANT CHANGE UP (ref 96–108)
CHLORIDE SERPL-SCNC: 98 MMOL/L — SIGNIFICANT CHANGE UP (ref 96–108)
CO2 BLDV-SCNC: 29 MMOL/L — HIGH (ref 22–26)
CO2 SERPL-SCNC: 23 MMOL/L — SIGNIFICANT CHANGE UP (ref 22–31)
CO2 SERPL-SCNC: 24 MMOL/L — SIGNIFICANT CHANGE UP (ref 22–31)
CREAT SERPL-MCNC: 0.32 MG/DL — LOW (ref 0.5–1.3)
CREAT SERPL-MCNC: 0.43 MG/DL — LOW (ref 0.5–1.3)
EGFR: 105 ML/MIN/1.73M2 — SIGNIFICANT CHANGE UP
EGFR: 113 ML/MIN/1.73M2 — SIGNIFICANT CHANGE UP
ESTIMATED AVERAGE GLUCOSE: 180 MG/DL — HIGH (ref 68–114)
GAS PNL BLDV: 132 MMOL/L — LOW (ref 136–145)
GAS PNL BLDV: SIGNIFICANT CHANGE UP
GAS PNL BLDV: SIGNIFICANT CHANGE UP
GLUCOSE BLDC GLUCOMTR-MCNC: 155 MG/DL — HIGH (ref 70–99)
GLUCOSE BLDC GLUCOMTR-MCNC: 163 MG/DL — HIGH (ref 70–99)
GLUCOSE BLDC GLUCOMTR-MCNC: 198 MG/DL — HIGH (ref 70–99)
GLUCOSE BLDC GLUCOMTR-MCNC: 239 MG/DL — HIGH (ref 70–99)
GLUCOSE BLDC GLUCOMTR-MCNC: 334 MG/DL — HIGH (ref 70–99)
GLUCOSE BLDC GLUCOMTR-MCNC: 504 MG/DL — CRITICAL HIGH (ref 70–99)
GLUCOSE BLDV-MCNC: 147 MG/DL — HIGH (ref 70–99)
GLUCOSE SERPL-MCNC: 141 MG/DL — HIGH (ref 70–99)
GLUCOSE SERPL-MCNC: 154 MG/DL — HIGH (ref 70–99)
HCO3 BLDV-SCNC: 27 MMOL/L — SIGNIFICANT CHANGE UP (ref 22–29)
HCT VFR BLD CALC: 27.5 % — LOW (ref 34.5–45)
HCT VFR BLDA CALC: 27 % — LOW (ref 34.5–46.5)
HGB BLD CALC-MCNC: 9 G/DL — LOW (ref 11.7–16.1)
HGB BLD-MCNC: 8.7 G/DL — LOW (ref 11.5–15.5)
LACTATE BLDV-MCNC: 2.3 MMOL/L — HIGH (ref 0.5–2)
LACTATE SERPL-SCNC: 2.1 MMOL/L — HIGH (ref 0.5–2)
LDH SERPL L TO P-CCNC: 300 U/L — HIGH (ref 50–242)
MAGNESIUM SERPL-MCNC: 0.9 MG/DL — CRITICAL LOW (ref 1.6–2.6)
MAGNESIUM SERPL-MCNC: 1.9 MG/DL — SIGNIFICANT CHANGE UP (ref 1.6–2.6)
MCHC RBC-ENTMCNC: 20.8 PG — LOW (ref 27–34)
MCHC RBC-ENTMCNC: 31.6 GM/DL — LOW (ref 32–36)
MCV RBC AUTO: 65.6 FL — LOW (ref 80–100)
MRSA PCR RESULT.: SIGNIFICANT CHANGE UP
NRBC # BLD: 0 /100 WBCS — SIGNIFICANT CHANGE UP (ref 0–0)
PCO2 BLDV: 43 MMHG — HIGH (ref 39–42)
PH BLDV: 7.41 — SIGNIFICANT CHANGE UP (ref 7.32–7.43)
PHOSPHATE SERPL-MCNC: 1.5 MG/DL — LOW (ref 2.5–4.5)
PHOSPHATE SERPL-MCNC: 2.2 MG/DL — LOW (ref 2.5–4.5)
PLATELET # BLD AUTO: 611 K/UL — HIGH (ref 150–400)
PO2 BLDV: 28 MMHG — SIGNIFICANT CHANGE UP (ref 25–45)
POTASSIUM BLDV-SCNC: 3.4 MMOL/L — LOW (ref 3.5–5.1)
POTASSIUM SERPL-MCNC: 3.4 MMOL/L — LOW (ref 3.5–5.3)
POTASSIUM SERPL-MCNC: 3.7 MMOL/L — SIGNIFICANT CHANGE UP (ref 3.5–5.3)
POTASSIUM SERPL-SCNC: 3.4 MMOL/L — LOW (ref 3.5–5.3)
POTASSIUM SERPL-SCNC: 3.7 MMOL/L — SIGNIFICANT CHANGE UP (ref 3.5–5.3)
PROT SERPL-MCNC: 5.9 G/DL — LOW (ref 6–8.3)
RBC # BLD: 4.19 M/UL — SIGNIFICANT CHANGE UP (ref 3.8–5.2)
RBC # FLD: 14.6 % — HIGH (ref 10.3–14.5)
S AUREUS DNA NOSE QL NAA+PROBE: SIGNIFICANT CHANGE UP
S PNEUM AG UR QL: NEGATIVE — SIGNIFICANT CHANGE UP
SAO2 % BLDV: 46.9 % — LOW (ref 67–88)
SODIUM SERPL-SCNC: 134 MMOL/L — LOW (ref 135–145)
SODIUM SERPL-SCNC: 136 MMOL/L — SIGNIFICANT CHANGE UP (ref 135–145)
WBC # BLD: 20.21 K/UL — HIGH (ref 3.8–10.5)
WBC # FLD AUTO: 20.21 K/UL — HIGH (ref 3.8–10.5)

## 2023-02-11 PROCEDURE — 99233 SBSQ HOSP IP/OBS HIGH 50: CPT

## 2023-02-11 PROCEDURE — 93010 ELECTROCARDIOGRAM REPORT: CPT

## 2023-02-11 RX ORDER — CALCIUM CARBONATE 500(1250)
1 TABLET ORAL ONCE
Refills: 0 | Status: COMPLETED | OUTPATIENT
Start: 2023-02-11 | End: 2023-02-11

## 2023-02-11 RX ORDER — SODIUM CHLORIDE 9 MG/ML
500 INJECTION INTRAMUSCULAR; INTRAVENOUS; SUBCUTANEOUS ONCE
Refills: 0 | Status: COMPLETED | OUTPATIENT
Start: 2023-02-11 | End: 2023-02-11

## 2023-02-11 RX ORDER — POTASSIUM PHOSPHATE, MONOBASIC POTASSIUM PHOSPHATE, DIBASIC 236; 224 MG/ML; MG/ML
30 INJECTION, SOLUTION INTRAVENOUS ONCE
Refills: 0 | Status: COMPLETED | OUTPATIENT
Start: 2023-02-11 | End: 2023-02-11

## 2023-02-11 RX ORDER — MAGNESIUM SULFATE 500 MG/ML
4 VIAL (ML) INJECTION ONCE
Refills: 0 | Status: COMPLETED | OUTPATIENT
Start: 2023-02-11 | End: 2023-02-11

## 2023-02-11 RX ORDER — MAGNESIUM OXIDE 400 MG ORAL TABLET 241.3 MG
400 TABLET ORAL
Refills: 0 | Status: COMPLETED | OUTPATIENT
Start: 2023-02-11 | End: 2023-02-13

## 2023-02-11 RX ORDER — SODIUM,POTASSIUM PHOSPHATES 278-250MG
2 POWDER IN PACKET (EA) ORAL ONCE
Refills: 0 | Status: COMPLETED | OUTPATIENT
Start: 2023-02-11 | End: 2023-02-11

## 2023-02-11 RX ORDER — POTASSIUM CHLORIDE 20 MEQ
40 PACKET (EA) ORAL ONCE
Refills: 0 | Status: COMPLETED | OUTPATIENT
Start: 2023-02-11 | End: 2023-02-11

## 2023-02-11 RX ADMIN — LIDOCAINE 1 PATCH: 4 CREAM TOPICAL at 11:12

## 2023-02-11 RX ADMIN — Medication 2: at 23:00

## 2023-02-11 RX ADMIN — ENOXAPARIN SODIUM 40 MILLIGRAM(S): 100 INJECTION SUBCUTANEOUS at 05:58

## 2023-02-11 RX ADMIN — INSULIN GLARGINE 13 UNIT(S): 100 INJECTION, SOLUTION SUBCUTANEOUS at 23:00

## 2023-02-11 RX ADMIN — Medication 3 UNIT(S): at 16:22

## 2023-02-11 RX ADMIN — Medication 2 PACKET(S): at 10:05

## 2023-02-11 RX ADMIN — GABAPENTIN 300 MILLIGRAM(S): 400 CAPSULE ORAL at 13:30

## 2023-02-11 RX ADMIN — Medication 1000 MILLIGRAM(S): at 00:50

## 2023-02-11 RX ADMIN — GABAPENTIN 300 MILLIGRAM(S): 400 CAPSULE ORAL at 05:58

## 2023-02-11 RX ADMIN — Medication 20 MILLIEQUIVALENT(S): at 11:12

## 2023-02-11 RX ADMIN — GABAPENTIN 300 MILLIGRAM(S): 400 CAPSULE ORAL at 22:48

## 2023-02-11 RX ADMIN — Medication 40 MILLIEQUIVALENT(S): at 22:48

## 2023-02-11 RX ADMIN — Medication 25 MILLIGRAM(S): at 08:44

## 2023-02-11 RX ADMIN — Medication 3 MILLIGRAM(S): at 22:47

## 2023-02-11 RX ADMIN — Medication 1 TABLET(S): at 11:11

## 2023-02-11 RX ADMIN — SIMVASTATIN 40 MILLIGRAM(S): 20 TABLET, FILM COATED ORAL at 22:48

## 2023-02-11 RX ADMIN — Medication 25 GRAM(S): at 09:41

## 2023-02-11 RX ADMIN — SODIUM CHLORIDE 100 MILLILITER(S): 9 INJECTION INTRAMUSCULAR; INTRAVENOUS; SUBCUTANEOUS at 11:12

## 2023-02-11 RX ADMIN — Medication 1000 MILLIGRAM(S): at 23:56

## 2023-02-11 RX ADMIN — Medication 3 UNIT(S): at 13:31

## 2023-02-11 RX ADMIN — PIPERACILLIN AND TAZOBACTAM 25 GRAM(S): 4; .5 INJECTION, POWDER, LYOPHILIZED, FOR SOLUTION INTRAVENOUS at 13:31

## 2023-02-11 RX ADMIN — Medication 1 TABLET(S): at 11:12

## 2023-02-11 RX ADMIN — Medication 2: at 18:30

## 2023-02-11 RX ADMIN — SODIUM CHLORIDE 500 MILLILITER(S): 9 INJECTION INTRAMUSCULAR; INTRAVENOUS; SUBCUTANEOUS at 11:13

## 2023-02-11 RX ADMIN — POTASSIUM PHOSPHATE, MONOBASIC POTASSIUM PHOSPHATE, DIBASIC 83.33 MILLIMOLE(S): 236; 224 INJECTION, SOLUTION INTRAVENOUS at 22:50

## 2023-02-11 RX ADMIN — Medication 3 UNIT(S): at 08:37

## 2023-02-11 RX ADMIN — MAGNESIUM OXIDE 400 MG ORAL TABLET 400 MILLIGRAM(S): 241.3 TABLET ORAL at 13:44

## 2023-02-11 RX ADMIN — LIDOCAINE 1 PATCH: 4 CREAM TOPICAL at 00:00

## 2023-02-11 RX ADMIN — MAGNESIUM OXIDE 400 MG ORAL TABLET 400 MILLIGRAM(S): 241.3 TABLET ORAL at 18:18

## 2023-02-11 RX ADMIN — AMLODIPINE BESYLATE 5 MILLIGRAM(S): 2.5 TABLET ORAL at 05:59

## 2023-02-11 RX ADMIN — PIPERACILLIN AND TAZOBACTAM 25 GRAM(S): 4; .5 INJECTION, POWDER, LYOPHILIZED, FOR SOLUTION INTRAVENOUS at 22:47

## 2023-02-11 RX ADMIN — Medication 1: at 08:38

## 2023-02-11 RX ADMIN — Medication 1000 MILLIGRAM(S): at 22:47

## 2023-02-11 RX ADMIN — Medication 1: at 13:26

## 2023-02-11 RX ADMIN — PIPERACILLIN AND TAZOBACTAM 25 GRAM(S): 4; .5 INJECTION, POWDER, LYOPHILIZED, FOR SOLUTION INTRAVENOUS at 05:58

## 2023-02-11 NOTE — PROGRESS NOTE ADULT - PROBLEM SELECTOR PLAN 6
On Amlodipine 5mg qd and metoprolol 25mg qd at home  - c/w amlodipine 5mg qd  - c/w metoprolol 25mg qd

## 2023-02-11 NOTE — DISCHARGE NOTE PROVIDER - CARE PROVIDER_API CALL
Fidel Lester)  Infectious Disease; Internal Medicine  [address] [place] [address]  Phone: [phone]  Fax: [phone]  Follow Up Time:     Laquita Champagne)  Critical Care Medicine  [address] [place] [address]  Phone: [phone]  Fax: [phone]  Follow Up Time:    Fidel Lester)  Infectious Disease; Internal Medicine  [address] [place] [address]  Phone: [phone]  Fax: [phone]  Follow Up Time:     Laquita Champagne)  Critical Care Medicine  [address] [place] [address], [place] [address]  Phone: [phone]  Fax: [phone]  Follow Up Time:     Wilfredo Lantigua)  Gastroenterology; Internal Medicine; Transplant Hepatology  [address] [place] [address]  Phone: [phone]  Fax: [phone]  Follow Up Time:

## 2023-02-11 NOTE — DISCHARGE NOTE PROVIDER - NSDCFUSCHEDAPPT_GEN_ALL_CORE_FT
Florence Luo  [facility]  UROLOGY [address] [facility]  Scheduled Appointment: 02/24/2023    Guerrero Lai  [facility]  NEUROLOGY [address]  Scheduled Appointment: 03/09/2023     Florence Luo  [facility]  UROLOGY [address] [facility]  Scheduled Appointment: 02/24/2023    Guerrero Lai  [facility]  NEUROLOGY [address]  Scheduled Appointment: 03/09/2023    [facility]  ENDOCRIN OP [address]  Scheduled Appointment: 04/25/2023

## 2023-02-11 NOTE — DISCHARGE NOTE PROVIDER - NSDCCPCAREPLAN_GEN_ALL_CORE_FT
PRINCIPAL DISCHARGE DIAGNOSIS  Diagnosis: Sepsis due to pneumonia  Assessment and Plan of Treatment: You came to the hospital from rehab with wet cough and weakness and were found to have elevated white blood cell count and high heart rate. Imaging of your chest abdomen and pelvis showed you had an infection in your lungs as well as inflammation in your GI tract. You were seen by the infectious disease team and were treated with IV antibiotics with resolution. An extensive infectious workup showed _______. Please follow up with ID and your PCP when you leave the hospital. If you develop worsening shortness of breath, fevers, chills, cough, please contact your doctor or return to the ED immediately.      SECONDARY DISCHARGE DIAGNOSES  Diagnosis: Watery stools  Assessment and Plan of Treatment: You reported having nonbloody watery stools, about 2x/day, when you came into the hospital. This may be due to an GI infection versus inflammation versus recent antibiotic use. CT scan of the abdomen showed inflammation in the GI tract. Infectious workup showed _____. We gave you IV fluids and your symptoms improved. Please follow up with your PCP when you leave the hospital. If you have ongoing diarrhea, fevers, chills, nausea, vomiting, abdominal pain, please contact your doctor or return to the ED immediately.    Diagnosis: Thyroid nodule  Assessment and Plan of Treatment: On imaging you were found to have an incidental thyroid nodule without symptoms of hyper or hypothyroidism. You had a subsequent thyroid ultrasound which showed a large R-sided thyroid nodule. This was further evaluated by _______. Please follow up with endocrinology and your PCP when you leave the hospital. If you have neck pain, throat swelling/closing up, fevers, chills, or symptoms of hyperthyroidism (palpitations, sweating, diarrhea, weight loss) or hypothyroidism (decreased energy, constipation, weight gain, joint pain), please contact your doctor.     PRINCIPAL DISCHARGE DIAGNOSIS  Diagnosis: Sepsis due to pneumonia  Assessment and Plan of Treatment: You came to the hospital from rehab with wet cough and weakness and were found to have elevated white blood cell count and high heart rate. Imaging of your chest abdomen and pelvis showed you had an infection in your lungs as well as inflammation in your GI tract. You were seen by the infectious disease team and were treated with IV antibiotics with resolution. An extensive infectious workup showed that you had strange lesions in your lungs. A repeat CT closer to discharge showed some slight resolution of these findings. You will need to follow up with repeat imaging later in 4-6 weeks to see if these lesions have resolved. Please follow up with ID, pulmonary, PCP when you leave the hospital. If you develop worsening shortness of breath, fevers, chills, cough, please contact your doctor or return to the ED immediately.      SECONDARY DISCHARGE DIAGNOSES  Diagnosis: Watery stools  Assessment and Plan of Treatment: You reported having nonbloody watery stools, about 2x/day, when you came into the hospital. This may be due to an GI infection versus inflammation versus recent antibiotic use. CT scan of the abdomen showed inflammation in the GI tract. Infectious workup showed nothing significant. We gave you IV fluids and your symptoms improved. Please follow up with your PCP when you leave the hospital. If you have ongoing diarrhea, fevers, chills, nausea, vomiting, abdominal pain, please contact your doctor or return to the ED immediately.    Diagnosis: Thyroid nodule  Assessment and Plan of Treatment: On imaging you were found to have an incidental thyroid nodule without symptoms of hyper or hypothyroidism. You had a subsequent thyroid ultrasound which showed a large R-sided thyroid nodule. This was further evaluated by endocrinology who said to follow up outpatient for a biopsy. Please follow up with endocrinology and your PCP when you leave the hospital. If you have neck pain, throat swelling/closing up, fevers, chills, or symptoms of hyperthyroidism (palpitations, sweating, diarrhea, weight loss) or hypothyroidism (decreased energy, constipation, weight gain, joint pain), please contact your doctor.     PRINCIPAL DISCHARGE DIAGNOSIS  Diagnosis: Sepsis due to pneumonia  Assessment and Plan of Treatment: You came to the hospital from rehab with wet cough and weakness and were found to have elevated white blood cell count and high heart rate. Imaging of your chest abdomen and pelvis showed you had an infection in your lungs as well as inflammation in your GI tract. You were seen by the infectious disease team and were treated with IV antibiotics with resolution. An extensive infectious workup showed that you had strange lesions in your lungs. A repeat CT closer to discharge showed some slight resolution of these findings. You will need to follow up with repeat imaging later in 4-6 weeks to see if these lesions have resolved. You will also need to continue taking your antibiotics. Please continue Levoquin for another 16 days until 3/10/23. Please follow up with ID, pulmonary, PCP when you leave the hospital. If you develop worsening shortness of breath, fevers, chills, cough, please contact your doctor or return to the ED immediately.      SECONDARY DISCHARGE DIAGNOSES  Diagnosis: Watery stools  Assessment and Plan of Treatment: You reported having nonbloody watery stools, about 2x/day, when you came into the hospital. This may be due to an GI infection versus inflammation versus recent antibiotic use. CT scan of the abdomen showed inflammation in the GI tract. Infectious workup showed nothing significant. We gave you IV fluids and your symptoms improved. Please follow up with your PCP when you leave the hospital. If you have ongoing diarrhea, fevers, chills, nausea, vomiting, abdominal pain, please contact your doctor or return to the ED immediately.    Diagnosis: Thyroid nodule  Assessment and Plan of Treatment: On imaging you were found to have an incidental thyroid nodule without symptoms of hyper or hypothyroidism. You had a subsequent thyroid ultrasound which showed a large R-sided thyroid nodule. This was further evaluated by endocrinology who said to follow up outpatient for a biopsy. Please follow up with endocrinology and your PCP when you leave the hospital. If you have neck pain, throat swelling/closing up, fevers, chills, or symptoms of hyperthyroidism (palpitations, sweating, diarrhea, weight loss) or hypothyroidism (decreased energy, constipation, weight gain, joint pain), please contact your doctor.    Diagnosis: Tachycardia  Assessment and Plan of Treatment: You consistently had a fast heart rate while you were here. There are a lot of reasons for having this and we noticed that you had it prior to coming to the hospital in the past. In order to better control this heart rate, we increased your Metoprolol Succinate to 75mg daily and increased your Gabapentin to 400mg TID. Please continue taking these medications at these increased doses until you see your primary care physician.     PRINCIPAL DISCHARGE DIAGNOSIS  Diagnosis: Sepsis due to pneumonia  Assessment and Plan of Treatment: You came to the hospital from rehab with wet cough and weakness and were found to have elevated white blood cell count and high heart rate. Imaging of your chest abdomen and pelvis showed you had an infection in your lungs as well as inflammation in your GI tract. You were seen by the infectious disease team and were treated with IV antibiotics with resolution. An extensive infectious workup showed that you had strange lesions in your lungs. A repeat CT closer to discharge showed some slight resolution of these findings. You will need to follow up with repeat imaging later in 4-6 weeks to see if these lesions have resolved. You will also need to continue taking your antibiotics. Initially you were on Unasyn however this caused your liver function test to increase. We switched you to another antibiotic called Levaquin. Please continue Levoquin for another 16 days until 3/10/23 (4 weeks total since you first started). Please follow up with ID, pulmonary, PCP when you leave the hospital. If you develop worsening shortness of breath, fevers, chills, cough, please contact your doctor or return to the ED immediately.      SECONDARY DISCHARGE DIAGNOSES  Diagnosis: Watery stools  Assessment and Plan of Treatment: You reported having nonbloody watery stools, about 2x/day, when you came into the hospital. This may be due to an GI infection versus inflammation versus recent antibiotic use. CT scan of the abdomen showed inflammation in the GI tract. Infectious workup showed nothing significant. We gave you IV fluids and your symptoms improved. Please follow up with your PCP when you leave the hospital. If you have ongoing diarrhea, fevers, chills, nausea, vomiting, abdominal pain, please contact your doctor or return to the ED immediately.    Diagnosis: Thyroid nodule  Assessment and Plan of Treatment: On imaging you were found to have an incidental thyroid nodule without symptoms of hyper or hypothyroidism. You had a subsequent thyroid ultrasound which showed a large R-sided thyroid nodule. This was further evaluated by endocrinology who said to follow up outpatient for a biopsy. Please follow up with endocrinology and your PCP when you leave the hospital. If you have neck pain, throat swelling/closing up, fevers, chills, or symptoms of hyperthyroidism (palpitations, sweating, diarrhea, weight loss) or hypothyroidism (decreased energy, constipation, weight gain, joint pain), please contact your doctor.    Diagnosis: Anemia  Assessment and Plan of Treatment: You have a history of anemia in the past. Your hemoglobin (red blood cells) in your body dropped below 7, which is usually when we start transfusions. Your number was 6.7 and so we started a transfusion of 1 unit (1 bag) of red blood cells. This increased your hemoglobin back to above 7. The reason for this can be vast but most likely it is because of your other chronic conditions. Sometimes those other conditions makes it difficult for your body to make enough red blood cells. We are giving you multivitamins including folate for this to help your body have the building blocks to make more cells. Please follow up with your primary care physician to further discuss.    Diagnosis: Tachycardia  Assessment and Plan of Treatment: You consistently had a fast heart rate while you were here. There are a lot of reasons for having this and we noticed that you had it prior to coming to the hospital in the past. In order to better control this heart rate, we increased your Metoprolol Succinate to 75mg daily and increased your Gabapentin to 400mg TID. Please continue taking these medications at these increased doses until you see your primary care physician.    Diagnosis: Hyperglycemia  Assessment and Plan of Treatment: You have a history of diabetes and take insulin at home. We had to change your regimen while you were in the hospital so that we can better control your sugars. We will discharge you on ... units of insulin   Please follow up with your PCP and endocrinologist.     PRINCIPAL DISCHARGE DIAGNOSIS  Diagnosis: Sepsis due to pneumonia  Assessment and Plan of Treatment: You came to the hospital from rehab with wet cough and weakness and were found to have elevated white blood cell count and high heart rate. Imaging of your chest abdomen and pelvis showed you had an infection in your lungs as well as inflammation in your GI tract. You were seen by the infectious disease team and were treated with IV antibiotics with resolution. An extensive infectious workup showed that you had strange lesions in your lungs. A repeat CT closer to discharge showed some slight resolution of these findings. You will need to follow up with repeat imaging later in 4-6 weeks to see if these lesions have resolved. You will also need to continue taking your antibiotics. Initially you were on Unasyn however this caused your liver function test to increase. We switched you to another antibiotic called Levaquin. Please continue Levoquin for another 14 days until 3/10/23 (4 weeks total since you first started). Please follow up with ID, pulmonary, hepatology (liver doctors), and PCP when you leave the hospital. If you develop worsening shortness of breath, fevers, chills, cough, please contact your doctor or return to the ED immediately.      SECONDARY DISCHARGE DIAGNOSES  Diagnosis: Watery stools  Assessment and Plan of Treatment: You reported having nonbloody watery stools, about 2x/day, when you came into the hospital. This may be due to an GI infection versus inflammation versus recent antibiotic use. CT scan of the abdomen showed inflammation in the GI tract. Infectious workup showed nothing significant. We gave you IV fluids and your symptoms improved. Please follow up with your PCP when you leave the hospital. If you have ongoing diarrhea, fevers, chills, nausea, vomiting, abdominal pain, please contact your doctor or return to the ED immediately.    Diagnosis: Thyroid nodule  Assessment and Plan of Treatment: On imaging you were found to have an incidental thyroid nodule without symptoms of hyper or hypothyroidism. You had a subsequent thyroid ultrasound which showed a large R-sided thyroid nodule. This was further evaluated by endocrinology who said to follow up outpatient for a biopsy. Please follow up with endocrinology and your PCP when you leave the hospital. If you have neck pain, throat swelling/closing up, fevers, chills, or symptoms of hyperthyroidism (palpitations, sweating, diarrhea, weight loss) or hypothyroidism (decreased energy, constipation, weight gain, joint pain), please contact your doctor.    Diagnosis: Tachycardia  Assessment and Plan of Treatment: You consistently had a fast heart rate while you were here. There are a lot of reasons for having this and we noticed that you had it prior to coming to the hospital in the past. In order to better control this heart rate, we increased your Metoprolol Succinate to 75mg daily and increased your Gabapentin to 400mg TID. Please continue taking these medications at these increased doses until you see your primary care physician.    Diagnosis: Anemia  Assessment and Plan of Treatment: You have a history of anemia in the past. Your hemoglobin (red blood cells) in your body dropped below 7, which is usually when we start transfusions. Your number was 6.7 and so we started a transfusion of 1 unit (1 bag) of red blood cells. This increased your hemoglobin back to above 7. The reason for this can be vast but most likely it is because of your other chronic conditions. Sometimes those other conditions makes it difficult for your body to make enough red blood cells. We are giving you multivitamins including folate for this to help your body have the building blocks to make more cells. Please follow up with your primary care physician to further discuss.    Diagnosis: Hyperglycemia  Assessment and Plan of Treatment: You have a history of diabetes and take insulin at home. We had to change your regimen while you were in the hospital so that we can better control your sugars. You will continue to take insulin at rehab and your physicians there will  be able to change it if needed to better control your sugars. Once you leave rehab, please follow up with your PCP and endocrinologist.

## 2023-02-11 NOTE — PROGRESS NOTE ADULT - ATTENDING COMMENTS
67F pmh DMT2, multiple falls, s/p lumbar microdiscectomy for radiculopathy presenting from [facility] with sepsis 2/2 to acute bacterial PNA, CT chest with  nodular and patchy opacities predominantly in the bilateral lower lobes with many that are centrally necrotic concerning for infection.   Differential diagnosis includes metastatic disease and septic emboli    # Sepsis 2/2 acute bacterial PNA with central necrotic areas r/o metastatic disease, septic emboli  - P/w chronic cough, recent hospitalization followed by [facility] stay  - previously treated with Meropenem   - tachycardic + elevated wbc + CT chest imaging suggesting lung infection meeting sepsis criteria    - CT chest: Nodular and patchy opacities predominantly in B/L lower lobes many that are centrally necrotic concerning for infection. DDx metastatic dz vs septic emboli   - IV Zosyn for now. unclear if this is adequate given has been on Arlene before. f/u MRSA swab  sputum cx if able  - ID c/s-  -urine legionella neg, f/u strep pneuo abc, can c/w atypical coverage for now  - f/u bcx, ucx    - Age appropriate cancer screening opt-  -check 2d echo  - Sputum for AFB is pend , F/up fungitel      # Diarrhea   -Diffuse wall thickening of the proximal duodenum with periduodenal stranding, concerning for duodenitis/peptic ulcer disease.  - Reports loose stool 3 episode/ day    - iso recent abx use (meropenem) , hospitalization, rehab course     - GI PCR neg, check c diff   - needs opt colonscopy      Gisselle Morton   HOspitalist   [phone]   Available on TEAMS

## 2023-02-11 NOTE — PROGRESS NOTE ADULT - SUBJECTIVE AND OBJECTIVE BOX
************************  Gonzalez Bailey, MD-PhD  Internal Medicine PGY-1  ************************    Patient is a 67y old  Female who presents with a chief complaint of Pneumonia (10 Feb 2023 16:03)    No events overnight, no acute complaints this morning. Patient with appropriate PO intake and urinating well with BM(s). Denies CP, SOB, fevers/chills, N/V, or abdominal pain. Patient reminded of ongoing careplan.    MEDICATIONS  (STANDING):  amLODIPine   Tablet 5 milliGRAM(s) Oral daily  dextrose 5%. 1000 milliLiter(s) (50 mL/Hr) IV Continuous <Continuous>  dextrose 5%. 1000 milliLiter(s) (100 mL/Hr) IV Continuous <Continuous>  dextrose 50% Injectable 25 Gram(s) IV Push once  dextrose 50% Injectable 12.5 Gram(s) IV Push once  dextrose 50% Injectable 25 Gram(s) IV Push once  enoxaparin Injectable 40 milliGRAM(s) SubCutaneous every 24 hours  gabapentin 300 milliGRAM(s) Oral three times a day  glucagon  Injectable 1 milliGRAM(s) IntraMuscular once  insulin glargine Injectable (LANTUS) 13 Unit(s) SubCutaneous at bedtime  insulin lispro (ADMELOG) corrective regimen sliding scale   SubCutaneous three times a day before meals  insulin lispro (ADMELOG) corrective regimen sliding scale   SubCutaneous at bedtime  insulin lispro Injectable (ADMELOG) 3 Unit(s) SubCutaneous three times a day before meals  lactobacillus acidophilus 1 Tablet(s) Oral daily  lidocaine   4% Patch 1 Patch Transdermal daily  metoprolol succinate ER 25 milliGRAM(s) Oral daily  piperacillin/tazobactam IVPB.. 3.375 Gram(s) IV Intermittent every 8 hours  potassium chloride    Tablet ER 20 milliEquivalent(s) Oral daily  simvastatin 40 milliGRAM(s) Oral at bedtime  sodium chloride 0.9%. 1000 milliLiter(s) (100 mL/Hr) IV Continuous <Continuous>    MEDICATIONS  (PRN):  acetaminophen     Tablet .. 1000 milliGRAM(s) Oral every 8 hours PRN Severe Pain (7 - 10)  dextrose Oral Gel 15 Gram(s) Oral once PRN Blood Glucose LESS THAN 70 milliGRAM(s)/deciliter  HYDROmorphone   Tablet 4 milliGRAM(s) Oral every 4 hours PRN Severe Pain (7 - 10)  melatonin 3 milliGRAM(s) Oral at bedtime PRN Insomnia      CAPILLARY BLOOD GLUCOSE      POCT Blood Glucose.: 154 mg/dL (10 Feb 2023 22:02)  POCT Blood Glucose.: 263 mg/dL (10 Feb 2023 17:17)  POCT Blood Glucose.: 222 mg/dL (10 Feb 2023 11:55)  POCT Blood Glucose.: 290 mg/dL (10 Feb 2023 07:28)    I&O's Summary    10 Feb 2023 07:01  -  2023 06:25  --------------------------------------------------------  IN: 0 mL / OUT: 900 mL / NET: -900 mL        PHYSICAL EXAM:  Vital Signs Last 24 Hrs  T(C): 36.6 (2023 04:56), Max: 37.1 (10 Feb 2023 12:39)  T(F): 97.9 (2023 04:56), Max: 98.8 (10 Feb 2023 12:39)  HR: 100 (2023 04:56) (100 - 125)  BP: 149/82 (2023 04:56) (126/76 - 149/82)  BP(mean): --  RR: 18 (2023 04:56) (18 - 19)  SpO2: 98% (2023 04:56) (95% - 98%)    Parameters below as of 2023 04:56  Patient On (Oxygen Delivery Method): room air        T(C): 36.6 (23 @ 04:56), Max: 37.1 (02-10-23 @ 12:39)  HR: 100 (23 @ 04:56) (100 - 125)  BP: 149/82 (23 @ 04:56) (126/76 - 149/82)  RR: 18 (23 @ 04:56) (18 - 19)  SpO2: 98% (23 @ 04:56) (95% - 98%)    GENERAL: NAD, lying in bed comfortably  NECK: Supple, no JVD  HEART: S1, S2, tachycardic rate but Regular rhythm, no murmurs, rubs, or gallops  LUNGS: inspiratory rhonchi and expiratory wheezing, R>L but unlabored on RA  ABDOMEN: Soft, nontender, nondistended, +BS  EXTREMITIES: 2+ peripheral pulses bilaterally. No clubbing, cyanosis, or edema  NERVOUS SYSTEM:  A&Ox3, no focal deficits   SKIN: No rashes or lesions    LABS:                        9.0    17.03 )-----------( 552      ( 10 Feb 2023 05:36 )             28.3     02-10    138  |  101  |  6<L>  ----------------------------<  272<H>  3.7   |  23  |  0.32<L>    Ca    7.1<L>      10 Feb 2023 05:36  Mg     .8         TPro  5.9<L>  /  Alb  2.8<L>  /  TBili  0.3  /  DBili  x   /  AST  23  /  ALT  25  /  AlkPhos  67  02-10    PT/INR - ( 2023 16:12 )   PT: 12.2 sec;   INR: 1.05 ratio         PTT - ( 2023 16:12 )  PTT:26.5 sec      Urinalysis Basic - ( 2023 17:07 )    Color: Yellow / Appearance: Clear / S.022 / pH: x  Gluc: x / Ketone: Small  / Bili: Negative / Urobili: Negative   Blood: x / Protein: 30 mg/dL / Nitrite: Negative   Leuk Esterase: Negative / RBC: 5 /hpf / WBC 2 /HPF   Sq Epi: x / Non Sq Epi: 2 /hpf / Bacteria: Negative        Culture - Blood (collected 2023 15:40)  Source: .Blood Blood-Peripheral  Preliminary Report (10 Feb 2023 19:02):    No growth to date.    Culture - Blood (collected 2023 13:30)  Source: .Blood Blood-Peripheral  Preliminary Report (10 Feb 2023 19:02):    No growth to date.        IMAGING & OTHER TESTS:    NNI.

## 2023-02-11 NOTE — PROGRESS NOTE ADULT - PROBLEM SELECTOR PLAN 5
History DMT2 on insulin (long acting 16units and short acting 6units), A1c 12.7 11/2022  - f/u repeat A1c  - increased Lantus to 13U qhs; c/w Admelog 3U TID and THOMAS and adjust per pt's insulin requirements History DMT2 on insulin (long acting 16units and short acting 6units), A1c 12.7 11/2022  - A1c 7.9%  - increased Lantus to 13U qhs; c/w Admelog 3U TID and THOMAS and adjust per pt's insulin requirements

## 2023-02-11 NOTE — DISCHARGE NOTE PROVIDER - NSDCCPTREATMENT_GEN_ALL_CORE_FT
PRINCIPAL PROCEDURE  Procedure: CT of chest, abdomen, and pelvis without contrast  Findings and Treatment: IMPRESSION:  Nodular and patchy opacities predominantly in the bilateral lower lobes   with many that are centrally necrotic concerning for infection.   Differential diagnosis includes metastatic disease and septic emboli.  Diffuse wall thickening of the proximal duodenum with periduodenal   stranding, concerning for duodenitis/peptic ulcer disease.  Nonobstructing right renal calculus  Right thyroid nodule may be evaluated with ultrasound on a nonemergent   basis.      SECONDARY PROCEDURE  Procedure: CXR, single AP view  Findings and Treatment: FINDINGS:  The cardiomediastinal silhouette is  normal in size.  The lungs are clear.  There is no pneumothorax or pleural effusion.  No acute bony abnormality.  IMPRESSION:  Clear lungs.    Procedure: Ultrasound thyroid  Findings and Treatment: FINDINGS:  Right Lobe: 3.7 cm x  2.1 cm x 2.1 cm. Normal in size and echogenicity.  There is 2.7 x 2.0 x 1.8 cm heterogeneous predominantly isoechoic nodule   in the midpole. There are no calcifications. (TIRAD -3).  Left Lobe: 3.9 cm x 1.2 cm x 1.6 cm. Normal in size. There are scattered   subcentimeter colloid cysts measuring up to 3 mm.  Isthmus: 2 mm.  Cervical Lymph Nodes: No enlarged or abnormal morphology cervical nodes.  IMPRESSION:  Dominant right thyroid nodule. Consider further evaluation with   ultrasound-guided fine-needle aspiration.  TI-RAD 3: Mildly suspicious (FNA if > 2.5 cm, Follow if > 1.5 cm)  __________________________________  ACR Thyroid Imaging, Reporting and Data System (TI-RADS): White Paper of   the ACR TI-RADS Committee. J Am Jorge Radiol 2017;14:587-595.  TI-RAD 1: Benign (No FNA)  TI-RAD 2: Not suspicious (No FNA)  TI-RAD 3: Mildly suspicious (FNA if > 2.5 cm, Follow if >1.5 cm)  TI-RAD 4: Moderately suspicious(FNA if > 1.5 cm, Follow if > 1 cm)  TI-RAD 5: Highly suspicious (FNA if > 1 cm, Follow if > 0.5 cm)    Procedure: Transthoracic echocardiography (TTE)  Findings and Treatment: EF (Rene Rule): 67 %Doppler Peak Velocity (m/sec):  AoV=1.3  Conclusions:  1. Normal mitral valve. There is no mitral regurgitation.  2. Normal trileaflet aortic valve. Minimal aortic  regurgitation.  3. Normal left ventricular systolic function. No segmental  wall motion abnormalities.  LVEF calculated using biplane  Rene's method is 67%.  4. Normal diastolic function.  5. Normal right ventricular size and function.  6. No pericardial effusion.  7. No evidence of valvular vegetation is seen.

## 2023-02-11 NOTE — PHYSICAL THERAPY INITIAL EVALUATION ADULT - PERTINENT HX OF CURRENT PROBLEM, REHAB EVAL
67 F PMH DMT2, multiple falls, s/p lumbar microdiscectomy for radiculopathy presenting from [facility] with acute onset of malaise and wet coughing but without fevers, shortness of breath, or chest pain with CT chest non-contrast imaging with patchy/nodular opacities in bilateral lower lobes. Labs notable for leukocytosis and tachycardia with exam findings notable for wheezing/ronchus breath sounds. These findings concerning for sepsis secondary to pneumonia. CT  chest/Abd; Nodular and patchy opacities predominantly in the bilateral lower lobes  with many that are centrally necrotic concerning for infection. Differential diagnosis includes metastatic disease and septic emboli. Diffuse wall thickening of the proximal duodenum with periduodenal stranding, concerning for duodenitis/peptic ulcer disease. Nonobstructing right renal calculus.

## 2023-02-11 NOTE — PROGRESS NOTE ADULT - PROBLEM SELECTOR PLAN 7
Noted to have right thyroid nodule on imaging  - TSH 3.33  - f/u thyroid ultrasound for further characterization Noted to have right thyroid nodule on imaging  - TSH 3.33  - Thyroid US showing TIRADS 3 nodule >2.5cm, indicates need for FNA for further evaluation  - thyroid FNA can be completed outpatient

## 2023-02-11 NOTE — PROGRESS NOTE ADULT - PROBLEM SELECTOR PLAN 2
Reported watery stools but without blood or pitch black color with frequency of about 2 per day. May be in setting of recent antibiotic use. Rule out developing GI infection such as C. diff. Low suspicion at this time given not clinically diarrhea.  - CT A/P non-contrast noted Diffuse wall thickening of the proximal duodenum with periduodenal stranding, c/f duodenitis/peptic ulcer disease  - GI PCR negative; f/u stool cx, stool O+P, c diff  - continue to monitor for now Reported watery stools but without blood or pitch black color with frequency of about 2 per day. May be in setting of recent antibiotic use. Rule out developing GI infection such as C. diff. Low suspicion at this time given not clinically diarrhea.  - CT A/P non-contrast noted Diffuse wall thickening of the proximal duodenum with periduodenal stranding, c/f duodenitis/peptic ulcer disease  - GI PCR negative; f/u stool cx, stool O+P, c diff (neg)  - continue to monitor for now

## 2023-02-11 NOTE — DISCHARGE NOTE PROVIDER - CARE PROVIDERS DIRECT ADDRESSES
[email or web address],DirectAddress_Unknown,DirectAddress_Unknown

## 2023-02-11 NOTE — DISCHARGE NOTE PROVIDER - HOSPITAL COURSE
67F w/ DMT2, multiple falls, s/p lumbar microdiscectomy for radiculopathy who presented from [facility] w/ acute onset malaise and wet coughing. CT chest in the ED showed nodular and patchy opacities in the b/l lower lobes with central necrosis concerning for infection vs. metastatic disease vs. septic emboli as well as a right thyroid nodule. CT abdomen and pelvis showed periduodenal stranding, concerning for duodenitis/peptic ulcer disease. The patient was found to be septic on the basis of elevated WBC count and tachycardia, with pneumonia being the likely source of infection. The patient was started on broad spectrum antibiotics to cover for hospital-acquired pneumonia as she presented from rehab. She also underwent a TTE, which was negative for any vegetations/infective endocarditis. ID was consulted, who recommended further infectious workup including ruling out TB. The workup revealed __________ and she was treated with __________. The patient was also found to have watery stools and several stool studies were sent, which revealed ______. She also underwent thyroid ultrasound which showed a dominant R thyroid nodule. This was further worked up via ________.    The patient is afebrile, hemodynamically stable and medically optimized for discharge to home with follow up with ID, ?endocrinology, and her PCP. On day of discharge, patient is clinically stable with no new exam findings or acute symptoms compared to prior. The patient was seen by the attending physician on the date of discharge and deemed stable and acceptable for discharge. The patient's chronic medical conditions were treated accordingly per the patient's home medication regimen. The patient's medication reconciliation (with changes made to chronic medications), follow up appointments, discharge orders, instructions, and significant lab and diagnostic studies are as noted.         67F w/ DMT2, multiple falls, s/p lumbar microdiscectomy for radiculopathy who presented from [facility] w/ acute onset malaise and wet coughing. CT chest in the ED showed nodular and patchy opacities in the b/l lower lobes with central necrosis concerning for infection vs. metastatic disease vs. septic emboli as well as a right thyroid nodule. CT abdomen and pelvis showed periduodenal stranding, concerning for duodenitis/peptic ulcer disease. The patient was found to be septic on the basis of elevated WBC count and tachycardia, with pneumonia being the likely source of infection. The patient was started on broad spectrum antibiotics to cover for hospital-acquired pneumonia as she presented from rehab. She also underwent a TTE, which was negative for any vegetations/infective endocarditis. ID was consulted, who recommended further infectious workup including ruling out TB. The workup revealed no significant findings and she was treated with Zosyn for 7 days followed by Unasyn. The patient was also found to have watery stools that resolved with stool studies showing nothing significant. She also underwent thyroid ultrasound which showed a dominant R thyroid nodule. Endocrinology recommended outpatient management for this including likely a fine needle aspiration. A repeat CT demonstrated some resolution of her previous findings. Her course was complicated by long-standing tachycardia for which she has prior to admission. In order to better control it, Toprol was increased to 75mg and gabapentin was increased to 400 TID.     The patient is afebrile, hemodynamically stable and medically optimized for discharge to home with follow up with ID, endocrinology, and her PCP. On day of discharge, patient is clinically stable with no new exam findings or acute symptoms compared to prior. The patient was seen by the attending physician on the date of discharge and deemed stable and acceptable for discharge. The patient's chronic medical conditions were treated accordingly per the patient's home medication regimen. The patient's medication reconciliation (with changes made to chronic medications), follow up appointments, discharge orders, instructions, and significant lab and diagnostic studies are as noted.         67F w/ DMT2, multiple falls, s/p lumbar microdiscectomy for radiculopathy who presented from [facility] w/ acute onset malaise and wet coughing. CT chest in the ED showed nodular and patchy opacities in the b/l lower lobes with central necrosis concerning for infection vs. metastatic disease vs. septic emboli as well as a right thyroid nodule. CT abdomen and pelvis showed periduodenal stranding, concerning for duodenitis/peptic ulcer disease. The patient was found to be septic on the basis of elevated WBC count and tachycardia, with pneumonia being the likely source of infection. The patient was started on broad spectrum antibiotics to cover for hospital-acquired pneumonia as she presented from rehab. She also underwent a TTE, which was negative for any vegetations/infective endocarditis. ID was consulted, who recommended further infectious workup including ruling out TB. The workup revealed no significant findings and she was treated with Zosyn for 7 days followed by Unasyn. The patient was also found to have watery stools that resolved with stool studies showing nothing significant. She also underwent thyroid ultrasound which showed a dominant R thyroid nodule. Endocrinology recommended outpatient management for this including likely a fine needle aspiration. A repeat CT demonstrated some resolution of her previous findings. Her course was complicated by long-standing tachycardia for which she has prior to admission. In order to better control it, Toprol was increased to 75mg and gabapentin was increased to 400 TID. She will need continued antibiotics for a total of 4 weeks since admission.     The patient is afebrile, hemodynamically stable and medically optimized for discharge to home with follow up with ID, endocrinology, and her PCP. On day of discharge, patient is clinically stable with no new exam findings or acute symptoms compared to prior. The patient was seen by the attending physician on the date of discharge and deemed stable and acceptable for discharge. The patient's chronic medical conditions were treated accordingly per the patient's home medication regimen. The patient's medication reconciliation (with changes made to chronic medications), follow up appointments, discharge orders, instructions, and significant lab and diagnostic studies are as noted.         67F w/ DMT2, multiple falls, s/p lumbar microdiscectomy for radiculopathy who presented from [facility] w/ acute onset malaise and wet coughing. CT chest in the ED showed nodular and patchy opacities in the b/l lower lobes with central necrosis concerning for infection vs. metastatic disease vs. septic emboli as well as a right thyroid nodule. CT abdomen and pelvis showed periduodenal stranding, concerning for duodenitis/peptic ulcer disease. The patient was found to be septic on the basis of elevated WBC count and tachycardia, with pneumonia being the likely source of infection. The patient was started on broad spectrum antibiotics to cover for hospital-acquired pneumonia as she presented from rehab. She also underwent a TTE, which was negative for any vegetations/infective endocarditis. ID was consulted, who recommended further infectious workup including ruling out TB. The workup revealed no significant findings and she was treated with Zosyn for 7 days followed by Unasyn. The patient was also found to have watery stools that resolved with stool studies showing nothing significant. She also underwent thyroid ultrasound which showed a dominant R thyroid nodule. Endocrinology recommended outpatient management for this including likely a fine needle aspiration. A repeat CT demonstrated some resolution of her previous findings. Her course was complicated by long-standing tachycardia for which she has prior to admission. In order to better control it, Toprol was increased to 75mg and gabapentin was increased to 400 TID. Following about 2 days of Unasyn, her LFTs transiently increased causing a switch in her antibiotics to Levaquin. She will need continued antibiotics for a total of 4 weeks since admission. She also needed to have 1u pRBC transfused during her admission for a microcytic anemia of 6.7, likely due to anemia of chronic disease.     The patient is afebrile, hemodynamically stable and medically optimized for discharge to home with follow up with ID, endocrinology, and her PCP. On day of discharge, patient is clinically stable with no new exam findings or acute symptoms compared to prior. The patient was seen by the attending physician on the date of discharge and deemed stable and acceptable for discharge. The patient's chronic medical conditions were treated accordingly per the patient's home medication regimen. The patient's medication reconciliation (with changes made to chronic medications), follow up appointments, discharge orders, instructions, and significant lab and diagnostic studies are as noted.

## 2023-02-11 NOTE — DISCHARGE NOTE PROVIDER - NSDCFUADDAPPT_GEN_ALL_CORE_FT
Please follow up with endocrinology, infectious disease, and your PCP ([id number]) within 2 weeks of discharge. APPTS ARE READY TO BE MADE: [ ] YES    Best Family or Patient Contact (if needed):    Additional Information about above appointments (if needed):    1: PCP  2: ID  3: Pulmonary   4. Endocrinology  5. Hepatology    Other comments or requests:

## 2023-02-11 NOTE — PROGRESS NOTE ADULT - ASSESSMENT
67 F PMH DMT2, multiple falls, s/p lumbar microdiscectomy for radiculopathy presenting from [facility] with acute onset of malaise and wet coughing but without fevers, shortness of breath, or chest pain with CT chest non-contrast imaging with patchy/nodular opacities in bilateral lower lobes, centrally necrotic c/f infection vs. metastatic disease vs. septic emboli. Labs notable for leukocytosis and tachycardia with exam findings notable for wheezing/ronchus breath sounds. These findings concerning for sepsis secondary to pneumonia.

## 2023-02-11 NOTE — PHYSICAL THERAPY INITIAL EVALUATION ADULT - ADDITIONAL COMMENTS
[facility] pt[facility], w/c bound, hasn't walked since 11/22, stated they used 2 ppl to transfer using b/l knee immobilizer or uses sliding board for transfers at rehab.

## 2023-02-11 NOTE — DISCHARGE NOTE PROVIDER - PROVIDER TOKENS
PROVIDER:[TOKEN:[[id number]]],PROVIDER:[TOKEN:[[id number]]] PROVIDER:[TOKEN:[[id number]]],PROVIDER:[TOKEN:[[id number]]],PROVIDER:[TOKEN:[[id number]]]

## 2023-02-11 NOTE — PROGRESS NOTE ADULT - PROBLEM SELECTOR PLAN 1
Leukocytosis + tachycardia = SIRS+ in conjunction with CT imaging with patchy/nodular opacities in setting of coughing concerning for sepsis secondary to pneumonia likely HAP given her recent surgery and [place] placement. Ddx: legionella pneumonia given patient with reported watery stools; however does not meet diarrhea definition of at least 3 unformed stools per day.    - s/p NS ~2L; continue with NS 75cc/hr x 12hrs  - patient recently on meropenem in [place] for pneumonia/UTI?  - s/p aztreonam x1 and doxycycline x1 in ED  - CTAP (2/9): Nodular and patchy opacities predominantly in the bilateral lower lobes with many that are centrally necrotic concerning for infection. Differential diagnosis includes metastatic disease and septic emboli  - TTE (2/10): EF 67%, grossly normal findings  - f/u BCx, UCx, urine legionella, urine strep, MRSA/MSSA  - ID consulted; f/u recs  - c/w Zosyn (2/9- ) for possible HAP pseudomonal coverage; cannot do vancomycin for MRSA coverage as patient noted to have vancomycin allergy   - if pt MRSA positive and/or patient appears more toxic or worsening health course, can consider linezolid for MRSA coverage  - monitor WBC count, temperature curve and hemodynamics Leukocytosis + tachycardia = SIRS+ in conjunction with CT imaging with patchy/nodular opacities in setting of coughing concerning for sepsis secondary to pneumonia likely HAP given her recent surgery and [place] placement. Ddx: legionella pneumonia given patient with reported watery stools; however does not meet diarrhea definition of at least 3 unformed stools per day.    - s/p NS ~2L; continue with NS 75cc/hr x 12hrs  - patient recently on meropenem in [place] for pneumonia/UTI?  - s/p aztreonam x1 and doxycycline x1 in ED  - CTAP (2/9): Nodular and patchy opacities predominantly in the bilateral lower lobes with many that are centrally necrotic concerning for infection. Differential diagnosis includes metastatic disease and septic emboli  - TTE (2/10): EF 67%, grossly normal findings  - f/u BCx (NGTD), UCx, urine legionella, urine strep, MRSA neg  - ID consulted; f/u recs  - c/w Zosyn (2/9- ) for possible HAP pseudomonal coverage; cannot do vancomycin for MRSA coverage as patient noted to have vancomycin allergy   - monitor WBC count, temperature curve and hemodynamics Leukocytosis + tachycardia = SIRS+ in conjunction with CT imaging with patchy/nodular opacities in setting of coughing concerning for sepsis secondary to pneumonia likely HAP given her recent surgery and [place] placement. Ddx: legionella pneumonia given patient with reported watery stools; however does not meet diarrhea definition of at least 3 unformed stools per day.    - s/p NS ~2L; continue with NS 75cc/hr x 12hrs  - patient recently on meropenem in [place] for pneumonia/UTI?  - s/p aztreonam x1 and doxycycline x1 in ED  - CTAP (2/9): Nodular and patchy opacities predominantly in the bilateral lower lobes with many that are centrally necrotic concerning for infection. Differential diagnosis includes metastatic disease and septic emboli  - TTE (2/10): EF 67%, grossly normal findings  - f/u BCx (NGTD), UCx, urine legionella, urine strep, MRSA neg  - ID consulted; f/u recs  - c/w Zosyn (2/9- ) for possible HAP pseudomonal coverage; cannot do vancomycin for MRSA coverage as patient noted to have vancomycin allergy   - monitor WBC count, temperature curve and hemodynamics  - f/u fungitell

## 2023-02-11 NOTE — DISCHARGE NOTE PROVIDER - NSFOLLOWUPCLINICS_GEN_ALL_ED_FT
[facility] - Primary Care  Primary Care  [address] [place] [address]  Phone: [phone]  Fax:   Follow Up Time: 2 weeks    [facility]  Endocrinology  [address] [place] [address]  Phone: [phone]  Fax:   Follow Up Time: 2 weeks    [facility] - Infectious Disease  Infectious Disease  [address], [place] [address]  Phone: [phone]  Fax:   Follow Up Time: 2 weeks

## 2023-02-11 NOTE — DISCHARGE NOTE PROVIDER - NSDCMRMEDTOKEN_GEN_ALL_CORE_FT
acetaminophen 500 mg oral tablet: 2 tab(s) orally every 8 hours  Admelog 100 units/mL injectable solution: 6 unit(s) injectable 3 times a day (before meals) hold if BS &lt;110  amLODIPine 5 mg oral tablet: 1 tab(s) orally once a day  Cymbalta 30 mg oral delayed release capsule: 2 cap(s) orally 2 times a day  gabapentin 100 mg oral capsule: 3 cap(s) orally 3 times a day  HYDROmorphone 4 mg oral tablet: 1 tab(s) orally every 4 hours, As needed, Severe Pain (7 - 10)  K-Dur 20 oral tablet, extended release: 1 tab(s) orally once a day (with meals)  lidocaine 4% patch: Apply topically to affected area once a day  metFORMIN 1000 mg oral tablet: 1 tab(s) orally 2 times a day  metoprolol succinate 25 mg oral tablet, extended release: 1 tab(s) orally once a day  Milk of Magnesia 8% oral suspension: 30 milliliter(s) orally once a day (at bedtime)  Mucinex 600 mg oral tablet, extended release: 1 tab(s) orally every 12 hours  Semglee 100 units/mL subcutaneous solution: 18 unit(s) subcutaneous once a day (at bedtime)  simvastatin 40 mg oral tablet: 1 tab(s) orally once a day (at bedtime)  Zofran 4 mg oral tablet: 1 tab(s) orally every 8 hours   acetaminophen 500 mg oral tablet: 2 tab(s) orally every 8 hours  Admelog 100 units/mL injectable solution: 6 unit(s) injectable 3 times a day (before meals) hold if BS &lt;110  amLODIPine 5 mg oral tablet: 1 tab(s) orally once a day  Cymbalta 30 mg oral delayed release capsule: 2 cap(s) orally 2 times a day  folic acid 1 mg oral tablet: 1 tab(s) orally once a day  gabapentin 400 mg oral capsule: 1 cap(s) orally 3 times a day  HYDROmorphone 4 mg oral tablet: 1 tab(s) orally every 4 hours, As needed, Severe Pain (7 - 10)  K-Dur 20 oral tablet, extended release: 1 tab(s) orally once a day (with meals)  lidocaine 4% patch: Apply topically to affected area once a day  metFORMIN 1000 mg oral tablet: 1 tab(s) orally 2 times a day  metoprolol succinate 25 mg oral tablet, extended release: 3 tab(s) orally once a day  Milk of Magnesia 8% oral suspension: 30 milliliter(s) orally once a day (at bedtime)  Mucinex 600 mg oral tablet, extended release: 1 tab(s) orally every 12 hours  Semglee 100 units/mL subcutaneous solution: 18 unit(s) subcutaneous once a day (at bedtime)  simvastatin 40 mg oral tablet: 1 tab(s) orally once a day (at bedtime)  Zofran 4 mg oral tablet: 1 tab(s) orally every 8 hours   acetaminophen 500 mg oral tablet: 2 tab(s) orally every 8 hours  amLODIPine 5 mg oral tablet: 1 tab(s) orally once a day  Cymbalta 30 mg oral delayed release capsule: 2 cap(s) orally 2 times a day  folic acid 1 mg oral tablet: 1 tab(s) orally once a day  gabapentin 400 mg oral capsule: 1 cap(s) orally 3 times a day  HYDROmorphone 4 mg oral tablet: 1 tab(s) orally every 4 hours, As needed, Severe Pain (7 - 10)  insulin glargine 100 units/mL subcutaneous solution: 31 unit(s) subcutaneous once a day (at bedtime)  insulin lispro 100 units/mL injectable solution: 18 unit(s) subcutaneously 3 times a day (before meals)  levoFLOXacin 500 mg oral tablet: 1 tab(s) orally every 24 hours  lidocaine 4% patch: Apply topically to affected area once a day  metFORMIN 1000 mg oral tablet: 1 tab(s) orally 2 times a day  metoprolol succinate 25 mg oral tablet, extended release: 3 tab(s) orally once a day  Milk of Magnesia 8% oral suspension: 30 milliliter(s) orally once a day (at bedtime)  Mucinex 600 mg oral tablet, extended release: 1 tab(s) orally every 12 hours  simvastatin 40 mg oral tablet: 1 tab(s) orally once a day (at bedtime)  Zofran 4 mg oral tablet: 1 tab(s) orally every 8 hours   acetaminophen 500 mg oral tablet: 2 tab(s) orally every 8 hours  amLODIPine 5 mg oral tablet: 1 tab(s) orally once a day  Cymbalta 30 mg oral delayed release capsule: 2 cap(s) orally 2 times a day  folic acid 1 mg oral tablet: 1 tab(s) orally once a day  gabapentin 400 mg oral capsule: 1 cap(s) orally 3 times a day  HYDROmorphone 4 mg oral tablet: 1 tab(s) orally every 4 hours, As needed, Severe Pain (7 - 10)  insulin glargine 100 units/mL subcutaneous solution: 28 unit(s) subcutaneous once a day (at bedtime)  insulin lispro 100 units/mL injectable solution: 16 unit(s) subcutaneously 3 times a day (before meals)  levoFLOXacin 500 mg oral tablet: 1 tab(s) orally every 24 hours  lidocaine 4% patch: Apply topically to affected area once a day  metFORMIN 1000 mg oral tablet: 1 tab(s) orally 2 times a day  metoprolol succinate 25 mg oral tablet, extended release: 3 tab(s) orally once a day  Milk of Magnesia 8% oral suspension: 30 milliliter(s) orally once a day (at bedtime)  Mucinex 600 mg oral tablet, extended release: 1 tab(s) orally every 12 hours  simvastatin 40 mg oral tablet: 1 tab(s) orally once a day (at bedtime)  Zofran 4 mg oral tablet: 1 tab(s) orally every 8 hours

## 2023-02-12 LAB
-  AMPICILLIN: SIGNIFICANT CHANGE UP
-  AMPICILLIN: SIGNIFICANT CHANGE UP
-  CIPROFLOXACIN: SIGNIFICANT CHANGE UP
-  CIPROFLOXACIN: SIGNIFICANT CHANGE UP
-  LEVOFLOXACIN: SIGNIFICANT CHANGE UP
-  LEVOFLOXACIN: SIGNIFICANT CHANGE UP
-  NITROFURANTOIN: SIGNIFICANT CHANGE UP
-  NITROFURANTOIN: SIGNIFICANT CHANGE UP
-  TETRACYCLINE: SIGNIFICANT CHANGE UP
-  TETRACYCLINE: SIGNIFICANT CHANGE UP
-  VANCOMYCIN: SIGNIFICANT CHANGE UP
-  VANCOMYCIN: SIGNIFICANT CHANGE UP
ANION GAP SERPL CALC-SCNC: 12 MMOL/L — SIGNIFICANT CHANGE UP (ref 5–17)
BUN SERPL-MCNC: 13 MG/DL — SIGNIFICANT CHANGE UP (ref 7–23)
CALCIUM SERPL-MCNC: 7.6 MG/DL — LOW (ref 8.4–10.5)
CHLORIDE SERPL-SCNC: 100 MMOL/L — SIGNIFICANT CHANGE UP (ref 96–108)
CO2 SERPL-SCNC: 23 MMOL/L — SIGNIFICANT CHANGE UP (ref 22–31)
CREAT SERPL-MCNC: 0.54 MG/DL — SIGNIFICANT CHANGE UP (ref 0.5–1.3)
CULTURE RESULTS: SIGNIFICANT CHANGE UP
CULTURE RESULTS: SIGNIFICANT CHANGE UP
EGFR: 100 ML/MIN/1.73M2 — SIGNIFICANT CHANGE UP
GLUCOSE BLDC GLUCOMTR-MCNC: 172 MG/DL — HIGH (ref 70–99)
GLUCOSE BLDC GLUCOMTR-MCNC: 228 MG/DL — HIGH (ref 70–99)
GLUCOSE BLDC GLUCOMTR-MCNC: 258 MG/DL — HIGH (ref 70–99)
GLUCOSE BLDC GLUCOMTR-MCNC: 261 MG/DL — HIGH (ref 70–99)
GLUCOSE BLDC GLUCOMTR-MCNC: 339 MG/DL — HIGH (ref 70–99)
GLUCOSE SERPL-MCNC: 216 MG/DL — HIGH (ref 70–99)
GRAM STN FLD: SIGNIFICANT CHANGE UP
HCT VFR BLD CALC: 29 % — LOW (ref 34.5–45)
HGB BLD-MCNC: 9.3 G/DL — LOW (ref 11.5–15.5)
LACTATE SERPL-SCNC: 2.6 MMOL/L — HIGH (ref 0.5–2)
MAGNESIUM SERPL-MCNC: 1.8 MG/DL — SIGNIFICANT CHANGE UP (ref 1.6–2.6)
MCHC RBC-ENTMCNC: 21 PG — LOW (ref 27–34)
MCHC RBC-ENTMCNC: 32.1 GM/DL — SIGNIFICANT CHANGE UP (ref 32–36)
MCV RBC AUTO: 65.5 FL — LOW (ref 80–100)
METHOD TYPE: SIGNIFICANT CHANGE UP
METHOD TYPE: SIGNIFICANT CHANGE UP
NRBC # BLD: 0 /100 WBCS — SIGNIFICANT CHANGE UP (ref 0–0)
ORGANISM # SPEC MICROSCOPIC CNT: SIGNIFICANT CHANGE UP
PHOSPHATE SERPL-MCNC: 3.6 MG/DL — SIGNIFICANT CHANGE UP (ref 2.5–4.5)
PLATELET # BLD AUTO: 679 K/UL — HIGH (ref 150–400)
POTASSIUM SERPL-MCNC: 5.1 MMOL/L — SIGNIFICANT CHANGE UP (ref 3.5–5.3)
POTASSIUM SERPL-SCNC: 5.1 MMOL/L — SIGNIFICANT CHANGE UP (ref 3.5–5.3)
RBC # BLD: 4.43 M/UL — SIGNIFICANT CHANGE UP (ref 3.8–5.2)
RBC # FLD: 14.8 % — HIGH (ref 10.3–14.5)
SODIUM SERPL-SCNC: 135 MMOL/L — SIGNIFICANT CHANGE UP (ref 135–145)
SPECIMEN SOURCE: SIGNIFICANT CHANGE UP
WBC # BLD: 16.39 K/UL — HIGH (ref 3.8–10.5)
WBC # FLD AUTO: 16.39 K/UL — HIGH (ref 3.8–10.5)

## 2023-02-12 PROCEDURE — 99233 SBSQ HOSP IP/OBS HIGH 50: CPT | Mod: GC

## 2023-02-12 PROCEDURE — 99232 SBSQ HOSP IP/OBS MODERATE 35: CPT

## 2023-02-12 RX ORDER — SODIUM CHLORIDE 9 MG/ML
500 INJECTION INTRAMUSCULAR; INTRAVENOUS; SUBCUTANEOUS ONCE
Refills: 0 | Status: COMPLETED | OUTPATIENT
Start: 2023-02-12 | End: 2023-02-12

## 2023-02-12 RX ORDER — LOPERAMIDE HCL 2 MG
2 TABLET ORAL ONCE
Refills: 0 | Status: COMPLETED | OUTPATIENT
Start: 2023-02-12 | End: 2023-02-12

## 2023-02-12 RX ORDER — INSULIN LISPRO 100/ML
6 VIAL (ML) SUBCUTANEOUS
Refills: 0 | Status: DISCONTINUED | OUTPATIENT
Start: 2023-02-12 | End: 2023-02-13

## 2023-02-12 RX ADMIN — Medication 3 MILLIGRAM(S): at 22:45

## 2023-02-12 RX ADMIN — Medication 1: at 22:57

## 2023-02-12 RX ADMIN — LIDOCAINE 1 PATCH: 4 CREAM TOPICAL at 23:24

## 2023-02-12 RX ADMIN — MAGNESIUM OXIDE 400 MG ORAL TABLET 400 MILLIGRAM(S): 241.3 TABLET ORAL at 18:25

## 2023-02-12 RX ADMIN — PIPERACILLIN AND TAZOBACTAM 25 GRAM(S): 4; .5 INJECTION, POWDER, LYOPHILIZED, FOR SOLUTION INTRAVENOUS at 22:44

## 2023-02-12 RX ADMIN — HYDROMORPHONE HYDROCHLORIDE 4 MILLIGRAM(S): 2 INJECTION INTRAMUSCULAR; INTRAVENOUS; SUBCUTANEOUS at 23:25

## 2023-02-12 RX ADMIN — GABAPENTIN 300 MILLIGRAM(S): 400 CAPSULE ORAL at 13:06

## 2023-02-12 RX ADMIN — Medication 2 MILLIGRAM(S): at 18:24

## 2023-02-12 RX ADMIN — SODIUM CHLORIDE 500 MILLILITER(S): 9 INJECTION INTRAMUSCULAR; INTRAVENOUS; SUBCUTANEOUS at 18:24

## 2023-02-12 RX ADMIN — MAGNESIUM OXIDE 400 MG ORAL TABLET 400 MILLIGRAM(S): 241.3 TABLET ORAL at 11:59

## 2023-02-12 RX ADMIN — Medication 1: at 09:00

## 2023-02-12 RX ADMIN — GABAPENTIN 300 MILLIGRAM(S): 400 CAPSULE ORAL at 05:52

## 2023-02-12 RX ADMIN — GABAPENTIN 300 MILLIGRAM(S): 400 CAPSULE ORAL at 22:45

## 2023-02-12 RX ADMIN — Medication 2: at 13:06

## 2023-02-12 RX ADMIN — LIDOCAINE 1 PATCH: 4 CREAM TOPICAL at 11:59

## 2023-02-12 RX ADMIN — Medication 3 UNIT(S): at 08:59

## 2023-02-12 RX ADMIN — SIMVASTATIN 40 MILLIGRAM(S): 20 TABLET, FILM COATED ORAL at 22:45

## 2023-02-12 RX ADMIN — AMLODIPINE BESYLATE 5 MILLIGRAM(S): 2.5 TABLET ORAL at 05:52

## 2023-02-12 RX ADMIN — Medication 25 MILLIGRAM(S): at 05:52

## 2023-02-12 RX ADMIN — INSULIN GLARGINE 13 UNIT(S): 100 INJECTION, SOLUTION SUBCUTANEOUS at 22:41

## 2023-02-12 RX ADMIN — Medication 6 UNIT(S): at 18:25

## 2023-02-12 RX ADMIN — Medication 3 UNIT(S): at 13:06

## 2023-02-12 RX ADMIN — Medication 20 MILLIEQUIVALENT(S): at 12:00

## 2023-02-12 RX ADMIN — LIDOCAINE 1 PATCH: 4 CREAM TOPICAL at 19:24

## 2023-02-12 RX ADMIN — HYDROMORPHONE HYDROCHLORIDE 4 MILLIGRAM(S): 2 INJECTION INTRAMUSCULAR; INTRAVENOUS; SUBCUTANEOUS at 22:45

## 2023-02-12 RX ADMIN — ENOXAPARIN SODIUM 40 MILLIGRAM(S): 100 INJECTION SUBCUTANEOUS at 05:49

## 2023-02-12 RX ADMIN — PIPERACILLIN AND TAZOBACTAM 25 GRAM(S): 4; .5 INJECTION, POWDER, LYOPHILIZED, FOR SOLUTION INTRAVENOUS at 05:53

## 2023-02-12 RX ADMIN — PIPERACILLIN AND TAZOBACTAM 25 GRAM(S): 4; .5 INJECTION, POWDER, LYOPHILIZED, FOR SOLUTION INTRAVENOUS at 13:06

## 2023-02-12 RX ADMIN — Medication 4: at 18:25

## 2023-02-12 RX ADMIN — MAGNESIUM OXIDE 400 MG ORAL TABLET 400 MILLIGRAM(S): 241.3 TABLET ORAL at 08:59

## 2023-02-12 RX ADMIN — Medication 1 TABLET(S): at 11:59

## 2023-02-12 NOTE — PROGRESS NOTE ADULT - PROBLEM SELECTOR PLAN 2
Reported watery stools but without blood or pitch black color with frequency of about 2 per day. May be in setting of recent antibiotic use. Rule out developing GI infection such as C. diff. Low suspicion at this time given not clinically diarrhea.  - CT A/P non-contrast noted Diffuse wall thickening of the proximal duodenum with periduodenal stranding, c/f duodenitis/peptic ulcer disease  - GI PCR negative; f/u stool cx, stool O+P, c diff (neg)  - continue to monitor for now Reported watery stools but without blood or pitch black color with frequency of about 2 per day. May be in setting of recent antibiotic use. Rule out developing GI infection such as C. diff. Low suspicion at this time given not clinically diarrhea.  - CT A/P non-contrast noted Diffuse wall thickening of the proximal duodenum with periduodenal stranding, c/f duodenitis/peptic ulcer disease  - GI PCR negative; stool cx, stool O+P, c diff (neg)  - continue to monitor for now

## 2023-02-12 NOTE — PROGRESS NOTE ADULT - SUBJECTIVE AND OBJECTIVE BOX
Vinh Crabtree MD  Internal Medicine, PGY-2    CHRIS ALAN  67y  MRN: [id number]    Patient is a 67y old  Female who presents with a chief complaint of Pneumonia (11 Feb 2023 11:44)      Subjective/Interval history/overnight events:      MEDICATIONS  (STANDING):  amLODIPine   Tablet 5 milliGRAM(s) Oral daily  dextrose 5%. 1000 milliLiter(s) (50 mL/Hr) IV Continuous <Continuous>  dextrose 5%. 1000 milliLiter(s) (100 mL/Hr) IV Continuous <Continuous>  dextrose 50% Injectable 25 Gram(s) IV Push once  dextrose 50% Injectable 12.5 Gram(s) IV Push once  dextrose 50% Injectable 25 Gram(s) IV Push once  enoxaparin Injectable 40 milliGRAM(s) SubCutaneous every 24 hours  gabapentin 300 milliGRAM(s) Oral three times a day  glucagon  Injectable 1 milliGRAM(s) IntraMuscular once  insulin glargine Injectable (LANTUS) 13 Unit(s) SubCutaneous at bedtime  insulin lispro (ADMELOG) corrective regimen sliding scale   SubCutaneous three times a day before meals  insulin lispro (ADMELOG) corrective regimen sliding scale   SubCutaneous at bedtime  insulin lispro Injectable (ADMELOG) 3 Unit(s) SubCutaneous three times a day before meals  lactobacillus acidophilus 1 Tablet(s) Oral daily  lidocaine   4% Patch 1 Patch Transdermal daily  magnesium oxide 400 milliGRAM(s) Oral three times a day with meals  metoprolol succinate ER 25 milliGRAM(s) Oral daily  piperacillin/tazobactam IVPB.. 3.375 Gram(s) IV Intermittent every 8 hours  potassium chloride    Tablet ER 20 milliEquivalent(s) Oral daily  simvastatin 40 milliGRAM(s) Oral at bedtime  sodium chloride 0.9%. 1000 milliLiter(s) (100 mL/Hr) IV Continuous <Continuous>    MEDICATIONS  (PRN):  acetaminophen     Tablet .. 1000 milliGRAM(s) Oral every 8 hours PRN Severe Pain (7 - 10)  dextrose Oral Gel 15 Gram(s) Oral once PRN Blood Glucose LESS THAN 70 milliGRAM(s)/deciliter  HYDROmorphone   Tablet 4 milliGRAM(s) Oral every 4 hours PRN Severe Pain (7 - 10)  melatonin 3 milliGRAM(s) Oral at bedtime PRN Insomnia        Objective:    Vitals: Vital Signs Last 24 Hrs  T(C): 36.6 (02-12-23 @ 05:07), Max: 37 (02-11-23 @ 22:05)  T(F): 97.9 (02-12-23 @ 05:07), Max: 98.6 (02-11-23 @ 22:05)  HR: 101 (02-12-23 @ 05:07) (87 - 111)  BP: 128/78 (02-12-23 @ 05:07) (119/74 - 135/82)  BP(mean): --  RR: 18 (02-12-23 @ 05:07) (18 - 18)  SpO2: 97% (02-12-23 @ 05:07) (96% - 99%)            I&O's Summary    10 Feb 2023 07:01  -  11 Feb 2023 07:00  --------------------------------------------------------  IN: 0 mL / OUT: 900 mL / NET: -900 mL    11 Feb 2023 07:01  -  12 Feb 2023 06:07  --------------------------------------------------------  IN: 0 mL / OUT: 900 mL / NET: -900 mL        PHYSICAL EXAM:  GENERAL: NAD  HEENT: PERRL, no scleral icterus, no head and neck lad   CHEST/LUNG: CTAB, no wheezing, crackles, or ronchi   HEART: RRR, normal S1, S2, no murmurs, gallops, or rubs appreciated   ABDOMEN: soft, nondistended, non-tender, normoactive, no HSM, no rebound, no guarding, no rigidity  SKIN: No rashes or lesions  NERVOUS SYSTEM: Alert & Oriented X3  EXT: no peripheral edema  PSYCH: calm and cooperative     LABS:    02-11    136  |  98  |  6<L>  ----------------------------<  141<H>  3.4<L>   |  24  |  0.43<L>  02-11    134<L>  |  98  |  5<L>  ----------------------------<  154<H>  3.7   |  23  |  0.32<L>  02-10    138  |  101  |  6<L>  ----------------------------<  272<H>  3.7   |  23  |  0.32<L>    Ca    7.1<L>      11 Feb 2023 16:26  Ca    7.1<L>      11 Feb 2023 07:05  Ca    7.1<L>      10 Feb 2023 05:36  Phos  2.2     02-11  Mg     1.9     02-11    TPro  5.9<L>  /  Alb  2.6<L>  /  TBili  0.3  /  DBili  x   /  AST  29  /  ALT  22  /  AlkPhos  67  02-11  TPro  5.9<L>  /  Alb  2.8<L>  /  TBili  0.3  /  DBili  x   /  AST  23  /  ALT  25  /  AlkPhos  67  02-10  TPro  5.6<L>  /  Alb  2.9<L>  /  TBili  0.2  /  DBili  x   /  AST  30  /  ALT  26  /  AlkPhos  65  02-09                                            8.7    20.21 )-----------( 611      ( 11 Feb 2023 07:05 )             27.5                         9.0    17.03 )-----------( 552      ( 10 Feb 2023 05:36 )             28.3                         8.6    16.30 )-----------( 547      ( 09 Feb 2023 16:12 )             27.4     CAPILLARY BLOOD GLUCOSE      POCT Blood Glucose.: 334 mg/dL (11 Feb 2023 22:52)  POCT Blood Glucose.: 239 mg/dL (11 Feb 2023 18:28)  POCT Blood Glucose.: 504 mg/dL (11 Feb 2023 18:26)  POCT Blood Glucose.: 155 mg/dL (11 Feb 2023 16:44)  POCT Blood Glucose.: 198 mg/dL (11 Feb 2023 12:32)  POCT Blood Glucose.: 163 mg/dL (11 Feb 2023 07:58)          RADIOLOGY & ADDITIONAL TESTS:    CT Abdomen and Pelvis No Cont:   ACC: [id number] EXAM:  CT CHEST   ORDERED BY: ANDREA JESSICA     ACC: [id number] EXAM:  CT ABDOMEN AND PELVIS   ORDERED BY: ANDREA JESSICA     PROCEDURE DATE:  02/09/2023          INTERPRETATION:  CLINICAL INFORMATION: Leukocytosis and fevers. Cough,   congestion and positive urinalysis.    COMPARISON: CT chest abdomen pelvis 11/3/2022    CONTRAST/COMPLICATIONS:  IV Contrast: NONE  Oral Contrast: NONE  Complications: None reported at time of study completion    PROCEDURE:  CT of the Chest, Abdomen and Pelvis was performed.  Sagittal and coronal reformats were performed.    FINDINGS:  CHEST:  LUNGS AND LARGE AIRWAYS: Patent central airways. Scattered nodular and   patchy opacities in the bilateral lower lobes. Many of the opacities are   centrally low attenuation. Process is more prominent in the right lower   lobe inferiorly  PLEURA: No pleural effusion.  VESSELS: Atherosclerotic calcification of the aorta.  HEART: Heart size is normal. No pericardial effusion.  MEDIASTINUM AND CASSIE: No lymphadenopathy.  CHEST WALL AND LOWER NECK: A 1.9 cm right thyroid nodule.    ABDOMEN AND PELVIS:  LIVER: Within normal limits.  BILE DUCTS: Normal caliber.  GALLBLADDER: Within normal limits.  SPLEEN: Within normal limits.  PANCREAS: Within normal limits.  ADRENALS: Within normal limits.  KIDNEYS/URETERS: No hydronephrosis. Bilateral cysts. Punctate   nonobstructing right lower pole calculus.    BLADDER: Within normal limits.  REPRODUCTIVE ORGANS: Hysterectomy.    BOWEL: Diffuse wall thickening of the proximal duodenum and periduodenal   stranding. No bowel obstruction. Appendix is normal. Small hiatal hernia.   Stool is seen throughout the colon suggestive of constipation  PERITONEUM: No ascites.  VESSELS: Atherosclerotic changes.  RETROPERITONEUM/LYMPH NODES: No lymphadenopathy.  ABDOMINAL WALL: Within normal limits.  BONES: Degenerative changes.    IMPRESSION:  Nodular and patchy opacities predominantly in the bilateral lower lobes   with many that are centrally necrotic concerning for infection.   Differential diagnosis includes metastatic disease and septic emboli.  Diffuse wall thickening of the proximal duodenum with periduodenal   stranding, concerning for duodenitis/peptic ulcer disease.  Nonobstructing right renal calculus  Right thyroid nodule may be evaluated with ultrasound on a nonemergent   basis.  --- End of Report ---           VALENTIN NAGY MD; Resident Radiologist  This document has been electronically signed.  FUAD VIEYRA MD; Attending Radiologist  This document has been electronically signed. Feb 9 2023  7:10PM (02-09-23 @ 18:03)          Imaging Personally Reviewed:  [ ] YES  [ ] NO    Consultants involved in case:   Consultant(s) Notes Reviewed:  [ ] YES  [ ] NO:   Care Discussed with Consultants/Other Providers [ ] YES  [ ] NO         Vinh Crabtree MD  Internal Medicine, PGY-2    CHRIS ALAN  67y  MRN: [id number]    Patient is a 67y old  Female who presents with a chief complaint of Pneumonia (11 Feb 2023 11:44)      Subjective/Interval history/overnight events:  No acute events overnight. The patient feels better this morning, less diarrhea. She reports persistent lower extremity weakness. No bowel or bladder incontinence or loss of sensation. No other concerns at this time.    MEDICATIONS  (STANDING):  amLODIPine   Tablet 5 milliGRAM(s) Oral daily  dextrose 5%. 1000 milliLiter(s) (50 mL/Hr) IV Continuous <Continuous>  dextrose 5%. 1000 milliLiter(s) (100 mL/Hr) IV Continuous <Continuous>  dextrose 50% Injectable 25 Gram(s) IV Push once  dextrose 50% Injectable 12.5 Gram(s) IV Push once  dextrose 50% Injectable 25 Gram(s) IV Push once  enoxaparin Injectable 40 milliGRAM(s) SubCutaneous every 24 hours  gabapentin 300 milliGRAM(s) Oral three times a day  glucagon  Injectable 1 milliGRAM(s) IntraMuscular once  insulin glargine Injectable (LANTUS) 13 Unit(s) SubCutaneous at bedtime  insulin lispro (ADMELOG) corrective regimen sliding scale   SubCutaneous three times a day before meals  insulin lispro (ADMELOG) corrective regimen sliding scale   SubCutaneous at bedtime  insulin lispro Injectable (ADMELOG) 3 Unit(s) SubCutaneous three times a day before meals  lactobacillus acidophilus 1 Tablet(s) Oral daily  lidocaine   4% Patch 1 Patch Transdermal daily  magnesium oxide 400 milliGRAM(s) Oral three times a day with meals  metoprolol succinate ER 25 milliGRAM(s) Oral daily  piperacillin/tazobactam IVPB.. 3.375 Gram(s) IV Intermittent every 8 hours  potassium chloride    Tablet ER 20 milliEquivalent(s) Oral daily  simvastatin 40 milliGRAM(s) Oral at bedtime  sodium chloride 0.9%. 1000 milliLiter(s) (100 mL/Hr) IV Continuous <Continuous>    MEDICATIONS  (PRN):  acetaminophen     Tablet .. 1000 milliGRAM(s) Oral every 8 hours PRN Severe Pain (7 - 10)  dextrose Oral Gel 15 Gram(s) Oral once PRN Blood Glucose LESS THAN 70 milliGRAM(s)/deciliter  HYDROmorphone   Tablet 4 milliGRAM(s) Oral every 4 hours PRN Severe Pain (7 - 10)  melatonin 3 milliGRAM(s) Oral at bedtime PRN Insomnia        Objective:    Vitals: Vital Signs Last 24 Hrs  T(C): 36.6 (02-12-23 @ 05:07), Max: 37 (02-11-23 @ 22:05)  T(F): 97.9 (02-12-23 @ 05:07), Max: 98.6 (02-11-23 @ 22:05)  HR: 101 (02-12-23 @ 05:07) (87 - 111)  BP: 128/78 (02-12-23 @ 05:07) (119/74 - 135/82)  BP(mean): --  RR: 18 (02-12-23 @ 05:07) (18 - 18)  SpO2: 97% (02-12-23 @ 05:07) (96% - 99%)            I&O's Summary    10 Feb 2023 07:01  -  11 Feb 2023 07:00  --------------------------------------------------------  IN: 0 mL / OUT: 900 mL / NET: -900 mL    11 Feb 2023 07:01  -  12 Feb 2023 06:07  --------------------------------------------------------  IN: 0 mL / OUT: 900 mL / NET: -900 mL        PHYSICAL EXAM:  GENERAL: NAD  HEENT: PERRL, no scleral icterus, no head and neck lad   CHEST/LUNG: CTAB, no wheezing, crackles, or ronchi   HEART: RRR, normal S1, S2, no murmurs, gallops, or rubs appreciated   ABDOMEN: soft, nondistended, non-tender, normoactive, no HSM, no rebound, no guarding, no rigidity  SKIN: No rashes or lesions  NERVOUS SYSTEM: Alert & Oriented X3, 0/5 hip flexion, 2/5 knee flexion/extension, 5/5 plantarflexion, sensation intact throughout  EXT: no peripheral edema  PSYCH: calm and cooperative     LABS:    02-11    136  |  98  |  6<L>  ----------------------------<  141<H>  3.4<L>   |  24  |  0.43<L>  02-11    134<L>  |  98  |  5<L>  ----------------------------<  154<H>  3.7   |  23  |  0.32<L>  02-10    138  |  101  |  6<L>  ----------------------------<  272<H>  3.7   |  23  |  0.32<L>    Ca    7.1<L>      11 Feb 2023 16:26  Ca    7.1<L>      11 Feb 2023 07:05  Ca    7.1<L>      10 Feb 2023 05:36  Phos  2.2     02-11  Mg     1.9     02-11    TPro  5.9<L>  /  Alb  2.6<L>  /  TBili  0.3  /  DBili  x   /  AST  29  /  ALT  22  /  AlkPhos  67  02-11  TPro  5.9<L>  /  Alb  2.8<L>  /  TBili  0.3  /  DBili  x   /  AST  23  /  ALT  25  /  AlkPhos  67  02-10  TPro  5.6<L>  /  Alb  2.9<L>  /  TBili  0.2  /  DBili  x   /  AST  30  /  ALT  26  /  AlkPhos  65  02-09                                            8.7    20.21 )-----------( 611      ( 11 Feb 2023 07:05 )             27.5                         9.0    17.03 )-----------( 552      ( 10 Feb 2023 05:36 )             28.3                         8.6    16.30 )-----------( 547      ( 09 Feb 2023 16:12 )             27.4     CAPILLARY BLOOD GLUCOSE      POCT Blood Glucose.: 334 mg/dL (11 Feb 2023 22:52)  POCT Blood Glucose.: 239 mg/dL (11 Feb 2023 18:28)  POCT Blood Glucose.: 504 mg/dL (11 Feb 2023 18:26)  POCT Blood Glucose.: 155 mg/dL (11 Feb 2023 16:44)  POCT Blood Glucose.: 198 mg/dL (11 Feb 2023 12:32)  POCT Blood Glucose.: 163 mg/dL (11 Feb 2023 07:58)          RADIOLOGY & ADDITIONAL TESTS:    CT Abdomen and Pelvis No Cont:   ACC: [id number] EXAM:  CT CHEST   ORDERED BY: ANDREA JESSICA     ACC: [id number] EXAM:  CT ABDOMEN AND PELVIS   ORDERED BY: ANDREA JESSICA     PROCEDURE DATE:  02/09/2023          INTERPRETATION:  CLINICAL INFORMATION: Leukocytosis and fevers. Cough,   congestion and positive urinalysis.    COMPARISON: CT chest abdomen pelvis 11/3/2022    CONTRAST/COMPLICATIONS:  IV Contrast: NONE  Oral Contrast: NONE  Complications: None reported at time of study completion    PROCEDURE:  CT of the Chest, Abdomen and Pelvis was performed.  Sagittal and coronal reformats were performed.    FINDINGS:  CHEST:  LUNGS AND LARGE AIRWAYS: Patent central airways. Scattered nodular and   patchy opacities in the bilateral lower lobes. Many of the opacities are   centrally low attenuation. Process is more prominent in the right lower   lobe inferiorly  PLEURA: No pleural effusion.  VESSELS: Atherosclerotic calcification of the aorta.  HEART: Heart size is normal. No pericardial effusion.  MEDIASTINUM AND CASSIE: No lymphadenopathy.  CHEST WALL AND LOWER NECK: A 1.9 cm right thyroid nodule.    ABDOMEN AND PELVIS:  LIVER: Within normal limits.  BILE DUCTS: Normal caliber.  GALLBLADDER: Within normal limits.  SPLEEN: Within normal limits.  PANCREAS: Within normal limits.  ADRENALS: Within normal limits.  KIDNEYS/URETERS: No hydronephrosis. Bilateral cysts. Punctate   nonobstructing right lower pole calculus.    BLADDER: Within normal limits.  REPRODUCTIVE ORGANS: Hysterectomy.    BOWEL: Diffuse wall thickening of the proximal duodenum and periduodenal   stranding. No bowel obstruction. Appendix is normal. Small hiatal hernia.   Stool is seen throughout the colon suggestive of constipation  PERITONEUM: No ascites.  VESSELS: Atherosclerotic changes.  RETROPERITONEUM/LYMPH NODES: No lymphadenopathy.  ABDOMINAL WALL: Within normal limits.  BONES: Degenerative changes.    IMPRESSION:  Nodular and patchy opacities predominantly in the bilateral lower lobes   with many that are centrally necrotic concerning for infection.   Differential diagnosis includes metastatic disease and septic emboli.  Diffuse wall thickening of the proximal duodenum with periduodenal   stranding, concerning for duodenitis/peptic ulcer disease.  Nonobstructing right renal calculus  Right thyroid nodule may be evaluated with ultrasound on a nonemergent   basis.  --- End of Report ---           VALENTIN NAGY MD; Resident Radiologist  This document has been electronically signed.  FUAD VIEYRA MD; Attending Radiologist  This document has been electronically signed. Feb 9 2023  7:10PM (02-09-23 @ 18:03)          Imaging Personally Reviewed:  [ ] YES  [ ] NO    Consultants involved in case:   Consultant(s) Notes Reviewed:  [ ] YES  [ ] NO:   Care Discussed with Consultants/Other Providers [ ] YES  [ ] NO

## 2023-02-12 NOTE — PROGRESS NOTE ADULT - ASSESSMENT
68 yo F DM2, falls, microdiscectomy, from [facility] with cough, fevers  No fever, has leukocytosis  Initially from [place] with cough/fevers, given abx, then high volume diarrhea  CT with nodular/patchy opacities in bilateral lower lobes; thickening of duodenum, duodenitis, renal stone  PCT minimally elevated  RVP neg  GI PCR neg  Legionella urine ag neg  UA neg/equivocal  From [facility] (decades ago), no history of exposure to TB  Uncertain etiology pulmonary lesions at this point  Lung lesion DDx: Malignancy, fungal, septic emboli, NTM/TB, lung abscess?  Overall,  1) Lung Lesions  - Septic emboli vs metastatic disease by radiology; lesions not present on CT in 11/2022  - Zosyn 3.375g q 8, empiric  - Check sputum culture  - Although I have low suspicion, cannot definitively rule out TB clinically, would isolate (airborne), and obtain sputum samples AFB x 3  - Check Quant gold, LDH, urine histoplasma  - If prelim workup negative, would consult Pulmonary for bronchoscopy  - F/U BCXs x 2- NGTD  - TTE reassuring, hold on HAJA for now    2) Diarrhea  - If ongoing high volume diarrhea, Check C diff PCR  - Monitor--antibiotic associated diarrhea?  - Follow up pending stool tests  3) Leukocytosis  - Reactive to underlying lung process?  - Trend to normal    Alexander Hicks MD  pager [phone]  office [phone]  Over the weekend, please call [phone] with questions

## 2023-02-12 NOTE — PROGRESS NOTE ADULT - PROBLEM SELECTOR PLAN 4
Reported spinal surgery for herniated disc in patient with lumbar radiculopathy history per chart review.  - c/w hydromorphone 4mg for severe pain q4hrs prn  - c/w Tylenol 1000mg q8hrs for pain prn  - c/w Lidocaine patch for pain prn  - c/w gabapentin 300mg TID Reported spinal surgery for herniated disc in patient with lumbar radiculopathy history per chart review.  - c/w hydromorphone 4mg for severe pain q4hrs prn  - c/w Tylenol 1000mg q8hrs for pain prn  - c/w Lidocaine patch for pain prn  - c/w gabapentin 300mg TID  - ordered repeat MR lumbosacral spine given persistent LE weakness, concern for epidural abscess?

## 2023-02-12 NOTE — PROGRESS NOTE ADULT - ASSESSMENT
67 F PMH DMT2, multiple falls, s/p lumbar microdiscectomy for radiculopathy presenting from [facility] with acute onset of malaise and wet coughing but without fevers, shortness of breath, or chest pain with CT chest non-contrast imaging with patchy/nodular opacities in bilateral lower lobes, centrally necrotic c/f infection vs. metastatic disease vs. septic emboli. Labs notable for leukocytosis and tachycardia with exam findings notable for wheezing/ronchus breath sounds. These findings concerning for sepsis secondary to pneumonia.

## 2023-02-12 NOTE — PROGRESS NOTE ADULT - PROBLEM SELECTOR PLAN 7
Noted to have right thyroid nodule on imaging  - TSH 3.33  - Thyroid US showing TIRADS 3 nodule >2.5cm, indicates need for FNA for further evaluation  - thyroid FNA can be completed outpatient

## 2023-02-12 NOTE — PROGRESS NOTE ADULT - ATTENDING COMMENTS
67F pmh DMT2, multiple falls, s/p lumbar microdiscectomy for radiculopathy in 11/2022 presented from [facility] with chronic cough, recent hospitalization followed by [facility] stay, previously treated with Meropenem .  Pt Met sepsis 2/2 to acute bacterial PNA, CT chest with  nodular and patchy opacities predominantly in the bilateral lower lobes with many that are centrally necrotic concerning for infection. Differential diagnosis includes metastatic disease and septic emboli or  infectious causes.  Pt was placed on ZOsyn and admitted for further evaluation.  Pt was seen by ID and infectious work is underway .  Neg MRSA swab, sputum cx , Sputum AFB if able  -urine legionella neg, f/u strep pneuo ab, Fungitel, neg blood CX    -check 2d echo     FOr Diarrhea   -Diffuse wall thickening of the proximal duodenum with periduodenal stranding, concerning for duodenitis/peptic ulcer disease.   - GI PCR neg,  neg c diff   - needs opt colonscopy  Patient with c/o leg weakness ---> will get MR of spine , will consult Ortho and cont ABX   Will get endo consult due to thyroid nodule in patient with reported pulmonary necrotic nodule of unclear Etiology       Gisselle Morton   HOspitalist   [phone]   Available on TEAMS .

## 2023-02-12 NOTE — PROGRESS NOTE ADULT - PROBLEM SELECTOR PLAN 5
History DMT2 on insulin (long acting 16units and short acting 6units), A1c 12.7 11/2022  - A1c 7.9%  - increased Lantus to 13U qhs; c/w Admelog 3U TID and THOMAS and adjust per pt's insulin requirements

## 2023-02-12 NOTE — PROGRESS NOTE ADULT - PROBLEM SELECTOR PLAN 1
Leukocytosis + tachycardia = SIRS+ in conjunction with CT imaging with patchy/nodular opacities in setting of coughing concerning for sepsis secondary to pneumonia likely HAP given her recent surgery and [place] placement. Ddx: legionella pneumonia given patient with reported watery stools; however does not meet diarrhea definition of at least 3 unformed stools per day.    - s/p NS ~2L; continue with NS 75cc/hr x 12hrs  - patient recently on meropenem in [place] for pneumonia/UTI?  - s/p aztreonam x1 and doxycycline x1 in ED  - CTAP (2/9): Nodular and patchy opacities predominantly in the bilateral lower lobes with many that are centrally necrotic concerning for infection. Differential diagnosis includes metastatic disease and septic emboli  - TTE (2/10): EF 67%, grossly normal findings  - f/u BCx (NGTD), UCx, urine legionella, urine strep, MRSA neg  - ID consulted; f/u recs  - c/w Zosyn (2/9- ) for possible HAP pseudomonal coverage; cannot do vancomycin for MRSA coverage as patient noted to have vancomycin allergy   - monitor WBC count, temperature curve and hemodynamics  - f/u fungitell Leukocytosis + tachycardia = SIRS+ in conjunction with CT imaging with patchy/nodular opacities in setting of coughing concerning for sepsis secondary to pneumonia likely HAP given her recent surgery and [place] placement. Ddx: legionella pneumonia given patient with reported watery stools; however does not meet diarrhea definition of at least 3 unformed stools per day.    - patient recently on meropenem in [place] for pneumonia/UTI?  - CTAP (2/9): Nodular and patchy opacities predominantly in the bilateral lower lobes with many that are centrally necrotic concerning for infection. Differential diagnosis includes metastatic disease and septic emboli  - TTE (2/10): EF 67%, grossly normal findings  - f/u BCx (NGTD), UCx, urine legionella, urine strep, MRSA neg  - ID consulted; f/u recs  - c/w Zosyn (2/9- ) for possible HAP pseudomonal coverage; cannot do vancomycin for MRSA coverage as patient noted to have vancomycin allergy   - monitor WBC count, temperature curve and hemodynamics  - f/u fungitell  - consider Pulm consult per ID

## 2023-02-13 LAB
ALBUMIN SERPL ELPH-MCNC: 2.2 G/DL — LOW (ref 3.3–5)
ALP SERPL-CCNC: 60 U/L — SIGNIFICANT CHANGE UP (ref 40–120)
ALT FLD-CCNC: 24 U/L — SIGNIFICANT CHANGE UP (ref 10–45)
ANION GAP SERPL CALC-SCNC: 9 MMOL/L — SIGNIFICANT CHANGE UP (ref 5–17)
ANISOCYTOSIS BLD QL: SLIGHT — SIGNIFICANT CHANGE UP
AST SERPL-CCNC: 23 U/L — SIGNIFICANT CHANGE UP (ref 10–40)
BASE EXCESS BLDV CALC-SCNC: -0.9 MMOL/L — SIGNIFICANT CHANGE UP (ref -2–3)
BASOPHILS # BLD AUTO: 0 K/UL — SIGNIFICANT CHANGE UP (ref 0–0.2)
BASOPHILS NFR BLD AUTO: 0 % — SIGNIFICANT CHANGE UP (ref 0–2)
BILIRUB SERPL-MCNC: <0.1 MG/DL — LOW (ref 0.2–1.2)
BLD GP AB SCN SERPL QL: NEGATIVE — SIGNIFICANT CHANGE UP
BUN SERPL-MCNC: 17 MG/DL — SIGNIFICANT CHANGE UP (ref 7–23)
CA-I SERPL-SCNC: 1.1 MMOL/L — LOW (ref 1.15–1.33)
CALCIUM SERPL-MCNC: 7.4 MG/DL — LOW (ref 8.4–10.5)
CHLORIDE BLDV-SCNC: 104 MMOL/L — SIGNIFICANT CHANGE UP (ref 96–108)
CHLORIDE SERPL-SCNC: 103 MMOL/L — SIGNIFICANT CHANGE UP (ref 96–108)
CO2 BLDV-SCNC: 24 MMOL/L — SIGNIFICANT CHANGE UP (ref 22–26)
CO2 SERPL-SCNC: 22 MMOL/L — SIGNIFICANT CHANGE UP (ref 22–31)
CREAT SERPL-MCNC: 0.47 MG/DL — LOW (ref 0.5–1.3)
CULTURE RESULTS: SIGNIFICANT CHANGE UP
DACRYOCYTES BLD QL SMEAR: SIGNIFICANT CHANGE UP
EGFR: 103 ML/MIN/1.73M2 — SIGNIFICANT CHANGE UP
ELLIPTOCYTES BLD QL SMEAR: SIGNIFICANT CHANGE UP
EOSINOPHIL # BLD AUTO: 0.57 K/UL — HIGH (ref 0–0.5)
EOSINOPHIL NFR BLD AUTO: 3.6 % — SIGNIFICANT CHANGE UP (ref 0–6)
ERYTHROCYTE [SEDIMENTATION RATE] IN BLOOD: 120 MM/HR — HIGH (ref 0–20)
FERRITIN SERPL-MCNC: 377 NG/ML — HIGH (ref 15–150)
FOLATE SERPL-MCNC: 3.7 NG/ML — LOW
GAS PNL BLDV: 133 MMOL/L — LOW (ref 136–145)
GAS PNL BLDV: SIGNIFICANT CHANGE UP
GAS PNL BLDV: SIGNIFICANT CHANGE UP
GIANT PLATELETS BLD QL SMEAR: PRESENT — SIGNIFICANT CHANGE UP
GLUCOSE BLDC GLUCOMTR-MCNC: 175 MG/DL — HIGH (ref 70–99)
GLUCOSE BLDC GLUCOMTR-MCNC: 259 MG/DL — HIGH (ref 70–99)
GLUCOSE BLDC GLUCOMTR-MCNC: 266 MG/DL — HIGH (ref 70–99)
GLUCOSE BLDC GLUCOMTR-MCNC: 339 MG/DL — HIGH (ref 70–99)
GLUCOSE BLDC GLUCOMTR-MCNC: 353 MG/DL — HIGH (ref 70–99)
GLUCOSE BLDV-MCNC: 312 MG/DL — HIGH (ref 70–99)
GLUCOSE SERPL-MCNC: 299 MG/DL — HIGH (ref 70–99)
H CAPSUL AG SPEC-ACNC: SIGNIFICANT CHANGE UP
H CAPSUL AG UR IA-ACNC: SIGNIFICANT CHANGE UP NG/ML
H CAPSUL AG UR QL IA: SIGNIFICANT CHANGE UP
HCO3 BLDV-SCNC: 23 MMOL/L — SIGNIFICANT CHANGE UP (ref 22–29)
HCT VFR BLD CALC: 23 % — LOW (ref 34.5–45)
HCT VFR BLD CALC: 28.8 % — LOW (ref 34.5–45)
HCT VFR BLDA CALC: 23 % — LOW (ref 34.5–46.5)
HGB BLD CALC-MCNC: 7.7 G/DL — LOW (ref 11.7–16.1)
HGB BLD-MCNC: 7.4 G/DL — LOW (ref 11.5–15.5)
HGB BLD-MCNC: 8.9 G/DL — LOW (ref 11.5–15.5)
HYPOCHROMIA BLD QL: SIGNIFICANT CHANGE UP
IRON SATN MFR SERPL: 12 % — LOW (ref 14–50)
IRON SATN MFR SERPL: 20 UG/DL — LOW (ref 30–160)
LACTATE BLDV-MCNC: 1.6 MMOL/L — SIGNIFICANT CHANGE UP (ref 0.5–2)
LYMPHOCYTES # BLD AUTO: 15.2 % — SIGNIFICANT CHANGE UP (ref 13–44)
LYMPHOCYTES # BLD AUTO: 2.4 K/UL — SIGNIFICANT CHANGE UP (ref 1–3.3)
MAGNESIUM SERPL-MCNC: 1.5 MG/DL — LOW (ref 1.6–2.6)
MAGNESIUM SERPL-MCNC: 1.9 MG/DL — SIGNIFICANT CHANGE UP (ref 1.6–2.6)
MANUAL SMEAR VERIFICATION: SIGNIFICANT CHANGE UP
MCHC RBC-ENTMCNC: 20.7 PG — LOW (ref 27–34)
MCHC RBC-ENTMCNC: 21.4 PG — LOW (ref 27–34)
MCHC RBC-ENTMCNC: 30.9 GM/DL — LOW (ref 32–36)
MCHC RBC-ENTMCNC: 32.2 GM/DL — SIGNIFICANT CHANGE UP (ref 32–36)
MCV RBC AUTO: 66.7 FL — LOW (ref 80–100)
MCV RBC AUTO: 67.1 FL — LOW (ref 80–100)
MONOCYTES # BLD AUTO: 0.14 K/UL — SIGNIFICANT CHANGE UP (ref 0–0.9)
MONOCYTES NFR BLD AUTO: 0.9 % — LOW (ref 2–14)
NEUTROPHILS # BLD AUTO: 12.67 K/UL — HIGH (ref 1.8–7.4)
NEUTROPHILS NFR BLD AUTO: 80.3 % — HIGH (ref 43–77)
NIGHT BLUE STAIN TISS: SIGNIFICANT CHANGE UP
NRBC # BLD: 0 /100 WBCS — SIGNIFICANT CHANGE UP (ref 0–0)
OVALOCYTES BLD QL SMEAR: SLIGHT — SIGNIFICANT CHANGE UP
PCO2 BLDV: 35 MMHG — LOW (ref 39–42)
PH BLDV: 7.43 — SIGNIFICANT CHANGE UP (ref 7.32–7.43)
PHOSPHATE SERPL-MCNC: 3.1 MG/DL — SIGNIFICANT CHANGE UP (ref 2.5–4.5)
PLAT MORPH BLD: NORMAL — SIGNIFICANT CHANGE UP
PLATELET # BLD AUTO: 548 K/UL — HIGH (ref 150–400)
PLATELET # BLD AUTO: 619 K/UL — HIGH (ref 150–400)
PO2 BLDV: 62 MMHG — HIGH (ref 25–45)
POIKILOCYTOSIS BLD QL AUTO: SIGNIFICANT CHANGE UP
POTASSIUM BLDV-SCNC: 5.1 MMOL/L — SIGNIFICANT CHANGE UP (ref 3.5–5.1)
POTASSIUM SERPL-MCNC: 5.1 MMOL/L — SIGNIFICANT CHANGE UP (ref 3.5–5.3)
POTASSIUM SERPL-SCNC: 5.1 MMOL/L — SIGNIFICANT CHANGE UP (ref 3.5–5.3)
PROT SERPL-MCNC: 5.8 G/DL — LOW (ref 6–8.3)
RBC # BLD: 3.45 M/UL — LOW (ref 3.8–5.2)
RBC # BLD: 3.45 M/UL — LOW (ref 3.8–5.2)
RBC # BLD: 4.29 M/UL — SIGNIFICANT CHANGE UP (ref 3.8–5.2)
RBC # FLD: 14.4 % — SIGNIFICANT CHANGE UP (ref 10.3–14.5)
RBC # FLD: 14.5 % — SIGNIFICANT CHANGE UP (ref 10.3–14.5)
RBC BLD AUTO: ABNORMAL
RETICS #: 55.1 K/UL — SIGNIFICANT CHANGE UP (ref 25–125)
RETICS/RBC NFR: 1.6 % — SIGNIFICANT CHANGE UP (ref 0.5–2.5)
RH IG SCN BLD-IMP: POSITIVE — SIGNIFICANT CHANGE UP
SAO2 % BLDV: 92.2 % — HIGH (ref 67–88)
SCHISTOCYTES BLD QL AUTO: SLIGHT — SIGNIFICANT CHANGE UP
SMUDGE CELLS # BLD: PRESENT — SIGNIFICANT CHANGE UP
SODIUM SERPL-SCNC: 134 MMOL/L — LOW (ref 135–145)
SPECIMEN SOURCE: SIGNIFICANT CHANGE UP
SPECIMEN SOURCE: SIGNIFICANT CHANGE UP
TARGETS BLD QL SMEAR: SIGNIFICANT CHANGE UP
TIBC SERPL-MCNC: 173 UG/DL — LOW (ref 220–430)
TRANSFERRIN SERPL-MCNC: 153 MG/DL — LOW (ref 200–360)
UIBC SERPL-MCNC: 152 UG/DL — SIGNIFICANT CHANGE UP (ref 110–370)
VIT B12 SERPL-MCNC: 655 PG/ML — SIGNIFICANT CHANGE UP (ref 232–1245)
WBC # BLD: 15.78 K/UL — HIGH (ref 3.8–10.5)
WBC # BLD: 18.95 K/UL — HIGH (ref 3.8–10.5)
WBC # FLD AUTO: 15.78 K/UL — HIGH (ref 3.8–10.5)
WBC # FLD AUTO: 18.95 K/UL — HIGH (ref 3.8–10.5)

## 2023-02-13 PROCEDURE — 99233 SBSQ HOSP IP/OBS HIGH 50: CPT

## 2023-02-13 PROCEDURE — 99232 SBSQ HOSP IP/OBS MODERATE 35: CPT

## 2023-02-13 PROCEDURE — 99223 1ST HOSP IP/OBS HIGH 75: CPT | Mod: GC

## 2023-02-13 RX ORDER — INSULIN GLARGINE 100 [IU]/ML
16 INJECTION, SOLUTION SUBCUTANEOUS AT BEDTIME
Refills: 0 | Status: DISCONTINUED | OUTPATIENT
Start: 2023-02-13 | End: 2023-02-15

## 2023-02-13 RX ORDER — FOLIC ACID 0.8 MG
1 TABLET ORAL DAILY
Refills: 0 | Status: DISCONTINUED | OUTPATIENT
Start: 2023-02-13 | End: 2023-02-24

## 2023-02-13 RX ORDER — INSULIN LISPRO 100/ML
7 VIAL (ML) SUBCUTANEOUS
Refills: 0 | Status: DISCONTINUED | OUTPATIENT
Start: 2023-02-13 | End: 2023-02-15

## 2023-02-13 RX ORDER — MAGNESIUM SULFATE 500 MG/ML
1 VIAL (ML) INJECTION ONCE
Refills: 0 | Status: COMPLETED | OUTPATIENT
Start: 2023-02-13 | End: 2023-02-13

## 2023-02-13 RX ADMIN — MAGNESIUM OXIDE 400 MG ORAL TABLET 400 MILLIGRAM(S): 241.3 TABLET ORAL at 09:04

## 2023-02-13 RX ADMIN — Medication 3: at 22:27

## 2023-02-13 RX ADMIN — Medication 6 UNIT(S): at 09:00

## 2023-02-13 RX ADMIN — Medication 1 MILLIGRAM(S): at 12:14

## 2023-02-13 RX ADMIN — GABAPENTIN 300 MILLIGRAM(S): 400 CAPSULE ORAL at 22:28

## 2023-02-13 RX ADMIN — PIPERACILLIN AND TAZOBACTAM 25 GRAM(S): 4; .5 INJECTION, POWDER, LYOPHILIZED, FOR SOLUTION INTRAVENOUS at 06:50

## 2023-02-13 RX ADMIN — PIPERACILLIN AND TAZOBACTAM 25 GRAM(S): 4; .5 INJECTION, POWDER, LYOPHILIZED, FOR SOLUTION INTRAVENOUS at 14:00

## 2023-02-13 RX ADMIN — Medication 25 MILLIGRAM(S): at 06:49

## 2023-02-13 RX ADMIN — Medication 1000 MILLIGRAM(S): at 22:36

## 2023-02-13 RX ADMIN — GABAPENTIN 300 MILLIGRAM(S): 400 CAPSULE ORAL at 06:49

## 2023-02-13 RX ADMIN — AMLODIPINE BESYLATE 5 MILLIGRAM(S): 2.5 TABLET ORAL at 06:49

## 2023-02-13 RX ADMIN — Medication 1: at 18:21

## 2023-02-13 RX ADMIN — Medication 3 MILLIGRAM(S): at 22:36

## 2023-02-13 RX ADMIN — GABAPENTIN 300 MILLIGRAM(S): 400 CAPSULE ORAL at 14:00

## 2023-02-13 RX ADMIN — INSULIN GLARGINE 16 UNIT(S): 100 INJECTION, SOLUTION SUBCUTANEOUS at 22:28

## 2023-02-13 RX ADMIN — PIPERACILLIN AND TAZOBACTAM 25 GRAM(S): 4; .5 INJECTION, POWDER, LYOPHILIZED, FOR SOLUTION INTRAVENOUS at 22:28

## 2023-02-13 RX ADMIN — Medication 3: at 09:00

## 2023-02-13 RX ADMIN — Medication 3: at 12:15

## 2023-02-13 RX ADMIN — SIMVASTATIN 40 MILLIGRAM(S): 20 TABLET, FILM COATED ORAL at 22:28

## 2023-02-13 RX ADMIN — Medication 1 TABLET(S): at 12:14

## 2023-02-13 RX ADMIN — Medication 7 UNIT(S): at 12:15

## 2023-02-13 RX ADMIN — Medication 100 GRAM(S): at 09:02

## 2023-02-13 RX ADMIN — ENOXAPARIN SODIUM 40 MILLIGRAM(S): 100 INJECTION SUBCUTANEOUS at 06:50

## 2023-02-13 RX ADMIN — LIDOCAINE 1 PATCH: 4 CREAM TOPICAL at 12:14

## 2023-02-13 RX ADMIN — Medication 20 MILLIEQUIVALENT(S): at 12:15

## 2023-02-13 RX ADMIN — Medication 7 UNIT(S): at 18:20

## 2023-02-13 NOTE — PROGRESS NOTE ADULT - ASSESSMENT
66 yo F DM2, falls, microdiscectomy, from [place] with cough, fevers  No fever, has leukocytosis  Initially from [place] with cough/fevers, given abx, then high volume diarrhea  CT with nodular/patchy opacities in bilateral lower lobes; thickening of duodenum, duodenitis, renal stone  PCT minimally elevated  RVP neg  GI PCR neg  Legionella urine ag neg  UA neg/equivocal  From [facility] (decades ago), no known history of exposure to TB  Uncertain etiology pulmonary lesions at this point  Lung lesion DDx: Malignancy, fungal, septic emboli, NTM/TB, lung abscess?  Overall,  1) Lung Lesions  - Septic emboli vs metastatic disease by radiology; lesions not present on CT in 11/2022  - Zosyn 3.375g q 8, empiric  - Check sputum culture  - Although I have low suspicion, cannot definitively rule out TB clinically, would isolate (airborne), and obtain sputum samples AFB x 3  - Check Quant gold, LDH, urine histoplasma- pending  - F/U BCXs x 2- No growth  - TTE no vegetations noted consider HAJA  -Would send HIV test and HIV viral load and Tcell subsets  - send ESR and CRP and consider autoimmune work up for alternative causes of fever

## 2023-02-13 NOTE — PROGRESS NOTE ADULT - PROBLEM SELECTOR PLAN 1
Leukocytosis + tachycardia = SIRS+ in conjunction with CT imaging with patchy/nodular opacities in setting of coughing concerning for sepsis secondary to pneumonia likely HAP given her recent surgery and [place] placement. Ddx: legionella pneumonia given patient with reported watery stools; however does not meet diarrhea definition of at least 3 unformed stools per day.  CTAP (2/9): Nodular and patchy opacities predominantly in the bilateral lower lobes with many that are centrally necrotic concerning for infection. Differential diagnosis includes metastatic disease and septic emboli. TTE (2/10): EF 67%, grossly normal findings  > patient recently on meropenem in [place] for pneumonia/UTI?  > consults: ID, pulm   - f/u BCx (NGTD), UCx, urine legionella, urine strep, MRSA neg, fungitel  - c/w Zosyn (2/9- ) for possible HAP pseudomonal coverage; cannot do vancomycin for MRSA coverage as patient noted to have vancomycin allergy

## 2023-02-13 NOTE — PROGRESS NOTE ADULT - SUBJECTIVE AND OBJECTIVE BOX
INFECTIOUS DISEASES FOLLOW UP-- Sparkle Gates  [phone]    This is a follow up note for this  67yFemale with  Leukocytosis        ROS:  CONSTITUTIONAL:  awake alert asking for physical therapy  CARDIOVASCULAR:  No chest pain or palpitations  RESPIRATORY:  No dyspnea  GASTROINTESTINAL:  No nausea, vomiting, diarrhea, or abdominal pain  GENITOURINARY:  No dysuria  NEUROLOGIC:  No headache,     Allergies    aspirin (Anaphylaxis)  aspirin (Rash)  clindamycin (Unknown)  contrast media (iron oxide-based) (Nephrotoxicity)  fish (Hives)  IV Contrast (Anaphylaxis)  sulfa drugs (Rash)  vancomycin (Rash)  walnut (Anaphylaxis)    Intolerances        ANTIBIOTICS/RELEVANT:  antimicrobials  piperacillin/tazobactam IVPB.. 3.375 Gram(s) IV Intermittent every 8 hours    immunologic:    OTHER:  acetaminophen     Tablet .. 1000 milliGRAM(s) Oral every 8 hours PRN  amLODIPine   Tablet 5 milliGRAM(s) Oral daily  dextrose 5%. 1000 milliLiter(s) IV Continuous <Continuous>  dextrose 5%. 1000 milliLiter(s) IV Continuous <Continuous>  dextrose 50% Injectable 25 Gram(s) IV Push once  dextrose 50% Injectable 12.5 Gram(s) IV Push once  dextrose 50% Injectable 25 Gram(s) IV Push once  dextrose Oral Gel 15 Gram(s) Oral once PRN  enoxaparin Injectable 40 milliGRAM(s) SubCutaneous every 24 hours  folic acid 1 milliGRAM(s) Oral daily  gabapentin 300 milliGRAM(s) Oral three times a day  glucagon  Injectable 1 milliGRAM(s) IntraMuscular once  HYDROmorphone   Tablet 4 milliGRAM(s) Oral every 4 hours PRN  insulin glargine Injectable (LANTUS) 16 Unit(s) SubCutaneous at bedtime  insulin lispro (ADMELOG) corrective regimen sliding scale   SubCutaneous three times a day before meals  insulin lispro (ADMELOG) corrective regimen sliding scale   SubCutaneous at bedtime  insulin lispro Injectable (ADMELOG) 7 Unit(s) SubCutaneous three times a day before meals  lactobacillus acidophilus 1 Tablet(s) Oral daily  lidocaine   4% Patch 1 Patch Transdermal daily  melatonin 3 milliGRAM(s) Oral at bedtime PRN  metoprolol succinate ER 25 milliGRAM(s) Oral daily  potassium chloride    Tablet ER 20 milliEquivalent(s) Oral daily  simvastatin 40 milliGRAM(s) Oral at bedtime      Objective:  Vital Signs Last 24 Hrs  T(C): 36.7 (13 Feb 2023 13:29), Max: 37.9 (12 Feb 2023 21:11)  T(F): 98 (13 Feb 2023 13:29), Max: 100.2 (12 Feb 2023 21:11)  HR: 115 (13 Feb 2023 13:29) (99 - 119)  BP: 121/79 (13 Feb 2023 13:29) (119/74 - 126/68)  BP(mean): --  RR: 18 (13 Feb 2023 13:29) (18 - 18)  SpO2: 97% (13 Feb 2023 13:29) (94% - 97%)    Parameters below as of 13 Feb 2023 13:29  Patient On (Oxygen Delivery Method): room air        PHYSICAL EXAM:  Constitutional:no acute distress, nasal oxygen  Eyes:OSCAR, EOMI  Ear/Nose/Throat: no oral lesions, 	  Respiratory: clear BL anteriorly  Cardiovascular: S1S2  Gastrointestinal:soft, (+) BS, no tenderness  Extremities:no e/e/c no rash or joint swelling  No Lymphadenopathy  IV sites not inflammed.    LABS:                        8.9    18.95 )-----------( 619      ( 13 Feb 2023 12:48 )             28.8     02-13    134<L>  |  103  |  17  ----------------------------<  299<H>  5.1   |  22  |  0.47<L>    Ca    7.4<L>      13 Feb 2023 03:57  Phos  3.1     02-13  Mg     1.9     02-13    TPro  5.8<L>  /  Alb  2.2<L>  /  TBili  <0.1<L>  /  DBili  x   /  AST  23  /  ALT  24  /  AlkPhos  60  02-13          MICROBIOLOGY:            RECENT CULTURES:  02-12 @ 19:39  .Sputum Sputum  --  --  --  --  --  02-12 @ 09:40  .Sputum Sputum  --  --  --    Normal Respiratory Janis present  --  02-10 @ 22:21  .Stool Feces  --  --  --    No Protozoa seen by trichrome stain  No Helminths or Protozoa seen in formalin concentrate  performed by iodine stain  (routine O+P not evaluated for Microsporidia,  Cryptosporidia or Cyclospora)  One negative sample does not necessarily rule  out the presence of a parasitic infection.  --  02-10 @ 12:52  .Stool Feces  --  --  --    No enteric pathogens isolated.  (Stool culture examined for Salmonella,  Shigella, Campylobacter, Aeromonas, Plesiomonas,  Vibrio, E.coli O157 and Yersinia)  --  02-09 @ 17:07  Clean Catch Clean Catch (Midstream)  Enterococcus faecium  Enterococcus faecalis  Enterococcus faecium  SHWETA    50,000 - 99,000 CFU/mL Enterococcus faecium  50,000 - 99,000 CFU/mL Enterococcus faecalis  --  02-09 @ 15:40  .Blood Blood-Peripheral  --  --  --    No growth to date.  --  02-09 @ 13:30  .Blood Blood-Peripheral  --  --  --    No growth to date.  --      RADIOLOGY & ADDITIONAL STUDIES:    IMPRESSION:  Nodular and patchy opacities predominantly in the bilateral lower lobes   with many that are centrally necrotic concerning for infection.   Differential diagnosis includes metastatic disease and septic emboli.  Diffuse wall thickening of the proximal duodenum with periduodenal   stranding, concerning for duodenitis/peptic ulcer disease.  Nonobstructing right renal calculus  Right thyroid nodule may be evaluated with ultrasound on a nonemergent   basis.  --- End of R

## 2023-02-13 NOTE — PROGRESS NOTE ADULT - PROBLEM SELECTOR PLAN 5
History DMT2 on insulin (long acting 16units and short acting 6units), A1c 12.7 11/2022  > A1c 7.9%  - increased Lantus to 16U qhs; c/w Admelog 7U TID and THOMAS

## 2023-02-13 NOTE — PROGRESS NOTE ADULT - SUBJECTIVE AND OBJECTIVE BOX
Progress Note    02-09-23 (4d)    Patient is a 67y old  Female who presents with a chief complaint of Pneumonia (12 Feb 2023 12:09)      Subjective / Overnight Events :  - No acute events overnight.  - Pt seen and examined at bedside.     Additional ROS (if any):    MEDICATIONS  (STANDING):  amLODIPine   Tablet 5 milliGRAM(s) Oral daily  dextrose 5%. 1000 milliLiter(s) (50 mL/Hr) IV Continuous <Continuous>  dextrose 5%. 1000 milliLiter(s) (100 mL/Hr) IV Continuous <Continuous>  dextrose 50% Injectable 25 Gram(s) IV Push once  dextrose 50% Injectable 12.5 Gram(s) IV Push once  dextrose 50% Injectable 25 Gram(s) IV Push once  enoxaparin Injectable 40 milliGRAM(s) SubCutaneous every 24 hours  gabapentin 300 milliGRAM(s) Oral three times a day  glucagon  Injectable 1 milliGRAM(s) IntraMuscular once  insulin glargine Injectable (LANTUS) 13 Unit(s) SubCutaneous at bedtime  insulin lispro (ADMELOG) corrective regimen sliding scale   SubCutaneous three times a day before meals  insulin lispro (ADMELOG) corrective regimen sliding scale   SubCutaneous at bedtime  insulin lispro Injectable (ADMELOG) 6 Unit(s) SubCutaneous three times a day before meals  lactobacillus acidophilus 1 Tablet(s) Oral daily  lidocaine   4% Patch 1 Patch Transdermal daily  magnesium oxide 400 milliGRAM(s) Oral three times a day with meals  magnesium sulfate  IVPB 1 Gram(s) IV Intermittent once  metoprolol succinate ER 25 milliGRAM(s) Oral daily  piperacillin/tazobactam IVPB.. 3.375 Gram(s) IV Intermittent every 8 hours  potassium chloride    Tablet ER 20 milliEquivalent(s) Oral daily  simvastatin 40 milliGRAM(s) Oral at bedtime    MEDICATIONS  (PRN):  acetaminophen     Tablet .. 1000 milliGRAM(s) Oral every 8 hours PRN Severe Pain (7 - 10)  dextrose Oral Gel 15 Gram(s) Oral once PRN Blood Glucose LESS THAN 70 milliGRAM(s)/deciliter  HYDROmorphone   Tablet 4 milliGRAM(s) Oral every 4 hours PRN Severe Pain (7 - 10)  melatonin 3 milliGRAM(s) Oral at bedtime PRN Insomnia          PHYSICAL EXAM:  Vital Signs Last 24 Hrs  T(C): 36.6 (13 Feb 2023 04:15), Max: 37.9 (12 Feb 2023 21:11)  T(F): 97.9 (13 Feb 2023 04:15), Max: 100.2 (12 Feb 2023 21:11)  HR: 99 (13 Feb 2023 04:15) (99 - 119)  BP: 126/68 (13 Feb 2023 04:15) (105/67 - 126/68)  BP(mean): --  RR: 18 (13 Feb 2023 04:15) (18 - 18)  SpO2: 94% (13 Feb 2023 04:15) (94% - 98%)    Parameters below as of 13 Feb 2023 04:15  Patient On (Oxygen Delivery Method): room air        I&O's Summary    12 Feb 2023 07:01  -  13 Feb 2023 07:00  --------------------------------------------------------  IN: 0 mL / OUT: 1000 mL / NET: -1000 mL        General: NAD, non-toxic appearing   HEENT: PERRLA, EOMi, no scleral icterus  CV: RRR, normal S1 and S2, no m/r/g  Lungs: normal respiratory effort. CTAB, no wheezes, rales, or rhonchi  Abd: soft, nontender, nondistended  Ext: no edema, 2+ peripheral pulses   Pysch: AAOx3, appropriate affect   Neuro: grossly non-focal, moving all extremities spontaneously   Skin: no rashes or lesions     LABS:  CAPILLARY BLOOD GLUCOSE      POCT Blood Glucose.: 261 mg/dL (12 Feb 2023 22:55)  POCT Blood Glucose.: 339 mg/dL (12 Feb 2023 18:24)  POCT Blood Glucose.: 258 mg/dL (12 Feb 2023 16:33)  POCT Blood Glucose.: 228 mg/dL (12 Feb 2023 12:56)  POCT Blood Glucose.: 172 mg/dL (12 Feb 2023 08:31)                              7.4    15.78 )-----------( 548      ( 13 Feb 2023 03:57 )             23.0       WBC Trend: 15.78<--, 16.39<--, 20.21<--  Hb Trend: 7.4<--, 9.3<--, 8.7<--, 9.0<--, 8.6<--    02-13    134<L>  |  103  |  17  ----------------------------<  299<H>  5.1   |  22  |  0.47<L>    Ca    7.4<L>      13 Feb 2023 03:57  Phos  3.1     02-13  Mg     1.5     02-13    TPro  5.8<L>  /  Alb  2.2<L>  /  TBili  <0.1<L>  /  DBili  x   /  AST  23  /  ALT  24  /  AlkPhos  60  02-13              Culture - Sputum (collected 12 Feb 2023 09:40)  Source: .Sputum Sputum  Gram Stain (12 Feb 2023 18:29):    Few Squamous epithelial cells per low power field    Moderate polymorphonuclear leukocytes per low power field    Few Gram positive cocci in pairs per oil power field    Culture - Stool (collected 10 Feb 2023 12:52)  Source: .Stool Feces  Final Report (12 Feb 2023 15:12):    No enteric pathogens isolated.    (Stool culture examined for Salmonella,    Shigella, Campylobacter, Aeromonas, Plesiomonas,    Vibrio, E.coli O157 and Yersinia)          RADIOLOGY & ADDITIONAL TESTS: Reviewed Progress Note    02-09-23 (4d)    Patient is a 67y old  Female who presents with a chief complaint of Pneumonia (12 Feb 2023 12:09)      Subjective / Overnight Events :  - No acute events overnight.  - Pt seen and examined at bedside. Improvement in symptoms. Denies shortness of breath or cough. No headaches, fevers, chills. No other complaints. Had a BM yesterday.     Additional ROS (if any):    MEDICATIONS  (STANDING):  amLODIPine   Tablet 5 milliGRAM(s) Oral daily  dextrose 5%. 1000 milliLiter(s) (50 mL/Hr) IV Continuous <Continuous>  dextrose 5%. 1000 milliLiter(s) (100 mL/Hr) IV Continuous <Continuous>  dextrose 50% Injectable 25 Gram(s) IV Push once  dextrose 50% Injectable 12.5 Gram(s) IV Push once  dextrose 50% Injectable 25 Gram(s) IV Push once  enoxaparin Injectable 40 milliGRAM(s) SubCutaneous every 24 hours  gabapentin 300 milliGRAM(s) Oral three times a day  glucagon  Injectable 1 milliGRAM(s) IntraMuscular once  insulin glargine Injectable (LANTUS) 13 Unit(s) SubCutaneous at bedtime  insulin lispro (ADMELOG) corrective regimen sliding scale   SubCutaneous three times a day before meals  insulin lispro (ADMELOG) corrective regimen sliding scale   SubCutaneous at bedtime  insulin lispro Injectable (ADMELOG) 6 Unit(s) SubCutaneous three times a day before meals  lactobacillus acidophilus 1 Tablet(s) Oral daily  lidocaine   4% Patch 1 Patch Transdermal daily  magnesium oxide 400 milliGRAM(s) Oral three times a day with meals  magnesium sulfate  IVPB 1 Gram(s) IV Intermittent once  metoprolol succinate ER 25 milliGRAM(s) Oral daily  piperacillin/tazobactam IVPB.. 3.375 Gram(s) IV Intermittent every 8 hours  potassium chloride    Tablet ER 20 milliEquivalent(s) Oral daily  simvastatin 40 milliGRAM(s) Oral at bedtime    MEDICATIONS  (PRN):  acetaminophen     Tablet .. 1000 milliGRAM(s) Oral every 8 hours PRN Severe Pain (7 - 10)  dextrose Oral Gel 15 Gram(s) Oral once PRN Blood Glucose LESS THAN 70 milliGRAM(s)/deciliter  HYDROmorphone   Tablet 4 milliGRAM(s) Oral every 4 hours PRN Severe Pain (7 - 10)  melatonin 3 milliGRAM(s) Oral at bedtime PRN Insomnia          PHYSICAL EXAM:  Vital Signs Last 24 Hrs  T(C): 36.6 (13 Feb 2023 04:15), Max: 37.9 (12 Feb 2023 21:11)  T(F): 97.9 (13 Feb 2023 04:15), Max: 100.2 (12 Feb 2023 21:11)  HR: 99 (13 Feb 2023 04:15) (99 - 119)  BP: 126/68 (13 Feb 2023 04:15) (105/67 - 126/68)  BP(mean): --  RR: 18 (13 Feb 2023 04:15) (18 - 18)  SpO2: 94% (13 Feb 2023 04:15) (94% - 98%)    Parameters below as of 13 Feb 2023 04:15  Patient On (Oxygen Delivery Method): room air        I&O's Summary    12 Feb 2023 07:01  -  13 Feb 2023 07:00  --------------------------------------------------------  IN: 0 mL / OUT: 1000 mL / NET: -1000 mL        General: NAD, non-toxic appearing   HEENT: PERRLA, EOMi, no scleral icterus  CV: RRR, normal S1 and S2, no m/r/g  Lungs: normal respiratory effort. CTAB, no wheezes, rales, or rhonchi  Abd: soft, nontender, nondistended  Ext: hip flexion 2/5 bilaterally. 3/5 knee flexion bilaterally. 5/5 dorsi/plantar flexion bilaterally. Sensation intact bilaterally. Tenderness to touch.   Pysch: AAOx3, appropriate affect   Skin: no rashes or lesions     LABS:  CAPILLARY BLOOD GLUCOSE      POCT Blood Glucose.: 261 mg/dL (12 Feb 2023 22:55)  POCT Blood Glucose.: 339 mg/dL (12 Feb 2023 18:24)  POCT Blood Glucose.: 258 mg/dL (12 Feb 2023 16:33)  POCT Blood Glucose.: 228 mg/dL (12 Feb 2023 12:56)  POCT Blood Glucose.: 172 mg/dL (12 Feb 2023 08:31)                              7.4    15.78 )-----------( 548      ( 13 Feb 2023 03:57 )             23.0       WBC Trend: 15.78<--, 16.39<--, 20.21<--  Hb Trend: 7.4<--, 9.3<--, 8.7<--, 9.0<--, 8.6<--    02-13    134<L>  |  103  |  17  ----------------------------<  299<H>  5.1   |  22  |  0.47<L>    Ca    7.4<L>      13 Feb 2023 03:57  Phos  3.1     02-13  Mg     1.5     02-13    TPro  5.8<L>  /  Alb  2.2<L>  /  TBili  <0.1<L>  /  DBili  x   /  AST  23  /  ALT  24  /  AlkPhos  60  02-13              Culture - Sputum (collected 12 Feb 2023 09:40)  Source: .Sputum Sputum  Gram Stain (12 Feb 2023 18:29):    Few Squamous epithelial cells per low power field    Moderate polymorphonuclear leukocytes per low power field    Few Gram positive cocci in pairs per oil power field    Culture - Stool (collected 10 Feb 2023 12:52)  Source: .Stool Feces  Final Report (12 Feb 2023 15:12):    No enteric pathogens isolated.    (Stool culture examined for Salmonella,    Shigella, Campylobacter, Aeromonas, Plesiomonas,    Vibrio, E.coli O157 and Yersinia)          RADIOLOGY & ADDITIONAL TESTS: Reviewed

## 2023-02-13 NOTE — CONSULT NOTE ADULT - ASSESSMENT
67 F PMH DMT2, multiple falls, s/p lumbar microdiscectomy for radiculopathy presenting from [facility] with acute onset of malaise and wet coughing but without fevers, shortness of breath, or chest pain with CT chest non-contrast imaging with patchy/nodular opacities in bilateral lower lobes, centrally necrotic c/f infection vs. metastatic disease vs. septic emboli. Labs notable for leukocytosis and tachycardia with exam findings notable for wheezing/ronchus breath sounds. These findings concerning for sepsis secondary to pneumonia. Pulm consulted for cavitary lung lesions    recommendations  patient with persistent leukocytosis, cough (although improved) and low grade temperatures.  CT scan concerning for multifocal small cavitary lesions  although patient from [facility], lesions not typical for TB  MRSA nasal Negative, , procal 0.16, RVP neg, urine legionella neg, sputum with nml resp amarilis, afb neg x 1, gi pcr negative CRP 18, , blood in urine, denies AI history although has psoriasis, eczema and severe radiculopathy  please send HIV, T cell subset, ANCAs, GLORIA, IgG4  echo: nml LV, nml diastolic, nml Rv, no apparent vegatations seen, bld cultures negative   fungitell, histo and quant in lab   no plans for bronchoscopy at this time although will continue to follow    Janes Worthy MD  [facility] PGY4  [facility] [address], [facility] [phone]

## 2023-02-13 NOTE — PROGRESS NOTE ADULT - PROBLEM SELECTOR PLAN 2
Reported watery stools but without blood or pitch black color with frequency of about 2 per day. May be in setting of recent antibiotic use. Rule out developing GI infection such as C. diff. Low suspicion at this time given not clinically diarrhea.  > CT A/P non-contrast noted Diffuse wall thickening of the proximal duodenum with periduodenal stranding, c/f duodenitis/peptic ulcer disease  > GI PCR negative; stool cx, stool O+P, c diff (neg)  - continue to monitor for now, no diarrhea as per patient

## 2023-02-13 NOTE — PROGRESS NOTE ADULT - PROBLEM SELECTOR PLAN 4
Reported spinal surgery for herniated disc in patient with lumbar radiculopathy history per chart review.  - c/w hydromorphone 4mg for severe pain q4hrs prn  - c/w Tylenol 1000mg q8hrs for pain prn  - c/w Lidocaine patch for pain prn  - c/w gabapentin 300mg TID  - ordered repeat MR lumbosacral spine given persistent LE weakness, concern for epidural abscess?

## 2023-02-13 NOTE — PROGRESS NOTE ADULT - PROBLEM SELECTOR PLAN 7
Noted to have right thyroid nodule on imaging. TSH wnl.   > Thyroid US showing TIRADS 3 nodule >2.5cm, indicates need for FNA for further evaluation  - thyroid FNA can be completed outpatient

## 2023-02-13 NOTE — PROGRESS NOTE ADULT - ASSESSMENT
67 F PMH DMT2, multiple falls, s/p lumbar microdiscectomy for radiculopathy presenting from [facility] with acute onset of malaise and wet coughing but without fevers, shortness of breath, or chest pain with CT chest non-contrast imaging with patchy/nodular opacities in bilateral lower lobes, centrally necrotic c/f infection vs. metastatic disease vs. septic emboli. Labs notable for leukocytosis and tachycardia with exam findings notable for wheezing/ronchus breath sounds. These findings concerning for sepsis secondary to pneumonia.

## 2023-02-13 NOTE — PROGRESS NOTE ADULT - ATTENDING COMMENTS
67F with DMT2, multiple falls, s/p lumbar microdiscectomy for radiculopathy in 11/2022 presented from [facility] with chronic cough, recent hospitalization followed by [place] stay, previously treated with Meropenem.  Pt Met sepsis 2/2 to acute bacterial PNA, CT chest with nodular and patchy opacities predominantly in the bilateral lower lobes with many that are centrally necrotic concerning for infection. Differential diagnosis includes metastatic disease and septic emboli or  infectious causes.  Pt was placed on ZOsyn and admitted for further evaluation.  Pt was seen by ID and infectious work is underway .  Neg MRSA swab, sputum cx , Sputum AFB if able  -urine legionella neg, f/u strep pneuo ab, Fungitel, neg blood CX    -check 2d echo  # Lung lesions  - ddx septic emboli vs malignancy vs fungal vs TB  - id and pulm recs appreciated  - c/w zosyn  - low suspicion for TB, but obtain sputum AFB x 3  - BCx, TTE reassuring, hold off HAJA for now    # Diarrhea  - Diffuse wall thickening of the proximal duodenum with periduodenal stranding, concerning for duodenitis/peptic ulcer disease.   - GI PCR neg,  neg c diff   - needs opt colonscopy    # Leg weakness - f/u MRI    # Thyroid nodule - will need outpatient FNA; d/w endo to arrange follow-up    Rest of plan as above, d/w HS
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
## 2023-02-14 DIAGNOSIS — R73.9 HYPERGLYCEMIA, UNSPECIFIED: ICD-10-CM

## 2023-02-14 LAB
4/8 RATIO: 1.81 RATIO — SIGNIFICANT CHANGE UP (ref 0.86–4.14)
ABS CD8: 234 CELLS/UL — SIGNIFICANT CHANGE UP (ref 90–775)
ANION GAP SERPL CALC-SCNC: 11 MMOL/L — SIGNIFICANT CHANGE UP (ref 5–17)
BASOPHILS # BLD AUTO: 0.03 K/UL — SIGNIFICANT CHANGE UP (ref 0–0.2)
BASOPHILS NFR BLD AUTO: 0.1 % — SIGNIFICANT CHANGE UP (ref 0–2)
BUN SERPL-MCNC: 19 MG/DL — SIGNIFICANT CHANGE UP (ref 7–23)
CALCIUM SERPL-MCNC: 8.7 MG/DL — SIGNIFICANT CHANGE UP (ref 8.4–10.5)
CD3 BLASTS SPEC-ACNC: 66 % — SIGNIFICANT CHANGE UP (ref 58–84)
CD3 BLASTS SPEC-ACNC: 702 CELLS/UL — SIGNIFICANT CHANGE UP (ref 396–2024)
CD4 %: 40 % — SIGNIFICANT CHANGE UP (ref 30–56)
CD8 %: 22 % — SIGNIFICANT CHANGE UP (ref 11–43)
CHLORIDE SERPL-SCNC: 100 MMOL/L — SIGNIFICANT CHANGE UP (ref 96–108)
CO2 SERPL-SCNC: 23 MMOL/L — SIGNIFICANT CHANGE UP (ref 22–31)
CREAT SERPL-MCNC: 0.4 MG/DL — LOW (ref 0.5–1.3)
CRP SERPL-MCNC: 11 MG/L — HIGH (ref 0–4)
CULTURE RESULTS: SIGNIFICANT CHANGE UP
EGFR: 107 ML/MIN/1.73M2 — SIGNIFICANT CHANGE UP
EOSINOPHIL # BLD AUTO: 0.11 K/UL — SIGNIFICANT CHANGE UP (ref 0–0.5)
EOSINOPHIL NFR BLD AUTO: 0.5 % — SIGNIFICANT CHANGE UP (ref 0–6)
ERYTHROCYTE [SEDIMENTATION RATE] IN BLOOD: 120 MM/HR — HIGH (ref 0–20)
FUNGITELL: <31 PG/ML — SIGNIFICANT CHANGE UP
GLUCOSE BLDC GLUCOMTR-MCNC: 267 MG/DL — HIGH (ref 70–99)
GLUCOSE BLDC GLUCOMTR-MCNC: 300 MG/DL — HIGH (ref 70–99)
GLUCOSE BLDC GLUCOMTR-MCNC: 386 MG/DL — HIGH (ref 70–99)
GLUCOSE BLDC GLUCOMTR-MCNC: 448 MG/DL — HIGH (ref 70–99)
GLUCOSE BLDC GLUCOMTR-MCNC: 453 MG/DL — CRITICAL HIGH (ref 70–99)
GLUCOSE BLDC GLUCOMTR-MCNC: 492 MG/DL — CRITICAL HIGH (ref 70–99)
GLUCOSE SERPL-MCNC: 350 MG/DL — HIGH (ref 70–99)
HCT VFR BLD CALC: 25 % — LOW (ref 34.5–45)
HGB BLD-MCNC: 8 G/DL — LOW (ref 11.5–15.5)
HIV 1+2 AB+HIV1 P24 AG SERPL QL IA: SIGNIFICANT CHANGE UP
HIV-1 VIRAL LOAD RESULT: SIGNIFICANT CHANGE UP
HIV1 RNA # SERPL NAA+PROBE: SIGNIFICANT CHANGE UP COPIES/ML
HIV1 RNA SER-IMP: SIGNIFICANT CHANGE UP
HIV1 RNA SERPL NAA+PROBE-ACNC: SIGNIFICANT CHANGE UP
HIV1 RNA SERPL NAA+PROBE-LOG#: SIGNIFICANT CHANGE UP LG COP/ML
IMM GRANULOCYTES NFR BLD AUTO: 1 % — HIGH (ref 0–0.9)
LYMPHOCYTES # BLD AUTO: 1.24 K/UL — SIGNIFICANT CHANGE UP (ref 1–3.3)
LYMPHOCYTES # BLD AUTO: 6.2 % — LOW (ref 13–44)
MAGNESIUM SERPL-MCNC: 1.5 MG/DL — LOW (ref 1.6–2.6)
MCHC RBC-ENTMCNC: 21.3 PG — LOW (ref 27–34)
MCHC RBC-ENTMCNC: 32 GM/DL — SIGNIFICANT CHANGE UP (ref 32–36)
MCV RBC AUTO: 66.5 FL — LOW (ref 80–100)
MONOCYTES # BLD AUTO: 0.27 K/UL — SIGNIFICANT CHANGE UP (ref 0–0.9)
MONOCYTES NFR BLD AUTO: 1.3 % — LOW (ref 2–14)
NEUTROPHILS # BLD AUTO: 18.27 K/UL — HIGH (ref 1.8–7.4)
NEUTROPHILS NFR BLD AUTO: 90.9 % — HIGH (ref 43–77)
NRBC # BLD: 0 /100 WBCS — SIGNIFICANT CHANGE UP (ref 0–0)
PHOSPHATE SERPL-MCNC: 3.3 MG/DL — SIGNIFICANT CHANGE UP (ref 2.5–4.5)
PLATELET # BLD AUTO: 601 K/UL — HIGH (ref 150–400)
POTASSIUM SERPL-MCNC: 5.2 MMOL/L — SIGNIFICANT CHANGE UP (ref 3.5–5.3)
POTASSIUM SERPL-SCNC: 5.2 MMOL/L — SIGNIFICANT CHANGE UP (ref 3.5–5.3)
RBC # BLD: 3.76 M/UL — LOW (ref 3.8–5.2)
RBC # FLD: 14.6 % — HIGH (ref 10.3–14.5)
SODIUM SERPL-SCNC: 134 MMOL/L — LOW (ref 135–145)
SPECIMEN SOURCE: SIGNIFICANT CHANGE UP
T-CELL CD4 SUBSET PNL BLD: 425 CELLS/UL — SIGNIFICANT CHANGE UP (ref 325–1251)
WBC # BLD: 20.12 K/UL — HIGH (ref 3.8–10.5)
WBC # FLD AUTO: 20.12 K/UL — HIGH (ref 3.8–10.5)

## 2023-02-14 PROCEDURE — 93010 ELECTROCARDIOGRAM REPORT: CPT

## 2023-02-14 PROCEDURE — 99233 SBSQ HOSP IP/OBS HIGH 50: CPT

## 2023-02-14 PROCEDURE — 72158 MRI LUMBAR SPINE W/O & W/DYE: CPT | Mod: 26

## 2023-02-14 PROCEDURE — 99232 SBSQ HOSP IP/OBS MODERATE 35: CPT

## 2023-02-14 PROCEDURE — 72197 MRI PELVIS W/O & W/DYE: CPT | Mod: 26

## 2023-02-14 RX ORDER — INSULIN LISPRO 100/ML
VIAL (ML) SUBCUTANEOUS AT BEDTIME
Refills: 0 | Status: DISCONTINUED | OUTPATIENT
Start: 2023-02-14 | End: 2023-02-24

## 2023-02-14 RX ORDER — MAGNESIUM SULFATE 500 MG/ML
1 VIAL (ML) INJECTION ONCE
Refills: 0 | Status: COMPLETED | OUTPATIENT
Start: 2023-02-14 | End: 2023-02-14

## 2023-02-14 RX ORDER — MAGNESIUM SULFATE 500 MG/ML
2 VIAL (ML) INJECTION
Refills: 0 | Status: DISCONTINUED | OUTPATIENT
Start: 2023-02-14 | End: 2023-02-14

## 2023-02-14 RX ORDER — INSULIN LISPRO 100/ML
VIAL (ML) SUBCUTANEOUS
Refills: 0 | Status: DISCONTINUED | OUTPATIENT
Start: 2023-02-14 | End: 2023-02-14

## 2023-02-14 RX ORDER — DIPHENHYDRAMINE HCL 50 MG
50 CAPSULE ORAL ONCE
Refills: 0 | Status: COMPLETED | OUTPATIENT
Start: 2023-02-14 | End: 2023-02-14

## 2023-02-14 RX ORDER — MAGNESIUM SULFATE 500 MG/ML
2 VIAL (ML) INJECTION ONCE
Refills: 0 | Status: COMPLETED | OUTPATIENT
Start: 2023-02-14 | End: 2023-02-14

## 2023-02-14 RX ORDER — INSULIN LISPRO 100/ML
VIAL (ML) SUBCUTANEOUS EVERY 4 HOURS
Refills: 0 | Status: DISCONTINUED | OUTPATIENT
Start: 2023-02-14 | End: 2023-02-15

## 2023-02-14 RX ORDER — DIPHENHYDRAMINE HCL 50 MG
52 CAPSULE ORAL ONCE
Refills: 0 | Status: DISCONTINUED | OUTPATIENT
Start: 2023-02-14 | End: 2023-02-14

## 2023-02-14 RX ORDER — INSULIN LISPRO 100/ML
VIAL (ML) SUBCUTANEOUS AT BEDTIME
Refills: 0 | Status: DISCONTINUED | OUTPATIENT
Start: 2023-02-14 | End: 2023-02-14

## 2023-02-14 RX ADMIN — Medication 12: at 12:20

## 2023-02-14 RX ADMIN — SIMVASTATIN 40 MILLIGRAM(S): 20 TABLET, FILM COATED ORAL at 22:23

## 2023-02-14 RX ADMIN — Medication 50 MILLIGRAM(S): at 11:13

## 2023-02-14 RX ADMIN — Medication 1 MILLIGRAM(S): at 12:19

## 2023-02-14 RX ADMIN — Medication 25 GRAM(S): at 11:15

## 2023-02-14 RX ADMIN — Medication 1 TABLET(S): at 12:19

## 2023-02-14 RX ADMIN — Medication 12: at 09:10

## 2023-02-14 RX ADMIN — PIPERACILLIN AND TAZOBACTAM 25 GRAM(S): 4; .5 INJECTION, POWDER, LYOPHILIZED, FOR SOLUTION INTRAVENOUS at 22:22

## 2023-02-14 RX ADMIN — AMLODIPINE BESYLATE 5 MILLIGRAM(S): 2.5 TABLET ORAL at 05:56

## 2023-02-14 RX ADMIN — INSULIN GLARGINE 16 UNIT(S): 100 INJECTION, SOLUTION SUBCUTANEOUS at 22:23

## 2023-02-14 RX ADMIN — Medication 32 MILLIGRAM(S): at 01:30

## 2023-02-14 RX ADMIN — Medication 7 UNIT(S): at 12:20

## 2023-02-14 RX ADMIN — Medication 100 GRAM(S): at 09:14

## 2023-02-14 RX ADMIN — Medication 7 UNIT(S): at 17:09

## 2023-02-14 RX ADMIN — Medication 20 MILLIEQUIVALENT(S): at 12:19

## 2023-02-14 RX ADMIN — GABAPENTIN 300 MILLIGRAM(S): 400 CAPSULE ORAL at 22:23

## 2023-02-14 RX ADMIN — PIPERACILLIN AND TAZOBACTAM 25 GRAM(S): 4; .5 INJECTION, POWDER, LYOPHILIZED, FOR SOLUTION INTRAVENOUS at 05:56

## 2023-02-14 RX ADMIN — Medication 7 UNIT(S): at 09:10

## 2023-02-14 RX ADMIN — Medication 1000 MILLIGRAM(S): at 22:27

## 2023-02-14 RX ADMIN — ENOXAPARIN SODIUM 40 MILLIGRAM(S): 100 INJECTION SUBCUTANEOUS at 05:56

## 2023-02-14 RX ADMIN — Medication 3 MILLIGRAM(S): at 22:28

## 2023-02-14 RX ADMIN — Medication 10: at 17:09

## 2023-02-14 RX ADMIN — GABAPENTIN 300 MILLIGRAM(S): 400 CAPSULE ORAL at 16:16

## 2023-02-14 RX ADMIN — PIPERACILLIN AND TAZOBACTAM 25 GRAM(S): 4; .5 INJECTION, POWDER, LYOPHILIZED, FOR SOLUTION INTRAVENOUS at 16:15

## 2023-02-14 RX ADMIN — Medication 25 MILLIGRAM(S): at 05:56

## 2023-02-14 RX ADMIN — Medication 2: at 22:22

## 2023-02-14 RX ADMIN — GABAPENTIN 300 MILLIGRAM(S): 400 CAPSULE ORAL at 05:56

## 2023-02-14 RX ADMIN — Medication 6: at 22:24

## 2023-02-14 RX ADMIN — Medication 32 MILLIGRAM(S): at 11:13

## 2023-02-14 RX ADMIN — LIDOCAINE 1 PATCH: 4 CREAM TOPICAL at 12:18

## 2023-02-14 NOTE — PROGRESS NOTE ADULT - PROBLEM SELECTOR PLAN 2
Reported watery stools but without blood or pitch black color with frequency of about 2 per day. May be in setting of recent antibiotic use. Rule out developing GI infection such as C. diff. Low suspicion at this time given not clinically diarrhea.  > CT A/P non-contrast noted Diffuse wall thickening of the proximal duodenum with periduodenal stranding, c/f duodenitis/peptic ulcer disease  > GI PCR negative; stool cx, stool O+P, c diff (neg)  - continue to monitor for now, no diarrhea as per patient Leukocytosis + tachycardia = SIRS+ in conjunction with CT imaging with patchy/nodular opacities in setting of coughing concerning for sepsis secondary to pneumonia likely HAP given her recent surgery and [place] placement. Ddx: legionella pneumonia given patient with reported watery stools; however does not meet diarrhea definition of at least 3 unformed stools per day.  CTAP (2/9): Nodular and patchy opacities predominantly in the bilateral lower lobes with many that are centrally necrotic concerning for infection. Differential diagnosis includes metastatic disease and septic emboli. TTE (2/10): EF 67%, grossly normal findings. BCx (NGTD), UCx (E. faecium), urine legionella (negative), MRSA (negative), fungitel (negative)  > patient recently on meropenem in [place] for pneumonia/UTI?  > consults: ID, pulm   - c/w Zosyn (2/9- ; day 5) for possible HAP pseudomonal coverage; cannot do vancomycin for MRSA coverage as patient noted to have vancomycin allergy

## 2023-02-14 NOTE — PROGRESS NOTE ADULT - ATTENDING COMMENTS
67F with DMT2, multiple falls, s/p lumbar microdiscectomy for radiculopathy in 11/2022 presented from [facility] with chronic cough, recent hospitalization followed by [place] stay, previously treated with Meropenem. Pt met sepsis 2/2 to acute bacterial PNA, CT chest with nodular and patchy opacities predominantly in the bilateral lower lobes with many that are centrally necrotic concerning for infection vs septic emboli vs metastatic disease.     # Lung lesions  - ddx septic emboli vs malignancy vs fungal vs TB  - id and pulm recs appreciated  - c/w zosyn, plan for 7 day course  - low suspicion for TB, but obtain sputum AFB x 3; induce sputum if needed  - BCx NGTD, TTE reassuring, hold off HAJA for now  - if ID workup is unremarkable, will need further onc workup (pan scan with contrast to identify primary, age appropriate cancer screening)    # Diarrhea  - Diffuse wall thickening of the proximal duodenum with periduodenal stranding, concerning for duodenitis/peptic ulcer disease.   - GI PCR neg,  neg c diff   - needs opt colposcopy    # Leg weakness, chronic  - f/u MRIs    # Thyroid nodule   - will need outpatient FNA; d/w endo to arrange follow-up    Rest of plan as above, d/w HS

## 2023-02-14 NOTE — PROGRESS NOTE ADULT - ASSESSMENT
68 yo F DM2, falls, microdiscectomy, from [facility] with cough, fevers  No fever, has leukocytosis  Initially from [place] with cough/fevers, given abx, then high volume diarrhea  CT with nodular/patchy opacities in bilateral lower lobes; thickening of duodenum, duodenitis, renal stone  PCT minimally elevated  RVP neg  GI PCR neg  Legionella urine ag neg  UA neg/equivocal  From [facility] (decades ago), no history of exposure to TB  Uncertain etiology pulmonary lesions at this point  Lung lesion DDx: Malignancy, fungal, septic emboli, NTM/TB, lung abscess?  Overall,  1) Lung Lesions  - Septic emboli vs metastatic disease by radiology; lesionsnot present on CT in 11/2022  - Zosyn 3.375g q 8, 7 days then discontinue  - Isolate (airborne), and obtain sputum samples AFB x 3--if not producing would have respiratory attempt to induce to provide sputums  - F/U Quant gold, urine histoplasma  - If prelim workup negative, would consult Pulmonary for bronchoscopy  - F/U BCXs x 2  - TTE reassuring, hold on HAJA for now  2) Diarrhea  - If ongoing high volume diarrhea, Check C diff PCR  - Monitor--antibiotic associated diarrhea?  - Follow up pending stool tests  3) Leukocytosis  - Reactive to underlying lung process?  - Trend to normal    Fidel Lester MD  Contact on TEAMS messaging from 9am - 5pm  From 5pm-9am, on weekends, or if no response call [phone]

## 2023-02-14 NOTE — PROGRESS NOTE ADULT - PROBLEM SELECTOR PLAN 3
Hypokalemia with serum K ~2.2 in setting of reported watery stools which may be diarrhea  - replete electrolytes as appropriate Reported spinal surgery for herniated disc in patient with lumbar radiculopathy history per chart review.  - c/w hydromorphone 4mg for severe pain q4hrs prn  - c/w Tylenol 1000mg q8hrs for pain prn  - c/w Lidocaine patch for pain prn  - c/w gabapentin 300mg TID  - ordered repeat MR lumbosacral/Pelvis spine given persistent LE weakness

## 2023-02-14 NOTE — PROGRESS NOTE ADULT - PROBLEM SELECTOR PLAN 1
Leukocytosis + tachycardia = SIRS+ in conjunction with CT imaging with patchy/nodular opacities in setting of coughing concerning for sepsis secondary to pneumonia likely HAP given her recent surgery and [place] placement. Ddx: legionella pneumonia given patient with reported watery stools; however does not meet diarrhea definition of at least 3 unformed stools per day.  CTAP (2/9): Nodular and patchy opacities predominantly in the bilateral lower lobes with many that are centrally necrotic concerning for infection. Differential diagnosis includes metastatic disease and septic emboli. TTE (2/10): EF 67%, grossly normal findings  > patient recently on meropenem in [place] for pneumonia/UTI?  > consults: ID, pulm   - f/u BCx (NGTD), UCx, urine legionella, urine strep, MRSA neg, fungitel  - c/w Zosyn (2/9- ) for possible HAP pseudomonal coverage; cannot do vancomycin for MRSA coverage as patient noted to have vancomycin allergy Increased to 492 w/o signs of DKA. S/p methylprednisolone for pre-medication for MRI.   > coverage with MISS  - will recheck FS and adjust insulin as needed

## 2023-02-14 NOTE — PROGRESS NOTE ADULT - PROBLEM SELECTOR PLAN 5
History DMT2 on insulin (long acting 16units and short acting 6units), A1c 12.7 11/2022  > A1c 7.9%  - increased Lantus to 16U qhs; c/w Admelog 7U TID and THOMAS Hypokalemia with serum K ~2.2 in setting of reported watery stools which may be diarrhea  - replete electrolytes as appropriate

## 2023-02-14 NOTE — PROGRESS NOTE ADULT - SUBJECTIVE AND OBJECTIVE BOX
Progress Note    02-09-23 (5d)    Patient is a 67y old  Female who presents with a chief complaint of Pneumonia (13 Feb 2023 19:25)      Subjective / Overnight Events :  - No acute events overnight.  - Pt seen and examined at bedside.     Additional ROS (if any):    MEDICATIONS  (STANDING):  amLODIPine   Tablet 5 milliGRAM(s) Oral daily  dextrose 5%. 1000 milliLiter(s) (50 mL/Hr) IV Continuous <Continuous>  dextrose 5%. 1000 milliLiter(s) (100 mL/Hr) IV Continuous <Continuous>  dextrose 50% Injectable 25 Gram(s) IV Push once  dextrose 50% Injectable 12.5 Gram(s) IV Push once  dextrose 50% Injectable 25 Gram(s) IV Push once  enoxaparin Injectable 40 milliGRAM(s) SubCutaneous every 24 hours  folic acid 1 milliGRAM(s) Oral daily  gabapentin 300 milliGRAM(s) Oral three times a day  glucagon  Injectable 1 milliGRAM(s) IntraMuscular once  insulin glargine Injectable (LANTUS) 16 Unit(s) SubCutaneous at bedtime  insulin lispro (ADMELOG) corrective regimen sliding scale   SubCutaneous three times a day before meals  insulin lispro (ADMELOG) corrective regimen sliding scale   SubCutaneous at bedtime  insulin lispro Injectable (ADMELOG) 7 Unit(s) SubCutaneous three times a day before meals  lactobacillus acidophilus 1 Tablet(s) Oral daily  lidocaine   4% Patch 1 Patch Transdermal daily  metoprolol succinate ER 25 milliGRAM(s) Oral daily  piperacillin/tazobactam IVPB.. 3.375 Gram(s) IV Intermittent every 8 hours  potassium chloride    Tablet ER 20 milliEquivalent(s) Oral daily  simvastatin 40 milliGRAM(s) Oral at bedtime    MEDICATIONS  (PRN):  acetaminophen     Tablet .. 1000 milliGRAM(s) Oral every 8 hours PRN Severe Pain (7 - 10)  dextrose Oral Gel 15 Gram(s) Oral once PRN Blood Glucose LESS THAN 70 milliGRAM(s)/deciliter  HYDROmorphone   Tablet 4 milliGRAM(s) Oral every 4 hours PRN Severe Pain (7 - 10)  melatonin 3 milliGRAM(s) Oral at bedtime PRN Insomnia          PHYSICAL EXAM:  Vital Signs Last 24 Hrs  T(C): 36.6 (14 Feb 2023 04:32), Max: 36.7 (13 Feb 2023 13:29)  T(F): 97.8 (14 Feb 2023 04:32), Max: 98 (13 Feb 2023 13:29)  HR: 105 (14 Feb 2023 04:32) (105 - 117)  BP: 132/78 (14 Feb 2023 04:32) (121/79 - 132/78)  BP(mean): --  RR: 18 (14 Feb 2023 04:32) (18 - 19)  SpO2: 95% (14 Feb 2023 04:32) (95% - 97%)    Parameters below as of 14 Feb 2023 04:32  Patient On (Oxygen Delivery Method): room air        I&O's Summary    12 Feb 2023 07:01  -  13 Feb 2023 07:00  --------------------------------------------------------  IN: 0 mL / OUT: 1000 mL / NET: -1000 mL    13 Feb 2023 07:01  -  14 Feb 2023 06:31  --------------------------------------------------------  IN: 500 mL / OUT: 1650 mL / NET: -1150 mL        General: NAD, non-toxic appearing   HEENT: PERRLA, EOMi, no scleral icterus  CV: RRR, normal S1 and S2, no m/r/g  Lungs: normal respiratory effort. CTAB, no wheezes, rales, or rhonchi  Abd: soft, nontender, nondistended  Ext: no edema, 2+ peripheral pulses   Pysch: AAOx3, appropriate affect   Neuro: grossly non-focal, moving all extremities spontaneously   Skin: no rashes or lesions     LABS:  CAPILLARY BLOOD GLUCOSE      POCT Blood Glucose.: 353 mg/dL (13 Feb 2023 22:13)  POCT Blood Glucose.: 339 mg/dL (13 Feb 2023 22:10)  POCT Blood Glucose.: 175 mg/dL (13 Feb 2023 17:22)  POCT Blood Glucose.: 259 mg/dL (13 Feb 2023 12:13)  POCT Blood Glucose.: 266 mg/dL (13 Feb 2023 08:31)                              8.9    18.95 )-----------( 619      ( 13 Feb 2023 12:48 )             28.8       WBC Trend: 18.95<--, 15.78<--, 16.39<--  Hb Trend: 8.9<--, 7.4<--, 9.3<--, 8.7<--, 9.0<--    02-13    134<L>  |  103  |  17  ----------------------------<  299<H>  5.1   |  22  |  0.47<L>    Ca    7.4<L>      13 Feb 2023 03:57  Phos  3.1     02-13  Mg     1.9     02-13    TPro  5.8<L>  /  Alb  2.2<L>  /  TBili  <0.1<L>  /  DBili  x   /  AST  23  /  ALT  24  /  AlkPhos  60  02-13              Culture - Acid Fast - Sputum w/Smear (collected 12 Feb 2023 19:39)  Source: .Sputum Sputum    Culture - Sputum (collected 12 Feb 2023 09:40)  Source: .Sputum Sputum  Gram Stain (12 Feb 2023 18:29):    Few Squamous epithelial cells per low power field    Moderate polymorphonuclear leukocytes per low power field    Few Gram positive cocci in pairs per oil power field  Preliminary Report (13 Feb 2023 16:38):    Normal Respiratory Janis present          RADIOLOGY & ADDITIONAL TESTS: Reviewed Progress Note    02-09-23 (5d)    Patient is a 67y old  Female who presents with a chief complaint of Pneumonia (13 Feb 2023 19:25)      Subjective / Overnight Events :  - No acute events overnight.  - Pt seen and examined at bedside. No complaints today. She denies SOB or sputum production w/ cough. No fever/chills. Denies n/v/c/d. Is unsure of her allergies to contrast as documented. In the past she mentions a number of years ago she has a rash and throat closure feeling with IV contrast. Unsure if MRI or CT performed at that time. She mentions that she has had bowel incontinence since her surgery. Was evaluated outpatient following her operation for persistent weakness.     Additional ROS (if any):    MEDICATIONS  (STANDING):  amLODIPine   Tablet 5 milliGRAM(s) Oral daily  dextrose 5%. 1000 milliLiter(s) (50 mL/Hr) IV Continuous <Continuous>  dextrose 5%. 1000 milliLiter(s) (100 mL/Hr) IV Continuous <Continuous>  dextrose 50% Injectable 25 Gram(s) IV Push once  dextrose 50% Injectable 12.5 Gram(s) IV Push once  dextrose 50% Injectable 25 Gram(s) IV Push once  enoxaparin Injectable 40 milliGRAM(s) SubCutaneous every 24 hours  folic acid 1 milliGRAM(s) Oral daily  gabapentin 300 milliGRAM(s) Oral three times a day  glucagon  Injectable 1 milliGRAM(s) IntraMuscular once  insulin glargine Injectable (LANTUS) 16 Unit(s) SubCutaneous at bedtime  insulin lispro (ADMELOG) corrective regimen sliding scale   SubCutaneous three times a day before meals  insulin lispro (ADMELOG) corrective regimen sliding scale   SubCutaneous at bedtime  insulin lispro Injectable (ADMELOG) 7 Unit(s) SubCutaneous three times a day before meals  lactobacillus acidophilus 1 Tablet(s) Oral daily  lidocaine   4% Patch 1 Patch Transdermal daily  metoprolol succinate ER 25 milliGRAM(s) Oral daily  piperacillin/tazobactam IVPB.. 3.375 Gram(s) IV Intermittent every 8 hours  potassium chloride    Tablet ER 20 milliEquivalent(s) Oral daily  simvastatin 40 milliGRAM(s) Oral at bedtime    MEDICATIONS  (PRN):  acetaminophen     Tablet .. 1000 milliGRAM(s) Oral every 8 hours PRN Severe Pain (7 - 10)  dextrose Oral Gel 15 Gram(s) Oral once PRN Blood Glucose LESS THAN 70 milliGRAM(s)/deciliter  HYDROmorphone   Tablet 4 milliGRAM(s) Oral every 4 hours PRN Severe Pain (7 - 10)  melatonin 3 milliGRAM(s) Oral at bedtime PRN Insomnia          PHYSICAL EXAM:  Vital Signs Last 24 Hrs  T(C): 36.6 (14 Feb 2023 04:32), Max: 36.7 (13 Feb 2023 13:29)  T(F): 97.8 (14 Feb 2023 04:32), Max: 98 (13 Feb 2023 13:29)  HR: 105 (14 Feb 2023 04:32) (105 - 117)  BP: 132/78 (14 Feb 2023 04:32) (121/79 - 132/78)  BP(mean): --  RR: 18 (14 Feb 2023 04:32) (18 - 19)  SpO2: 95% (14 Feb 2023 04:32) (95% - 97%)    Parameters below as of 14 Feb 2023 04:32  Patient On (Oxygen Delivery Method): room air        I&O's Summary    12 Feb 2023 07:01  -  13 Feb 2023 07:00  --------------------------------------------------------  IN: 0 mL / OUT: 1000 mL / NET: -1000 mL    13 Feb 2023 07:01  -  14 Feb 2023 06:31  --------------------------------------------------------  IN: 500 mL / OUT: 1650 mL / NET: -1150 mL        General: NAD, non-toxic appearing   HEENT: PERRLA, EOMi, no scleral icterus  CV: RRR, normal S1 and S2, no m/r/g  Lungs: clear to auscultation   Abd: soft, nontender, nondistended  Ext: no peripheral edema   Pysch: AAOx3, appropriate affect   Neuro: grossly non-focal, moving all extremities spontaneously. 0/5 strength at hips, 4/5 strength at knees, 5/5 strength at ankles. Sensation intact bilaterally 5/5.   Skin: no rashes or lesions     LABS:  CAPILLARY BLOOD GLUCOSE      POCT Blood Glucose.: 353 mg/dL (13 Feb 2023 22:13)  POCT Blood Glucose.: 339 mg/dL (13 Feb 2023 22:10)  POCT Blood Glucose.: 175 mg/dL (13 Feb 2023 17:22)  POCT Blood Glucose.: 259 mg/dL (13 Feb 2023 12:13)  POCT Blood Glucose.: 266 mg/dL (13 Feb 2023 08:31)                              8.9    18.95 )-----------( 619      ( 13 Feb 2023 12:48 )             28.8       WBC Trend: 18.95<--, 15.78<--, 16.39<--  Hb Trend: 8.9<--, 7.4<--, 9.3<--, 8.7<--, 9.0<--    02-13    134<L>  |  103  |  17  ----------------------------<  299<H>  5.1   |  22  |  0.47<L>    Ca    7.4<L>      13 Feb 2023 03:57  Phos  3.1     02-13  Mg     1.9     02-13    TPro  5.8<L>  /  Alb  2.2<L>  /  TBili  <0.1<L>  /  DBili  x   /  AST  23  /  ALT  24  /  AlkPhos  60  02-13              Culture - Acid Fast - Sputum w/Smear (collected 12 Feb 2023 19:39)  Source: .Sputum Sputum    Culture - Sputum (collected 12 Feb 2023 09:40)  Source: .Sputum Sputum  Gram Stain (12 Feb 2023 18:29):    Few Squamous epithelial cells per low power field    Moderate polymorphonuclear leukocytes per low power field    Few Gram positive cocci in pairs per oil power field  Preliminary Report (13 Feb 2023 16:38):    Normal Respiratory Janis present          RADIOLOGY & ADDITIONAL TESTS: Reviewed

## 2023-02-14 NOTE — PROGRESS NOTE ADULT - PROBLEM SELECTOR PLAN 4
Reported spinal surgery for herniated disc in patient with lumbar radiculopathy history per chart review.  - c/w hydromorphone 4mg for severe pain q4hrs prn  - c/w Tylenol 1000mg q8hrs for pain prn  - c/w Lidocaine patch for pain prn  - c/w gabapentin 300mg TID  - ordered repeat MR lumbosacral spine given persistent LE weakness, concern for epidural abscess? Reported watery stools but without blood or pitch black color with frequency of about 2 per day. May be in setting of recent antibiotic use. Rule out developing GI infection such as C. diff. Low suspicion at this time given not clinically diarrhea.  > CT A/P non-contrast noted Diffuse wall thickening of the proximal duodenum with periduodenal stranding, c/f duodenitis/peptic ulcer disease  > GI PCR negative; stool cx, stool O+P, c diff (neg)  - continue to monitor for now, no diarrhea as per patient

## 2023-02-14 NOTE — PROGRESS NOTE ADULT - PROBLEM SELECTOR PLAN 9
DVT prophylaxis: Lovenox 40mg qd  Diet: consistent carbs  Disposition: Pending clinical improvement  Code Status: full code - c/w atorvastatin 40mg qhs

## 2023-02-14 NOTE — PROGRESS NOTE ADULT - PROBLEM SELECTOR PLAN 7
Noted to have right thyroid nodule on imaging. TSH wnl.   > Thyroid US showing TIRADS 3 nodule >2.5cm, indicates need for FNA for further evaluation  - thyroid FNA can be completed outpatient On Amlodipine 5mg qd and metoprolol 25mg qd at home  - c/w amlodipine 5mg qd  - c/w metoprolol 25mg qd

## 2023-02-14 NOTE — PROGRESS NOTE ADULT - ASSESSMENT
67 F PMH DMT2, multiple falls, s/p lumbar microdiscectomy for radiculopathy presenting from [facility] with acute onset of malaise and wet coughing but without fevers, shortness of breath, or chest pain with CT chest non-contrast imaging with patchy/nodular opacities in bilateral lower lobes, centrally necrotic c/f infection vs. metastatic disease vs. septic emboli. Labs notable for leukocytosis and tachycardia with exam findings notable for wheezing/ronchus breath sounds. These findings concerning for sepsis secondary to pneumonia.

## 2023-02-14 NOTE — PROGRESS NOTE ADULT - PROBLEM SELECTOR PLAN 8
- c/w atorvastatin 40mg qhs Noted to have right thyroid nodule on imaging. TSH wnl.   > Thyroid US showing TIRADS 3 nodule >2.5cm, indicates need for FNA for further evaluation  - thyroid FNA can be completed outpatient

## 2023-02-14 NOTE — PROGRESS NOTE ADULT - PROBLEM SELECTOR PLAN 6
On Amlodipine 5mg qd and metoprolol 25mg qd at home  - c/w amlodipine 5mg qd  - c/w metoprolol 25mg qd History DMT2 on insulin (long acting 16units and short acting 6units), A1c 12.7 11/2022  > A1c 7.9%  - increased Lantus to 16U qhs; c/w Admelog 7U TID and THOMAS

## 2023-02-14 NOTE — PROGRESS NOTE ADULT - SUBJECTIVE AND OBJECTIVE BOX
CC: F/U for Cavitary lesion    Saw/spoke to patient. No fevers, no chills. No new complaints.    Allergies  aspirin (Anaphylaxis)  aspirin (Rash)  clindamycin (Unknown)  contrast media (iron oxide-based) (Nephrotoxicity)  fish (Hives)  IV Contrast (Anaphylaxis)  sulfa drugs (Rash)  vancomycin (Rash)  walnut (Anaphylaxis)    ANTIMICROBIALS:  piperacillin/tazobactam IVPB.. 3.375 every 8 hours    PE:    Vital Signs Last 24 Hrs  T(C): 36.5 (14 Feb 2023 11:00), Max: 36.6 (13 Feb 2023 21:50)  T(F): 97.7 (14 Feb 2023 11:00), Max: 97.8 (13 Feb 2023 21:50)  HR: 113 (14 Feb 2023 11:00) (105 - 117)  BP: 105/67 (14 Feb 2023 11:00) (105/67 - 132/78)  RR: 18 (14 Feb 2023 11:00) (18 - 19)  SpO2: 98% (14 Feb 2023 11:00) (95% - 98%)    Gen: AOx3, NAD, non-toxic  CV: Nontachycardic  Resp: Breathing comfortably, RA  Abd: Soft, nontender  IV/Skin: No thrombophlebitis    LABS:                        8.0    20.12 )-----------( 601      ( 14 Feb 2023 06:38 )             25.0     02-14    134<L>  |  100  |  19  ----------------------------<  350<H>  5.2   |  23  |  0.40<L>    Ca    8.7      14 Feb 2023 06:40  Phos  3.3     02-14  Mg     1.5     02-14    TPro  5.8<L>  /  Alb  2.2<L>  /  TBili  <0.1<L>  /  DBili  x   /  AST  23  /  ALT  24  /  AlkPhos  60  02-13    MICROBIOLOGY:    .Sputum Sputum  02-12-23 --  --  --    .Sputum Sputum  02-12-23   Normal Respiratory Janis present  --    Few Squamous epithelial cells per low power field  Moderate polymorphonuclear leukocytes per low power field  Few Gram positive cocci in pairs per oil power field    .Stool Feces  02-10-23   No Protozoa seen by trichrome stain  No Helminths or Protozoa seen in formalin concentrate  performed by iodine stain  (routine O+P not evaluated for Microsporidia,  Cryptosporidia or Cyclospora)  One negative sample does not necessarily rule  out the presence of a parasitic infection.  --  --    .Stool Feces  02-10-23   No enteric pathogens isolated.  (Stool culture examined for Salmonella,  Shigella, Campylobacter, Aeromonas, Plesiomonas,  Vibrio, E.coli O157 and Yersinia)  --  --    Clean Catch Clean Catch (Midstream)  02-09-23   50,000 - 99,000 CFU/mL Enterococcus faecium  50,000 - 99,000 CFU/mL Enterococcus faecalis  --  Enterococcus faecium  Enterococcus faecalis    .Blood Blood-Peripheral  02-09-23   No growth to date.  --  --    .Blood Blood-Peripheral  02-09-23   No growth to date.  --  --    Rapid RVP Result: NotDetec (02-09 @ 16:09)    Clostridium difficile GDH Toxins A&amp;B, EIA:   Negative (02-10-23 @ 22:15)  Clostridium difficile GDH Interpretation: Negative for toxigenic C. Difficile.  This specimen is negative for C.  Difficile glutamate dehydrogenase (GDH) antigen and negative for C.  Difficile Toxins A & B, by EIA.  GDH is a highly sensitive screening  marker for C. Difficile that is produced in large amounts by all C.  Difficile strains, both toxigenic and nontoxigenic.  This assay has not  been validated as a test of cure.  Repeat testing during the same episode  of diarrhea is of limited value and is discouraged.  The results of this  assay should always be interpreted in conjunction with pateint's clinical  history. (02-10-23 @ 22:15)    RADIOLOGY:    2/9 CT:    IMPRESSION:  Nodular and patchy opacities predominantly in the bilateral lower lobes   with many that are centrally necrotic concerning for infection.   Differential diagnosis includes metastatic disease and septic emboli.  Diffuse wall thickening of the proximal duodenum with periduodenal   stranding, concerning for duodenitis/peptic ulcer disease.  Nonobstructing right renal calculus  Right thyroid nodule may be evaluated with ultrasound on a nonemergent   basis.

## 2023-02-15 DIAGNOSIS — R09.02 HYPOXEMIA: ICD-10-CM

## 2023-02-15 DIAGNOSIS — R00.0 TACHYCARDIA, UNSPECIFIED: ICD-10-CM

## 2023-02-15 PROBLEM — M54.16 RADICULOPATHY, LUMBAR REGION: Chronic | Status: ACTIVE | Noted: 2023-02-09

## 2023-02-15 LAB
ANION GAP SERPL CALC-SCNC: 13 MMOL/L — SIGNIFICANT CHANGE UP (ref 5–17)
BUN SERPL-MCNC: 38 MG/DL — HIGH (ref 7–23)
CALCIUM SERPL-MCNC: 9.2 MG/DL — SIGNIFICANT CHANGE UP (ref 8.4–10.5)
CHLORIDE SERPL-SCNC: 101 MMOL/L — SIGNIFICANT CHANGE UP (ref 96–108)
CO2 SERPL-SCNC: 20 MMOL/L — LOW (ref 22–31)
CREAT SERPL-MCNC: 0.64 MG/DL — SIGNIFICANT CHANGE UP (ref 0.5–1.3)
EGFR: 96 ML/MIN/1.73M2 — SIGNIFICANT CHANGE UP
GAMMA INTERFERON BACKGROUND BLD IA-ACNC: 0.02 IU/ML — SIGNIFICANT CHANGE UP
GLUCOSE BLDC GLUCOMTR-MCNC: 197 MG/DL — HIGH (ref 70–99)
GLUCOSE BLDC GLUCOMTR-MCNC: 247 MG/DL — HIGH (ref 70–99)
GLUCOSE BLDC GLUCOMTR-MCNC: 255 MG/DL — HIGH (ref 70–99)
GLUCOSE BLDC GLUCOMTR-MCNC: 266 MG/DL — HIGH (ref 70–99)
GLUCOSE BLDC GLUCOMTR-MCNC: 314 MG/DL — HIGH (ref 70–99)
GLUCOSE BLDC GLUCOMTR-MCNC: 344 MG/DL — HIGH (ref 70–99)
GLUCOSE BLDC GLUCOMTR-MCNC: 376 MG/DL — HIGH (ref 70–99)
GLUCOSE SERPL-MCNC: 248 MG/DL — HIGH (ref 70–99)
HCT VFR BLD CALC: 25.1 % — LOW (ref 34.5–45)
HGB BLD-MCNC: 7.8 G/DL — LOW (ref 11.5–15.5)
M TB IFN-G BLD-IMP: ABNORMAL
M TB IFN-G CD4+ BCKGRND COR BLD-ACNC: 0 IU/ML — SIGNIFICANT CHANGE UP
M TB IFN-G CD4+CD8+ BCKGRND COR BLD-ACNC: 0 IU/ML — SIGNIFICANT CHANGE UP
MAGNESIUM SERPL-MCNC: 2.1 MG/DL — SIGNIFICANT CHANGE UP (ref 1.6–2.6)
MCHC RBC-ENTMCNC: 20.5 PG — LOW (ref 27–34)
MCHC RBC-ENTMCNC: 31.1 GM/DL — LOW (ref 32–36)
MCV RBC AUTO: 66.1 FL — LOW (ref 80–100)
NRBC # BLD: 0 /100 WBCS — SIGNIFICANT CHANGE UP (ref 0–0)
PHOSPHATE SERPL-MCNC: 4 MG/DL — SIGNIFICANT CHANGE UP (ref 2.5–4.5)
PLATELET # BLD AUTO: 701 K/UL — HIGH (ref 150–400)
POTASSIUM SERPL-MCNC: 5.3 MMOL/L — SIGNIFICANT CHANGE UP (ref 3.5–5.3)
POTASSIUM SERPL-SCNC: 5.3 MMOL/L — SIGNIFICANT CHANGE UP (ref 3.5–5.3)
QUANT TB PLUS MITOGEN MINUS NIL: 0.1 IU/ML — SIGNIFICANT CHANGE UP
RBC # BLD: 3.8 M/UL — SIGNIFICANT CHANGE UP (ref 3.8–5.2)
RBC # FLD: 14.7 % — HIGH (ref 10.3–14.5)
SODIUM SERPL-SCNC: 134 MMOL/L — LOW (ref 135–145)
WBC # BLD: 17.91 K/UL — HIGH (ref 3.8–10.5)
WBC # FLD AUTO: 17.91 K/UL — HIGH (ref 3.8–10.5)

## 2023-02-15 PROCEDURE — 99233 SBSQ HOSP IP/OBS HIGH 50: CPT

## 2023-02-15 PROCEDURE — 93970 EXTREMITY STUDY: CPT | Mod: 26

## 2023-02-15 PROCEDURE — 99232 SBSQ HOSP IP/OBS MODERATE 35: CPT

## 2023-02-15 RX ORDER — INSULIN GLARGINE 100 [IU]/ML
22 INJECTION, SOLUTION SUBCUTANEOUS AT BEDTIME
Refills: 0 | Status: DISCONTINUED | OUTPATIENT
Start: 2023-02-15 | End: 2023-02-17

## 2023-02-15 RX ORDER — INSULIN LISPRO 100/ML
12 VIAL (ML) SUBCUTANEOUS
Refills: 0 | Status: DISCONTINUED | OUTPATIENT
Start: 2023-02-15 | End: 2023-02-17

## 2023-02-15 RX ORDER — SODIUM CHLORIDE 9 MG/ML
4 INJECTION INTRAMUSCULAR; INTRAVENOUS; SUBCUTANEOUS EVERY 12 HOURS
Refills: 0 | Status: DISCONTINUED | OUTPATIENT
Start: 2023-02-15 | End: 2023-02-17

## 2023-02-15 RX ORDER — INSULIN LISPRO 100/ML
VIAL (ML) SUBCUTANEOUS
Refills: 0 | Status: DISCONTINUED | OUTPATIENT
Start: 2023-02-15 | End: 2023-02-24

## 2023-02-15 RX ADMIN — PIPERACILLIN AND TAZOBACTAM 25 GRAM(S): 4; .5 INJECTION, POWDER, LYOPHILIZED, FOR SOLUTION INTRAVENOUS at 14:05

## 2023-02-15 RX ADMIN — GABAPENTIN 300 MILLIGRAM(S): 400 CAPSULE ORAL at 22:27

## 2023-02-15 RX ADMIN — PIPERACILLIN AND TAZOBACTAM 25 GRAM(S): 4; .5 INJECTION, POWDER, LYOPHILIZED, FOR SOLUTION INTRAVENOUS at 06:34

## 2023-02-15 RX ADMIN — Medication 20 MILLIEQUIVALENT(S): at 14:01

## 2023-02-15 RX ADMIN — Medication 4: at 22:25

## 2023-02-15 RX ADMIN — Medication 3 MILLIGRAM(S): at 22:26

## 2023-02-15 RX ADMIN — Medication 1 TABLET(S): at 14:02

## 2023-02-15 RX ADMIN — ENOXAPARIN SODIUM 40 MILLIGRAM(S): 100 INJECTION SUBCUTANEOUS at 06:33

## 2023-02-15 RX ADMIN — GABAPENTIN 300 MILLIGRAM(S): 400 CAPSULE ORAL at 06:33

## 2023-02-15 RX ADMIN — LIDOCAINE 1 PATCH: 4 CREAM TOPICAL at 17:48

## 2023-02-15 RX ADMIN — Medication 25 MILLIGRAM(S): at 06:33

## 2023-02-15 RX ADMIN — Medication 6: at 06:34

## 2023-02-15 RX ADMIN — Medication 2: at 17:54

## 2023-02-15 RX ADMIN — Medication 1000 MILLIGRAM(S): at 23:00

## 2023-02-15 RX ADMIN — Medication 1000 MILLIGRAM(S): at 22:26

## 2023-02-15 RX ADMIN — LIDOCAINE 1 PATCH: 4 CREAM TOPICAL at 14:04

## 2023-02-15 RX ADMIN — Medication 7 UNIT(S): at 08:25

## 2023-02-15 RX ADMIN — SIMVASTATIN 40 MILLIGRAM(S): 20 TABLET, FILM COATED ORAL at 22:26

## 2023-02-15 RX ADMIN — Medication 7 UNIT(S): at 13:59

## 2023-02-15 RX ADMIN — PIPERACILLIN AND TAZOBACTAM 25 GRAM(S): 4; .5 INJECTION, POWDER, LYOPHILIZED, FOR SOLUTION INTRAVENOUS at 22:22

## 2023-02-15 RX ADMIN — GABAPENTIN 300 MILLIGRAM(S): 400 CAPSULE ORAL at 14:02

## 2023-02-15 RX ADMIN — Medication 1 MILLIGRAM(S): at 14:03

## 2023-02-15 RX ADMIN — Medication 6: at 09:11

## 2023-02-15 RX ADMIN — Medication 12 UNIT(S): at 17:55

## 2023-02-15 RX ADMIN — Medication 10: at 13:59

## 2023-02-15 RX ADMIN — INSULIN GLARGINE 22 UNIT(S): 100 INJECTION, SOLUTION SUBCUTANEOUS at 22:27

## 2023-02-15 RX ADMIN — AMLODIPINE BESYLATE 5 MILLIGRAM(S): 2.5 TABLET ORAL at 06:33

## 2023-02-15 NOTE — PROGRESS NOTE ADULT - PROBLEM SELECTOR PLAN 3
Reported spinal surgery for herniated disc in patient with lumbar radiculopathy history per chart review.  - c/w hydromorphone 4mg for severe pain q4hrs prn  - c/w Tylenol 1000mg q8hrs for pain prn  - c/w Lidocaine patch for pain prn  - c/w gabapentin 300mg TID  - ordered repeat MR lumbosacral/Pelvis spine given persistent LE weakness Reported spinal surgery for herniated disc in patient with lumbar radiculopathy history per chart review.  > MR lumbar/pelvis with concerning stenosis, however, as per Ortho, patient without acute new findings and so can defer consult for outpatient evaluation by primary surgeon  - c/w hydromorphone 4mg for severe pain q4hrs prn  - c/w Tylenol 1000mg q8hrs for pain prn  - c/w Lidocaine patch for pain prn  - c/w gabapentin 300mg TID Increased to 492 w/o signs of DKA. S/p methylprednisolone for pre-medication for MRI.   > coverage with MISS  - resolving, now back on regular diet

## 2023-02-15 NOTE — PROGRESS NOTE ADULT - SUBJECTIVE AND OBJECTIVE BOX
CC: F/U for Abnormal finding on imaging    Saw/spoke to patient. No fevers, no chills. No new complaints.    Allergies  aspirin (Anaphylaxis)  aspirin (Rash)  clindamycin (Unknown)  contrast media (iron oxide-based) (Nephrotoxicity)  fish (Hives)  IV Contrast (Anaphylaxis)  sulfa drugs (Rash)  vancomycin (Rash)  walnut (Anaphylaxis)    ANTIMICROBIALS:  piperacillin/tazobactam IVPB.. 3.375 every 8 hours    PE:    Vital Signs Last 24 Hrs  T(C): 36.8 (15 Feb 2023 11:30), Max: 36.8 (14 Feb 2023 21:00)  T(F): 98.2 (15 Feb 2023 11:30), Max: 98.3 (14 Feb 2023 21:00)  HR: 100 (15 Feb 2023 11:30) (100 - 114)  BP: 126/61 (15 Feb 2023 11:30) (121/75 - 132/84)  RR: 18 (15 Feb 2023 11:30) (18 - 18)  SpO2: 98% (15 Feb 2023 11:30) (95% - 98%)    Gen: AOx3, NAD, non-toxic  CV: Nontachycardic  Resp: Breathing comfortably, RA  Abd: Soft, nontender  IV/Skin: No thrombophlebitis    LABS:                        7.8    17.91 )-----------( 701      ( 15 Feb 2023 07:03 )             25.1     02-15    134<L>  |  101  |  38<H>  ----------------------------<  248<H>  5.3   |  20<L>  |  0.64    Ca    9.2      15 Feb 2023 07:01  Phos  4.0     02-15  Mg     2.1     02-15    MICROBIOLOGY:    .Sputum Sputum  02-12-23 --  --  --    .Sputum Sputum  02-12-23   Normal Respiratory Janis present  --    Few Squamous epithelial cells per low power field  Moderate polymorphonuclear leukocytes per low power field  Few Gram positive cocci in pairs per oil power field    .Stool Feces  02-10-23   No Protozoa seen by trichrome stain  No Helminths or Protozoa seen in formalin concentrate  performed by iodine stain  (routine O+P not evaluated for Microsporidia,  Cryptosporidia or Cyclospora)  One negative sample does not necessarily rule  out the presence of a parasitic infection.  --  --    .Stool Feces  02-10-23   No enteric pathogens isolated.  (Stool culture examined for Salmonella,  Shigella, Campylobacter, Aeromonas, Plesiomonas,  Vibrio, E.coli O157 and Yersinia)  --  --    Clean Catch Clean Catch (Midstream)  02-09-23   50,000 - 99,000 CFU/mL Enterococcus faecium  50,000 - 99,000 CFU/mL Enterococcus faecalis  --  Enterococcus faecium  Enterococcus faecalis    .Blood Blood-Peripheral  02-09-23   No Growth Final  --  --    .Blood Blood-Peripheral  02-09-23   No Growth Final  --  --    HIV-1 RNA Quantitative, Viral Load Log: NOT DET. lg /mL (02-14-23 @ 06:38)    Rapid RVP Result: NotDetec (02-09 @ 16:09)    Clostridium difficile GDH Toxins A&amp;B, EIA:   Negative (02-10-23 @ 22:15)  Clostridium difficile GDH Interpretation: Negative for toxigenic C. Difficile.  This specimen is negative for C.  Difficile glutamate dehydrogenase (GDH) antigen and negative for C.  Difficile Toxins A & B, by EIA.  GDH is a highly sensitive screening  marker for C. Difficile that is produced in large amounts by all C.  Difficile strains, both toxigenic and nontoxigenic.  This assay has not  been validated as a test of cure.  Repeat testing during the same episode  of diarrhea is of limited value and is discouraged.  The results of this  assay should always be interpreted in conjunction with pateint's clinical  history. (02-10-23 @ 22:15)    RADIOLOGY:    2/14 MR:    IMPRESSION:  1.  Lumbosacral plexus appears maintained.  2.  Muscle edema may reflect a nonspecific myositis.  3.  Multilevel degenerative disc disease of the lumbar spine with a   degree of congenital spinal stenosis.  4.  Postsurgical changes of L5-S1 with moderate right and severe left   lateral recess narrowing and mild-to-moderate central stenosis.   Nonenhancing left paracentral disc extrusion or fragment is present that   abuts the descending left S1 and S2 nerve root.  5.  Mild to moderate lower lumbar neural foraminal stenosis is present.

## 2023-02-15 NOTE — PROGRESS NOTE ADULT - PROBLEM SELECTOR PLAN 1
Increased to 492 w/o signs of DKA. S/p methylprednisolone for pre-medication for MRI.   > coverage with MISS  - will recheck FS and adjust insulin as needed Leukocytosis + tachycardia = SIRS+ in conjunction with CT imaging with patchy/nodular opacities in setting of coughing concerning for sepsis secondary to pneumonia likely HAP given her recent surgery and [place] placement. Ddx: legionella pneumonia given patient with reported watery stools; however does not meet diarrhea definition of at least 3 unformed stools per day.  CTAP (2/9): Nodular and patchy opacities predominantly in the bilateral lower lobes with many that are centrally necrotic concerning for infection. Differential diagnosis includes metastatic disease and septic emboli. TTE (2/10): EF 67%, grossly normal findings. BCx (NGTD), UCx (E. faecium), urine legionella (negative), MRSA (negative), fungitel (negative)  > patient recently on meropenem in [place] for pneumonia/UTI?  > consults: ID, pulm   - resolved cough, subjective improvement in symptoms, no fevers or SOB  - Zosyn Day 6 (2/9 -) - will ask ID if still needed iso improvement and no cultures to date Persistently tachycardic since admission. Hypoxic to 95% on room air. No symptoms, however, recent spinal surgery with significant functional impairment and immobility, wheelchair bound. EKG with sinus tachycardia, morphologically similar P waves that are upright in lead II.   > concern for PE  - LE dopplers, if positive can consider CTA however patient with IV contrast allergy, can pre-medicate if high suspicion

## 2023-02-15 NOTE — PROGRESS NOTE ADULT - ASSESSMENT
66 yo F DM2, falls, microdiscectomy, from [facility] with cough, fevers  No fever, has leukocytosis  Initially from [place] with cough/fevers, given abx, then high volume diarrhea  CT with nodular/patchy opacities in bilateral lower lobes; thickening of duodenum, duodenitis, renal stone  PCT minimally elevated  RVP neg  GI PCR neg  Legionella urine ag neg  UA neg/equivocal  Quant gold IND  From [facility] (decades ago), no history of exposure to TB  Uncertain etiology pulmonary lesions at this point  Lung lesion DDx: Malignancy, fungal, septic emboli, NTM/TB, lung abscess?  Overall,  1) Lung Lesions  - Septic emboli vs metastatic disease by radiology; lesions not present on CT in 11/2022  - Zosyn 3.375g q 8, 7 days then discontinue  - Isolate (airborne), and obtain sputum samples AFB x 3--if not producing would have respiratory attempt to induce to provide sputums  - F/U urine histoplasma  - If prelim workup negative, would consult Pulmonary for bronchoscopy  - F/U BCXs x 2  - TTE reassuring, hold on HAJA for now  2) Diarrhea  - If ongoing high volume diarrhea, Check C diff PCR  - Monitor--antibiotic associated diarrhea?  - Follow up pending stool tests  3) Leukocytosis  - Reactive to underlying lung process?  - Trend to normal    Fidel Lester MD  Contact on TEAMS messaging from 9am - 5pm  From 5pm-9am, on weekends, or if no response call [phone]

## 2023-02-15 NOTE — PROGRESS NOTE ADULT - PROBLEM SELECTOR PLAN 4
Reported watery stools but without blood or pitch black color with frequency of about 2 per day. May be in setting of recent antibiotic use. Rule out developing GI infection such as C. diff. Low suspicion at this time given not clinically diarrhea.  > CT A/P non-contrast noted Diffuse wall thickening of the proximal duodenum with periduodenal stranding, c/f duodenitis/peptic ulcer disease  > GI PCR negative; stool cx, stool O+P, c diff (neg)  - continue to monitor for now, no diarrhea as per patient Reported watery stools but without blood or pitch black color with frequency of about 2 per day. May be in setting of recent antibiotic use. Rule out developing GI infection such as C. diff. Low suspicion at this time given not clinically diarrhea. CT A/P non-contrast noted Diffuse wall thickening of the proximal duodenum with periduodenal stranding, c/f duodenitis/peptic ulcer disease. GI PCR negative; stool cx, stool O+P, c diff (neg)  > resolved Reported spinal surgery for herniated disc in patient with lumbar radiculopathy history per chart review.  > MR lumbar/pelvis with concerning stenosis, however, as per Ortho, patient without acute new findings and so can defer consult for outpatient evaluation by primary surgeon  - c/w hydromorphone 4mg for severe pain q4hrs prn  - c/w Tylenol 1000mg q8hrs for pain prn  - c/w Lidocaine patch for pain prn  - c/w gabapentin 300mg TID

## 2023-02-15 NOTE — PROGRESS NOTE ADULT - PROBLEM SELECTOR PLAN 10
DVT prophylaxis: Lovenox 40mg qd  Diet: consistent carbs  Disposition: Pending clinical improvement  Code Status: full code DVT prophylaxis: Lovenox 40mg qd  Diet: consistent carbs  Disposition: Pending clinical improvement  Code Status: pending [facility] - c/w atorvastatin 40mg qhs

## 2023-02-15 NOTE — PROGRESS NOTE ADULT - SUBJECTIVE AND OBJECTIVE BOX
Progress Note    02-09-23 (6d)    Patient is a 67y old  Female who presents with a chief complaint of Pneumonia (14 Feb 2023 06:30)      Subjective / Overnight Events :  - No acute events overnight.  - Pt seen and examined at bedside.     Additional ROS (if any):    MEDICATIONS  (STANDING):  amLODIPine   Tablet 5 milliGRAM(s) Oral daily  dextrose 5%. 1000 milliLiter(s) (100 mL/Hr) IV Continuous <Continuous>  dextrose 5%. 1000 milliLiter(s) (50 mL/Hr) IV Continuous <Continuous>  dextrose 50% Injectable 25 Gram(s) IV Push once  dextrose 50% Injectable 12.5 Gram(s) IV Push once  dextrose 50% Injectable 25 Gram(s) IV Push once  enoxaparin Injectable 40 milliGRAM(s) SubCutaneous every 24 hours  folic acid 1 milliGRAM(s) Oral daily  gabapentin 300 milliGRAM(s) Oral three times a day  glucagon  Injectable 1 milliGRAM(s) IntraMuscular once  insulin glargine Injectable (LANTUS) 16 Unit(s) SubCutaneous at bedtime  insulin lispro (ADMELOG) corrective regimen sliding scale   SubCutaneous every 4 hours  insulin lispro (ADMELOG) corrective regimen sliding scale   SubCutaneous at bedtime  insulin lispro Injectable (ADMELOG) 7 Unit(s) SubCutaneous three times a day before meals  lactobacillus acidophilus 1 Tablet(s) Oral daily  lidocaine   4% Patch 1 Patch Transdermal daily  metoprolol succinate ER 25 milliGRAM(s) Oral daily  piperacillin/tazobactam IVPB.. 3.375 Gram(s) IV Intermittent every 8 hours  potassium chloride    Tablet ER 20 milliEquivalent(s) Oral daily  simvastatin 40 milliGRAM(s) Oral at bedtime    MEDICATIONS  (PRN):  acetaminophen     Tablet .. 1000 milliGRAM(s) Oral every 8 hours PRN Severe Pain (7 - 10)  dextrose Oral Gel 15 Gram(s) Oral once PRN Blood Glucose LESS THAN 70 milliGRAM(s)/deciliter  HYDROmorphone   Tablet 4 milliGRAM(s) Oral every 4 hours PRN Severe Pain (7 - 10)  melatonin 3 milliGRAM(s) Oral at bedtime PRN Insomnia          PHYSICAL EXAM:  Vital Signs Last 24 Hrs  T(C): 36.7 (15 Feb 2023 04:56), Max: 36.8 (14 Feb 2023 21:00)  T(F): 98 (15 Feb 2023 04:56), Max: 98.3 (14 Feb 2023 21:00)  HR: 111 (15 Feb 2023 04:56) (111 - 114)  BP: 121/75 (15 Feb 2023 04:56) (105/67 - 132/84)  BP(mean): --  RR: 18 (15 Feb 2023 04:56) (18 - 18)  SpO2: 95% (15 Feb 2023 04:56) (95% - 98%)    Parameters below as of 15 Feb 2023 04:56  Patient On (Oxygen Delivery Method): room air        I&O's Summary    13 Feb 2023 07:01  -  14 Feb 2023 07:00  --------------------------------------------------------  IN: 500 mL / OUT: 1650 mL / NET: -1150 mL    14 Feb 2023 07:01  -  15 Feb 2023 06:25  --------------------------------------------------------  IN: 480 mL / OUT: 900 mL / NET: -420 mL        General: NAD, non-toxic appearing   HEENT: PERRLA, EOMi, no scleral icterus  CV: RRR, normal S1 and S2, no m/r/g  Lungs: normal respiratory effort. CTAB, no wheezes, rales, or rhonchi  Abd: soft, nontender, nondistended  Ext: no edema, 2+ peripheral pulses   Pysch: AAOx3, appropriate affect   Neuro: grossly non-focal, moving all extremities spontaneously   Skin: no rashes or lesions     LABS:  CAPILLARY BLOOD GLUCOSE      POCT Blood Glucose.: 247 mg/dL (15 Feb 2023 04:04)  POCT Blood Glucose.: 314 mg/dL (15 Feb 2023 00:36)  POCT Blood Glucose.: 300 mg/dL (14 Feb 2023 21:54)  POCT Blood Glucose.: 267 mg/dL (14 Feb 2023 20:16)  POCT Blood Glucose.: 386 mg/dL (14 Feb 2023 16:12)  POCT Blood Glucose.: 492 mg/dL (14 Feb 2023 11:55)  POCT Blood Glucose.: 453 mg/dL (14 Feb 2023 09:09)  POCT Blood Glucose.: 448 mg/dL (14 Feb 2023 07:57)                              8.0    20.12 )-----------( 601      ( 14 Feb 2023 06:38 )             25.0       WBC Trend: 20.12<--, 18.95<--, 15.78<--  Hb Trend: 8.0<--, 8.9<--, 7.4<--, 9.3<--, 8.7<--    02-14    134<L>  |  100  |  19  ----------------------------<  350<H>  5.2   |  23  |  0.40<L>    Ca    8.7      14 Feb 2023 06:40  Phos  3.3     02-14  Mg     1.5     02-14                Culture - Acid Fast - Sputum w/Smear (collected 12 Feb 2023 19:39)  Source: .Sputum Sputum    Culture - Sputum (collected 12 Feb 2023 09:40)  Source: .Sputum Sputum  Gram Stain (12 Feb 2023 18:29):    Few Squamous epithelial cells per low power field    Moderate polymorphonuclear leukocytes per low power field    Few Gram positive cocci in pairs per oil power field  Final Report (14 Feb 2023 15:56):    Normal Respiratory Janis present          RADIOLOGY & ADDITIONAL TESTS: Reviewed Progress Note    02-09-23 (6d)    Patient is a 67y old  Female who presents with a chief complaint of Pneumonia (14 Feb 2023 06:30)      Subjective / Overnight Events :  - No acute events overnight.  - Pt seen and examined at bedside. No complaints. States that her cough is completely resolved. No SOB. Just reporting that she is very hungry because she was not able to eat yesterday. GOC discussion held. Of note, patient has had bowel incontinence since after the surgery and is currently with a vonda under her. She is able to control her urine.     Additional ROS (if any):    MEDICATIONS  (STANDING):  amLODIPine   Tablet 5 milliGRAM(s) Oral daily  dextrose 5%. 1000 milliLiter(s) (100 mL/Hr) IV Continuous <Continuous>  dextrose 5%. 1000 milliLiter(s) (50 mL/Hr) IV Continuous <Continuous>  dextrose 50% Injectable 25 Gram(s) IV Push once  dextrose 50% Injectable 12.5 Gram(s) IV Push once  dextrose 50% Injectable 25 Gram(s) IV Push once  enoxaparin Injectable 40 milliGRAM(s) SubCutaneous every 24 hours  folic acid 1 milliGRAM(s) Oral daily  gabapentin 300 milliGRAM(s) Oral three times a day  glucagon  Injectable 1 milliGRAM(s) IntraMuscular once  insulin glargine Injectable (LANTUS) 16 Unit(s) SubCutaneous at bedtime  insulin lispro (ADMELOG) corrective regimen sliding scale   SubCutaneous every 4 hours  insulin lispro (ADMELOG) corrective regimen sliding scale   SubCutaneous at bedtime  insulin lispro Injectable (ADMELOG) 7 Unit(s) SubCutaneous three times a day before meals  lactobacillus acidophilus 1 Tablet(s) Oral daily  lidocaine   4% Patch 1 Patch Transdermal daily  metoprolol succinate ER 25 milliGRAM(s) Oral daily  piperacillin/tazobactam IVPB.. 3.375 Gram(s) IV Intermittent every 8 hours  potassium chloride    Tablet ER 20 milliEquivalent(s) Oral daily  simvastatin 40 milliGRAM(s) Oral at bedtime    MEDICATIONS  (PRN):  acetaminophen     Tablet .. 1000 milliGRAM(s) Oral every 8 hours PRN Severe Pain (7 - 10)  dextrose Oral Gel 15 Gram(s) Oral once PRN Blood Glucose LESS THAN 70 milliGRAM(s)/deciliter  HYDROmorphone   Tablet 4 milliGRAM(s) Oral every 4 hours PRN Severe Pain (7 - 10)  melatonin 3 milliGRAM(s) Oral at bedtime PRN Insomnia          PHYSICAL EXAM:  Vital Signs Last 24 Hrs  T(C): 36.7 (15 Feb 2023 04:56), Max: 36.8 (14 Feb 2023 21:00)  T(F): 98 (15 Feb 2023 04:56), Max: 98.3 (14 Feb 2023 21:00)  HR: 111 (15 Feb 2023 04:56) (111 - 114)  BP: 121/75 (15 Feb 2023 04:56) (105/67 - 132/84)  BP(mean): --  RR: 18 (15 Feb 2023 04:56) (18 - 18)  SpO2: 95% (15 Feb 2023 04:56) (95% - 98%)    Parameters below as of 15 Feb 2023 04:56  Patient On (Oxygen Delivery Method): room air        I&O's Summary    13 Feb 2023 07:01  -  14 Feb 2023 07:00  --------------------------------------------------------  IN: 500 mL / OUT: 1650 mL / NET: -1150 mL    14 Feb 2023 07:01  -  15 Feb 2023 06:25  --------------------------------------------------------  IN: 480 mL / OUT: 900 mL / NET: -420 mL        General: NAD, non-toxic appearing   HEENT: PERRLA, EOMi, no scleral icterus  CV: RRR, normal S1 and S2, no m/r/g  Lungs: limited exam but clear to auscultation   Abd: soft, nontender, nondistended  Ext: no edema, 2+ peripheral pulses   Pysch: AAOx3, appropriate affect   Neuro: 0/5 strength at hips, 3/5 strength at knees, 5/5 strength at ankles. Sensation throughout. +bowel incontinence (not new finding)  Skin: no rashes or lesions     LABS:  CAPILLARY BLOOD GLUCOSE      POCT Blood Glucose.: 247 mg/dL (15 Feb 2023 04:04)  POCT Blood Glucose.: 314 mg/dL (15 Feb 2023 00:36)  POCT Blood Glucose.: 300 mg/dL (14 Feb 2023 21:54)  POCT Blood Glucose.: 267 mg/dL (14 Feb 2023 20:16)  POCT Blood Glucose.: 386 mg/dL (14 Feb 2023 16:12)  POCT Blood Glucose.: 492 mg/dL (14 Feb 2023 11:55)  POCT Blood Glucose.: 453 mg/dL (14 Feb 2023 09:09)  POCT Blood Glucose.: 448 mg/dL (14 Feb 2023 07:57)                              8.0    20.12 )-----------( 601      ( 14 Feb 2023 06:38 )             25.0       WBC Trend: 20.12<--, 18.95<--, 15.78<--  Hb Trend: 8.0<--, 8.9<--, 7.4<--, 9.3<--, 8.7<--    02-14    134<L>  |  100  |  19  ----------------------------<  350<H>  5.2   |  23  |  0.40<L>    Ca    8.7      14 Feb 2023 06:40  Phos  3.3     02-14  Mg     1.5     02-14                Culture - Acid Fast - Sputum w/Smear (collected 12 Feb 2023 19:39)  Source: .Sputum Sputum    Culture - Sputum (collected 12 Feb 2023 09:40)  Source: .Sputum Sputum  Gram Stain (12 Feb 2023 18:29):    Few Squamous epithelial cells per low power field    Moderate polymorphonuclear leukocytes per low power field    Few Gram positive cocci in pairs per oil power field  Final Report (14 Feb 2023 15:56):    Normal Respiratory Janis present          RADIOLOGY & ADDITIONAL TESTS: Reviewed

## 2023-02-15 NOTE — PROGRESS NOTE ADULT - ASSESSMENT
67 F PMH DMT2, multiple falls, s/p lumbar microdiscectomy for radiculopathy presenting from [facility] with acute onset of malaise and wet coughing but without fevers, shortness of breath, or chest pain with CT chest non-contrast imaging with patchy/nodular opacities in bilateral lower lobes, centrally necrotic c/f infection vs. metastatic disease vs. septic emboli. Labs notable for leukocytosis and tachycardia with exam findings notable for wheezing/ronchus breath sounds. These findings concerning for sepsis secondary to pneumonia.

## 2023-02-15 NOTE — PROGRESS NOTE ADULT - PROBLEM SELECTOR PLAN 6
History DMT2 on insulin (long acting 16units and short acting 6units), A1c 12.7 11/2022  > A1c 7.9%  - increased Lantus to 16U qhs; c/w Admelog 7U TID and THOMAS History DMT2 on insulin (long acting 16units and short acting 6units), A1c 12.7 11/2022  > A1c 7.9%  - Lantus to 16U qhs, Admelog 7U TID + THOMAS Hypokalemia with serum K ~2.2 in setting of reported watery stools which may be diarrhea

## 2023-02-15 NOTE — PROGRESS NOTE ADULT - PROBLEM SELECTOR PLAN 11
DVT prophylaxis: Lovenox 40mg qd  Diet: consistent carbs  Disposition: Pending clinical improvement  Code Status: pending [facility]

## 2023-02-15 NOTE — PROGRESS NOTE ADULT - PROBLEM SELECTOR PLAN 2
Leukocytosis + tachycardia = SIRS+ in conjunction with CT imaging with patchy/nodular opacities in setting of coughing concerning for sepsis secondary to pneumonia likely HAP given her recent surgery and [place] placement. Ddx: legionella pneumonia given patient with reported watery stools; however does not meet diarrhea definition of at least 3 unformed stools per day.  CTAP (2/9): Nodular and patchy opacities predominantly in the bilateral lower lobes with many that are centrally necrotic concerning for infection. Differential diagnosis includes metastatic disease and septic emboli. TTE (2/10): EF 67%, grossly normal findings. BCx (NGTD), UCx (E. faecium), urine legionella (negative), MRSA (negative), fungitel (negative)  > patient recently on meropenem in [place] for pneumonia/UTI?  > consults: ID, pulm   - c/w Zosyn (2/9- ; day 5) for possible HAP pseudomonal coverage; cannot do vancomycin for MRSA coverage as patient noted to have vancomycin allergy Increased to 492 w/o signs of DKA. S/p methylprednisolone for pre-medication for MRI.   > coverage with MISS  - resolving, now back on regular diet Leukocytosis + tachycardia = SIRS+ in conjunction with CT imaging with patchy/nodular opacities in setting of coughing concerning for sepsis secondary to pneumonia likely HAP given her recent surgery and [place] placement. Ddx: legionella pneumonia given patient with reported watery stools; however does not meet diarrhea definition of at least 3 unformed stools per day.  CTAP (2/9): Nodular and patchy opacities predominantly in the bilateral lower lobes with many that are centrally necrotic concerning for infection. Differential diagnosis includes metastatic disease and septic emboli. TTE (2/10): EF 67%, grossly normal findings. BCx (NGTD), UCx (E. faecium), urine legionella (negative), MRSA (negative), fungitel (negative)  > patient recently on meropenem in [place] for pneumonia/UTI?  > consults: ID, pulm   - resolved cough, subjective improvement in symptoms, no fevers or SOB  - Zosyn Day 6 (2/9 -), as per ID d/c after 7 days total

## 2023-02-15 NOTE — PROGRESS NOTE ADULT - ATTENDING COMMENTS
67F with DMT2, multiple falls, s/p lumbar microdiscectomy for radiculopathy in 11/2022 presented from [facility] with chronic cough, recent hospitalization followed by [place] stay, previously treated with Meropenem. Pt met sepsis 2/2 to acute bacterial PNA, CT chest with nodular and patchy opacities predominantly in the bilateral lower lobes with many that are centrally necrotic concerning for infection vs septic emboli vs metastatic disease.     # Lung lesions  - ddx septic emboli vs malignancy vs fungal vs TB  - id and pulm recs appreciated  - c/w zosyn, plan for 7 day course  - low suspicion for TB, but obtain sputum AFB x 3; induce sputum if needed  - BCx NGTD, TTE reassuring, hold off HAJA for now  - if ID workup is unremarkable, will need further onc workup (pan scan with contrast to identify primary, age appropriate cancer screening) and possible diagnostic broncoscopy    # Diarrhea  - Diffuse wall thickening of the proximal duodenum with periduodenal stranding, concerning for duodenitis/peptic ulcer disease.   - GI PCR neg,  neg c diff   - needs opt colposcopy    # Leg weakness, chronic  - f/u MRIs - d/w ortho; will follow-up outpatient as these are not acute findings    # Thyroid nodule   - will need outpatient FNA; d/w endo to arrange follow-up    Rest of plan as above, d/w HS 67F with DMT2, multiple falls, s/p lumbar microdiscectomy for radiculopathy in 11/2022 presented from [facility] with chronic cough, recent hospitalization followed by [place] stay, previously treated with Meropenem. Pt met sepsis 2/2 to acute bacterial PNA, CT chest with nodular and patchy opacities predominantly in the bilateral lower lobes with many that are centrally necrotic concerning for infection vs septic emboli vs metastatic disease.     # Lung lesions  - ddx septic emboli vs malignancy vs fungal vs TB  - id and pulm recs appreciated  - c/w zosyn, plan for 7 day course  - low suspicion for TB, but obtain sputum AFB x 3; induce sputum if needed  - BCx NGTD, TTE reassuring, hold off HAJA for now  - if ID workup is unremarkable, will need further onc workup (pan scan with contrast to identify primary, age appropriate cancer screening) and possible diagnostic bronchoscopy    # Diarrhea  - Diffuse wall thickening of the proximal duodenum with periduodenal stranding, concerning for duodenitis/peptic ulcer disease.   - GI PCR neg,  neg c diff   - needs opt colonoscopy    # Leg weakness, chronic  - f/u MRIs - d/w ortho; will follow-up outpatient as these are not acute findings    # Thyroid nodule   - will need outpatient FNA; d/w endo to arrange follow-up    Rest of plan as above, d/w HS

## 2023-02-15 NOTE — PROGRESS NOTE ADULT - PROBLEM SELECTOR PLAN 5
Hypokalemia with serum K ~2.2 in setting of reported watery stools which may be diarrhea  - replete electrolytes as appropriate Hypokalemia with serum K ~2.2 in setting of reported watery stools which may be diarrhea Reported watery stools but without blood or pitch black color with frequency of about 2 per day. May be in setting of recent antibiotic use. Rule out developing GI infection such as C. diff. Low suspicion at this time given not clinically diarrhea. CT A/P non-contrast noted Diffuse wall thickening of the proximal duodenum with periduodenal stranding, c/f duodenitis/peptic ulcer disease. GI PCR negative; stool cx, stool O+P, c diff (neg)  > resolved

## 2023-02-15 NOTE — PROGRESS NOTE ADULT - PROBLEM SELECTOR PLAN 7
On Amlodipine 5mg qd and metoprolol 25mg qd at home  - c/w amlodipine 5mg qd  - c/w metoprolol 25mg qd History DMT2 on insulin (long acting 16units and short acting 6units), A1c 12.7 11/2022  > A1c 7.9%  - Lantus to 16U qhs, Admelog 7U TID + THOMAS

## 2023-02-15 NOTE — PROGRESS NOTE ADULT - CONVERSATION DETAILS
Discussed chest compressions and intubation including benefits and risks. Patient demonstrates good understanding of need for compressions and intubation. Expressed that she does not want resuscitation (chest compressions) and doesn't think that she would want intubation. States that she wants her daughter to sign the MOLST form. Will discuss with daughter regarding her mothers decisions and sign MOLST. Discussed chest compressions and intubation including benefits and risks. Patient demonstrates good understanding of need for compressions and intubation. Expressed that she does not want resuscitation (chest compressions) and doesn't think that she would want intubation. States that she wants her daughter to sign the MOLST form.    Daughter (Gino Kelley, HCP) called and discussion regarding resuscitation measures held. As per the daughter, she is the documented health care proxy and had a discussion with her mother in the past regarding this. Her mother has expressed to her that she would want resuscitation measures including compressions and intubation but would never want a tracheostomy. Due to the differing answers, daughter states that she will have a discussion with her mother and allow her to express herself to the healthcare team afterwards. The daughter would want to do what the patient wants and prefers.     The patient remains full code as of this progress note.

## 2023-02-16 LAB
ANION GAP SERPL CALC-SCNC: 10 MMOL/L — SIGNIFICANT CHANGE UP (ref 5–17)
AUTO DIFF PNL BLD: NEGATIVE — SIGNIFICANT CHANGE UP
BUN SERPL-MCNC: 33 MG/DL — HIGH (ref 7–23)
C-ANCA SER-ACNC: NEGATIVE — SIGNIFICANT CHANGE UP
CALCIUM SERPL-MCNC: 8.7 MG/DL — SIGNIFICANT CHANGE UP (ref 8.4–10.5)
CHLORIDE SERPL-SCNC: 105 MMOL/L — SIGNIFICANT CHANGE UP (ref 96–108)
CO2 SERPL-SCNC: 21 MMOL/L — LOW (ref 22–31)
CREAT SERPL-MCNC: 0.51 MG/DL — SIGNIFICANT CHANGE UP (ref 0.5–1.3)
EGFR: 101 ML/MIN/1.73M2 — SIGNIFICANT CHANGE UP
GLUCOSE BLDC GLUCOMTR-MCNC: 148 MG/DL — HIGH (ref 70–99)
GLUCOSE BLDC GLUCOMTR-MCNC: 167 MG/DL — HIGH (ref 70–99)
GLUCOSE BLDC GLUCOMTR-MCNC: 181 MG/DL — HIGH (ref 70–99)
GLUCOSE BLDC GLUCOMTR-MCNC: 284 MG/DL — HIGH (ref 70–99)
GLUCOSE BLDC GLUCOMTR-MCNC: 293 MG/DL — HIGH (ref 70–99)
GLUCOSE BLDC GLUCOMTR-MCNC: 307 MG/DL — HIGH (ref 70–99)
GLUCOSE SERPL-MCNC: 163 MG/DL — HIGH (ref 70–99)
HCT VFR BLD CALC: 24.6 % — LOW (ref 34.5–45)
HGB BLD-MCNC: 7.7 G/DL — LOW (ref 11.5–15.5)
MAGNESIUM SERPL-MCNC: 1.6 MG/DL — SIGNIFICANT CHANGE UP (ref 1.6–2.6)
MCHC RBC-ENTMCNC: 21.3 PG — LOW (ref 27–34)
MCHC RBC-ENTMCNC: 31.3 GM/DL — LOW (ref 32–36)
MCV RBC AUTO: 68.1 FL — LOW (ref 80–100)
MPO AB + PR3 PNL SER: SIGNIFICANT CHANGE UP
NRBC # BLD: 0 /100 WBCS — SIGNIFICANT CHANGE UP (ref 0–0)
P-ANCA SER-ACNC: NEGATIVE — SIGNIFICANT CHANGE UP
PHOSPHATE SERPL-MCNC: 3.9 MG/DL — SIGNIFICANT CHANGE UP (ref 2.5–4.5)
PLATELET # BLD AUTO: 620 K/UL — HIGH (ref 150–400)
POTASSIUM SERPL-MCNC: 4.9 MMOL/L — SIGNIFICANT CHANGE UP (ref 3.5–5.3)
POTASSIUM SERPL-SCNC: 4.9 MMOL/L — SIGNIFICANT CHANGE UP (ref 3.5–5.3)
RBC # BLD: 3.61 M/UL — LOW (ref 3.8–5.2)
RBC # FLD: 14.7 % — HIGH (ref 10.3–14.5)
SODIUM SERPL-SCNC: 136 MMOL/L — SIGNIFICANT CHANGE UP (ref 135–145)
WBC # BLD: 13.7 K/UL — HIGH (ref 3.8–10.5)
WBC # FLD AUTO: 13.7 K/UL — HIGH (ref 3.8–10.5)

## 2023-02-16 PROCEDURE — 99233 SBSQ HOSP IP/OBS HIGH 50: CPT

## 2023-02-16 PROCEDURE — 99233 SBSQ HOSP IP/OBS HIGH 50: CPT | Mod: GC

## 2023-02-16 RX ORDER — GABAPENTIN 400 MG/1
400 CAPSULE ORAL THREE TIMES A DAY
Refills: 0 | Status: DISCONTINUED | OUTPATIENT
Start: 2023-02-16 | End: 2023-02-24

## 2023-02-16 RX ORDER — METOPROLOL TARTRATE 50 MG
50 TABLET ORAL DAILY
Refills: 0 | Status: DISCONTINUED | OUTPATIENT
Start: 2023-02-17 | End: 2023-02-20

## 2023-02-16 RX ORDER — METOPROLOL TARTRATE 50 MG
25 TABLET ORAL ONCE
Refills: 0 | Status: COMPLETED | OUTPATIENT
Start: 2023-02-16 | End: 2023-02-16

## 2023-02-16 RX ORDER — HYDROMORPHONE HYDROCHLORIDE 2 MG/ML
2 INJECTION INTRAMUSCULAR; INTRAVENOUS; SUBCUTANEOUS ONCE
Refills: 0 | Status: DISCONTINUED | OUTPATIENT
Start: 2023-02-16 | End: 2023-02-16

## 2023-02-16 RX ORDER — MAGNESIUM SULFATE 500 MG/ML
2 VIAL (ML) INJECTION ONCE
Refills: 0 | Status: COMPLETED | OUTPATIENT
Start: 2023-02-16 | End: 2023-02-16

## 2023-02-16 RX ADMIN — Medication 2: at 08:44

## 2023-02-16 RX ADMIN — Medication 1000 MILLIGRAM(S): at 22:58

## 2023-02-16 RX ADMIN — Medication 2: at 22:59

## 2023-02-16 RX ADMIN — Medication 1 TABLET(S): at 12:36

## 2023-02-16 RX ADMIN — PIPERACILLIN AND TAZOBACTAM 25 GRAM(S): 4; .5 INJECTION, POWDER, LYOPHILIZED, FOR SOLUTION INTRAVENOUS at 14:51

## 2023-02-16 RX ADMIN — PIPERACILLIN AND TAZOBACTAM 25 GRAM(S): 4; .5 INJECTION, POWDER, LYOPHILIZED, FOR SOLUTION INTRAVENOUS at 06:44

## 2023-02-16 RX ADMIN — PIPERACILLIN AND TAZOBACTAM 25 GRAM(S): 4; .5 INJECTION, POWDER, LYOPHILIZED, FOR SOLUTION INTRAVENOUS at 21:23

## 2023-02-16 RX ADMIN — GABAPENTIN 300 MILLIGRAM(S): 400 CAPSULE ORAL at 06:45

## 2023-02-16 RX ADMIN — GABAPENTIN 400 MILLIGRAM(S): 400 CAPSULE ORAL at 14:51

## 2023-02-16 RX ADMIN — Medication 12 UNIT(S): at 08:44

## 2023-02-16 RX ADMIN — LIDOCAINE 1 PATCH: 4 CREAM TOPICAL at 18:59

## 2023-02-16 RX ADMIN — Medication 25 GRAM(S): at 10:23

## 2023-02-16 RX ADMIN — Medication 1000 MILLIGRAM(S): at 09:49

## 2023-02-16 RX ADMIN — AMLODIPINE BESYLATE 5 MILLIGRAM(S): 2.5 TABLET ORAL at 06:46

## 2023-02-16 RX ADMIN — Medication 3 MILLIGRAM(S): at 22:58

## 2023-02-16 RX ADMIN — Medication 12 UNIT(S): at 12:36

## 2023-02-16 RX ADMIN — ENOXAPARIN SODIUM 40 MILLIGRAM(S): 100 INJECTION SUBCUTANEOUS at 06:44

## 2023-02-16 RX ADMIN — Medication 1000 MILLIGRAM(S): at 08:49

## 2023-02-16 RX ADMIN — Medication 1 MILLIGRAM(S): at 12:36

## 2023-02-16 RX ADMIN — Medication 20 MILLIEQUIVALENT(S): at 12:36

## 2023-02-16 RX ADMIN — HYDROMORPHONE HYDROCHLORIDE 2 MILLIGRAM(S): 2 INJECTION INTRAMUSCULAR; INTRAVENOUS; SUBCUTANEOUS at 00:10

## 2023-02-16 RX ADMIN — SIMVASTATIN 40 MILLIGRAM(S): 20 TABLET, FILM COATED ORAL at 21:23

## 2023-02-16 RX ADMIN — Medication 25 MILLIGRAM(S): at 06:45

## 2023-02-16 RX ADMIN — Medication 12 UNIT(S): at 17:20

## 2023-02-16 RX ADMIN — GABAPENTIN 400 MILLIGRAM(S): 400 CAPSULE ORAL at 21:23

## 2023-02-16 RX ADMIN — LIDOCAINE 1 PATCH: 4 CREAM TOPICAL at 01:05

## 2023-02-16 RX ADMIN — HYDROMORPHONE HYDROCHLORIDE 2 MILLIGRAM(S): 2 INJECTION INTRAMUSCULAR; INTRAVENOUS; SUBCUTANEOUS at 00:40

## 2023-02-16 RX ADMIN — Medication 25 MILLIGRAM(S): at 11:02

## 2023-02-16 RX ADMIN — LIDOCAINE 1 PATCH: 4 CREAM TOPICAL at 12:35

## 2023-02-16 RX ADMIN — INSULIN GLARGINE 22 UNIT(S): 100 INJECTION, SOLUTION SUBCUTANEOUS at 23:00

## 2023-02-16 RX ADMIN — Medication 6: at 17:20

## 2023-02-16 NOTE — PROGRESS NOTE ADULT - SUBJECTIVE AND OBJECTIVE BOX
CHIEF COMPLAINT:Patient is a 67y old  Female who presents with a chief complaint of Pneumonia (16 Feb 2023 05:11)      Interval Events:    REVIEW OF SYSTEMS:  [x] All other systems negative except per HPI   [ ] Unable to assess ROS because ________    OBJECTIVE:  ICU Vital Signs Last 24 Hrs  T(C): 36.7 (16 Feb 2023 05:09), Max: 36.8 (15 Feb 2023 11:30)  T(F): 98.1 (16 Feb 2023 05:09), Max: 98.2 (15 Feb 2023 11:30)  HR: 117 (16 Feb 2023 05:09) (100 - 118)  BP: 130/80 (16 Feb 2023 05:09) (113/67 - 130/80)  BP(mean): --  ABP: --  ABP(mean): --  RR: 18 (16 Feb 2023 05:09) (18 - 18)  SpO2: 97% (16 Feb 2023 05:09) (97% - 98%)    O2 Parameters below as of 16 Feb 2023 05:09  Patient On (Oxygen Delivery Method): room air              02-15 @ 07:01  -  02-16 @ 07:00  --------------------------------------------------------  IN: 480 mL / OUT: 850 mL / NET: -370 mL        PHYSICAL EXAM:  GENERAL: NAD, well-groomed, well-developed  HEAD:  Atraumatic, Normocephalic  EYES: EOMI, PERRLA, conjunctiva and sclera clear  ENMT: No tonsillar erythema, exudates, or enlargement; Moist mucous membranes, Good dentition, No lesions  NECK: Supple, No JVD, Normal thyroid  CHEST/LUNG: Clear to auscultation bilaterally; No rales, rhonchi, wheezing, or rubs  HEART: Regular rate and rhythm; No murmurs, rubs, or gallops  ABDOMEN: Soft, Nontender, Nondistended; Bowel sounds present  VASCULAR:  2+ Peripheral Pulses, No clubbing, cyanosis, or edema  LYMPH: No lymphadenopathy noted  SKIN: No rashes or lesions  NERVOUS SYSTEM:  Alert & Oriented X3, Good concentration; Motor Strength 5/5 B/L upper and lower extremities; DTRs 2+ intact and symmetric    HOSPITAL MEDICATIONS:  MEDICATIONS  (STANDING):  amLODIPine   Tablet 5 milliGRAM(s) Oral daily  dextrose 5%. 1000 milliLiter(s) (50 mL/Hr) IV Continuous <Continuous>  dextrose 5%. 1000 milliLiter(s) (100 mL/Hr) IV Continuous <Continuous>  dextrose 50% Injectable 25 Gram(s) IV Push once  dextrose 50% Injectable 12.5 Gram(s) IV Push once  dextrose 50% Injectable 25 Gram(s) IV Push once  enoxaparin Injectable 40 milliGRAM(s) SubCutaneous every 24 hours  folic acid 1 milliGRAM(s) Oral daily  gabapentin 300 milliGRAM(s) Oral three times a day  glucagon  Injectable 1 milliGRAM(s) IntraMuscular once  insulin glargine Injectable (LANTUS) 22 Unit(s) SubCutaneous at bedtime  insulin lispro (ADMELOG) corrective regimen sliding scale   SubCutaneous at bedtime  insulin lispro (ADMELOG) corrective regimen sliding scale   SubCutaneous three times a day before meals  insulin lispro Injectable (ADMELOG) 12 Unit(s) SubCutaneous three times a day before meals  lactobacillus acidophilus 1 Tablet(s) Oral daily  lidocaine   4% Patch 1 Patch Transdermal daily  metoprolol succinate ER 25 milliGRAM(s) Oral daily  piperacillin/tazobactam IVPB.. 3.375 Gram(s) IV Intermittent every 8 hours  potassium chloride    Tablet ER 20 milliEquivalent(s) Oral daily  simvastatin 40 milliGRAM(s) Oral at bedtime  sodium chloride 3%  Inhalation 4 milliLiter(s) Inhalation every 12 hours    MEDICATIONS  (PRN):  acetaminophen     Tablet .. 1000 milliGRAM(s) Oral every 8 hours PRN Severe Pain (7 - 10)  dextrose Oral Gel 15 Gram(s) Oral once PRN Blood Glucose LESS THAN 70 milliGRAM(s)/deciliter  melatonin 3 milliGRAM(s) Oral at bedtime PRN Insomnia      LABS:    The Labs were reviewed by me   The Radiology was reviewed by me    EKG tracing reviewed by me    02-16    136  |  105  |  33<H>  ----------------------------<  163<H>  4.9   |  21<L>  |  0.51  02-15    134<L>  |  101  |  38<H>  ----------------------------<  248<H>  5.3   |  20<L>  |  0.64  02-14    134<L>  |  100  |  19  ----------------------------<  350<H>  5.2   |  23  |  0.40<L>    Ca    8.7      16 Feb 2023 07:06  Ca    9.2      15 Feb 2023 07:01  Ca    8.7      14 Feb 2023 06:40  Phos  3.9     02-16  Mg     1.6     02-16      Magnesium, Serum: 1.6 mg/dL (02-16-23 @ 07:06)  Magnesium, Serum: 2.1 mg/dL (02-15-23 @ 07:01)  Magnesium, Serum: 1.5 mg/dL (02-14-23 @ 06:40)  Magnesium, Serum: 1.9 mg/dL (02-13-23 @ 12:48)    Phosphorus Level, Serum: 3.9 mg/dL (02-16-23 @ 07:06)  Phosphorus Level, Serum: 4.0 mg/dL (02-15-23 @ 07:01)  Phosphorus Level, Serum: 3.3 mg/dL (02-14-23 @ 06:40)                                              7.7    13.70 )-----------( 620      ( 16 Feb 2023 07:03 )             24.6                         7.8    17.91 )-----------( 701      ( 15 Feb 2023 07:03 )             25.1                         8.0    20.12 )-----------( 601      ( 14 Feb 2023 06:38 )             25.0     CAPILLARY BLOOD GLUCOSE      POCT Blood Glucose.: 181 mg/dL (16 Feb 2023 06:21)  POCT Blood Glucose.: 307 mg/dL (16 Feb 2023 00:38)  POCT Blood Glucose.: 344 mg/dL (15 Feb 2023 21:34)  POCT Blood Glucose.: 197 mg/dL (15 Feb 2023 16:48)  POCT Blood Glucose.: 376 mg/dL (15 Feb 2023 13:45)  POCT Blood Glucose.: 255 mg/dL (15 Feb 2023 08:14)        MICROBIOLOGY:     RADIOLOGY:  [ ] Reviewed and interpreted by me    Point of Care Ultrasound Findings:    PFT:    EKG: CHIEF COMPLAINT:Patient is a 67y old  Female who presents with a chief complaint of Pneumonia (16 Feb 2023 05:11)      Interval Events: no overnight events, although was not able to be induced, still stating that she has mucus this morning and cough. breathing  otherwise stable    REVIEW OF SYSTEMS: Patient denies fevers, chills, chest pain, nausea, abdominal pain, diarrhea, constipation, dysuria, leg swelling, headache, light headedness  [x] All other systems negative except per HPI   [ ] Unable to assess ROS because ________    OBJECTIVE:  ICU Vital Signs Last 24 Hrs  T(C): 36.7 (16 Feb 2023 05:09), Max: 36.8 (15 Feb 2023 11:30)  T(F): 98.1 (16 Feb 2023 05:09), Max: 98.2 (15 Feb 2023 11:30)  HR: 117 (16 Feb 2023 05:09) (100 - 118)  BP: 130/80 (16 Feb 2023 05:09) (113/67 - 130/80)  BP(mean): --  ABP: --  ABP(mean): --  RR: 18 (16 Feb 2023 05:09) (18 - 18)  SpO2: 97% (16 Feb 2023 05:09) (97% - 98%)    O2 Parameters below as of 16 Feb 2023 05:09  Patient On (Oxygen Delivery Method): room air              02-15 @ 07:01  -  02-16 @ 07:00  --------------------------------------------------------  IN: 480 mL / OUT: 850 mL / NET: -370 mL        PHYSICAL EXAM:  GENERAL: NAD, well-groomed, well-developed  HEAD:  Atraumatic, Normocephalic  EYES: EOMI, PERRLA, conjunctiva and sclera clear  ENMT: No tonsillar erythema, exudates, or enlargement; Moist mucous membranes, Good dentition, No lesions  NECK: Supple, No JVD, Normal thyroid  CHEST/LUNG: Clear to auscultation bilaterally; No rales, rhonchi, wheezing, or rubs  HEART: Regular rate and rhythm; No murmurs, rubs, or gallops  ABDOMEN: Soft, Nontender, Nondistended; Bowel sounds present  VASCULAR:  2+ Peripheral Pulses, No clubbing, cyanosis, or edema  LYMPH: No lymphadenopathy noted  SKIN: No rashes or lesions  NERVOUS SYSTEM:  Alert & Oriented X3, Good concentration; Motor Strength 5/5 B/L upper and lower extremities; DTRs 2+ intact and symmetric    HOSPITAL MEDICATIONS:  MEDICATIONS  (STANDING):  amLODIPine   Tablet 5 milliGRAM(s) Oral daily  dextrose 5%. 1000 milliLiter(s) (50 mL/Hr) IV Continuous <Continuous>  dextrose 5%. 1000 milliLiter(s) (100 mL/Hr) IV Continuous <Continuous>  dextrose 50% Injectable 25 Gram(s) IV Push once  dextrose 50% Injectable 12.5 Gram(s) IV Push once  dextrose 50% Injectable 25 Gram(s) IV Push once  enoxaparin Injectable 40 milliGRAM(s) SubCutaneous every 24 hours  folic acid 1 milliGRAM(s) Oral daily  gabapentin 300 milliGRAM(s) Oral three times a day  glucagon  Injectable 1 milliGRAM(s) IntraMuscular once  insulin glargine Injectable (LANTUS) 22 Unit(s) SubCutaneous at bedtime  insulin lispro (ADMELOG) corrective regimen sliding scale   SubCutaneous at bedtime  insulin lispro (ADMELOG) corrective regimen sliding scale   SubCutaneous three times a day before meals  insulin lispro Injectable (ADMELOG) 12 Unit(s) SubCutaneous three times a day before meals  lactobacillus acidophilus 1 Tablet(s) Oral daily  lidocaine   4% Patch 1 Patch Transdermal daily  metoprolol succinate ER 25 milliGRAM(s) Oral daily  piperacillin/tazobactam IVPB.. 3.375 Gram(s) IV Intermittent every 8 hours  potassium chloride    Tablet ER 20 milliEquivalent(s) Oral daily  simvastatin 40 milliGRAM(s) Oral at bedtime  sodium chloride 3%  Inhalation 4 milliLiter(s) Inhalation every 12 hours    MEDICATIONS  (PRN):  acetaminophen     Tablet .. 1000 milliGRAM(s) Oral every 8 hours PRN Severe Pain (7 - 10)  dextrose Oral Gel 15 Gram(s) Oral once PRN Blood Glucose LESS THAN 70 milliGRAM(s)/deciliter  melatonin 3 milliGRAM(s) Oral at bedtime PRN Insomnia      LABS:    The Labs were reviewed by me   The Radiology was reviewed by me    EKG tracing reviewed by me    02-16    136  |  105  |  33<H>  ----------------------------<  163<H>  4.9   |  21<L>  |  0.51  02-15    134<L>  |  101  |  38<H>  ----------------------------<  248<H>  5.3   |  20<L>  |  0.64  02-14    134<L>  |  100  |  19  ----------------------------<  350<H>  5.2   |  23  |  0.40<L>    Ca    8.7      16 Feb 2023 07:06  Ca    9.2      15 Feb 2023 07:01  Ca    8.7      14 Feb 2023 06:40  Phos  3.9     02-16  Mg     1.6     02-16      Magnesium, Serum: 1.6 mg/dL (02-16-23 @ 07:06)  Magnesium, Serum: 2.1 mg/dL (02-15-23 @ 07:01)  Magnesium, Serum: 1.5 mg/dL (02-14-23 @ 06:40)  Magnesium, Serum: 1.9 mg/dL (02-13-23 @ 12:48)    Phosphorus Level, Serum: 3.9 mg/dL (02-16-23 @ 07:06)  Phosphorus Level, Serum: 4.0 mg/dL (02-15-23 @ 07:01)  Phosphorus Level, Serum: 3.3 mg/dL (02-14-23 @ 06:40)                                              7.7    13.70 )-----------( 620      ( 16 Feb 2023 07:03 )             24.6                         7.8    17.91 )-----------( 701      ( 15 Feb 2023 07:03 )             25.1                         8.0    20.12 )-----------( 601      ( 14 Feb 2023 06:38 )             25.0     CAPILLARY BLOOD GLUCOSE      POCT Blood Glucose.: 181 mg/dL (16 Feb 2023 06:21)  POCT Blood Glucose.: 307 mg/dL (16 Feb 2023 00:38)  POCT Blood Glucose.: 344 mg/dL (15 Feb 2023 21:34)  POCT Blood Glucose.: 197 mg/dL (15 Feb 2023 16:48)  POCT Blood Glucose.: 376 mg/dL (15 Feb 2023 13:45)  POCT Blood Glucose.: 255 mg/dL (15 Feb 2023 08:14)        MICROBIOLOGY:     RADIOLOGY:  [ ] Reviewed and interpreted by me    Point of Care Ultrasound Findings:    PFT:    EKG:

## 2023-02-16 NOTE — PROGRESS NOTE ADULT - PROBLEM SELECTOR PLAN 3
Increased to 492 w/o signs of DKA. S/p methylprednisolone for pre-medication for MRI.   > coverage with MISS  - resolving, now back on regular diet Increased to 492 w/o signs of DKA. S/p methylprednisolone for pre-medication for MRI.   > resolved

## 2023-02-16 NOTE — PROGRESS NOTE ADULT - ATTENDING COMMENTS
Pt is a 67F with PMHx DMII and hx recent lumbar microdiscectomy now presenting from [facility] to [facility] on 2/9/23 with productive cough and lethargy likely 2/2 cavitary PNA. Pt clinically well appearing and in no respiratory distress on RA, remains hemodynamically stable with downtrending fever curve and improving leukocytosis while on empiric abx therapy for cavitary PNA. Blood and sputum cx otherwise NTD. TTE unremarkable.   -AFB x3 pending, would continue to induce sputum with collection.   -Agree with continuation of empiric antibiotic therapy   -If pt does not respond to antibiotic therapy will consider diagnostic bronchoscopy, but pt appears to be clinically responding to treatment for cavitary PNA. Clinical suspicion for pulmonary TB remains low.   -Will need serial imaging to ensure radiographic resolution in 4-6 weeks  -Pulmonary will continue to follow

## 2023-02-16 NOTE — PROGRESS NOTE ADULT - ASSESSMENT
67 F PMH DMT2, multiple falls, s/p lumbar microdiscectomy for radiculopathy presenting from [facility] with acute onset of malaise and wet coughing but without fevers, shortness of breath, or chest pain with CT chest non-contrast imaging with patchy/nodular opacities in bilateral lower lobes, centrally necrotic c/f infection vs. metastatic disease vs. septic emboli. Labs notable for leukocytosis and tachycardia with exam findings notable for wheezing/ronchus breath sounds. These findings concerning for sepsis secondary to pneumonia.   67 F PMH DMT2, multiple falls, s/p lumbar microdiscectomy for radiculopathy presenting from [facility] with acute onset of malaise and wet coughing but without fevers, shortness of breath, or chest pain with CT chest non-contrast imaging with patchy/nodular opacities in bilateral lower lobes, centrally necrotic c/f infection vs. metastatic disease vs. septic emboli. S/p 7 day treatment with broad spectrum abx with resolution of symptoms, but without elucidating underlying etiology. Pending further workup with concern for cavitary lesions versus malignancy.

## 2023-02-16 NOTE — PROGRESS NOTE ADULT - PROBLEM SELECTOR PLAN 2
Leukocytosis + tachycardia = SIRS+ in conjunction with CT imaging with patchy/nodular opacities in setting of coughing concerning for sepsis secondary to pneumonia likely HAP given her recent surgery and [place] placement. Ddx: legionella pneumonia given patient with reported watery stools; however does not meet diarrhea definition of at least 3 unformed stools per day.  CTAP (2/9): Nodular and patchy opacities predominantly in the bilateral lower lobes with many that are centrally necrotic concerning for infection. Differential diagnosis includes metastatic disease and septic emboli. TTE (2/10): EF 67%, grossly normal findings. BCx (NGTD), UCx (E. faecium), urine legionella (negative), MRSA (negative), fungitel (negative)  > patient recently on meropenem in [place] for pneumonia/UTI?  > consults: ID, pulm   - resolved cough, subjective improvement in symptoms, no fevers or SOB  - Zosyn Day 6 (2/9 -), as per ID d/c after 7 days total Leukocytosis + tachycardia = SIRS+ in conjunction with CT imaging with patchy/nodular opacities in setting of coughing concerning for sepsis secondary to pneumonia likely HAP given her recent surgery and [place] placement. Ddx: legionella pneumonia given patient with reported watery stools; however does not meet diarrhea definition of at least 3 unformed stools per day.  CTAP (2/9): Nodular and patchy opacities predominantly in the bilateral lower lobes with many that are centrally necrotic concerning for infection. Differential diagnosis includes metastatic disease and septic emboli. TTE (2/10): EF 67%, grossly normal findings. BCx (NGTD), UCx (E. faecium), urine legionella (negative), MRSA (negative), fungitel (negative)  > patient recently on meropenem in [place] for pneumonia/UTI?  > consults: ID, pulm   - resolved cough, subjective improvement in symptoms, no fevers or SOB  - Zosyn Day 7 (2/9 -), will d/c today but still no etiology as to cavitary lesions  - f/u pulm for further evaluation with possible bronch? Leukocytosis + tachycardia = SIRS+ in conjunction with CT imaging with patchy/nodular opacities in setting of coughing concerning for sepsis secondary to pneumonia likely HAP given her recent surgery and [place] placement. Ddx: legionella pneumonia given patient with reported watery stools; however does not meet diarrhea definition of at least 3 unformed stools per day.  CTAP (2/9): Nodular and patchy opacities predominantly in the bilateral lower lobes with many that are centrally necrotic concerning for infection. Differential diagnosis includes metastatic disease and septic emboli. TTE (2/10): EF 67%, grossly normal findings. BCx (NGTD), UCx (E. faecium), urine legionella (negative), MRSA (negative), fungitel (negative)  > patient recently on meropenem in [place] for pneumonia/UTI?  > consults: ID, pulm   - resolved cough, subjective improvement in symptoms, no fevers or SOB  - Zosyn Day 7 (2/9 -), will d/c   - pulm with no plans for intervention, will await repeat Quant TB

## 2023-02-16 NOTE — PROGRESS NOTE ADULT - PROBLEM SELECTOR PLAN 11
DVT prophylaxis: Lovenox 40mg qd  Diet: consistent carbs  Disposition: Pending clinical improvement  Code Status: pending [facility] DVT prophylaxis: Lovenox 40mg qd  Diet: consistent carbs  Disposition: d/c planning to rehab  Code Status: pending JENNIFER

## 2023-02-16 NOTE — PROGRESS NOTE ADULT - PROBLEM SELECTOR PLAN 4
Reported spinal surgery for herniated disc in patient with lumbar radiculopathy history per chart review.  > MR lumbar/pelvis with concerning stenosis, however, as per Ortho, patient without acute new findings and so can defer consult for outpatient evaluation by primary surgeon  - c/w hydromorphone 4mg for severe pain q4hrs prn  - c/w Tylenol 1000mg q8hrs for pain prn  - c/w Lidocaine patch for pain prn  - c/w gabapentin 300mg TID Reported spinal surgery for herniated disc in patient with lumbar radiculopathy history per chart review.  > MR lumbar/pelvis with concerning stenosis, however, as per Ortho, patient without acute new findings and so can defer consult for outpatient evaluation by primary surgeon  - c/w hydromorphone 4mg for severe pain q4hrs prn  - c/w Tylenol 1000mg q8hrs for pain prn  - c/w Lidocaine patch for pain prn  - increased gabapentin 400mg TID

## 2023-02-16 NOTE — PROGRESS NOTE ADULT - SUBJECTIVE AND OBJECTIVE BOX
Progress Note    02-09-23 (7d)    Patient is a 67y old  Female who presents with a chief complaint of Pneumonia (15 Feb 2023 06:25)      Subjective / Overnight Events :  - No acute events overnight.  - Pt seen and examined at bedside.     Additional ROS (if any):    MEDICATIONS  (STANDING):  amLODIPine   Tablet 5 milliGRAM(s) Oral daily  dextrose 5%. 1000 milliLiter(s) (50 mL/Hr) IV Continuous <Continuous>  dextrose 5%. 1000 milliLiter(s) (100 mL/Hr) IV Continuous <Continuous>  dextrose 50% Injectable 25 Gram(s) IV Push once  dextrose 50% Injectable 12.5 Gram(s) IV Push once  dextrose 50% Injectable 25 Gram(s) IV Push once  enoxaparin Injectable 40 milliGRAM(s) SubCutaneous every 24 hours  folic acid 1 milliGRAM(s) Oral daily  gabapentin 300 milliGRAM(s) Oral three times a day  glucagon  Injectable 1 milliGRAM(s) IntraMuscular once  insulin glargine Injectable (LANTUS) 22 Unit(s) SubCutaneous at bedtime  insulin lispro (ADMELOG) corrective regimen sliding scale   SubCutaneous at bedtime  insulin lispro (ADMELOG) corrective regimen sliding scale   SubCutaneous three times a day before meals  insulin lispro Injectable (ADMELOG) 12 Unit(s) SubCutaneous three times a day before meals  lactobacillus acidophilus 1 Tablet(s) Oral daily  lidocaine   4% Patch 1 Patch Transdermal daily  metoprolol succinate ER 25 milliGRAM(s) Oral daily  piperacillin/tazobactam IVPB.. 3.375 Gram(s) IV Intermittent every 8 hours  potassium chloride    Tablet ER 20 milliEquivalent(s) Oral daily  simvastatin 40 milliGRAM(s) Oral at bedtime  sodium chloride 3%  Inhalation 4 milliLiter(s) Inhalation every 12 hours    MEDICATIONS  (PRN):  acetaminophen     Tablet .. 1000 milliGRAM(s) Oral every 8 hours PRN Severe Pain (7 - 10)  dextrose Oral Gel 15 Gram(s) Oral once PRN Blood Glucose LESS THAN 70 milliGRAM(s)/deciliter  melatonin 3 milliGRAM(s) Oral at bedtime PRN Insomnia          PHYSICAL EXAM:  Vital Signs Last 24 Hrs  T(C): 36.7 (16 Feb 2023 05:09), Max: 36.8 (15 Feb 2023 11:30)  T(F): 98.1 (16 Feb 2023 05:09), Max: 98.2 (15 Feb 2023 11:30)  HR: 117 (16 Feb 2023 05:09) (100 - 118)  BP: 130/80 (16 Feb 2023 05:09) (113/67 - 130/80)  BP(mean): --  RR: 18 (16 Feb 2023 05:09) (18 - 18)  SpO2: 97% (16 Feb 2023 05:09) (97% - 98%)    Parameters below as of 16 Feb 2023 05:09  Patient On (Oxygen Delivery Method): room air        I&O's Summary    14 Feb 2023 07:01  -  15 Feb 2023 07:00  --------------------------------------------------------  IN: 480 mL / OUT: 900 mL / NET: -420 mL    15 Feb 2023 07:01  -  16 Feb 2023 05:12  --------------------------------------------------------  IN: 480 mL / OUT: 850 mL / NET: -370 mL        General: NAD, non-toxic appearing   HEENT: PERRLA, EOMi, no scleral icterus  CV: RRR, normal S1 and S2, no m/r/g  Lungs: normal respiratory effort. CTAB, no wheezes, rales, or rhonchi  Abd: soft, nontender, nondistended  Ext: no edema, 2+ peripheral pulses   Pysch: AAOx3, appropriate affect   Neuro: grossly non-focal, moving all extremities spontaneously   Skin: no rashes or lesions     LABS:  CAPILLARY BLOOD GLUCOSE      POCT Blood Glucose.: 307 mg/dL (16 Feb 2023 00:38)  POCT Blood Glucose.: 344 mg/dL (15 Feb 2023 21:34)  POCT Blood Glucose.: 197 mg/dL (15 Feb 2023 16:48)  POCT Blood Glucose.: 376 mg/dL (15 Feb 2023 13:45)  POCT Blood Glucose.: 255 mg/dL (15 Feb 2023 08:14)  POCT Blood Glucose.: 266 mg/dL (15 Feb 2023 06:24)                              7.8    17.91 )-----------( 701      ( 15 Feb 2023 07:03 )             25.1       WBC Trend: 17.91<--, 20.12<--, 18.95<--  Hb Trend: 7.8<--, 8.0<--, 8.9<--, 7.4<--, 9.3<--    02-15    134<L>  |  101  |  38<H>  ----------------------------<  248<H>  5.3   |  20<L>  |  0.64    Ca    9.2      15 Feb 2023 07:01  Phos  4.0     02-15  Mg     2.1     02-15                    RADIOLOGY & ADDITIONAL TESTS: Reviewed Progress Note    02-09-23 (7d)    Patient is a 67y old  Female who presents with a chief complaint of Pneumonia (15 Feb 2023 06:25)      Subjective / Overnight Events :  - No acute events overnight.  - Pt seen and examined at bedside. No pulmonary symptoms. Patient has pain last night due to her back issues and was given hydromorphone with relief. She denies SOB, cough, sputum production currently. Is tolerating a diet, and urinating without issues. She is bowel incontinent at baseline following her spinal compression hx.     Additional ROS (if any):    MEDICATIONS  (STANDING):  amLODIPine   Tablet 5 milliGRAM(s) Oral daily  dextrose 5%. 1000 milliLiter(s) (50 mL/Hr) IV Continuous <Continuous>  dextrose 5%. 1000 milliLiter(s) (100 mL/Hr) IV Continuous <Continuous>  dextrose 50% Injectable 25 Gram(s) IV Push once  dextrose 50% Injectable 12.5 Gram(s) IV Push once  dextrose 50% Injectable 25 Gram(s) IV Push once  enoxaparin Injectable 40 milliGRAM(s) SubCutaneous every 24 hours  folic acid 1 milliGRAM(s) Oral daily  gabapentin 300 milliGRAM(s) Oral three times a day  glucagon  Injectable 1 milliGRAM(s) IntraMuscular once  insulin glargine Injectable (LANTUS) 22 Unit(s) SubCutaneous at bedtime  insulin lispro (ADMELOG) corrective regimen sliding scale   SubCutaneous at bedtime  insulin lispro (ADMELOG) corrective regimen sliding scale   SubCutaneous three times a day before meals  insulin lispro Injectable (ADMELOG) 12 Unit(s) SubCutaneous three times a day before meals  lactobacillus acidophilus 1 Tablet(s) Oral daily  lidocaine   4% Patch 1 Patch Transdermal daily  metoprolol succinate ER 25 milliGRAM(s) Oral daily  piperacillin/tazobactam IVPB.. 3.375 Gram(s) IV Intermittent every 8 hours  potassium chloride    Tablet ER 20 milliEquivalent(s) Oral daily  simvastatin 40 milliGRAM(s) Oral at bedtime  sodium chloride 3%  Inhalation 4 milliLiter(s) Inhalation every 12 hours    MEDICATIONS  (PRN):  acetaminophen     Tablet .. 1000 milliGRAM(s) Oral every 8 hours PRN Severe Pain (7 - 10)  dextrose Oral Gel 15 Gram(s) Oral once PRN Blood Glucose LESS THAN 70 milliGRAM(s)/deciliter  melatonin 3 milliGRAM(s) Oral at bedtime PRN Insomnia          PHYSICAL EXAM:  Vital Signs Last 24 Hrs  T(C): 36.7 (16 Feb 2023 05:09), Max: 36.8 (15 Feb 2023 11:30)  T(F): 98.1 (16 Feb 2023 05:09), Max: 98.2 (15 Feb 2023 11:30)  HR: 117 (16 Feb 2023 05:09) (100 - 118)  BP: 130/80 (16 Feb 2023 05:09) (113/67 - 130/80)  BP(mean): --  RR: 18 (16 Feb 2023 05:09) (18 - 18)  SpO2: 97% (16 Feb 2023 05:09) (97% - 98%)    Parameters below as of 16 Feb 2023 05:09  Patient On (Oxygen Delivery Method): room air        I&O's Summary    14 Feb 2023 07:01  -  15 Feb 2023 07:00  --------------------------------------------------------  IN: 480 mL / OUT: 900 mL / NET: -420 mL    15 Feb 2023 07:01  -  16 Feb 2023 05:12  --------------------------------------------------------  IN: 480 mL / OUT: 850 mL / NET: -370 mL        General: NAD, non-toxic appearing   HEENT: PERRLA, EOMi, no scleral icterus  CV: RRR, normal S1 and S2, no m/r/g  Lungs: limited examination but clear to auscultation  Abd: soft, nontender, nondistended  Ext: no edema, 2+ peripheral pulses   Pysch: AAOx3, appropriate affect   Neuro: 0/5 hip flexion, 3/5 knee flexion, 5/5 ankle flexion. Sensation throughout intact.   Skin: no rashes or lesions     LABS:  CAPILLARY BLOOD GLUCOSE      POCT Blood Glucose.: 307 mg/dL (16 Feb 2023 00:38)  POCT Blood Glucose.: 344 mg/dL (15 Feb 2023 21:34)  POCT Blood Glucose.: 197 mg/dL (15 Feb 2023 16:48)  POCT Blood Glucose.: 376 mg/dL (15 Feb 2023 13:45)  POCT Blood Glucose.: 255 mg/dL (15 Feb 2023 08:14)  POCT Blood Glucose.: 266 mg/dL (15 Feb 2023 06:24)                              7.8    17.91 )-----------( 701      ( 15 Feb 2023 07:03 )             25.1       WBC Trend: 17.91<--, 20.12<--, 18.95<--  Hb Trend: 7.8<--, 8.0<--, 8.9<--, 7.4<--, 9.3<--    02-15    134<L>  |  101  |  38<H>  ----------------------------<  248<H>  5.3   |  20<L>  |  0.64    Ca    9.2      15 Feb 2023 07:01  Phos  4.0     02-15  Mg     2.1     02-15                    RADIOLOGY & ADDITIONAL TESTS: Reviewed

## 2023-02-16 NOTE — PROGRESS NOTE ADULT - ASSESSMENT
67 F PMH DMT2, multiple falls, s/p lumbar microdiscectomy for radiculopathy presenting from [facility] with acute onset of malaise and wet coughing but without fevers, shortness of breath, or chest pain with CT chest non-contrast imaging with patchy/nodular opacities in bilateral lower lobes, centrally necrotic c/f infection vs. metastatic disease vs. septic emboli. Labs notable for leukocytosis and tachycardia with exam findings notable for wheezing/ronchus breath sounds. These findings concerning for sepsis secondary to pneumonia. Pulm consulted for cavitary lung lesions    recommendations  patient with persistent leukocytosis, cough (although improved) and low grade temperatures.  CT scan concerning for multifocal small cavitary lesions  although patient from [facility], lesions not typical for TB  MRSA nasal Negative, , procal 0.16, RVP neg, urine legionella neg, sputum with nml resp amarilis, afb neg x 1, gi pcr negative CRP 18, , blood in urine, denies AI history although has psoriasis, eczema and severe radiculopathy  please send HIV, T cell subset, ANCAs, GLORIA, IgG4  echo: nml LV, nml diastolic, nml Rv, no apparent vegatations seen, bld cultures negative   fungitell, histo and quant in lab   no plans for bronchoscopy at this time although will continue to follow    Janes Worthy MD  [facility] PGY4  [facility] [address], [facility] [phone]   67 F PMH DMT2, multiple falls, s/p lumbar microdiscectomy for radiculopathy presenting from [facility] with acute onset of malaise and wet coughing but without fevers, shortness of breath, or chest pain with CT chest non-contrast imaging with patchy/nodular opacities in bilateral lower lobes, centrally necrotic c/f infection vs. metastatic disease vs. septic emboli. Labs notable for leukocytosis and tachycardia with exam findings notable for wheezing/ronchus breath sounds. These findings concerning for sepsis secondary to pneumonia. Pulm consulted for cavitary lung lesions    recommendations  patient with persistent leukocytosis, cough (although improved) and low grade temperatures.  CT scan concerning for multifocal small cavitary lesions  although patient from [facility], lesions not typical for TB  MRSA nasal Negative, , procal 0.16, RVP neg, urine legionella neg, sputum with nml resp amarilis, afb neg x 1, gi pcr negative CRP 18, , blood in urine, denies AI history although has psoriasis, eczema and severe radiculopathy  HIV and T cell subset and fungitell and histo normal  ANCAs, GLORIA, IgG4 in lab  echo: nml LV, nml diastolic, nml Rv, no apparent vegatations seen, bld cultures negative   no plans for bronchoscopy at this time although will continue to follow  need to obtain 2 more negative afb  first quant gold indeterminate, repeat in lab    Janes Worthy MD  [facility] PGY4  [facility] [address], [facility] [phone]

## 2023-02-16 NOTE — PROVIDER CONTACT NOTE (OTHER) - ASSESSMENT
Pt A&Ox4, VSS, no complaints of pain or discomfort.
Pt tachycardic in the 120's, pt a&o4, asymptomatic, vital signs as charted
Pt A&Ox4, VSS, no complaints of pain, discomfort, no blurred vision.

## 2023-02-16 NOTE — PROGRESS NOTE ADULT - PROBLEM SELECTOR PLAN 7
History DMT2 on insulin (long acting 16units and short acting 6units), A1c 12.7 11/2022  > A1c 7.9%  - Lantus to 16U qhs, Admelog 7U TID + THOMAS

## 2023-02-16 NOTE — PROGRESS NOTE ADULT - PROBLEM SELECTOR PLAN 5
Reported watery stools but without blood or pitch black color with frequency of about 2 per day. May be in setting of recent antibiotic use. Rule out developing GI infection such as C. diff. Low suspicion at this time given not clinically diarrhea. CT A/P non-contrast noted Diffuse wall thickening of the proximal duodenum with periduodenal stranding, c/f duodenitis/peptic ulcer disease. GI PCR negative; stool cx, stool O+P, c diff (neg)  > resolved

## 2023-02-16 NOTE — PROVIDER CONTACT NOTE (OTHER) - BACKGROUND
Pt admitted for leukocytosis. PMH T2DM, HLD,
Pt admitted for leukocytosis. PMH T2DM, HLD
Pt 68 yo female admitted for leukocytosis

## 2023-02-16 NOTE — PROGRESS NOTE ADULT - PROBLEM SELECTOR PLAN 1
Persistently tachycardic since admission. Hypoxic to 95% on room air. No symptoms, however, recent spinal surgery with significant functional impairment and immobility, wheelchair bound. EKG with sinus tachycardia, morphologically similar P waves that are upright in lead II.   > concern for PE  - LE dopplers, if positive can consider CTA however patient with IV contrast allergy, can pre-medicate if high suspicion Persistently tachycardic since admission. Hypoxic to 95% on room air. No symptoms, however, recent spinal surgery with significant functional impairment and immobility, wheelchair bound. EKG with sinus tachycardia, morphologically similar P waves that are upright in lead II.   > concern for PE  - LE dopplers without DVTs, patient is still persistently tachycardia Persistently tachycardic since admission. Hypoxic to 95% on room air. No symptoms, however, recent spinal surgery with significant functional impairment and immobility, wheelchair bound. EKG with sinus tachycardia, morphologically similar P waves that are upright in lead II.   > concern for PE  - LE dopplers without DVTs, patient is still persistently tachycardia  - chart review with persistent tachycardia in the past with unclear etiology  - Toprol XL increased to 50mg daily for beta blockade  - Gabapentin increased to 400mg TID for neuropathic pain

## 2023-02-16 NOTE — PROGRESS NOTE ADULT - PROBLEM SELECTOR PLAN 8
On Amlodipine 5mg qd and metoprolol 25mg qd at home  - c/w amlodipine 5mg qd  - c/w metoprolol 25mg qd On Amlodipine 5mg qd and metoprolol 25mg qd at home  - c/w amlodipine 5mg qd  - increased Toprol XL 50mg

## 2023-02-17 DIAGNOSIS — E87.5 HYPERKALEMIA: ICD-10-CM

## 2023-02-17 LAB
ANA TITR SER: NEGATIVE — SIGNIFICANT CHANGE UP
ANION GAP SERPL CALC-SCNC: 11 MMOL/L — SIGNIFICANT CHANGE UP (ref 5–17)
ANION GAP SERPL CALC-SCNC: 15 MMOL/L — SIGNIFICANT CHANGE UP (ref 5–17)
ANISOCYTOSIS BLD QL: SLIGHT — SIGNIFICANT CHANGE UP
BASOPHILS # BLD AUTO: 0.26 K/UL — HIGH (ref 0–0.2)
BASOPHILS NFR BLD AUTO: 1.7 % — SIGNIFICANT CHANGE UP (ref 0–2)
BUN SERPL-MCNC: 26 MG/DL — HIGH (ref 7–23)
BUN SERPL-MCNC: 30 MG/DL — HIGH (ref 7–23)
CALCIUM SERPL-MCNC: 8.5 MG/DL — SIGNIFICANT CHANGE UP (ref 8.4–10.5)
CALCIUM SERPL-MCNC: 8.7 MG/DL — SIGNIFICANT CHANGE UP (ref 8.4–10.5)
CHLORIDE SERPL-SCNC: 105 MMOL/L — SIGNIFICANT CHANGE UP (ref 96–108)
CHLORIDE SERPL-SCNC: 106 MMOL/L — SIGNIFICANT CHANGE UP (ref 96–108)
CO2 SERPL-SCNC: 17 MMOL/L — LOW (ref 22–31)
CO2 SERPL-SCNC: 21 MMOL/L — LOW (ref 22–31)
CREAT SERPL-MCNC: 0.43 MG/DL — LOW (ref 0.5–1.3)
CREAT SERPL-MCNC: 0.48 MG/DL — LOW (ref 0.5–1.3)
EGFR: 102 ML/MIN/1.73M2 — SIGNIFICANT CHANGE UP
EGFR: 105 ML/MIN/1.73M2 — SIGNIFICANT CHANGE UP
EOSINOPHIL # BLD AUTO: 0.12 K/UL — SIGNIFICANT CHANGE UP (ref 0–0.5)
EOSINOPHIL NFR BLD AUTO: 0.8 % — SIGNIFICANT CHANGE UP (ref 0–6)
GAMMA INTERFERON BACKGROUND BLD IA-ACNC: 0.01 IU/ML — SIGNIFICANT CHANGE UP
GLUCOSE BLDC GLUCOMTR-MCNC: 110 MG/DL — HIGH (ref 70–99)
GLUCOSE BLDC GLUCOMTR-MCNC: 174 MG/DL — HIGH (ref 70–99)
GLUCOSE BLDC GLUCOMTR-MCNC: 223 MG/DL — HIGH (ref 70–99)
GLUCOSE BLDC GLUCOMTR-MCNC: 225 MG/DL — HIGH (ref 70–99)
GLUCOSE BLDC GLUCOMTR-MCNC: 271 MG/DL — HIGH (ref 70–99)
GLUCOSE SERPL-MCNC: 216 MG/DL — HIGH (ref 70–99)
GLUCOSE SERPL-MCNC: 283 MG/DL — HIGH (ref 70–99)
HCT VFR BLD CALC: 26.2 % — LOW (ref 34.5–45)
HGB BLD-MCNC: 8.1 G/DL — LOW (ref 11.5–15.5)
LYMPHOCYTES # BLD AUTO: 18.5 % — SIGNIFICANT CHANGE UP (ref 13–44)
LYMPHOCYTES # BLD AUTO: 2.79 K/UL — SIGNIFICANT CHANGE UP (ref 1–3.3)
M TB IFN-G BLD-IMP: ABNORMAL
M TB IFN-G CD4+ BCKGRND COR BLD-ACNC: 0 IU/ML — SIGNIFICANT CHANGE UP
M TB IFN-G CD4+CD8+ BCKGRND COR BLD-ACNC: 0 IU/ML — SIGNIFICANT CHANGE UP
MAGNESIUM SERPL-MCNC: 1.9 MG/DL — SIGNIFICANT CHANGE UP (ref 1.6–2.6)
MANUAL SMEAR VERIFICATION: SIGNIFICANT CHANGE UP
MCHC RBC-ENTMCNC: 21.2 PG — LOW (ref 27–34)
MCHC RBC-ENTMCNC: 30.9 GM/DL — LOW (ref 32–36)
MCV RBC AUTO: 68.6 FL — LOW (ref 80–100)
MONOCYTES # BLD AUTO: 0.63 K/UL — SIGNIFICANT CHANGE UP (ref 0–0.9)
MONOCYTES NFR BLD AUTO: 4.2 % — SIGNIFICANT CHANGE UP (ref 2–14)
NEUTROPHILS # BLD AUTO: 11.29 K/UL — HIGH (ref 1.8–7.4)
NEUTROPHILS NFR BLD AUTO: 74.8 % — SIGNIFICANT CHANGE UP (ref 43–77)
NRBC # BLD: 0 /100 WBCS — SIGNIFICANT CHANGE UP (ref 0–0)
PHOSPHATE SERPL-MCNC: 4 MG/DL — SIGNIFICANT CHANGE UP (ref 2.5–4.5)
PLAT MORPH BLD: NORMAL — SIGNIFICANT CHANGE UP
PLATELET # BLD AUTO: 633 K/UL — HIGH (ref 150–400)
POIKILOCYTOSIS BLD QL AUTO: SIGNIFICANT CHANGE UP
POTASSIUM SERPL-MCNC: 4.6 MMOL/L — SIGNIFICANT CHANGE UP (ref 3.5–5.3)
POTASSIUM SERPL-MCNC: 5.8 MMOL/L — HIGH (ref 3.5–5.3)
POTASSIUM SERPL-SCNC: 4.6 MMOL/L — SIGNIFICANT CHANGE UP (ref 3.5–5.3)
POTASSIUM SERPL-SCNC: 5.8 MMOL/L — HIGH (ref 3.5–5.3)
QUANT TB PLUS MITOGEN MINUS NIL: 0.02 IU/ML — SIGNIFICANT CHANGE UP
RBC # BLD: 3.82 M/UL — SIGNIFICANT CHANGE UP (ref 3.8–5.2)
RBC # FLD: 15.2 % — HIGH (ref 10.3–14.5)
RBC BLD AUTO: SIGNIFICANT CHANGE UP
SARS-COV-2 RNA SPEC QL NAA+PROBE: SIGNIFICANT CHANGE UP
SODIUM SERPL-SCNC: 137 MMOL/L — SIGNIFICANT CHANGE UP (ref 135–145)
SODIUM SERPL-SCNC: 138 MMOL/L — SIGNIFICANT CHANGE UP (ref 135–145)
WBC # BLD: 15.1 K/UL — HIGH (ref 3.8–10.5)
WBC # FLD AUTO: 15.1 K/UL — HIGH (ref 3.8–10.5)

## 2023-02-17 PROCEDURE — 99232 SBSQ HOSP IP/OBS MODERATE 35: CPT

## 2023-02-17 RX ORDER — SODIUM CHLORIDE 9 MG/ML
4 INJECTION INTRAMUSCULAR; INTRAVENOUS; SUBCUTANEOUS ONCE
Refills: 0 | Status: COMPLETED | OUTPATIENT
Start: 2023-02-17 | End: 2023-02-17

## 2023-02-17 RX ORDER — INSULIN LISPRO 100/ML
14 VIAL (ML) SUBCUTANEOUS
Refills: 0 | Status: DISCONTINUED | OUTPATIENT
Start: 2023-02-17 | End: 2023-02-20

## 2023-02-17 RX ORDER — AMPICILLIN SODIUM AND SULBACTAM SODIUM 250; 125 MG/ML; MG/ML
3 INJECTION, POWDER, FOR SUSPENSION INTRAMUSCULAR; INTRAVENOUS EVERY 6 HOURS
Refills: 0 | Status: DISCONTINUED | OUTPATIENT
Start: 2023-02-17 | End: 2023-02-21

## 2023-02-17 RX ORDER — INSULIN GLARGINE 100 [IU]/ML
27 INJECTION, SOLUTION SUBCUTANEOUS AT BEDTIME
Refills: 0 | Status: DISCONTINUED | OUTPATIENT
Start: 2023-02-17 | End: 2023-02-20

## 2023-02-17 RX ORDER — IPRATROPIUM/ALBUTEROL SULFATE 18-103MCG
3 AEROSOL WITH ADAPTER (GRAM) INHALATION ONCE
Refills: 0 | Status: COMPLETED | OUTPATIENT
Start: 2023-02-17 | End: 2023-02-17

## 2023-02-17 RX ADMIN — LIDOCAINE 1 PATCH: 4 CREAM TOPICAL at 19:00

## 2023-02-17 RX ADMIN — AMPICILLIN SODIUM AND SULBACTAM SODIUM 200 GRAM(S): 250; 125 INJECTION, POWDER, FOR SUSPENSION INTRAMUSCULAR; INTRAVENOUS at 21:55

## 2023-02-17 RX ADMIN — Medication 1 MILLIGRAM(S): at 13:26

## 2023-02-17 RX ADMIN — Medication 6: at 09:04

## 2023-02-17 RX ADMIN — GABAPENTIN 400 MILLIGRAM(S): 400 CAPSULE ORAL at 13:26

## 2023-02-17 RX ADMIN — Medication 3 MILLILITER(S): at 15:11

## 2023-02-17 RX ADMIN — Medication 14 UNIT(S): at 18:22

## 2023-02-17 RX ADMIN — INSULIN GLARGINE 27 UNIT(S): 100 INJECTION, SOLUTION SUBCUTANEOUS at 21:55

## 2023-02-17 RX ADMIN — AMPICILLIN SODIUM AND SULBACTAM SODIUM 200 GRAM(S): 250; 125 INJECTION, POWDER, FOR SUSPENSION INTRAMUSCULAR; INTRAVENOUS at 15:11

## 2023-02-17 RX ADMIN — LIDOCAINE 1 PATCH: 4 CREAM TOPICAL at 00:34

## 2023-02-17 RX ADMIN — ENOXAPARIN SODIUM 40 MILLIGRAM(S): 100 INJECTION SUBCUTANEOUS at 05:31

## 2023-02-17 RX ADMIN — GABAPENTIN 400 MILLIGRAM(S): 400 CAPSULE ORAL at 21:53

## 2023-02-17 RX ADMIN — AMLODIPINE BESYLATE 5 MILLIGRAM(S): 2.5 TABLET ORAL at 05:31

## 2023-02-17 RX ADMIN — SIMVASTATIN 40 MILLIGRAM(S): 20 TABLET, FILM COATED ORAL at 21:53

## 2023-02-17 RX ADMIN — Medication 12 UNIT(S): at 09:03

## 2023-02-17 RX ADMIN — Medication 50 MILLIGRAM(S): at 05:31

## 2023-02-17 RX ADMIN — Medication 12 UNIT(S): at 13:27

## 2023-02-17 RX ADMIN — GABAPENTIN 400 MILLIGRAM(S): 400 CAPSULE ORAL at 05:30

## 2023-02-17 RX ADMIN — SODIUM CHLORIDE 4 MILLILITER(S): 9 INJECTION INTRAMUSCULAR; INTRAVENOUS; SUBCUTANEOUS at 15:11

## 2023-02-17 RX ADMIN — Medication 1 TABLET(S): at 13:26

## 2023-02-17 RX ADMIN — Medication 4: at 13:27

## 2023-02-17 RX ADMIN — Medication 2: at 18:21

## 2023-02-17 RX ADMIN — Medication 3 MILLIGRAM(S): at 23:14

## 2023-02-17 RX ADMIN — LIDOCAINE 1 PATCH: 4 CREAM TOPICAL at 13:26

## 2023-02-17 RX ADMIN — PIPERACILLIN AND TAZOBACTAM 25 GRAM(S): 4; .5 INJECTION, POWDER, LYOPHILIZED, FOR SOLUTION INTRAVENOUS at 05:31

## 2023-02-17 NOTE — ADVANCED PRACTICE NURSE CONSULT - REASON FOR CONSULT
Wound care consult initiated by RN to assess patient's skin for a possible sacral stage 2 pressure injury    Reason for Admission: Pneumonia  History of Present Illness:   67 F PMH DMT2, HTN, HLD, multiple falls, s/p lumbar microdiscectomy for radiculopathy presenting from [facility] with 5days of acute onset of malaise and wet coughing but without fevers, shortness of breath, or chest pain. Patient says she was subsequently started on antibiotics and then developed non-bloody watery diarrhea since Monday with 2 episodes at most per day, and of which she says may be from the antibiotics. Currently, she reports being unable to walk after her recent spinal surgery for a herniated disc. Otherwise, patient reports abscence of palpitations (except sometimes when in the hospital), nausea, vomiting, dysuria, hematuria, or LE edema (except for RLE for which suffered a mechanical fall at [facility] and was found to have a fracture, as per patient). As per ED chart note, she was found to have elevated wbc and fevers in the setting of cough, congestion and positive UA. Pt reports 4 days ago her O2 was low and she needed to be on O2 and there was concern for PNA. Pt was started on meropenem on 2/5 for possible UTI vs PNA. Workup outpatient on 2/6-2/8 showed WBC of 16.25. However, despite antibiotic treament, patient was not improving.

## 2023-02-17 NOTE — PROGRESS NOTE ADULT - PROBLEM SELECTOR PLAN 5
Reported watery stools but without blood or pitch black color with frequency of about 2 per day. May be in setting of recent antibiotic use. Rule out developing GI infection such as C. diff. Low suspicion at this time given not clinically diarrhea. CT A/P non-contrast noted Diffuse wall thickening of the proximal duodenum with periduodenal stranding, c/f duodenitis/peptic ulcer disease. GI PCR negative; stool cx, stool O+P, c diff (neg)  > resolved Reported spinal surgery for herniated disc in patient with lumbar radiculopathy history per chart review.  > MR lumbar/pelvis with concerning stenosis, however, as per Ortho, patient without acute new findings and so can defer consult for outpatient evaluation by primary surgeon  - c/w hydromorphone 4mg for severe pain q4hrs prn  - c/w Tylenol 1000mg q8hrs for pain prn  - c/w Lidocaine patch for pain prn  - increased gabapentin 400mg TID

## 2023-02-17 NOTE — PROGRESS NOTE ADULT - ASSESSMENT
66 yo F DM2, falls, microdiscectomy, from [facility] with cough, fevers  No fever, has leukocytosis  Initially from [place] with cough/fevers, given abx, then high volume diarrhea  CT with nodular/patchy opacities in bilateral lower lobes; thickening of duodenum, duodenitis, renal stone  PCT minimally elevated  RVP neg  GI PCR neg  Legionella urine ag neg  UA neg/equivocal  Quant gold IND  From [facility] (decades ago), no history of exposure to TB  Uncertain etiology pulmonary lesions at this point  Lung lesion DDx: Malignancy, fungal, septic emboli, NTM/TB, lung abscess?  Overall,  1) Lung Lesions  - Septic emboli vs metastatic disease by radiology; lesions not present on CT in 11/2022  - Change Zosyn to Unasyn 3g q 6--Pulm suspecting cavitary pneumonia, may need longer course of treatment if these are abscesses  - Isolate (airborne), and obtain sputum samples AFB x 3--if not producing would have respiratory attempt to induce to provide sputums  - F/U BCXs x 2  - TTE reassuring, hold on HAJA for now  - Anticipate will need repeat CT chest early next week to determine status of lesions  - F/U Pulm  2) Diarrhea  - If ongoing high volume diarrhea, Check C diff PCR  - Monitor--antibiotic associated diarrhea?  - Follow up pending stool tests  3) Leukocytosis  - Reactive to underlying lung process?  - Trend to normal    Fidel Lester MD  Contact on TEAMS messaging from 9am - 5pm  From 5pm-9am, on weekends, or if no response call [phone]

## 2023-02-17 NOTE — PROGRESS NOTE ADULT - PROBLEM SELECTOR PLAN 4
Reported spinal surgery for herniated disc in patient with lumbar radiculopathy history per chart review.  > MR lumbar/pelvis with concerning stenosis, however, as per Ortho, patient without acute new findings and so can defer consult for outpatient evaluation by primary surgeon  - c/w hydromorphone 4mg for severe pain q4hrs prn  - c/w Tylenol 1000mg q8hrs for pain prn  - c/w Lidocaine patch for pain prn  - increased gabapentin 400mg TID Increased to 492 w/o signs of DKA. S/p methylprednisolone for pre-medication for MRI.   - will readjust insulin requirements, still hyperglycemic

## 2023-02-17 NOTE — PROGRESS NOTE ADULT - PROBLEM SELECTOR PLAN 3
9/18/2018       RE: Alessandra Pak  [address]  [facility] [address]     Dear Colleague,    Thank you for referring your patient, Alessandra Pak, to the [facility]. Please see a copy of my visit note below.    Chief Complaint:   Chief Complaint   Patient presents with     Wound Check     Wound, left foot. Pt stated that she is concerned about the odor coming from her foot. Pt stated that she took the vac off this past Saturday.           Allergies   Allergen Reactions     No Clinical Screening - See Comments      Swelling in the throat.         Subjective: Alessandra is a 49 year old female who presents to the clinic today for a follow up of left foot ulcer. She saw Dr. 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 She continues with the VAC on the left foot.     Objective    Image in media tab.   A diabetic pressure wound is noted at left  medial 1st met head measuring 3.1cm x 2.8cm x 0.2cm.    Lozano Classification: 2    Wound base: Pink  Yellow/Granulation  Some necrotic tendon    Edges: intact    Drainage: light/serous    Odor: no    Undermining: no    Bone Exposure: No    Clinical Signs of Infection: No    After obtaining patient consent, the wound was irrigated with copious amounts of saline. A scalpel and curette was then used to debride the wound into the necrotic tendonious tissue with excision of this. The wound edges were debrided to healthy, bleeding tissue. Given the patient's lack of sensation, no anesthesia was necessary for the procedure.       Assessment: Left foot ulceration - less depth today. Tendon is gradually covering up more with granulation.     Plan:   - Pt seen and evaluated  - Wound debrided as described.   - VAC was replaced by myself on the ulcer site.   - BKA wound was visualized today by Dr. King. Will check this at her next visit.   - Pt  to return to clinic in 2 weeks.       Again, thank you for allowing me to participate in the care of your patient.      Sincerely,    Barrington Lewis DPM       Increased to 492 w/o signs of DKA. S/p methylprednisolone for pre-medication for MRI.   > resolved Increased to 492 w/o signs of DKA. S/p methylprednisolone for pre-medication for MRI.   - will readjust insulin requirements, still hyperglycemic Leukocytosis + tachycardia = SIRS+ in conjunction with CT imaging with patchy/nodular opacities in setting of coughing concerning for sepsis secondary to pneumonia likely HAP given her recent surgery and [place] placement. Ddx: legionella pneumonia given patient with reported watery stools; however does not meet diarrhea definition of at least 3 unformed stools per day.  CTAP (2/9): Nodular and patchy opacities predominantly in the bilateral lower lobes with many that are centrally necrotic concerning for infection. Differential diagnosis includes metastatic disease and septic emboli. TTE (2/10): EF 67%, grossly normal findings. BCx (NGTD), UCx (E. faecium), urine legionella (negative), MRSA (negative), fungitel (negative)  > consults: ID, pulm   - last day of Zosyn, will d/c after infusion   - Quant TB indeterminant x2, will reach out to pulm and ID for further management - do we still need contact isolation for rehab evaluation? Do we need a bronchoscopy for cultures?  - still unable to produce sputum for AFB studies Detail Level: Simple Additional Notes: 5fu with calcipotreiene bid x 1 week to chest

## 2023-02-17 NOTE — PROGRESS NOTE ADULT - ATTENDING COMMENTS
67F with DMT2, multiple falls, s/p lumbar microdiscectomy for radiculopathy in 11/2022 presented from [facility] with chronic cough, recent hospitalization followed by [place] stay, previously treated with Meropenem. Pt met sepsis 2/2 to acute bacterial PNA, CT chest with nodular and patchy opacities predominantly in the bilateral lower lobes with many that are centrally necrotic concerning for infection vs septic emboli vs metastatic disease.     # Lung lesions  - ddx septic emboli vs malignancy vs fungal vs TB  - id and pulm recs appreciated  - c/w zosyn, plan for 7 day course  - low suspicion for TB, but obtain sputum AFB x 3; attempted to induce sputum but still having difficulty  - BCx NGTD, TTE reassuring, hold off HAJA for now  - d/w ID; pt having difficulty with producing sputum even despite induction. will repeat CTC early next week to risk stratify for TB    # Diarrhea  - Diffuse wall thickening of the proximal duodenum with periduodenal stranding, concerning for duodenitis/peptic ulcer disease.   - GI PCR neg,  neg c diff   - needs opt colonoscopy    # Leg weakness, chronic  - f/u MRIs - d/w ortho; will follow-up outpatient as these are not acute findings    # Thyroid nodule   - will need outpatient FNA; d/w endo to arrange follow-up    Rest of plan as above, d/w HS. Planning for discharge after 3 AFBs collected and no ID contraindications to discharge

## 2023-02-17 NOTE — ADVANCED PRACTICE NURSE CONSULT - ASSESSMENT
When wound care RN arrived on unit, patient was found lying in a low air loss pressure redistribution support surface style bed. Patient Warren was on airborne precautions to rule out TB. Appropriate PPE donned. Patient was alert and oriented and gave consent to skin consult. This patient states that she spends a lot of time in bed because she cannot walk but she is able to turn independently and staff assistance x 2 remained as standby assistance. Once turned, fecal incontinence was apparent and pericare was provided. Once cleaned, the wound care RN was able to visualize an area of persistent nonblanchable maroon discoloration with purple hues over B/L buttocks/sacral skin and extending toward B/L ischium, area measures approximately 15cm x 10cm x 0cm. Within this location, there is an area of superficial partial thickness skin loss measuring approximately 2cm x 2cm x 0cm. Once consult was complete, patient was educated on the need for routine turning and positioning to prevent pressure injuries and patient was placed in a left side-lying position utilizing pillow positioner assistive devices.

## 2023-02-17 NOTE — PROGRESS NOTE ADULT - SUBJECTIVE AND OBJECTIVE BOX
CC: F/U for cavitary lesions    Saw/spoke to patient. Unchanged. Doing well.    Allergies  aspirin (Anaphylaxis)  aspirin (Rash)  clindamycin (Unknown)  contrast media (iron oxide-based) (Nephrotoxicity)  fish (Hives)  IV Contrast (Anaphylaxis)  sulfa drugs (Rash)  vancomycin (Rash)  walnut (Anaphylaxis)    ANTIMICROBIALS:      PE:    Vital Signs Last 24 Hrs  T(C): 36.8 (17 Feb 2023 12:06), Max: 37.2 (16 Feb 2023 22:02)  T(F): 98.3 (17 Feb 2023 12:06), Max: 98.9 (16 Feb 2023 22:02)  HR: 105 (17 Feb 2023 12:06) (102 - 120)  BP: 112/69 (17 Feb 2023 12:06) (108/58 - 136/84)  RR: 18 (17 Feb 2023 12:06) (18 - 18)  SpO2: 98% (17 Feb 2023 12:06) (97% - 100%)    Gen: AOx3, NAD, non-toxic  CV: Nontachycardic  Resp: Breathing comfortably, RA  Abd: Soft, nontender  IV/Skin: No thrombophlebitis    LABS:                        8.1    15.10 )-----------( 633      ( 17 Feb 2023 06:35 )             26.2     02-17    138  |  106  |  30<H>  ----------------------------<  283<H>  4.6   |  17<L>  |  0.43<L>    Ca    8.5      17 Feb 2023 11:36  Phos  4.0     02-17  Mg     1.9     02-17    MICROBIOLOGY:    .Sputum Sputum  02-12-23   Culture is being performed.  --  --    .Sputum Sputum  02-12-23   Normal Respiratory Janis present  --    Few Squamous epithelial cells per low power field  Moderate polymorphonuclear leukocytes per low power field  Few Gram positive cocci in pairs per oil power field    .Stool Feces  02-10-23   No Protozoa seen by trichrome stain  No Helminths or Protozoa seen in formalin concentrate  performed by iodine stain  (routine O+P not evaluated for Microsporidia,  Cryptosporidia or Cyclospora)  One negative sample does not necessarily rule  out the presence of a parasitic infection.  --  --    .Stool Feces  02-10-23   No enteric pathogens isolated.  (Stool culture examined for Salmonella,  Shigella, Campylobacter, Aeromonas, Plesiomonas,  Vibrio, E.coli O157 and Yersinia)  --  --    Clean Catch Clean Catch (Midstream)  02-09-23   50,000 - 99,000 CFU/mL Enterococcus faecium  50,000 - 99,000 CFU/mL Enterococcus faecalis  --  Enterococcus faecium  Enterococcus faecalis    .Blood Blood-Peripheral  02-09-23   No Growth Final  --  --    .Blood Blood-Peripheral  02-09-23   No Growth Final  --  --    HIV-1 RNA Quantitative, Viral Load Log: NOT DET. lg /mL (02-14-23 @ 06:38)    Clostridium difficile GDH Toxins A&amp;B, EIA:   Negative (02-10-23 @ 22:15)  Clostridium difficile GDH Interpretation: Negative for toxigenic C. Difficile.  This specimen is negative for C.  Difficile glutamate dehydrogenase (GDH) antigen and negative for C.  Difficile Toxins A & B, by EIA.  GDH is a highly sensitive screening  marker for C. Difficile that is produced in large amounts by all C.  Difficile strains, both toxigenic and nontoxigenic.  This assay has not  been validated as a test of cure.  Repeat testing during the same episode  of diarrhea is of limited value and is discouraged.  The results of this  assay should always be interpreted in conjunction with pateint's clinical  history. (02-10-23 @ 22:15)    RADIOLOGY:    2/9 CT    IMPRESSION:  Nodular and patchy opacities predominantly in the bilateral lower lobes   with many that are centrally necrotic concerning for infection.   Differential diagnosis includes metastatic disease and septic emboli.  Diffuse wall thickening of the proximal duodenum with periduodenal   stranding, concerning for duodenitis/peptic ulcer disease.  Nonobstructing right renal calculus  Right thyroid nodule may be evaluated with ultrasound on a nonemergent   basis.

## 2023-02-17 NOTE — PROGRESS NOTE ADULT - ASSESSMENT
67 F PMH DMT2, multiple falls, s/p lumbar microdiscectomy for radiculopathy presenting from [facility] with acute onset of malaise and wet coughing but without fevers, shortness of breath, or chest pain with CT chest non-contrast imaging with patchy/nodular opacities in bilateral lower lobes, centrally necrotic c/f infection vs. metastatic disease vs. septic emboli. S/p 7 day treatment with broad spectrum abx with resolution of symptoms, but without elucidating underlying etiology. Pending further workup with concern for cavitary lesions versus malignancy.  67 F PMH DMT2, multiple falls, s/p lumbar microdiscectomy for radiculopathy presenting from [facility] with acute onset of malaise and wet coughing but without fevers, shortness of breath, or chest pain with CT chest non-contrast imaging with patchy/nodular opacities in bilateral lower lobes, centrally necrotic c/f infection vs. metastatic disease vs. septic emboli. S/p 7 day treatment with broad spectrum abx with resolution of symptoms, but without elucidating underlying etiology. Pending discharge planning to rehab.

## 2023-02-17 NOTE — PROGRESS NOTE ADULT - PROBLEM SELECTOR PLAN 1
Persistently tachycardic since admission. Hypoxic to 95% on room air. No symptoms, however, recent spinal surgery with significant functional impairment and immobility, wheelchair bound. EKG with sinus tachycardia, morphologically similar P waves that are upright in lead II.   > concern for PE  - LE dopplers without DVTs, patient is still persistently tachycardia  - chart review with persistent tachycardia in the past with unclear etiology  - Toprol XL increased to 50mg daily for beta blockade  - Gabapentin increased to 400mg TID for neuropathic pain Persistently tachycardic since admission. Hypoxic to 95% on room air. No symptoms, however, recent spinal surgery with significant functional impairment and immobility, wheelchair bound. EKG with sinus tachycardia, morphologically similar P waves that are upright in lead II. LE dopplers without DVTs, patient is still persistently tachycardia  > low concern for PE at this time, chart review w/ chronic tachycardia in the past without clear etiology  - Toprol XL 50mg daily for beta blockade  - Gabapentin 400mg TID for neuropathic pain K to 5.8 today. Asymptomatic without chest pain or palpitations.   > etiology from increase in Toprol?  - d/c K supplement  - repeat BMP

## 2023-02-17 NOTE — PROGRESS NOTE ADULT - PROBLEM SELECTOR PLAN 11
DVT prophylaxis: Lovenox 40mg qd  Diet: consistent carbs  Disposition: d/c planning to rehab  Code Status: pending JENNIFER DVT prophylaxis: Lovenox 40mg qd  Diet: consistent carbs  Disposition: d/c planning to rehab  Code Status: pending family discussion - c/w atorvastatin 40mg qhs

## 2023-02-17 NOTE — PROGRESS NOTE ADULT - PROBLEM SELECTOR PLAN 2
Leukocytosis + tachycardia = SIRS+ in conjunction with CT imaging with patchy/nodular opacities in setting of coughing concerning for sepsis secondary to pneumonia likely HAP given her recent surgery and [place] placement. Ddx: legionella pneumonia given patient with reported watery stools; however does not meet diarrhea definition of at least 3 unformed stools per day.  CTAP (2/9): Nodular and patchy opacities predominantly in the bilateral lower lobes with many that are centrally necrotic concerning for infection. Differential diagnosis includes metastatic disease and septic emboli. TTE (2/10): EF 67%, grossly normal findings. BCx (NGTD), UCx (E. faecium), urine legionella (negative), MRSA (negative), fungitel (negative)  > patient recently on meropenem in [place] for pneumonia/UTI?  > consults: ID, pulm   - resolved cough, subjective improvement in symptoms, no fevers or SOB  - Zosyn Day 7 (2/9 -), will d/c   - pulm with no plans for intervention, will await repeat Quant TB Leukocytosis + tachycardia = SIRS+ in conjunction with CT imaging with patchy/nodular opacities in setting of coughing concerning for sepsis secondary to pneumonia likely HAP given her recent surgery and [place] placement. Ddx: legionella pneumonia given patient with reported watery stools; however does not meet diarrhea definition of at least 3 unformed stools per day.  CTAP (2/9): Nodular and patchy opacities predominantly in the bilateral lower lobes with many that are centrally necrotic concerning for infection. Differential diagnosis includes metastatic disease and septic emboli. TTE (2/10): EF 67%, grossly normal findings. BCx (NGTD), UCx (E. faecium), urine legionella (negative), MRSA (negative), fungitel (negative)  > consults: ID, pulm   - last day of Zosyn, will d/c after infusion   - Quant TB indeterminant x2, will reach out to pulm and ID for further management - do we still need contact isolation for rehab evaluation?   - still unable to produce sputum for AFB studies   - pulm with no plans for intervention, will await repeat Quant TB Persistently tachycardic since admission. Hypoxic to 95% on room air. No symptoms, however, recent spinal surgery with significant functional impairment and immobility, wheelchair bound. EKG with sinus tachycardia, morphologically similar P waves that are upright in lead II. LE dopplers without DVTs, patient is still persistently tachycardia  > low concern for PE at this time, chart review w/ chronic tachycardia in the past without clear etiology  - Toprol XL 50mg daily for beta blockade  - Gabapentin 400mg TID for neuropathic pain

## 2023-02-17 NOTE — PROGRESS NOTE ADULT - PROBLEM SELECTOR PLAN 6
Hypokalemia with serum K ~2.2 in setting of reported watery stools which may be diarrhea Reported watery stools but without blood or pitch black color with frequency of about 2 per day. May be in setting of recent antibiotic use. Rule out developing GI infection such as C. diff. Low suspicion at this time given not clinically diarrhea. CT A/P non-contrast noted Diffuse wall thickening of the proximal duodenum with periduodenal stranding, c/f duodenitis/peptic ulcer disease. GI PCR negative; stool cx, stool O+P, c diff (neg)  > resolved

## 2023-02-17 NOTE — PROGRESS NOTE ADULT - PROBLEM SELECTOR PLAN 7
History DMT2 on insulin (long acting 16units and short acting 6units), A1c 12.7 11/2022  > A1c 7.9%  - Lantus to 16U qhs, Admelog 7U TID + THOMAS History DMT2 on insulin (long acting 16units and short acting 6units), A1c 12.7 11/2022  > A1c 7.9%  - Lantus to 22U qhs, Admelog 12U TID + THOMAS Hypokalemia with serum K ~2.2 in setting of reported watery stools which may be diarrhea

## 2023-02-17 NOTE — PROGRESS NOTE ADULT - PROBLEM SELECTOR PLAN 12
DVT prophylaxis: Lovenox 40mg qd  Diet: consistent carbs  Disposition: d/c planning to rehab  Code Status: pending family discussion

## 2023-02-17 NOTE — PROGRESS NOTE ADULT - PROBLEM SELECTOR PLAN 9
Noted to have right thyroid nodule on imaging. TSH wnl.   > Thyroid US showing TIRADS 3 nodule >2.5cm, indicates need for FNA for further evaluation  - thyroid FNA can be completed outpatient On Amlodipine 5mg qd and metoprolol 25mg qd at home  - c/w amlodipine 5mg qd  - increased Toprol XL 50mg

## 2023-02-17 NOTE — PROGRESS NOTE ADULT - SUBJECTIVE AND OBJECTIVE BOX
Progress Note    02-09-23 (8d)    Patient is a 67y old  Female who presents with a chief complaint of Pneumonia (16 Feb 2023 07:51)      Subjective / Overnight Events :  - No acute events overnight.  - Pt seen and examined at bedside.     Additional ROS (if any):    MEDICATIONS  (STANDING):  amLODIPine   Tablet 5 milliGRAM(s) Oral daily  dextrose 5%. 1000 milliLiter(s) (50 mL/Hr) IV Continuous <Continuous>  dextrose 5%. 1000 milliLiter(s) (100 mL/Hr) IV Continuous <Continuous>  dextrose 50% Injectable 25 Gram(s) IV Push once  dextrose 50% Injectable 12.5 Gram(s) IV Push once  dextrose 50% Injectable 25 Gram(s) IV Push once  enoxaparin Injectable 40 milliGRAM(s) SubCutaneous every 24 hours  folic acid 1 milliGRAM(s) Oral daily  gabapentin 400 milliGRAM(s) Oral three times a day  glucagon  Injectable 1 milliGRAM(s) IntraMuscular once  insulin glargine Injectable (LANTUS) 22 Unit(s) SubCutaneous at bedtime  insulin lispro (ADMELOG) corrective regimen sliding scale   SubCutaneous at bedtime  insulin lispro (ADMELOG) corrective regimen sliding scale   SubCutaneous three times a day before meals  insulin lispro Injectable (ADMELOG) 12 Unit(s) SubCutaneous three times a day before meals  lactobacillus acidophilus 1 Tablet(s) Oral daily  lidocaine   4% Patch 1 Patch Transdermal daily  metoprolol succinate ER 50 milliGRAM(s) Oral daily  piperacillin/tazobactam IVPB.. 3.375 Gram(s) IV Intermittent every 8 hours  potassium chloride    Tablet ER 20 milliEquivalent(s) Oral daily  simvastatin 40 milliGRAM(s) Oral at bedtime  sodium chloride 3%  Inhalation 4 milliLiter(s) Inhalation every 12 hours    MEDICATIONS  (PRN):  acetaminophen     Tablet .. 1000 milliGRAM(s) Oral every 8 hours PRN Severe Pain (7 - 10)  dextrose Oral Gel 15 Gram(s) Oral once PRN Blood Glucose LESS THAN 70 milliGRAM(s)/deciliter  melatonin 3 milliGRAM(s) Oral at bedtime PRN Insomnia          PHYSICAL EXAM:  Vital Signs Last 24 Hrs  T(C): 37.2 (16 Feb 2023 22:02), Max: 37.2 (16 Feb 2023 22:02)  T(F): 98.9 (16 Feb 2023 22:02), Max: 98.9 (16 Feb 2023 22:02)  HR: 119 (16 Feb 2023 22:02) (110 - 120)  BP: 136/84 (16 Feb 2023 22:02) (96/58 - 136/84)  BP(mean): 71 (16 Feb 2023 10:19) (71 - 71)  RR: 18 (16 Feb 2023 22:02) (18 - 18)  SpO2: 100% (16 Feb 2023 22:02) (97% - 100%)    Parameters below as of 16 Feb 2023 22:02  Patient On (Oxygen Delivery Method): room air        I&O's Summary    15 Feb 2023 07:01  -  16 Feb 2023 07:00  --------------------------------------------------------  IN: 480 mL / OUT: 850 mL / NET: -370 mL        General: NAD, non-toxic appearing   HEENT: PERRLA, EOMi, no scleral icterus  CV: RRR, normal S1 and S2, no m/r/g  Lungs: normal respiratory effort. CTAB, no wheezes, rales, or rhonchi  Abd: soft, nontender, nondistended  Ext: no edema, 2+ peripheral pulses   Pysch: AAOx3, appropriate affect   Neuro: grossly non-focal, moving all extremities spontaneously   Skin: no rashes or lesions     LABS:  CAPILLARY BLOOD GLUCOSE      POCT Blood Glucose.: 293 mg/dL (16 Feb 2023 22:25)  POCT Blood Glucose.: 284 mg/dL (16 Feb 2023 17:02)  POCT Blood Glucose.: 148 mg/dL (16 Feb 2023 12:28)  POCT Blood Glucose.: 167 mg/dL (16 Feb 2023 08:23)  POCT Blood Glucose.: 181 mg/dL (16 Feb 2023 06:21)                              7.7    13.70 )-----------( 620      ( 16 Feb 2023 07:03 )             24.6       WBC Trend: 13.70<--, 17.91<--, 20.12<--  Hb Trend: 7.7<--, 7.8<--, 8.0<--, 8.9<--, 7.4<--    02-16    136  |  105  |  33<H>  ----------------------------<  163<H>  4.9   |  21<L>  |  0.51    Ca    8.7      16 Feb 2023 07:06  Phos  3.9     02-16  Mg     1.6     02-16                    RADIOLOGY & ADDITIONAL TESTS: Reviewed Progress Note    02-09-23 (8d)    Patient is a 67y old  Female who presents with a chief complaint of Pneumonia (16 Feb 2023 07:51)      Subjective / Overnight Events :  - No acute events overnight.  - Pt seen and examined at bedside. No complaints this morning. Was a little more tired today than previously but without SOB, cough, or sputum production. Denies fevers, chills.     Additional ROS (if any):    MEDICATIONS  (STANDING):  amLODIPine   Tablet 5 milliGRAM(s) Oral daily  dextrose 5%. 1000 milliLiter(s) (50 mL/Hr) IV Continuous <Continuous>  dextrose 5%. 1000 milliLiter(s) (100 mL/Hr) IV Continuous <Continuous>  dextrose 50% Injectable 25 Gram(s) IV Push once  dextrose 50% Injectable 12.5 Gram(s) IV Push once  dextrose 50% Injectable 25 Gram(s) IV Push once  enoxaparin Injectable 40 milliGRAM(s) SubCutaneous every 24 hours  folic acid 1 milliGRAM(s) Oral daily  gabapentin 400 milliGRAM(s) Oral three times a day  glucagon  Injectable 1 milliGRAM(s) IntraMuscular once  insulin glargine Injectable (LANTUS) 22 Unit(s) SubCutaneous at bedtime  insulin lispro (ADMELOG) corrective regimen sliding scale   SubCutaneous at bedtime  insulin lispro (ADMELOG) corrective regimen sliding scale   SubCutaneous three times a day before meals  insulin lispro Injectable (ADMELOG) 12 Unit(s) SubCutaneous three times a day before meals  lactobacillus acidophilus 1 Tablet(s) Oral daily  lidocaine   4% Patch 1 Patch Transdermal daily  metoprolol succinate ER 50 milliGRAM(s) Oral daily  piperacillin/tazobactam IVPB.. 3.375 Gram(s) IV Intermittent every 8 hours  potassium chloride    Tablet ER 20 milliEquivalent(s) Oral daily  simvastatin 40 milliGRAM(s) Oral at bedtime  sodium chloride 3%  Inhalation 4 milliLiter(s) Inhalation every 12 hours    MEDICATIONS  (PRN):  acetaminophen     Tablet .. 1000 milliGRAM(s) Oral every 8 hours PRN Severe Pain (7 - 10)  dextrose Oral Gel 15 Gram(s) Oral once PRN Blood Glucose LESS THAN 70 milliGRAM(s)/deciliter  melatonin 3 milliGRAM(s) Oral at bedtime PRN Insomnia          PHYSICAL EXAM:  Vital Signs Last 24 Hrs  T(C): 37.2 (16 Feb 2023 22:02), Max: 37.2 (16 Feb 2023 22:02)  T(F): 98.9 (16 Feb 2023 22:02), Max: 98.9 (16 Feb 2023 22:02)  HR: 119 (16 Feb 2023 22:02) (110 - 120)  BP: 136/84 (16 Feb 2023 22:02) (96/58 - 136/84)  BP(mean): 71 (16 Feb 2023 10:19) (71 - 71)  RR: 18 (16 Feb 2023 22:02) (18 - 18)  SpO2: 100% (16 Feb 2023 22:02) (97% - 100%)    Parameters below as of 16 Feb 2023 22:02  Patient On (Oxygen Delivery Method): room air        I&O's Summary    15 Feb 2023 07:01  -  16 Feb 2023 07:00  --------------------------------------------------------  IN: 480 mL / OUT: 850 mL / NET: -370 mL        General: NAD, non-toxic appearing   HEENT: PERRLA, EOMi, no scleral icterus  CV: RRR, normal S1 and S2, no m/r/g  Lungs: limited examination, clear to auscultation   Abd: soft, nontender, nondistended  Ext: no edema, 2+ peripheral pulses   Pysch: AAOx3, appropriate affect   Neuro: 0/5 hip flexion, 3/5 knee flexion, 5/5 ankle flexion. Sensation intact throughout.   Skin: no rashes or lesions     LABS:  CAPILLARY BLOOD GLUCOSE      POCT Blood Glucose.: 293 mg/dL (16 Feb 2023 22:25)  POCT Blood Glucose.: 284 mg/dL (16 Feb 2023 17:02)  POCT Blood Glucose.: 148 mg/dL (16 Feb 2023 12:28)  POCT Blood Glucose.: 167 mg/dL (16 Feb 2023 08:23)  POCT Blood Glucose.: 181 mg/dL (16 Feb 2023 06:21)                              7.7    13.70 )-----------( 620      ( 16 Feb 2023 07:03 )             24.6       WBC Trend: 13.70<--, 17.91<--, 20.12<--  Hb Trend: 7.7<--, 7.8<--, 8.0<--, 8.9<--, 7.4<--    02-16    136  |  105  |  33<H>  ----------------------------<  163<H>  4.9   |  21<L>  |  0.51    Ca    8.7      16 Feb 2023 07:06  Phos  3.9     02-16  Mg     1.6     02-16                    RADIOLOGY & ADDITIONAL TESTS: Reviewed

## 2023-02-17 NOTE — PROGRESS NOTE ADULT - PROBLEM SELECTOR PLAN 8
On Amlodipine 5mg qd and metoprolol 25mg qd at home  - c/w amlodipine 5mg qd  - increased Toprol XL 50mg History DMT2 on insulin (long acting 16units and short acting 6units), A1c 12.7 11/2022  > A1c 7.9%  - Lantus to 22U qhs, Admelog 12U TID + THOMAS

## 2023-02-18 LAB
ANION GAP SERPL CALC-SCNC: 14 MMOL/L — SIGNIFICANT CHANGE UP (ref 5–17)
BUN SERPL-MCNC: 31 MG/DL — HIGH (ref 7–23)
CALCIUM SERPL-MCNC: 9.2 MG/DL — SIGNIFICANT CHANGE UP (ref 8.4–10.5)
CHLORIDE SERPL-SCNC: 103 MMOL/L — SIGNIFICANT CHANGE UP (ref 96–108)
CO2 SERPL-SCNC: 19 MMOL/L — LOW (ref 22–31)
CREAT SERPL-MCNC: 0.49 MG/DL — LOW (ref 0.5–1.3)
EGFR: 102 ML/MIN/1.73M2 — SIGNIFICANT CHANGE UP
GLUCOSE BLDC GLUCOMTR-MCNC: 114 MG/DL — HIGH (ref 70–99)
GLUCOSE BLDC GLUCOMTR-MCNC: 231 MG/DL — HIGH (ref 70–99)
GLUCOSE BLDC GLUCOMTR-MCNC: 233 MG/DL — HIGH (ref 70–99)
GLUCOSE BLDC GLUCOMTR-MCNC: 250 MG/DL — HIGH (ref 70–99)
GLUCOSE BLDC GLUCOMTR-MCNC: 270 MG/DL — HIGH (ref 70–99)
GLUCOSE SERPL-MCNC: 243 MG/DL — HIGH (ref 70–99)
HCT VFR BLD CALC: 24.3 % — LOW (ref 34.5–45)
HGB BLD-MCNC: 7.4 G/DL — LOW (ref 11.5–15.5)
MAGNESIUM SERPL-MCNC: 1.8 MG/DL — SIGNIFICANT CHANGE UP (ref 1.6–2.6)
MCHC RBC-ENTMCNC: 21.1 PG — LOW (ref 27–34)
MCHC RBC-ENTMCNC: 30.5 GM/DL — LOW (ref 32–36)
MCV RBC AUTO: 69.2 FL — LOW (ref 80–100)
NIGHT BLUE STAIN TISS: SIGNIFICANT CHANGE UP
NRBC # BLD: 0 /100 WBCS — SIGNIFICANT CHANGE UP (ref 0–0)
PHOSPHATE SERPL-MCNC: 4 MG/DL — SIGNIFICANT CHANGE UP (ref 2.5–4.5)
PLATELET # BLD AUTO: 626 K/UL — HIGH (ref 150–400)
POTASSIUM SERPL-MCNC: 5.1 MMOL/L — SIGNIFICANT CHANGE UP (ref 3.5–5.3)
POTASSIUM SERPL-SCNC: 5.1 MMOL/L — SIGNIFICANT CHANGE UP (ref 3.5–5.3)
RBC # BLD: 3.51 M/UL — LOW (ref 3.8–5.2)
RBC # FLD: 15.6 % — HIGH (ref 10.3–14.5)
SODIUM SERPL-SCNC: 136 MMOL/L — SIGNIFICANT CHANGE UP (ref 135–145)
SPECIMEN SOURCE: SIGNIFICANT CHANGE UP
WBC # BLD: 10.43 K/UL — SIGNIFICANT CHANGE UP (ref 3.8–10.5)
WBC # FLD AUTO: 10.43 K/UL — SIGNIFICANT CHANGE UP (ref 3.8–10.5)

## 2023-02-18 PROCEDURE — 99232 SBSQ HOSP IP/OBS MODERATE 35: CPT

## 2023-02-18 RX ORDER — SODIUM CHLORIDE 9 MG/ML
4 INJECTION INTRAMUSCULAR; INTRAVENOUS; SUBCUTANEOUS ONCE
Refills: 0 | Status: DISCONTINUED | OUTPATIENT
Start: 2023-02-18 | End: 2023-02-18

## 2023-02-18 RX ORDER — IPRATROPIUM/ALBUTEROL SULFATE 18-103MCG
3 AEROSOL WITH ADAPTER (GRAM) INHALATION ONCE
Refills: 0 | Status: DISCONTINUED | OUTPATIENT
Start: 2023-02-18 | End: 2023-02-18

## 2023-02-18 RX ORDER — MAGNESIUM SULFATE 500 MG/ML
1 VIAL (ML) INJECTION ONCE
Refills: 0 | Status: COMPLETED | OUTPATIENT
Start: 2023-02-18 | End: 2023-02-18

## 2023-02-18 RX ADMIN — GABAPENTIN 400 MILLIGRAM(S): 400 CAPSULE ORAL at 06:34

## 2023-02-18 RX ADMIN — Medication 6: at 09:19

## 2023-02-18 RX ADMIN — GABAPENTIN 400 MILLIGRAM(S): 400 CAPSULE ORAL at 13:27

## 2023-02-18 RX ADMIN — Medication 4: at 13:25

## 2023-02-18 RX ADMIN — SIMVASTATIN 40 MILLIGRAM(S): 20 TABLET, FILM COATED ORAL at 21:45

## 2023-02-18 RX ADMIN — AMPICILLIN SODIUM AND SULBACTAM SODIUM 200 GRAM(S): 250; 125 INJECTION, POWDER, FOR SUSPENSION INTRAMUSCULAR; INTRAVENOUS at 06:29

## 2023-02-18 RX ADMIN — Medication 14 UNIT(S): at 13:26

## 2023-02-18 RX ADMIN — AMLODIPINE BESYLATE 5 MILLIGRAM(S): 2.5 TABLET ORAL at 06:29

## 2023-02-18 RX ADMIN — Medication 1 TABLET(S): at 13:36

## 2023-02-18 RX ADMIN — Medication 14 UNIT(S): at 09:20

## 2023-02-18 RX ADMIN — ENOXAPARIN SODIUM 40 MILLIGRAM(S): 100 INJECTION SUBCUTANEOUS at 06:33

## 2023-02-18 RX ADMIN — LIDOCAINE 1 PATCH: 4 CREAM TOPICAL at 13:26

## 2023-02-18 RX ADMIN — Medication 14 UNIT(S): at 17:56

## 2023-02-18 RX ADMIN — INSULIN GLARGINE 27 UNIT(S): 100 INJECTION, SOLUTION SUBCUTANEOUS at 21:45

## 2023-02-18 RX ADMIN — GABAPENTIN 400 MILLIGRAM(S): 400 CAPSULE ORAL at 21:45

## 2023-02-18 RX ADMIN — AMPICILLIN SODIUM AND SULBACTAM SODIUM 200 GRAM(S): 250; 125 INJECTION, POWDER, FOR SUSPENSION INTRAMUSCULAR; INTRAVENOUS at 09:21

## 2023-02-18 RX ADMIN — LIDOCAINE 1 PATCH: 4 CREAM TOPICAL at 01:26

## 2023-02-18 RX ADMIN — Medication 100 GRAM(S): at 09:21

## 2023-02-18 RX ADMIN — AMPICILLIN SODIUM AND SULBACTAM SODIUM 200 GRAM(S): 250; 125 INJECTION, POWDER, FOR SUSPENSION INTRAMUSCULAR; INTRAVENOUS at 22:36

## 2023-02-18 RX ADMIN — Medication 50 MILLIGRAM(S): at 06:30

## 2023-02-18 RX ADMIN — Medication 1 MILLIGRAM(S): at 13:36

## 2023-02-18 RX ADMIN — AMPICILLIN SODIUM AND SULBACTAM SODIUM 200 GRAM(S): 250; 125 INJECTION, POWDER, FOR SUSPENSION INTRAMUSCULAR; INTRAVENOUS at 15:59

## 2023-02-18 NOTE — PROGRESS NOTE ADULT - PROBLEM SELECTOR PLAN 8
History DMT2 on insulin (long acting 16units and short acting 6units), A1c 12.7 11/2022  > A1c 7.9%  - Lantus to 22U qhs, Admelog 12U TID + THOMAS

## 2023-02-18 NOTE — PROGRESS NOTE ADULT - PROBLEM SELECTOR PLAN 2
Persistently tachycardic since admission. Hypoxic to 95% on room air. No symptoms, however, recent spinal surgery with significant functional impairment and immobility, wheelchair bound. EKG with sinus tachycardia, morphologically similar P waves that are upright in lead II. LE dopplers without DVTs, patient is still persistently tachycardia  > low concern for PE at this time, chart review w/ chronic tachycardia in the past without clear etiology  - Toprol XL 50mg daily for beta blockade  - Gabapentin 400mg TID for neuropathic pain Increased to 492 w/o signs of DKA. S/p methylprednisolone for pre-medication for MRI.   - basal 27 units, premeal 14 units

## 2023-02-18 NOTE — PROGRESS NOTE ADULT - PROBLEM SELECTOR PLAN 4
Increased to 492 w/o signs of DKA. S/p methylprednisolone for pre-medication for MRI.   - will readjust insulin requirements, still hyperglycemic Increased to 492 w/o signs of DKA. S/p methylprednisolone for pre-medication for MRI.   - basal 27 units, premeal 14 units Persistently tachycardic since admission. Hypoxic to 95% on room air. No symptoms, however, recent spinal surgery with significant functional impairment and immobility, wheelchair bound. EKG with sinus tachycardia, morphologically similar P waves that are upright in lead II. LE dopplers without DVTs, patient is still persistently tachycardia  > low concern for PE at this time, chart review w/ chronic tachycardia in the past without clear etiology  - Toprol XL 50mg daily for beta blockade  - Gabapentin 400mg TID for neuropathic pain

## 2023-02-18 NOTE — PROGRESS NOTE ADULT - PROBLEM SELECTOR PLAN 5
Reported spinal surgery for herniated disc in patient with lumbar radiculopathy history per chart review.  > MR lumbar/pelvis with concerning stenosis, however, as per Ortho, patient without acute new findings and so can defer consult for outpatient evaluation by primary surgeon  - c/w hydromorphone 4mg for severe pain q4hrs prn  - c/w Tylenol 1000mg q8hrs for pain prn  - c/w Lidocaine patch for pain prn  - increased gabapentin 400mg TID

## 2023-02-18 NOTE — PROGRESS NOTE ADULT - PROBLEM SELECTOR PLAN 1
K to 5.8 today. Asymptomatic without chest pain or palpitations.   > etiology from increase in Toprol?  - d/c K supplement  - repeat BMP K to 5.8 today. Asymptomatic without chest pain or palpitations.   > resolved Leukocytosis + tachycardia = SIRS+ in conjunction with CT imaging with patchy/nodular opacities in setting of coughing concerning for sepsis secondary to pneumonia likely HAP given her recent surgery and [place] placement. Ddx: legionella pneumonia given patient with reported watery stools; however does not meet diarrhea definition of at least 3 unformed stools per day.  CTAP (2/9): Nodular and patchy opacities predominantly in the bilateral lower lobes with many that are centrally necrotic concerning for infection. Differential diagnosis includes metastatic disease and septic emboli. TTE (2/10): EF 67%, grossly normal findings. BCx (NGTD), UCx (E. faecium), urine legionella (negative), MRSA (negative), fungitel (negative)  > consults: ID, pulm   - started Unasyn for cavitary PNA per ID  - Quant TB indeterminant x2  - sputum produced w/ induction x2   - will repeat sputum induction today for final sample x3   - initiate discharge planning w/ isolation precautions removed Leukocytosis + tachycardia = SIRS+ in conjunction with CT imaging with patchy/nodular opacities in setting of coughing concerning for sepsis secondary to pneumonia likely HAP given her recent surgery and [place] placement. Ddx: legionella pneumonia given patient with reported watery stools; however does not meet diarrhea definition of at least 3 unformed stools per day.  CTAP (2/9): Nodular and patchy opacities predominantly in the bilateral lower lobes with many that are centrally necrotic concerning for infection. Differential diagnosis includes metastatic disease and septic emboli. TTE (2/10): EF 67%, grossly normal findings. BCx (NGTD), UCx (E. faecium), urine legionella (negative), MRSA (negative), fungitel (negative)  > consults: ID, pulm   - started Unasyn for cavitary PNA per ID  - Quant TB indeterminant x2  - sputum produced w/ induction x3 - pending results can d/c isolation precautions   - initiate discharge planning w/ isolation precautions removed Leukocytosis + tachycardia = SIRS+ in conjunction with CT imaging with patchy/nodular opacities in setting of coughing concerning for sepsis secondary to pneumonia likely HAP given her recent surgery and [place] placement. Ddx: legionella pneumonia given patient with reported watery stools; however does not meet diarrhea definition of at least 3 unformed stools per day.  CTAP (2/9): Nodular and patchy opacities predominantly in the bilateral lower lobes with many that are centrally necrotic concerning for infection. Differential diagnosis includes metastatic disease and septic emboli. TTE (2/10): EF 67%, grossly normal findings. BCx (NGTD), UCx (E. faecium), urine legionella (negative), MRSA (negative), fungitel (negative)  > consults: ID, pulm   - started Unasyn for cavitary PNA per ID - will clarify the duration   - Quant TB indeterminant x2  - sputum produced w/ induction x3 - pending results can d/c isolation precautions   - initiate discharge planning w/ isolation precautions removed

## 2023-02-18 NOTE — PROGRESS NOTE ADULT - PROBLEM SELECTOR PLAN 3
Leukocytosis + tachycardia = SIRS+ in conjunction with CT imaging with patchy/nodular opacities in setting of coughing concerning for sepsis secondary to pneumonia likely HAP given her recent surgery and [place] placement. Ddx: legionella pneumonia given patient with reported watery stools; however does not meet diarrhea definition of at least 3 unformed stools per day.  CTAP (2/9): Nodular and patchy opacities predominantly in the bilateral lower lobes with many that are centrally necrotic concerning for infection. Differential diagnosis includes metastatic disease and septic emboli. TTE (2/10): EF 67%, grossly normal findings. BCx (NGTD), UCx (E. faecium), urine legionella (negative), MRSA (negative), fungitel (negative)  > consults: ID, pulm   - last day of Zosyn, will d/c after infusion   - Quant TB indeterminant x2, will reach out to pulm and ID for further management - do we still need contact isolation for rehab evaluation? Do we need a bronchoscopy for cultures?  - still unable to produce sputum for AFB studies Leukocytosis + tachycardia = SIRS+ in conjunction with CT imaging with patchy/nodular opacities in setting of coughing concerning for sepsis secondary to pneumonia likely HAP given her recent surgery and [place] placement. Ddx: legionella pneumonia given patient with reported watery stools; however does not meet diarrhea definition of at least 3 unformed stools per day.  CTAP (2/9): Nodular and patchy opacities predominantly in the bilateral lower lobes with many that are centrally necrotic concerning for infection. Differential diagnosis includes metastatic disease and septic emboli. TTE (2/10): EF 67%, grossly normal findings. BCx (NGTD), UCx (E. faecium), urine legionella (negative), MRSA (negative), fungitel (negative)  > consults: ID, pulm   - last day of Zosyn, will d/c after infusion   - Quant TB indeterminant x2  - sputum produced w/ induction x2   - will repeat sputum induction today for final sample x3   - initiate discharge planning w/ isolation precautions removed K to 5.8 today. Asymptomatic without chest pain or palpitations.   > resolved

## 2023-02-18 NOTE — PROGRESS NOTE ADULT - PROBLEM SELECTOR PLAN 12
DVT prophylaxis: Lovenox 40mg qd  Diet: consistent carbs  Disposition: d/c planning to rehab  Code Status: pending family discussion DVT prophylaxis: Lovenox 40mg qd + SCDs  Diet: consistent carbs  Disposition: d/c planning to rehab  Code Status: pending family discussion

## 2023-02-18 NOTE — PROGRESS NOTE ADULT - SUBJECTIVE AND OBJECTIVE BOX
Progress Note    02-09-23 (9d)    Patient is a 67y old  Female who presents with a chief complaint of Pneumonia (17 Feb 2023 04:32)      Subjective / Overnight Events :  - No acute events overnight.  - Pt seen and examined at bedside.     Additional ROS (if any):    MEDICATIONS  (STANDING):  amLODIPine   Tablet 5 milliGRAM(s) Oral daily  ampicillin/sulbactam  IVPB 3 Gram(s) IV Intermittent every 6 hours  dextrose 5%. 1000 milliLiter(s) (50 mL/Hr) IV Continuous <Continuous>  dextrose 5%. 1000 milliLiter(s) (100 mL/Hr) IV Continuous <Continuous>  dextrose 50% Injectable 25 Gram(s) IV Push once  dextrose 50% Injectable 12.5 Gram(s) IV Push once  dextrose 50% Injectable 25 Gram(s) IV Push once  enoxaparin Injectable 40 milliGRAM(s) SubCutaneous every 24 hours  folic acid 1 milliGRAM(s) Oral daily  gabapentin 400 milliGRAM(s) Oral three times a day  glucagon  Injectable 1 milliGRAM(s) IntraMuscular once  insulin glargine Injectable (LANTUS) 27 Unit(s) SubCutaneous at bedtime  insulin lispro (ADMELOG) corrective regimen sliding scale   SubCutaneous three times a day before meals  insulin lispro (ADMELOG) corrective regimen sliding scale   SubCutaneous at bedtime  insulin lispro Injectable (ADMELOG) 14 Unit(s) SubCutaneous three times a day before meals  lactobacillus acidophilus 1 Tablet(s) Oral daily  lidocaine   4% Patch 1 Patch Transdermal daily  metoprolol succinate ER 50 milliGRAM(s) Oral daily  simvastatin 40 milliGRAM(s) Oral at bedtime    MEDICATIONS  (PRN):  acetaminophen     Tablet .. 1000 milliGRAM(s) Oral every 8 hours PRN Severe Pain (7 - 10)  dextrose Oral Gel 15 Gram(s) Oral once PRN Blood Glucose LESS THAN 70 milliGRAM(s)/deciliter  melatonin 3 milliGRAM(s) Oral at bedtime PRN Insomnia          PHYSICAL EXAM:  Vital Signs Last 24 Hrs  T(C): 37.1 (17 Feb 2023 21:45), Max: 37.1 (17 Feb 2023 21:45)  T(F): 98.8 (17 Feb 2023 21:45), Max: 98.8 (17 Feb 2023 21:45)  HR: 117 (17 Feb 2023 21:45) (102 - 117)  BP: 126/74 (17 Feb 2023 21:45) (112/69 - 136/61)  BP(mean): --  RR: 19 (17 Feb 2023 21:45) (18 - 19)  SpO2: 96% (17 Feb 2023 21:45) (96% - 98%)    Parameters below as of 17 Feb 2023 21:45  Patient On (Oxygen Delivery Method): room air        I&O's Summary    17 Feb 2023 07:01  -  18 Feb 2023 04:45  --------------------------------------------------------  IN: 720 mL / OUT: 250 mL / NET: 470 mL        General: NAD, non-toxic appearing   HEENT: PERRLA, EOMi, no scleral icterus  CV: RRR, normal S1 and S2, no m/r/g  Lungs: normal respiratory effort. CTAB, no wheezes, rales, or rhonchi  Abd: soft, nontender, nondistended  Ext: no edema, 2+ peripheral pulses   Pysch: AAOx3, appropriate affect   Neuro: grossly non-focal, moving all extremities spontaneously   Skin: no rashes or lesions     LABS:  CAPILLARY BLOOD GLUCOSE      POCT Blood Glucose.: 223 mg/dL (17 Feb 2023 21:35)  POCT Blood Glucose.: 174 mg/dL (17 Feb 2023 18:18)  POCT Blood Glucose.: 110 mg/dL (17 Feb 2023 17:02)  POCT Blood Glucose.: 225 mg/dL (17 Feb 2023 12:31)  POCT Blood Glucose.: 271 mg/dL (17 Feb 2023 08:22)                              8.1    15.10 )-----------( 633      ( 17 Feb 2023 06:35 )             26.2       WBC Trend: 15.10<--, 13.70<--, 17.91<--  Hb Trend: 8.1<--, 7.7<--, 7.8<--, 8.0<--, 8.9<--    02-17    138  |  106  |  30<H>  ----------------------------<  283<H>  4.6   |  17<L>  |  0.43<L>    Ca    8.5      17 Feb 2023 11:36  Phos  4.0     02-17  Mg     1.9     02-17                    RADIOLOGY & ADDITIONAL TESTS: Reviewed Progress Note    02-09-23 (9d)    Patient is a 67y old  Female who presents with a chief complaint of Pneumonia (17 Feb 2023 04:32)      Subjective / Overnight Events :  - No acute events overnight.  - Pt seen and examined at bedside. Feeling better. No complaints.     Additional ROS (if any):    MEDICATIONS  (STANDING):  amLODIPine   Tablet 5 milliGRAM(s) Oral daily  ampicillin/sulbactam  IVPB 3 Gram(s) IV Intermittent every 6 hours  dextrose 5%. 1000 milliLiter(s) (50 mL/Hr) IV Continuous <Continuous>  dextrose 5%. 1000 milliLiter(s) (100 mL/Hr) IV Continuous <Continuous>  dextrose 50% Injectable 25 Gram(s) IV Push once  dextrose 50% Injectable 12.5 Gram(s) IV Push once  dextrose 50% Injectable 25 Gram(s) IV Push once  enoxaparin Injectable 40 milliGRAM(s) SubCutaneous every 24 hours  folic acid 1 milliGRAM(s) Oral daily  gabapentin 400 milliGRAM(s) Oral three times a day  glucagon  Injectable 1 milliGRAM(s) IntraMuscular once  insulin glargine Injectable (LANTUS) 27 Unit(s) SubCutaneous at bedtime  insulin lispro (ADMELOG) corrective regimen sliding scale   SubCutaneous three times a day before meals  insulin lispro (ADMELOG) corrective regimen sliding scale   SubCutaneous at bedtime  insulin lispro Injectable (ADMELOG) 14 Unit(s) SubCutaneous three times a day before meals  lactobacillus acidophilus 1 Tablet(s) Oral daily  lidocaine   4% Patch 1 Patch Transdermal daily  metoprolol succinate ER 50 milliGRAM(s) Oral daily  simvastatin 40 milliGRAM(s) Oral at bedtime    MEDICATIONS  (PRN):  acetaminophen     Tablet .. 1000 milliGRAM(s) Oral every 8 hours PRN Severe Pain (7 - 10)  dextrose Oral Gel 15 Gram(s) Oral once PRN Blood Glucose LESS THAN 70 milliGRAM(s)/deciliter  melatonin 3 milliGRAM(s) Oral at bedtime PRN Insomnia          PHYSICAL EXAM:  Vital Signs Last 24 Hrs  T(C): 37.1 (17 Feb 2023 21:45), Max: 37.1 (17 Feb 2023 21:45)  T(F): 98.8 (17 Feb 2023 21:45), Max: 98.8 (17 Feb 2023 21:45)  HR: 117 (17 Feb 2023 21:45) (102 - 117)  BP: 126/74 (17 Feb 2023 21:45) (112/69 - 136/61)  BP(mean): --  RR: 19 (17 Feb 2023 21:45) (18 - 19)  SpO2: 96% (17 Feb 2023 21:45) (96% - 98%)    Parameters below as of 17 Feb 2023 21:45  Patient On (Oxygen Delivery Method): room air        I&O's Summary    17 Feb 2023 07:01  -  18 Feb 2023 04:45  --------------------------------------------------------  IN: 720 mL / OUT: 250 mL / NET: 470 mL        General: NAD, non-toxic appearing   HEENT: PERRLA, EOMi, no scleral icterus  CV: RRR, normal S1 and S2, no m/r/g  Lungs: limited examination but clear to auscultation   Abd: soft, nontender, nondistended  Ext: no edema, 2+ peripheral pulses   Pysch: AAOx3, appropriate affect   Neuro: 0/5 hip flexion, 3/5 knee flexion, 5/5 ankle flexion. Sensation in tact 5/5 in bilateral extremities   Skin: no rashes or lesions     LABS:  CAPILLARY BLOOD GLUCOSE      POCT Blood Glucose.: 223 mg/dL (17 Feb 2023 21:35)  POCT Blood Glucose.: 174 mg/dL (17 Feb 2023 18:18)  POCT Blood Glucose.: 110 mg/dL (17 Feb 2023 17:02)  POCT Blood Glucose.: 225 mg/dL (17 Feb 2023 12:31)  POCT Blood Glucose.: 271 mg/dL (17 Feb 2023 08:22)                              8.1    15.10 )-----------( 633      ( 17 Feb 2023 06:35 )             26.2       WBC Trend: 15.10<--, 13.70<--, 17.91<--  Hb Trend: 8.1<--, 7.7<--, 7.8<--, 8.0<--, 8.9<--    02-17    138  |  106  |  30<H>  ----------------------------<  283<H>  4.6   |  17<L>  |  0.43<L>    Ca    8.5      17 Feb 2023 11:36  Phos  4.0     02-17  Mg     1.9     02-17                    RADIOLOGY & ADDITIONAL TESTS: Reviewed

## 2023-02-18 NOTE — PROGRESS NOTE ADULT - ASSESSMENT
67 F PMH DMT2, multiple falls, s/p lumbar microdiscectomy for radiculopathy presenting from [facility] with acute onset of malaise and wet coughing but without fevers, shortness of breath, or chest pain with CT chest non-contrast imaging with patchy/nodular opacities in bilateral lower lobes, centrally necrotic c/f infection vs. metastatic disease vs. septic emboli. S/p 7 day treatment with broad spectrum abx with resolution of symptoms, but without elucidating underlying etiology. Pending discharge planning to rehab.

## 2023-02-18 NOTE — PROGRESS NOTE ADULT - ATTENDING COMMENTS
67F with DMT2, multiple falls, s/p lumbar microdiscectomy for radiculopathy in 11/2022 presented from [facility] with chronic cough, recent hospitalization followed by [place] stay, previously treated with Meropenem. Pt met sepsis 2/2 to acute bacterial PNA, CT chest with nodular and patchy opacities predominantly in the bilateral lower lobes with many that are centrally necrotic concerning for infection vs septic emboli vs metastatic disease.     # Lung lesions  - ddx septic emboli vs malignancy vs fungal vs TB  - id and pulm recs appreciated  - c/w zosyn, plan for 7 day course  - low suspicion for TB, but obtain sputum AFB x 3; 3 sputums in lab, f/u results  - BCx NGTD, TTE reassuring, hold off HAJA for now  - pending ID clearance for discharge, can pursue CTC to reassess lesion size    # Diarrhea  - Diffuse wall thickening of the proximal duodenum with periduodenal stranding, concerning for duodenitis/peptic ulcer disease.   - GI PCR neg,  neg c diff   - needs opt colonoscopy    # Leg weakness, chronic  - f/u MRIs - d/w ortho; will follow-up outpatient as these are not acute findings    # Thyroid nodule   - will need outpatient FNA; d/w endo to arrange follow-up    Rest of plan as above, d/w HS. Planning for discharge after ID clearance for discharge back to [facility]

## 2023-02-18 NOTE — PROGRESS NOTE ADULT - PROBLEM SELECTOR PLAN 9
On Amlodipine 5mg qd and metoprolol 25mg qd at home  - c/w amlodipine 5mg qd  - increased Toprol XL 50mg

## 2023-02-19 LAB
ANION GAP SERPL CALC-SCNC: 11 MMOL/L — SIGNIFICANT CHANGE UP (ref 5–17)
BLD GP AB SCN SERPL QL: NEGATIVE — SIGNIFICANT CHANGE UP
BUN SERPL-MCNC: 27 MG/DL — HIGH (ref 7–23)
CALCIUM SERPL-MCNC: 9.2 MG/DL — SIGNIFICANT CHANGE UP (ref 8.4–10.5)
CHLORIDE SERPL-SCNC: 107 MMOL/L — SIGNIFICANT CHANGE UP (ref 96–108)
CO2 SERPL-SCNC: 20 MMOL/L — LOW (ref 22–31)
CREAT SERPL-MCNC: 0.46 MG/DL — LOW (ref 0.5–1.3)
EGFR: 104 ML/MIN/1.73M2 — SIGNIFICANT CHANGE UP
GLUCOSE BLDC GLUCOMTR-MCNC: 211 MG/DL — HIGH (ref 70–99)
GLUCOSE BLDC GLUCOMTR-MCNC: 216 MG/DL — HIGH (ref 70–99)
GLUCOSE BLDC GLUCOMTR-MCNC: 290 MG/DL — HIGH (ref 70–99)
GLUCOSE BLDC GLUCOMTR-MCNC: 317 MG/DL — HIGH (ref 70–99)
GLUCOSE SERPL-MCNC: 180 MG/DL — HIGH (ref 70–99)
HCT VFR BLD CALC: 16 % — CRITICAL LOW (ref 34.5–45)
HCT VFR BLD CALC: 23.8 % — LOW (ref 34.5–45)
HGB BLD-MCNC: 4.8 G/DL — CRITICAL LOW (ref 11.5–15.5)
HGB BLD-MCNC: 7.1 G/DL — LOW (ref 11.5–15.5)
MAGNESIUM SERPL-MCNC: 1.9 MG/DL — SIGNIFICANT CHANGE UP (ref 1.6–2.6)
MCHC RBC-ENTMCNC: 20.6 PG — LOW (ref 27–34)
MCHC RBC-ENTMCNC: 20.8 PG — LOW (ref 27–34)
MCHC RBC-ENTMCNC: 29.8 GM/DL — LOW (ref 32–36)
MCHC RBC-ENTMCNC: 30 GM/DL — LOW (ref 32–36)
MCV RBC AUTO: 69.2 FL — LOW (ref 80–100)
MCV RBC AUTO: 69.3 FL — LOW (ref 80–100)
NRBC # BLD: 0 /100 WBCS — SIGNIFICANT CHANGE UP (ref 0–0)
NRBC # BLD: 0 /100 WBCS — SIGNIFICANT CHANGE UP (ref 0–0)
PHOSPHATE SERPL-MCNC: 4.7 MG/DL — HIGH (ref 2.5–4.5)
PLATELET # BLD AUTO: 559 K/UL — HIGH (ref 150–400)
PLATELET # BLD AUTO: 659 K/UL — HIGH (ref 150–400)
POTASSIUM SERPL-MCNC: 4.7 MMOL/L — SIGNIFICANT CHANGE UP (ref 3.5–5.3)
POTASSIUM SERPL-SCNC: 4.7 MMOL/L — SIGNIFICANT CHANGE UP (ref 3.5–5.3)
RBC # BLD: 2.31 M/UL — LOW (ref 3.8–5.2)
RBC # BLD: 3.44 M/UL — LOW (ref 3.8–5.2)
RBC # FLD: 16 % — HIGH (ref 10.3–14.5)
RBC # FLD: 16.9 % — HIGH (ref 10.3–14.5)
RH IG SCN BLD-IMP: POSITIVE — SIGNIFICANT CHANGE UP
SODIUM SERPL-SCNC: 138 MMOL/L — SIGNIFICANT CHANGE UP (ref 135–145)
WBC # BLD: 8.25 K/UL — SIGNIFICANT CHANGE UP (ref 3.8–10.5)
WBC # BLD: 9.15 K/UL — SIGNIFICANT CHANGE UP (ref 3.8–10.5)
WBC # FLD AUTO: 8.25 K/UL — SIGNIFICANT CHANGE UP (ref 3.8–10.5)
WBC # FLD AUTO: 9.15 K/UL — SIGNIFICANT CHANGE UP (ref 3.8–10.5)

## 2023-02-19 PROCEDURE — 99233 SBSQ HOSP IP/OBS HIGH 50: CPT | Mod: GC

## 2023-02-19 RX ORDER — LANOLIN ALCOHOL/MO/W.PET/CERES
5 CREAM (GRAM) TOPICAL ONCE
Refills: 0 | Status: COMPLETED | OUTPATIENT
Start: 2023-02-19 | End: 2023-02-19

## 2023-02-19 RX ADMIN — Medication 14 UNIT(S): at 12:30

## 2023-02-19 RX ADMIN — Medication 4: at 09:09

## 2023-02-19 RX ADMIN — AMPICILLIN SODIUM AND SULBACTAM SODIUM 200 GRAM(S): 250; 125 INJECTION, POWDER, FOR SUSPENSION INTRAMUSCULAR; INTRAVENOUS at 21:50

## 2023-02-19 RX ADMIN — Medication 1 TABLET(S): at 12:29

## 2023-02-19 RX ADMIN — Medication 5 MILLIGRAM(S): at 22:50

## 2023-02-19 RX ADMIN — ENOXAPARIN SODIUM 40 MILLIGRAM(S): 100 INJECTION SUBCUTANEOUS at 05:48

## 2023-02-19 RX ADMIN — LIDOCAINE 1 PATCH: 4 CREAM TOPICAL at 12:27

## 2023-02-19 RX ADMIN — AMPICILLIN SODIUM AND SULBACTAM SODIUM 200 GRAM(S): 250; 125 INJECTION, POWDER, FOR SUSPENSION INTRAMUSCULAR; INTRAVENOUS at 03:50

## 2023-02-19 RX ADMIN — Medication 14 UNIT(S): at 09:09

## 2023-02-19 RX ADMIN — Medication 14 UNIT(S): at 17:40

## 2023-02-19 RX ADMIN — Medication 50 MILLIGRAM(S): at 05:48

## 2023-02-19 RX ADMIN — Medication 1 MILLIGRAM(S): at 12:29

## 2023-02-19 RX ADMIN — Medication 4: at 12:31

## 2023-02-19 RX ADMIN — AMLODIPINE BESYLATE 5 MILLIGRAM(S): 2.5 TABLET ORAL at 05:48

## 2023-02-19 RX ADMIN — Medication 3 MILLIGRAM(S): at 00:17

## 2023-02-19 RX ADMIN — AMPICILLIN SODIUM AND SULBACTAM SODIUM 200 GRAM(S): 250; 125 INJECTION, POWDER, FOR SUSPENSION INTRAMUSCULAR; INTRAVENOUS at 09:12

## 2023-02-19 RX ADMIN — GABAPENTIN 400 MILLIGRAM(S): 400 CAPSULE ORAL at 05:48

## 2023-02-19 RX ADMIN — AMPICILLIN SODIUM AND SULBACTAM SODIUM 200 GRAM(S): 250; 125 INJECTION, POWDER, FOR SUSPENSION INTRAMUSCULAR; INTRAVENOUS at 16:25

## 2023-02-19 RX ADMIN — Medication 2: at 22:50

## 2023-02-19 RX ADMIN — LIDOCAINE 1 PATCH: 4 CREAM TOPICAL at 19:50

## 2023-02-19 RX ADMIN — GABAPENTIN 400 MILLIGRAM(S): 400 CAPSULE ORAL at 16:22

## 2023-02-19 RX ADMIN — INSULIN GLARGINE 27 UNIT(S): 100 INJECTION, SOLUTION SUBCUTANEOUS at 22:50

## 2023-02-19 RX ADMIN — Medication 8: at 17:41

## 2023-02-19 NOTE — PROGRESS NOTE ADULT - PROBLEM SELECTOR PLAN 2
Increased to 492 w/o signs of DKA. S/p methylprednisolone for pre-medication for MRI.   - basal 27 units, premeal 14 units

## 2023-02-19 NOTE — PROGRESS NOTE ADULT - PROBLEM SELECTOR PLAN 12
DVT prophylaxis: Lovenox 40mg qd + SCDs  Diet: consistent carbs  Disposition: d/c planning to rehab  Code Status: pending family discussion

## 2023-02-19 NOTE — PROGRESS NOTE ADULT - PROBLEM SELECTOR PLAN 4
Persistently tachycardic since admission. Hypoxic to 95% on room air. No symptoms, however, recent spinal surgery with significant functional impairment and immobility, wheelchair bound. EKG with sinus tachycardia, morphologically similar P waves that are upright in lead II. LE dopplers without DVTs, patient is still persistently tachycardia  > low concern for PE at this time, chart review w/ chronic tachycardia in the past without clear etiology  - Toprol XL 50mg daily for beta blockade  - Gabapentin 400mg TID for neuropathic pain

## 2023-02-19 NOTE — PROGRESS NOTE ADULT - ASSESSMENT
67 F PMH DMT2, multiple falls, s/p lumbar microdiscectomy for radiculopathy presenting from [facility] with acute onset of malaise and wet coughing but without fevers, shortness of breath, or chest pain with CT chest non-contrast imaging with patchy/nodular opacities in bilateral lower lobes, centrally necrotic c/f infection vs. metastatic disease vs. septic emboli. S/p 7 day treatment with broad spectrum abx with resolution of symptoms, but without elucidating underlying etiology. Pending discharge planning to rehab.

## 2023-02-19 NOTE — PROGRESS NOTE ADULT - PROBLEM SELECTOR PLAN 1
Leukocytosis + tachycardia = SIRS+ in conjunction with CT imaging with patchy/nodular opacities in setting of coughing concerning for sepsis secondary to pneumonia likely HAP given her recent surgery and [place] placement. Ddx: legionella pneumonia given patient with reported watery stools; however does not meet diarrhea definition of at least 3 unformed stools per day.  CTAP (2/9): Nodular and patchy opacities predominantly in the bilateral lower lobes with many that are centrally necrotic concerning for infection. Differential diagnosis includes metastatic disease and septic emboli. TTE (2/10): EF 67%, grossly normal findings. BCx (NGTD), UCx (E. faecium), urine legionella (negative), MRSA (negative), fungitel (negative)  > consults: ID, pulm   - started Unasyn for cavitary PNA per ID - will clarify the duration   - Quant TB indeterminant x2  - sputum produced w/ induction x3 - pending results can d/c isolation precautions   - initiate discharge planning w/ isolation precautions removed

## 2023-02-19 NOTE — PROGRESS NOTE ADULT - SUBJECTIVE AND OBJECTIVE BOX
PROGRESS NOTE:   Authored by Heather Kang MD  Internal Medicine      Patient is a 67y old  Female who presents with a chief complaint of Pneumonia (18 Feb 2023 04:44)      SUBJECTIVE / OVERNIGHT EVENTS: NAEO, patient seen and examined at bedside    MEDICATIONS  (STANDING):  amLODIPine   Tablet 5 milliGRAM(s) Oral daily  ampicillin/sulbactam  IVPB 3 Gram(s) IV Intermittent every 6 hours  dextrose 5%. 1000 milliLiter(s) (50 mL/Hr) IV Continuous <Continuous>  dextrose 5%. 1000 milliLiter(s) (100 mL/Hr) IV Continuous <Continuous>  dextrose 50% Injectable 25 Gram(s) IV Push once  dextrose 50% Injectable 12.5 Gram(s) IV Push once  dextrose 50% Injectable 25 Gram(s) IV Push once  enoxaparin Injectable 40 milliGRAM(s) SubCutaneous every 24 hours  folic acid 1 milliGRAM(s) Oral daily  gabapentin 400 milliGRAM(s) Oral three times a day  glucagon  Injectable 1 milliGRAM(s) IntraMuscular once  insulin glargine Injectable (LANTUS) 27 Unit(s) SubCutaneous at bedtime  insulin lispro (ADMELOG) corrective regimen sliding scale   SubCutaneous three times a day before meals  insulin lispro (ADMELOG) corrective regimen sliding scale   SubCutaneous at bedtime  insulin lispro Injectable (ADMELOG) 14 Unit(s) SubCutaneous three times a day before meals  lactobacillus acidophilus 1 Tablet(s) Oral daily  lidocaine   4% Patch 1 Patch Transdermal daily  metoprolol succinate ER 50 milliGRAM(s) Oral daily  simvastatin 40 milliGRAM(s) Oral at bedtime    MEDICATIONS  (PRN):  acetaminophen     Tablet .. 1000 milliGRAM(s) Oral every 8 hours PRN Severe Pain (7 - 10)  dextrose Oral Gel 15 Gram(s) Oral once PRN Blood Glucose LESS THAN 70 milliGRAM(s)/deciliter  melatonin 3 milliGRAM(s) Oral at bedtime PRN Insomnia      CAPILLARY BLOOD GLUCOSE      POCT Blood Glucose.: 231 mg/dL (18 Feb 2023 21:33)  POCT Blood Glucose.: 114 mg/dL (18 Feb 2023 17:13)  POCT Blood Glucose.: 250 mg/dL (18 Feb 2023 13:24)  POCT Blood Glucose.: 233 mg/dL (18 Feb 2023 12:09)  POCT Blood Glucose.: 270 mg/dL (18 Feb 2023 08:21)    I&O's Summary    18 Feb 2023 07:01  -  19 Feb 2023 07:00  --------------------------------------------------------  IN: 680 mL / OUT: 500 mL / NET: 180 mL        PHYSICAL EXAM:  Vital Signs Last 24 Hrs  T(C): 36.7 (19 Feb 2023 05:12), Max: 37.2 (18 Feb 2023 12:10)  T(F): 98.1 (19 Feb 2023 05:12), Max: 99 (18 Feb 2023 12:10)  HR: 110 (19 Feb 2023 05:12) (106 - 114)  BP: 141/82 (19 Feb 2023 05:12) (125/68 - 141/82)  BP(mean): --  RR: 18 (19 Feb 2023 05:12) (18 - 18)  SpO2: 99% (19 Feb 2023 05:12) (97% - 99%)    Parameters below as of 19 Feb 2023 05:12  Patient On (Oxygen Delivery Method): room air    General: NAD, non-toxic appearing   HEENT: PERRLA, EOMi, no scleral icterus  CV: RRR, normal S1 and S2, no m/r/g  Lungs: limited examination but clear to auscultation   Abd: soft, nontender, nondistended  Ext: no edema, 2+ peripheral pulses   Pysch: AAOx3, appropriate affect   Neuro: 0/5 hip flexion, 3/5 knee flexion, 5/5 ankle flexion. Sensation in tact 5/5 in bilateral extremities   Skin: no rashes or lesions     LABS:                        7.4    10.43 )-----------( 626      ( 18 Feb 2023 07:08 )             24.3     02-18    136  |  103  |  31<H>  ----------------------------<  243<H>  5.1   |  19<L>  |  0.49<L>    Ca    9.2      18 Feb 2023 07:02  Phos  4.0     02-18  Mg     1.8     02-18                Culture - Acid Fast - Sputum w/Smear (collected 18 Feb 2023 09:44)  Source: .Sputum    Culture - Acid Fast - Sputum w/Smear (collected 17 Feb 2023 23:58)  Source: .Sputum Sputum    Culture - Acid Fast - Sputum w/Smear (collected 17 Feb 2023 15:42)  Source: .Sputum Sputum        RADIOLOGY & ADDITIONAL TESTS:  Results Reviewed:   Imaging Personally Reviewed:  Electrocardiogram Personally Reviewed:    COORDINATION OF CARE:  Care Discussed with Consultants/Other Providers [Y/N]:  Prior or Outpatient Records Reviewed [Y/N]:   PROGRESS NOTE:   Authored by Heather Kang MD  Internal Medicine      Patient is a 67y old  Female who presents with a chief complaint of Pneumonia (18 Feb 2023 04:44)      SUBJECTIVE / OVERNIGHT EVENTS: NAEO, patient seen and examined at bedside without acute complaints. denies bleeding, hematochezia, melena, no hematuria, no abomdinal pain, no nausea/vomiting, no chest pain or discomfort, no sob. eating breakfast. does report LE discomfort but improved with SCD    MEDICATIONS  (STANDING):  amLODIPine   Tablet 5 milliGRAM(s) Oral daily  ampicillin/sulbactam  IVPB 3 Gram(s) IV Intermittent every 6 hours  dextrose 5%. 1000 milliLiter(s) (50 mL/Hr) IV Continuous <Continuous>  dextrose 5%. 1000 milliLiter(s) (100 mL/Hr) IV Continuous <Continuous>  dextrose 50% Injectable 25 Gram(s) IV Push once  dextrose 50% Injectable 12.5 Gram(s) IV Push once  dextrose 50% Injectable 25 Gram(s) IV Push once  enoxaparin Injectable 40 milliGRAM(s) SubCutaneous every 24 hours  folic acid 1 milliGRAM(s) Oral daily  gabapentin 400 milliGRAM(s) Oral three times a day  glucagon  Injectable 1 milliGRAM(s) IntraMuscular once  insulin glargine Injectable (LANTUS) 27 Unit(s) SubCutaneous at bedtime  insulin lispro (ADMELOG) corrective regimen sliding scale   SubCutaneous three times a day before meals  insulin lispro (ADMELOG) corrective regimen sliding scale   SubCutaneous at bedtime  insulin lispro Injectable (ADMELOG) 14 Unit(s) SubCutaneous three times a day before meals  lactobacillus acidophilus 1 Tablet(s) Oral daily  lidocaine   4% Patch 1 Patch Transdermal daily  metoprolol succinate ER 50 milliGRAM(s) Oral daily  simvastatin 40 milliGRAM(s) Oral at bedtime    MEDICATIONS  (PRN):  acetaminophen     Tablet .. 1000 milliGRAM(s) Oral every 8 hours PRN Severe Pain (7 - 10)  dextrose Oral Gel 15 Gram(s) Oral once PRN Blood Glucose LESS THAN 70 milliGRAM(s)/deciliter  melatonin 3 milliGRAM(s) Oral at bedtime PRN Insomnia      CAPILLARY BLOOD GLUCOSE      POCT Blood Glucose.: 231 mg/dL (18 Feb 2023 21:33)  POCT Blood Glucose.: 114 mg/dL (18 Feb 2023 17:13)  POCT Blood Glucose.: 250 mg/dL (18 Feb 2023 13:24)  POCT Blood Glucose.: 233 mg/dL (18 Feb 2023 12:09)  POCT Blood Glucose.: 270 mg/dL (18 Feb 2023 08:21)    I&O's Summary    18 Feb 2023 07:01  -  19 Feb 2023 07:00  --------------------------------------------------------  IN: 680 mL / OUT: 500 mL / NET: 180 mL        PHYSICAL EXAM:  Vital Signs Last 24 Hrs  T(C): 36.7 (19 Feb 2023 05:12), Max: 37.2 (18 Feb 2023 12:10)  T(F): 98.1 (19 Feb 2023 05:12), Max: 99 (18 Feb 2023 12:10)  HR: 110 (19 Feb 2023 05:12) (106 - 114)  BP: 141/82 (19 Feb 2023 05:12) (125/68 - 141/82)  BP(mean): --  RR: 18 (19 Feb 2023 05:12) (18 - 18)  SpO2: 99% (19 Feb 2023 05:12) (97% - 99%)    Parameters below as of 19 Feb 2023 05:12  Patient On (Oxygen Delivery Method): room air    General: NAD, non-toxic appearing   HEENT: , EOMi, no scleral icterus, no conjunctival injection  CV: RRR, normal S1 and S2, no m/r/g  Lungs: limited examination but clear to auscultation   Abd: soft, nontender, nondistended  Ext: no edema, 2+ peripheral pulses   Pysch: AAOx3, appropriate affect   Neuro: 0/5 hip flexion, 3/5 knee flexion, 5/5 ankle flexion. Sensation in tact 5/5 in bilateral extremities   Skin: no rashes or lesions     LABS:                        7.4    10.43 )-----------( 626      ( 18 Feb 2023 07:08 )             24.3     02-18    136  |  103  |  31<H>  ----------------------------<  243<H>  5.1   |  19<L>  |  0.49<L>    Ca    9.2      18 Feb 2023 07:02  Phos  4.0     02-18  Mg     1.8     02-18                Culture - Acid Fast - Sputum w/Smear (collected 18 Feb 2023 09:44)  Source: .Sputum    Culture - Acid Fast - Sputum w/Smear (collected 17 Feb 2023 23:58)  Source: .Sputum Sputum    Culture - Acid Fast - Sputum w/Smear (collected 17 Feb 2023 15:42)  Source: .Sputum Sputum        RADIOLOGY & ADDITIONAL TESTS:  Results Reviewed:   Imaging Personally Reviewed:  Electrocardiogram Personally Reviewed:    COORDINATION OF CARE:  Care Discussed with Consultants/Other Providers [Y/N]:  Prior or Outpatient Records Reviewed [Y/N]:

## 2023-02-19 NOTE — PROGRESS NOTE ADULT - ATTENDING COMMENTS
67 F PMH DMT2, multiple falls, s/p lumbar microdiscectomy for radiculopathy presenting from [facility] with acute onset of malaise and wet coughing but without fevers, shortness of breath, or chest pain with CT chest non-contrast imaging with patchy/nodular opacities in bilateral lower lobes, centrally necrotic c/f infection vs. metastatic disease vs. septic emboli. S/p 7 day treatment with broad spectrum abx with resolution of symptoms, but without elucidating underlying etiology. Pending discharge planning to rehab.   -Lung lesions-ddx septic emboli vs malignancy vs fungal vs TB, c/w IV unasyn, AFBx3 negative, check repeat CT chest, ID follow up

## 2023-02-20 LAB
ANION GAP SERPL CALC-SCNC: 12 MMOL/L — SIGNIFICANT CHANGE UP (ref 5–17)
BUN SERPL-MCNC: 32 MG/DL — HIGH (ref 7–23)
CALCIUM SERPL-MCNC: 9.5 MG/DL — SIGNIFICANT CHANGE UP (ref 8.4–10.5)
CHLORIDE SERPL-SCNC: 105 MMOL/L — SIGNIFICANT CHANGE UP (ref 96–108)
CO2 SERPL-SCNC: 21 MMOL/L — LOW (ref 22–31)
CREAT SERPL-MCNC: 0.46 MG/DL — LOW (ref 0.5–1.3)
EGFR: 104 ML/MIN/1.73M2 — SIGNIFICANT CHANGE UP
GLUCOSE BLDC GLUCOMTR-MCNC: 197 MG/DL — HIGH (ref 70–99)
GLUCOSE BLDC GLUCOMTR-MCNC: 215 MG/DL — HIGH (ref 70–99)
GLUCOSE BLDC GLUCOMTR-MCNC: 241 MG/DL — HIGH (ref 70–99)
GLUCOSE BLDC GLUCOMTR-MCNC: 268 MG/DL — HIGH (ref 70–99)
GLUCOSE BLDC GLUCOMTR-MCNC: 296 MG/DL — HIGH (ref 70–99)
GLUCOSE SERPL-MCNC: 255 MG/DL — HIGH (ref 70–99)
HCT VFR BLD CALC: 23.8 % — LOW (ref 34.5–45)
HGB BLD-MCNC: 7.1 G/DL — LOW (ref 11.5–15.5)
MAGNESIUM SERPL-MCNC: 1.7 MG/DL — SIGNIFICANT CHANGE UP (ref 1.6–2.6)
MCHC RBC-ENTMCNC: 20.6 PG — LOW (ref 27–34)
MCHC RBC-ENTMCNC: 29.8 GM/DL — LOW (ref 32–36)
MCV RBC AUTO: 69.2 FL — LOW (ref 80–100)
NRBC # BLD: 0 /100 WBCS — SIGNIFICANT CHANGE UP (ref 0–0)
PHOSPHATE SERPL-MCNC: 4.8 MG/DL — HIGH (ref 2.5–4.5)
PLATELET # BLD AUTO: 506 K/UL — HIGH (ref 150–400)
POTASSIUM SERPL-MCNC: 4.8 MMOL/L — SIGNIFICANT CHANGE UP (ref 3.5–5.3)
POTASSIUM SERPL-SCNC: 4.8 MMOL/L — SIGNIFICANT CHANGE UP (ref 3.5–5.3)
RBC # BLD: 3.44 M/UL — LOW (ref 3.8–5.2)
RBC # FLD: 17.2 % — HIGH (ref 10.3–14.5)
SARS-COV-2 RNA SPEC QL NAA+PROBE: SIGNIFICANT CHANGE UP
SODIUM SERPL-SCNC: 138 MMOL/L — SIGNIFICANT CHANGE UP (ref 135–145)
WBC # BLD: 7.09 K/UL — SIGNIFICANT CHANGE UP (ref 3.8–10.5)
WBC # FLD AUTO: 7.09 K/UL — SIGNIFICANT CHANGE UP (ref 3.8–10.5)

## 2023-02-20 PROCEDURE — 99233 SBSQ HOSP IP/OBS HIGH 50: CPT | Mod: GC

## 2023-02-20 PROCEDURE — 71250 CT THORAX DX C-: CPT | Mod: 26

## 2023-02-20 RX ORDER — METOPROLOL TARTRATE 50 MG
25 TABLET ORAL ONCE
Refills: 0 | Status: COMPLETED | OUTPATIENT
Start: 2023-02-20 | End: 2023-02-20

## 2023-02-20 RX ORDER — LANOLIN ALCOHOL/MO/W.PET/CERES
5 CREAM (GRAM) TOPICAL AT BEDTIME
Refills: 0 | Status: DISCONTINUED | OUTPATIENT
Start: 2023-02-20 | End: 2023-02-24

## 2023-02-20 RX ORDER — INSULIN LISPRO 100/ML
18 VIAL (ML) SUBCUTANEOUS
Refills: 0 | Status: DISCONTINUED | OUTPATIENT
Start: 2023-02-20 | End: 2023-02-24

## 2023-02-20 RX ORDER — INSULIN GLARGINE 100 [IU]/ML
31 INJECTION, SOLUTION SUBCUTANEOUS AT BEDTIME
Refills: 0 | Status: DISCONTINUED | OUTPATIENT
Start: 2023-02-20 | End: 2023-02-24

## 2023-02-20 RX ORDER — METOPROLOL TARTRATE 50 MG
75 TABLET ORAL DAILY
Refills: 0 | Status: DISCONTINUED | OUTPATIENT
Start: 2023-02-21 | End: 2023-02-24

## 2023-02-20 RX ADMIN — LIDOCAINE 1 PATCH: 4 CREAM TOPICAL at 19:00

## 2023-02-20 RX ADMIN — AMPICILLIN SODIUM AND SULBACTAM SODIUM 200 GRAM(S): 250; 125 INJECTION, POWDER, FOR SUSPENSION INTRAMUSCULAR; INTRAVENOUS at 16:11

## 2023-02-20 RX ADMIN — Medication 1 TABLET(S): at 13:19

## 2023-02-20 RX ADMIN — Medication 2: at 17:12

## 2023-02-20 RX ADMIN — ENOXAPARIN SODIUM 40 MILLIGRAM(S): 100 INJECTION SUBCUTANEOUS at 05:47

## 2023-02-20 RX ADMIN — Medication 5 MILLIGRAM(S): at 21:43

## 2023-02-20 RX ADMIN — SIMVASTATIN 40 MILLIGRAM(S): 20 TABLET, FILM COATED ORAL at 21:44

## 2023-02-20 RX ADMIN — INSULIN GLARGINE 31 UNIT(S): 100 INJECTION, SOLUTION SUBCUTANEOUS at 21:43

## 2023-02-20 RX ADMIN — Medication 2: at 21:42

## 2023-02-20 RX ADMIN — Medication 18 UNIT(S): at 08:27

## 2023-02-20 RX ADMIN — GABAPENTIN 400 MILLIGRAM(S): 400 CAPSULE ORAL at 13:19

## 2023-02-20 RX ADMIN — Medication 50 MILLIGRAM(S): at 05:48

## 2023-02-20 RX ADMIN — SIMVASTATIN 40 MILLIGRAM(S): 20 TABLET, FILM COATED ORAL at 00:26

## 2023-02-20 RX ADMIN — Medication 1 MILLIGRAM(S): at 13:19

## 2023-02-20 RX ADMIN — AMPICILLIN SODIUM AND SULBACTAM SODIUM 200 GRAM(S): 250; 125 INJECTION, POWDER, FOR SUSPENSION INTRAMUSCULAR; INTRAVENOUS at 21:42

## 2023-02-20 RX ADMIN — AMLODIPINE BESYLATE 5 MILLIGRAM(S): 2.5 TABLET ORAL at 05:48

## 2023-02-20 RX ADMIN — GABAPENTIN 400 MILLIGRAM(S): 400 CAPSULE ORAL at 05:47

## 2023-02-20 RX ADMIN — LIDOCAINE 1 PATCH: 4 CREAM TOPICAL at 13:20

## 2023-02-20 RX ADMIN — Medication 18 UNIT(S): at 13:21

## 2023-02-20 RX ADMIN — GABAPENTIN 400 MILLIGRAM(S): 400 CAPSULE ORAL at 21:43

## 2023-02-20 RX ADMIN — Medication 4: at 13:20

## 2023-02-20 RX ADMIN — Medication 18 UNIT(S): at 17:13

## 2023-02-20 RX ADMIN — GABAPENTIN 400 MILLIGRAM(S): 400 CAPSULE ORAL at 00:13

## 2023-02-20 RX ADMIN — AMPICILLIN SODIUM AND SULBACTAM SODIUM 200 GRAM(S): 250; 125 INJECTION, POWDER, FOR SUSPENSION INTRAMUSCULAR; INTRAVENOUS at 10:19

## 2023-02-20 RX ADMIN — Medication 25 MILLIGRAM(S): at 10:19

## 2023-02-20 RX ADMIN — Medication 6: at 08:30

## 2023-02-20 RX ADMIN — AMPICILLIN SODIUM AND SULBACTAM SODIUM 200 GRAM(S): 250; 125 INJECTION, POWDER, FOR SUSPENSION INTRAMUSCULAR; INTRAVENOUS at 05:47

## 2023-02-20 NOTE — PROGRESS NOTE ADULT - PROBLEM SELECTOR PLAN 8
History DMT2 on insulin (long acting 16units and short acting 6units), A1c 12.7 11/2022  > A1c 7.9%  - Lantus to 22U qhs, Admelog 12U TID + THOMAS Noted to have right thyroid nodule on imaging. TSH wnl.   > Thyroid US showing TIRADS 3 nodule >2.5cm, indicates need for FNA for further evaluation  - thyroid FNA can be completed outpatient

## 2023-02-20 NOTE — PROGRESS NOTE ADULT - SUBJECTIVE AND OBJECTIVE BOX
PROGRESS NOTE:   Authored by Dr. Carli Teixeira MD (PGY-3). Available on TEAMS.    Patient is a 67y old  Female who presents with a chief complaint of Pneumonia (19 Feb 2023 07:01)      SUBJECTIVE / OVERNIGHT EVENTS:  No acute events overnight. Patient seen and examined at bedside. Patient denies new symptoms or concerns.     ADDITIONAL REVIEW OF SYSTEMS:  Patient denies fevers, chills, chest pain, shortness of breath, nausea, abdominal pain, diarrhea, constipation, dysuria, leg swelling, headache, light headedness.    MEDICATIONS  (STANDING):  amLODIPine   Tablet 5 milliGRAM(s) Oral daily  ampicillin/sulbactam  IVPB 3 Gram(s) IV Intermittent every 6 hours  dextrose 5%. 1000 milliLiter(s) (100 mL/Hr) IV Continuous <Continuous>  dextrose 5%. 1000 milliLiter(s) (50 mL/Hr) IV Continuous <Continuous>  dextrose 50% Injectable 25 Gram(s) IV Push once  dextrose 50% Injectable 12.5 Gram(s) IV Push once  dextrose 50% Injectable 25 Gram(s) IV Push once  enoxaparin Injectable 40 milliGRAM(s) SubCutaneous every 24 hours  folic acid 1 milliGRAM(s) Oral daily  gabapentin 400 milliGRAM(s) Oral three times a day  glucagon  Injectable 1 milliGRAM(s) IntraMuscular once  insulin glargine Injectable (LANTUS) 27 Unit(s) SubCutaneous at bedtime  insulin lispro (ADMELOG) corrective regimen sliding scale   SubCutaneous three times a day before meals  insulin lispro (ADMELOG) corrective regimen sliding scale   SubCutaneous at bedtime  insulin lispro Injectable (ADMELOG) 14 Unit(s) SubCutaneous three times a day before meals  lactobacillus acidophilus 1 Tablet(s) Oral daily  lidocaine   4% Patch 1 Patch Transdermal daily  metoprolol succinate ER 50 milliGRAM(s) Oral daily  simvastatin 40 milliGRAM(s) Oral at bedtime    MEDICATIONS  (PRN):  acetaminophen     Tablet .. 1000 milliGRAM(s) Oral every 8 hours PRN Severe Pain (7 - 10)  dextrose Oral Gel 15 Gram(s) Oral once PRN Blood Glucose LESS THAN 70 milliGRAM(s)/deciliter  melatonin 3 milliGRAM(s) Oral at bedtime PRN Insomnia      CAPILLARY BLOOD GLUCOSE      POCT Blood Glucose.: 290 mg/dL (19 Feb 2023 22:24)  POCT Blood Glucose.: 317 mg/dL (19 Feb 2023 17:27)  POCT Blood Glucose.: 216 mg/dL (19 Feb 2023 12:22)  POCT Blood Glucose.: 211 mg/dL (19 Feb 2023 08:46)    I&O's Summary    18 Feb 2023 07:01  -  19 Feb 2023 07:00  --------------------------------------------------------  IN: 680 mL / OUT: 500 mL / NET: 180 mL    19 Feb 2023 07:01  -  20 Feb 2023 06:20  --------------------------------------------------------  IN: 440 mL / OUT: 800 mL / NET: -360 mL        PHYSICAL EXAM:  Vital Signs Last 24 Hrs  T(C): 37.1 (20 Feb 2023 05:47), Max: 37.1 (20 Feb 2023 05:47)  T(F): 98.7 (20 Feb 2023 05:47), Max: 98.7 (20 Feb 2023 05:47)  HR: 111 (20 Feb 2023 05:47) (101 - 114)  BP: 141/84 (20 Feb 2023 05:47) (113/74 - 141/84)  BP(mean): --  RR: 18 (20 Feb 2023 05:47) (18 - 18)  SpO2: 99% (20 Feb 2023 05:47) (95% - 99%)    Parameters below as of 20 Feb 2023 05:47  Patient On (Oxygen Delivery Method): room air        CONSTITUTIONAL: NAD, well-developed  RESPIRATORY: Normal respiratory effort; lungs are clear to auscultation bilaterally  CARDIOVASCULAR: Regular rate and rhythm, normal S1 and S2, no murmur/rub/gallop; No lower extremity edema; Peripheral pulses are 2+ bilaterally  ABDOMEN: Nontender to palpation, normoactive bowel sounds, no rebound/guarding; No hepatosplenomegaly  MUSCLOSKELETAL: no clubbing or cyanosis of digits; no joint swelling or tenderness to palpation  PSYCH: A+O to person, place, and time; affect appropriate    LABS:                        7.1    8.25  )-----------( 559      ( 19 Feb 2023 09:04 )             23.8     02-19    138  |  107  |  27<H>  ----------------------------<  180<H>  4.7   |  20<L>  |  0.46<L>    Ca    9.2      19 Feb 2023 06:39  Phos  4.7     02-19  Mg     1.9     02-19                Culture - Acid Fast - Sputum w/Smear (collected 18 Feb 2023 09:44)  Source: .Sputum    Culture - Acid Fast - Sputum w/Smear (collected 17 Feb 2023 23:58)  Source: .Sputum Sputum    Culture - Acid Fast - Sputum w/Smear (collected 17 Feb 2023 15:42)  Source: .Sputum Sputum        Tele Reviewed:    RADIOLOGY & ADDITIONAL TESTS:  Results Reviewed:   Imaging Personally Reviewed:  Electrocardiogram Personally Reviewed:     PROGRESS NOTE:   Authored by Dr. Carli Teixeira MD (PGY-3). Available on TEAMS.    Patient is a 67y old  Female who presents with a chief complaint of Pneumonia (19 Feb 2023 07:01)      SUBJECTIVE / OVERNIGHT EVENTS:  No acute events overnight. Patient seen and examined at bedside. Patient denies new symptoms or concerns. We discussed plans to follow up repeat CT scan and discharge to rehab soon.     ADDITIONAL REVIEW OF SYSTEMS:  Patient denies fevers, chills, chest pain, shortness of breath, nausea, abdominal pain, diarrhea, constipation, dysuria, leg swelling, headache, light headedness.    MEDICATIONS  (STANDING):  amLODIPine   Tablet 5 milliGRAM(s) Oral daily  ampicillin/sulbactam  IVPB 3 Gram(s) IV Intermittent every 6 hours  dextrose 5%. 1000 milliLiter(s) (100 mL/Hr) IV Continuous <Continuous>  dextrose 5%. 1000 milliLiter(s) (50 mL/Hr) IV Continuous <Continuous>  dextrose 50% Injectable 25 Gram(s) IV Push once  dextrose 50% Injectable 12.5 Gram(s) IV Push once  dextrose 50% Injectable 25 Gram(s) IV Push once  enoxaparin Injectable 40 milliGRAM(s) SubCutaneous every 24 hours  folic acid 1 milliGRAM(s) Oral daily  gabapentin 400 milliGRAM(s) Oral three times a day  glucagon  Injectable 1 milliGRAM(s) IntraMuscular once  insulin glargine Injectable (LANTUS) 27 Unit(s) SubCutaneous at bedtime  insulin lispro (ADMELOG) corrective regimen sliding scale   SubCutaneous three times a day before meals  insulin lispro (ADMELOG) corrective regimen sliding scale   SubCutaneous at bedtime  insulin lispro Injectable (ADMELOG) 14 Unit(s) SubCutaneous three times a day before meals  lactobacillus acidophilus 1 Tablet(s) Oral daily  lidocaine   4% Patch 1 Patch Transdermal daily  metoprolol succinate ER 50 milliGRAM(s) Oral daily  simvastatin 40 milliGRAM(s) Oral at bedtime    MEDICATIONS  (PRN):  acetaminophen     Tablet .. 1000 milliGRAM(s) Oral every 8 hours PRN Severe Pain (7 - 10)  dextrose Oral Gel 15 Gram(s) Oral once PRN Blood Glucose LESS THAN 70 milliGRAM(s)/deciliter  melatonin 3 milliGRAM(s) Oral at bedtime PRN Insomnia      CAPILLARY BLOOD GLUCOSE      POCT Blood Glucose.: 290 mg/dL (19 Feb 2023 22:24)  POCT Blood Glucose.: 317 mg/dL (19 Feb 2023 17:27)  POCT Blood Glucose.: 216 mg/dL (19 Feb 2023 12:22)  POCT Blood Glucose.: 211 mg/dL (19 Feb 2023 08:46)    I&O's Summary    18 Feb 2023 07:01  -  19 Feb 2023 07:00  --------------------------------------------------------  IN: 680 mL / OUT: 500 mL / NET: 180 mL    19 Feb 2023 07:01  -  20 Feb 2023 06:20  --------------------------------------------------------  IN: 440 mL / OUT: 800 mL / NET: -360 mL        PHYSICAL EXAM:  Vital Signs Last 24 Hrs  T(C): 37.1 (20 Feb 2023 05:47), Max: 37.1 (20 Feb 2023 05:47)  T(F): 98.7 (20 Feb 2023 05:47), Max: 98.7 (20 Feb 2023 05:47)  HR: 111 (20 Feb 2023 05:47) (101 - 114)  BP: 141/84 (20 Feb 2023 05:47) (113/74 - 141/84)  BP(mean): --  RR: 18 (20 Feb 2023 05:47) (18 - 18)  SpO2: 99% (20 Feb 2023 05:47) (95% - 99%)    Parameters below as of 20 Feb 2023 05:47  Patient On (Oxygen Delivery Method): room air        CONSTITUTIONAL: NAD, well-developed  RESPIRATORY: Normal respiratory effort; lungs are clear to auscultation bilaterally  CARDIOVASCULAR: Tachycardic, regular rhythm, normal S1 and S2, no murmur/rub/gallop; No lower extremity edema; Peripheral pulses are 2+ bilaterally  ABDOMEN: Nontender to palpation, normoactive bowel sounds, no rebound/guarding  MUSCLOSKELETAL: b/l proximal LE weakness 3/5, distal LE strength 4/5. Sensation intact.  PSYCH: A+O to person, place, and time; affect appropriate    LABS:                        7.1    8.25  )-----------( 559      ( 19 Feb 2023 09:04 )             23.8     02-19    138  |  107  |  27<H>  ----------------------------<  180<H>  4.7   |  20<L>  |  0.46<L>    Ca    9.2      19 Feb 2023 06:39  Phos  4.7     02-19  Mg     1.9     02-19    Culture - Acid Fast - Sputum w/Smear (collected 18 Feb 2023 09:44)  Source: .Sputum    Culture - Acid Fast - Sputum w/Smear (collected 17 Feb 2023 23:58)  Source: .Sputum Sputum    Culture - Acid Fast - Sputum w/Smear (collected 17 Feb 2023 15:42)  Source: .Sputum Sputum        Tele Reviewed:    RADIOLOGY & ADDITIONAL TESTS:  Results Reviewed:   Imaging Personally Reviewed:  Electrocardiogram Personally Reviewed:

## 2023-02-20 NOTE — PROGRESS NOTE ADULT - PROBLEM SELECTOR PLAN 4
Persistently tachycardic since admission. Hypoxic to 95% on room air. No symptoms, however, recent spinal surgery with significant functional impairment and immobility, wheelchair bound. EKG with sinus tachycardia, morphologically similar P waves that are upright in lead II. LE dopplers without DVTs, patient is still persistently tachycardia  > low concern for PE at this time, chart review w/ chronic tachycardia in the past without clear etiology  - Toprol XL 50mg daily for beta blockade  - Gabapentin 400mg TID for neuropathic pain Reported spinal surgery for herniated disc in patient with lumbar radiculopathy history per chart review.  > MR lumbar/pelvis with concerning stenosis, however, as per Ortho, patient without acute new findings and so can defer consult for outpatient evaluation by primary surgeon  - c/w hydromorphone 4mg for severe pain q4hrs prn  - c/w Tylenol 1000mg q8hrs for pain prn  - c/w Lidocaine patch for pain prn  - increased gabapentin 400mg TID

## 2023-02-20 NOTE — PROGRESS NOTE ADULT - PROBLEM SELECTOR PLAN 5
Reported spinal surgery for herniated disc in patient with lumbar radiculopathy history per chart review.  > MR lumbar/pelvis with concerning stenosis, however, as per Ortho, patient without acute new findings and so can defer consult for outpatient evaluation by primary surgeon  - c/w hydromorphone 4mg for severe pain q4hrs prn  - c/w Tylenol 1000mg q8hrs for pain prn  - c/w Lidocaine patch for pain prn  - increased gabapentin 400mg TID Hypokalemia with serum K ~2.2 in setting of reported watery stools which may be diarrhea

## 2023-02-20 NOTE — PROVIDER CONTACT NOTE (OTHER) - ACTION/TREATMENT ORDERED:
NP UPDATED AND AWARE. NO NEW ORDER GIVEN.
Tim Sadler aware, continue to monitor patient
Cover with insulin maintenance dose and sliding scale. Will continue to monitor.
Insulin sliding scale adjusted. Will continue to monitor.

## 2023-02-20 NOTE — PROGRESS NOTE ADULT - PROBLEM SELECTOR PLAN 10
Noted to have right thyroid nodule on imaging. TSH wnl.   > Thyroid US showing TIRADS 3 nodule >2.5cm, indicates need for FNA for further evaluation  - thyroid FNA can be completed outpatient DVT prophylaxis: Lovenox 40mg qd + SCDs  Diet: consistent carbs  Disposition: d/c planning to rehab  Code Status: DNR DNI DVT prophylaxis: Lovenox 40mg qd + SCDs  Diet: consistent carbs  Disposition: d/c planning to rehab  Code Status: pending further family discussion

## 2023-02-20 NOTE — PROGRESS NOTE ADULT - ATTENDING COMMENTS
67 F PMH DMT2, multiple falls, s/p lumbar microdiscectomy for radiculopathy presenting from [facility] with acute onset of malaise and wet coughing but without fevers, shortness of breath, or chest pain with CT chest non-contrast imaging with patchy/nodular opacities in bilateral lower lobes, centrally necrotic c/f infection vs. metastatic disease vs. septic emboli. S/p 7 day treatment with broad spectrum abx with resolution of symptoms, but without elucidating underlying etiology. Pending discharge planning to rehab.   -Lung lesions-ddx septic emboli vs malignancy vs fungal vs TB, c/w IV unasyn, AFBx3 negative, check repeat CT chest, ID follow up regarding abx

## 2023-02-20 NOTE — PROGRESS NOTE ADULT - ASSESSMENT
67 F PMH DMT2, multiple falls, s/p lumbar microdiscectomy for radiculopathy presenting from [facility] with acute onset of malaise and wet coughing but without fevers, shortness of breath, or chest pain with CT chest non-contrast imaging with patchy/nodular opacities in bilateral lower lobes, centrally necrotic c/f infection vs. metastatic disease vs. septic emboli. S/p 7 day treatment with broad spectrum abx with resolution of symptoms, but without elucidating underlying etiology. Pending discharge planning to rehab.

## 2023-02-20 NOTE — PROGRESS NOTE ADULT - PROBLEM SELECTOR PLAN 2
Increased to 492 w/o signs of DKA. S/p methylprednisolone for pre-medication for MRI.   - basal 27 units, premeal 14 units Increased to 492 w/o signs of DKA. S/p methylprednisolone for pre-medication for MRI.   - persistently above goal  - basal 31 units, premeal 18 units

## 2023-02-20 NOTE — PROGRESS NOTE ADULT - PROBLEM SELECTOR PLAN 6
Reported watery stools but without blood or pitch black color with frequency of about 2 per day. May be in setting of recent antibiotic use. Rule out developing GI infection such as C. diff. Low suspicion at this time given not clinically diarrhea. CT A/P non-contrast noted Diffuse wall thickening of the proximal duodenum with periduodenal stranding, c/f duodenitis/peptic ulcer disease. GI PCR negative; stool cx, stool O+P, c diff (neg)  > resolved History DMT2 on insulin (long acting 16units and short acting 6units), A1c 12.7 11/2022  > A1c 7.9%  - Lantus to 22U qhs, Admelog 12U TID + THOMAS

## 2023-02-20 NOTE — PROGRESS NOTE ADULT - PROBLEM SELECTOR PLAN 3
K to 5.8 today. Asymptomatic without chest pain or palpitations.   > resolved Persistently tachycardic since admission. Hypoxic to 95% on room air. No symptoms, however, recent spinal surgery with significant functional impairment and immobility, wheelchair bound. EKG with sinus tachycardia, morphologically similar P waves that are upright in lead II. LE dopplers without DVTs, patient is still persistently tachycardia  > low concern for PE at this time, chart review w/ chronic tachycardia in the past without clear etiology  - Toprol XL increased to 75mg daily for beta blockade  - Gabapentin 400mg TID for neuropathic pain  - ECHO in Feb without EF dysfunction

## 2023-02-20 NOTE — PROGRESS NOTE ADULT - PROBLEM SELECTOR PLAN 7
Hypokalemia with serum K ~2.2 in setting of reported watery stools which may be diarrhea On Amlodipine 5mg qd and metoprolol 25mg qd at home  - c/w amlodipine 5mg qd  - increased Toprol XL 50mg

## 2023-02-20 NOTE — PROGRESS NOTE ADULT - PROBLEM SELECTOR PLAN 9
On Amlodipine 5mg qd and metoprolol 25mg qd at home  - c/w amlodipine 5mg qd  - increased Toprol XL 50mg - c/w atorvastatin 40mg qhs

## 2023-02-21 LAB
ALBUMIN SERPL ELPH-MCNC: 3.2 G/DL — LOW (ref 3.3–5)
ALP SERPL-CCNC: 116 U/L — SIGNIFICANT CHANGE UP (ref 40–120)
ALT FLD-CCNC: 304 U/L — HIGH (ref 10–45)
ANION GAP SERPL CALC-SCNC: 14 MMOL/L — SIGNIFICANT CHANGE UP (ref 5–17)
AST SERPL-CCNC: 152 U/L — HIGH (ref 10–40)
BILIRUB SERPL-MCNC: 0.1 MG/DL — LOW (ref 0.2–1.2)
BUN SERPL-MCNC: 31 MG/DL — HIGH (ref 7–23)
CALCIUM SERPL-MCNC: 9.3 MG/DL — SIGNIFICANT CHANGE UP (ref 8.4–10.5)
CHLORIDE SERPL-SCNC: 108 MMOL/L — SIGNIFICANT CHANGE UP (ref 96–108)
CO2 SERPL-SCNC: 21 MMOL/L — LOW (ref 22–31)
CREAT SERPL-MCNC: 0.45 MG/DL — LOW (ref 0.5–1.3)
EGFR: 104 ML/MIN/1.73M2 — SIGNIFICANT CHANGE UP
GLUCOSE BLDC GLUCOMTR-MCNC: 131 MG/DL — HIGH (ref 70–99)
GLUCOSE BLDC GLUCOMTR-MCNC: 134 MG/DL — HIGH (ref 70–99)
GLUCOSE BLDC GLUCOMTR-MCNC: 197 MG/DL — HIGH (ref 70–99)
GLUCOSE BLDC GLUCOMTR-MCNC: 208 MG/DL — HIGH (ref 70–99)
GLUCOSE SERPL-MCNC: 193 MG/DL — HIGH (ref 70–99)
HCT VFR BLD CALC: 24.7 % — LOW (ref 34.5–45)
HGB BLD-MCNC: 7.2 G/DL — LOW (ref 11.5–15.5)
MAGNESIUM SERPL-MCNC: 1.8 MG/DL — SIGNIFICANT CHANGE UP (ref 1.6–2.6)
MCHC RBC-ENTMCNC: 20.6 PG — LOW (ref 27–34)
MCHC RBC-ENTMCNC: 29.1 GM/DL — LOW (ref 32–36)
MCV RBC AUTO: 70.8 FL — LOW (ref 80–100)
NRBC # BLD: 0 /100 WBCS — SIGNIFICANT CHANGE UP (ref 0–0)
PHOSPHATE SERPL-MCNC: 4.3 MG/DL — SIGNIFICANT CHANGE UP (ref 2.5–4.5)
PLATELET # BLD AUTO: 577 K/UL — HIGH (ref 150–400)
POTASSIUM SERPL-MCNC: 4.6 MMOL/L — SIGNIFICANT CHANGE UP (ref 3.5–5.3)
POTASSIUM SERPL-SCNC: 4.6 MMOL/L — SIGNIFICANT CHANGE UP (ref 3.5–5.3)
PROT SERPL-MCNC: 6.6 G/DL — SIGNIFICANT CHANGE UP (ref 6–8.3)
RBC # BLD: 3.49 M/UL — LOW (ref 3.8–5.2)
RBC # FLD: 18.2 % — HIGH (ref 10.3–14.5)
SODIUM SERPL-SCNC: 143 MMOL/L — SIGNIFICANT CHANGE UP (ref 135–145)
WBC # BLD: 7.81 K/UL — SIGNIFICANT CHANGE UP (ref 3.8–10.5)
WBC # FLD AUTO: 7.81 K/UL — SIGNIFICANT CHANGE UP (ref 3.8–10.5)

## 2023-02-21 PROCEDURE — 99233 SBSQ HOSP IP/OBS HIGH 50: CPT | Mod: GC

## 2023-02-21 PROCEDURE — 99232 SBSQ HOSP IP/OBS MODERATE 35: CPT

## 2023-02-21 RX ORDER — FOLIC ACID 0.8 MG
1 TABLET ORAL
Qty: 0 | Refills: 0 | DISCHARGE
Start: 2023-02-21

## 2023-02-21 RX ORDER — METOPROLOL TARTRATE 50 MG
3 TABLET ORAL
Qty: 0 | Refills: 0 | DISCHARGE
Start: 2023-02-21

## 2023-02-21 RX ORDER — GABAPENTIN 400 MG/1
3 CAPSULE ORAL
Qty: 0 | Refills: 0 | DISCHARGE

## 2023-02-21 RX ORDER — GABAPENTIN 400 MG/1
1 CAPSULE ORAL
Qty: 0 | Refills: 0 | DISCHARGE
Start: 2023-02-21

## 2023-02-21 RX ADMIN — AMLODIPINE BESYLATE 5 MILLIGRAM(S): 2.5 TABLET ORAL at 05:05

## 2023-02-21 RX ADMIN — AMPICILLIN SODIUM AND SULBACTAM SODIUM 200 GRAM(S): 250; 125 INJECTION, POWDER, FOR SUSPENSION INTRAMUSCULAR; INTRAVENOUS at 05:06

## 2023-02-21 RX ADMIN — Medication 18 UNIT(S): at 12:24

## 2023-02-21 RX ADMIN — LIDOCAINE 1 PATCH: 4 CREAM TOPICAL at 12:25

## 2023-02-21 RX ADMIN — Medication 18 UNIT(S): at 17:52

## 2023-02-21 RX ADMIN — Medication 1 TABLET(S): at 12:24

## 2023-02-21 RX ADMIN — AMPICILLIN SODIUM AND SULBACTAM SODIUM 200 GRAM(S): 250; 125 INJECTION, POWDER, FOR SUSPENSION INTRAMUSCULAR; INTRAVENOUS at 10:00

## 2023-02-21 RX ADMIN — GABAPENTIN 400 MILLIGRAM(S): 400 CAPSULE ORAL at 13:13

## 2023-02-21 RX ADMIN — Medication 5 MILLIGRAM(S): at 21:36

## 2023-02-21 RX ADMIN — ENOXAPARIN SODIUM 40 MILLIGRAM(S): 100 INJECTION SUBCUTANEOUS at 05:05

## 2023-02-21 RX ADMIN — LIDOCAINE 1 PATCH: 4 CREAM TOPICAL at 02:00

## 2023-02-21 RX ADMIN — LIDOCAINE 1 PATCH: 4 CREAM TOPICAL at 21:48

## 2023-02-21 RX ADMIN — Medication 75 MILLIGRAM(S): at 05:05

## 2023-02-21 RX ADMIN — Medication 1 MILLIGRAM(S): at 12:24

## 2023-02-21 RX ADMIN — GABAPENTIN 400 MILLIGRAM(S): 400 CAPSULE ORAL at 21:36

## 2023-02-21 RX ADMIN — Medication 18 UNIT(S): at 08:49

## 2023-02-21 RX ADMIN — INSULIN GLARGINE 31 UNIT(S): 100 INJECTION, SOLUTION SUBCUTANEOUS at 22:16

## 2023-02-21 RX ADMIN — GABAPENTIN 400 MILLIGRAM(S): 400 CAPSULE ORAL at 05:05

## 2023-02-21 RX ADMIN — Medication 2: at 08:49

## 2023-02-21 NOTE — PROGRESS NOTE ADULT - ASSESSMENT
67 F PMH DMT2, multiple falls, s/p lumbar microdiscectomy for radiculopathy presenting from [facility] with acute onset of malaise and wet coughing but without fevers, shortness of breath, or chest pain with CT chest non-contrast imaging with patchy/nodular opacities in bilateral lower lobes, centrally necrotic c/f infection vs. metastatic disease vs. septic emboli. S/p 7 day treatment with broad spectrum abx with resolution of symptoms, but without elucidating underlying etiology. Pending discharge planning to rehab.

## 2023-02-21 NOTE — PROGRESS NOTE ADULT - PROBLEM SELECTOR PLAN 3
Persistently tachycardic since admission. Hypoxic to 95% on room air. No symptoms, however, recent spinal surgery with significant functional impairment and immobility, wheelchair bound. EKG with sinus tachycardia, morphologically similar P waves that are upright in lead II. LE dopplers without DVTs, patient is still persistently tachycardia  > low concern for PE at this time, chart review w/ chronic tachycardia in the past without clear etiology  - Toprol XL increased to 75mg daily for beta blockade  - Gabapentin 400mg TID for neuropathic pain  - ECHO in Feb without EF dysfunction

## 2023-02-21 NOTE — PROGRESS NOTE ADULT - ASSESSMENT
68 yo F DM2, falls, microdiscectomy, from [facility] with cough, fevers  No fever, has leukocytosis  Initially from [place] with [place]/fevers, given abx, then high volume diarrhea  CT with nodular/patchy opacities in bilateral lower lobes; thickening of duodenum, duodenitis, renal stone  PCT minimally elevated  RVP neg  GI PCR neg  Legionella urine ag neg  UA neg/equivocal  Quant gold IND  From [facility] (decades ago), no history of exposure to TB  Uncertain etiology pulmonary lesions at this point  CT repeated with bilateral nodular opacities, resolution of cavitary components, still presence of nodules  Treat for infectious cause cavitary pneumonia (improving on antibiotics?)  Overall,  1) Lung Lesions  - Septic emboli vs metastatic disease by radiology; lesions not present on CT in 11/2022  - Levaquin 500mg q 24 (note rise in LFTs, avoid B lactam for now)  - AFB neg x3, okay to DC isolation  - F/U Pulm  - Noninfectious etiologies of lesions such as malignancy per team/pulm  2) Elevated LFTs  - DILI?  - Hold on Unasyn  - Trend to normal  - Hepatology eval if unclear etiology   3) Leukocytosis  - Reactive to underlying lung process?  - Trend to normal    Fidel Lester MD  Contact on TEAMS messaging from 9am - 5pm  From 5pm-9am, on weekends, or if no response call [phone]

## 2023-02-21 NOTE — PROGRESS NOTE ADULT - ATTENDING COMMENTS
Patient feeling well, but noted with new transaminitis which we believe may be secondary to Unasyn. For now we switched to Levofloxacin and will hold her statin. If it improves tomorrow we can discharge her with close outpatient follow up. Will touch base with ID regarding final duration of antibiotics.

## 2023-02-21 NOTE — PROGRESS NOTE ADULT - SUBJECTIVE AND OBJECTIVE BOX
Progress Note    02-09-23 (12d)    Patient is a 67y old  Female who presents with a chief complaint of Pneumonia (20 Feb 2023 06:19)      Subjective / Overnight Events :  - No acute events overnight.  - Pt seen and examined at bedside.     Additional ROS (if any):    MEDICATIONS  (STANDING):  amLODIPine   Tablet 5 milliGRAM(s) Oral daily  ampicillin/sulbactam  IVPB 3 Gram(s) IV Intermittent every 6 hours  dextrose 5%. 1000 milliLiter(s) (100 mL/Hr) IV Continuous <Continuous>  dextrose 5%. 1000 milliLiter(s) (50 mL/Hr) IV Continuous <Continuous>  dextrose 50% Injectable 25 Gram(s) IV Push once  dextrose 50% Injectable 12.5 Gram(s) IV Push once  dextrose 50% Injectable 25 Gram(s) IV Push once  enoxaparin Injectable 40 milliGRAM(s) SubCutaneous every 24 hours  folic acid 1 milliGRAM(s) Oral daily  gabapentin 400 milliGRAM(s) Oral three times a day  glucagon  Injectable 1 milliGRAM(s) IntraMuscular once  insulin glargine Injectable (LANTUS) 31 Unit(s) SubCutaneous at bedtime  insulin lispro (ADMELOG) corrective regimen sliding scale   SubCutaneous three times a day before meals  insulin lispro (ADMELOG) corrective regimen sliding scale   SubCutaneous at bedtime  insulin lispro Injectable (ADMELOG) 18 Unit(s) SubCutaneous three times a day before meals  lactobacillus acidophilus 1 Tablet(s) Oral daily  lidocaine   4% Patch 1 Patch Transdermal daily  melatonin 5 milliGRAM(s) Oral at bedtime  metoprolol succinate ER 75 milliGRAM(s) Oral daily  simvastatin 40 milliGRAM(s) Oral at bedtime    MEDICATIONS  (PRN):  acetaminophen     Tablet .. 1000 milliGRAM(s) Oral every 8 hours PRN Severe Pain (7 - 10)  dextrose Oral Gel 15 Gram(s) Oral once PRN Blood Glucose LESS THAN 70 milliGRAM(s)/deciliter  melatonin 3 milliGRAM(s) Oral at bedtime PRN Insomnia          PHYSICAL EXAM:  Vital Signs Last 24 Hrs  T(C): 36.8 (21 Feb 2023 04:50), Max: 37.1 (20 Feb 2023 05:47)  T(F): 98.3 (21 Feb 2023 04:50), Max: 98.7 (20 Feb 2023 05:47)  HR: 103 (21 Feb 2023 04:50) (103 - 112)  BP: 130/82 (21 Feb 2023 04:50) (107/67 - 141/84)  BP(mean): --  RR: 18 (21 Feb 2023 04:50) (18 - 18)  SpO2: 96% (21 Feb 2023 04:50) (96% - 99%)    Parameters below as of 21 Feb 2023 04:50  Patient On (Oxygen Delivery Method): room air        I&O's Summary    19 Feb 2023 07:01  -  20 Feb 2023 07:00  --------------------------------------------------------  IN: 440 mL / OUT: 800 mL / NET: -360 mL    20 Feb 2023 07:01  -  21 Feb 2023 05:00  --------------------------------------------------------  IN: 720 mL / OUT: 0 mL / NET: 720 mL        General: NAD, non-toxic appearing   HEENT: PERRLA, EOMi, no scleral icterus  CV: RRR, normal S1 and S2, no m/r/g  Lungs: normal respiratory effort. CTAB, no wheezes, rales, or rhonchi  Abd: soft, nontender, nondistended  Ext: no edema, 2+ peripheral pulses   Pysch: AAOx3, appropriate affect   Neuro: grossly non-focal, moving all extremities spontaneously   Skin: no rashes or lesions     LABS:  CAPILLARY BLOOD GLUCOSE      POCT Blood Glucose.: 296 mg/dL (20 Feb 2023 21:21)  POCT Blood Glucose.: 197 mg/dL (20 Feb 2023 16:48)  POCT Blood Glucose.: 215 mg/dL (20 Feb 2023 13:16)  POCT Blood Glucose.: 241 mg/dL (20 Feb 2023 11:47)  POCT Blood Glucose.: 268 mg/dL (20 Feb 2023 07:44)                              7.1    7.09  )-----------( 506      ( 20 Feb 2023 06:54 )             23.8       WBC Trend: 7.09<--, 8.25<--, 9.15<--  Hb Trend: 7.1<--, 7.1<--, 4.8<--, 7.4<--, 8.1<--    02-20    138  |  105  |  32<H>  ----------------------------<  255<H>  4.8   |  21<L>  |  0.46<L>    Ca    9.5      20 Feb 2023 06:54  Phos  4.8     02-20  Mg     1.7     02-20                Culture - Acid Fast - Sputum w/Smear (collected 18 Feb 2023 09:44)  Source: .Sputum          RADIOLOGY & ADDITIONAL TESTS: Reviewed Progress Note    02-09-23 (12d)    Patient is a 67y old  Female who presents with a chief complaint of Pneumonia (20 Feb 2023 06:19)      Subjective / Overnight Events :  - No acute events overnight.  - Pt seen and examined at bedside. No complaints.     Additional ROS (if any):    MEDICATIONS  (STANDING):  amLODIPine   Tablet 5 milliGRAM(s) Oral daily  ampicillin/sulbactam  IVPB 3 Gram(s) IV Intermittent every 6 hours  dextrose 5%. 1000 milliLiter(s) (100 mL/Hr) IV Continuous <Continuous>  dextrose 5%. 1000 milliLiter(s) (50 mL/Hr) IV Continuous <Continuous>  dextrose 50% Injectable 25 Gram(s) IV Push once  dextrose 50% Injectable 12.5 Gram(s) IV Push once  dextrose 50% Injectable 25 Gram(s) IV Push once  enoxaparin Injectable 40 milliGRAM(s) SubCutaneous every 24 hours  folic acid 1 milliGRAM(s) Oral daily  gabapentin 400 milliGRAM(s) Oral three times a day  glucagon  Injectable 1 milliGRAM(s) IntraMuscular once  insulin glargine Injectable (LANTUS) 31 Unit(s) SubCutaneous at bedtime  insulin lispro (ADMELOG) corrective regimen sliding scale   SubCutaneous three times a day before meals  insulin lispro (ADMELOG) corrective regimen sliding scale   SubCutaneous at bedtime  insulin lispro Injectable (ADMELOG) 18 Unit(s) SubCutaneous three times a day before meals  lactobacillus acidophilus 1 Tablet(s) Oral daily  lidocaine   4% Patch 1 Patch Transdermal daily  melatonin 5 milliGRAM(s) Oral at bedtime  metoprolol succinate ER 75 milliGRAM(s) Oral daily  simvastatin 40 milliGRAM(s) Oral at bedtime    MEDICATIONS  (PRN):  acetaminophen     Tablet .. 1000 milliGRAM(s) Oral every 8 hours PRN Severe Pain (7 - 10)  dextrose Oral Gel 15 Gram(s) Oral once PRN Blood Glucose LESS THAN 70 milliGRAM(s)/deciliter  melatonin 3 milliGRAM(s) Oral at bedtime PRN Insomnia          PHYSICAL EXAM:  Vital Signs Last 24 Hrs  T(C): 36.8 (21 Feb 2023 04:50), Max: 37.1 (20 Feb 2023 05:47)  T(F): 98.3 (21 Feb 2023 04:50), Max: 98.7 (20 Feb 2023 05:47)  HR: 103 (21 Feb 2023 04:50) (103 - 112)  BP: 130/82 (21 Feb 2023 04:50) (107/67 - 141/84)  BP(mean): --  RR: 18 (21 Feb 2023 04:50) (18 - 18)  SpO2: 96% (21 Feb 2023 04:50) (96% - 99%)    Parameters below as of 21 Feb 2023 04:50  Patient On (Oxygen Delivery Method): room air        I&O's Summary    19 Feb 2023 07:01  -  20 Feb 2023 07:00  --------------------------------------------------------  IN: 440 mL / OUT: 800 mL / NET: -360 mL    20 Feb 2023 07:01  -  21 Feb 2023 05:00  --------------------------------------------------------  IN: 720 mL / OUT: 0 mL / NET: 720 mL        General: NAD, non-toxic appearing   HEENT: PERRLA, EOMi, no scleral icterus  CV: RRR, normal S1 and S2, no m/r/g  Lungs: limited examination but clear to auscultation   Abd: soft, nontender, nondistended  Ext: no edema, 2+ peripheral pulses   Pysch: AAOx3, appropriate affect   Neuro: 0/5 hip flexion, 3/5 knee flexion, 5/5 ankle flexion   Skin: no rashes or lesions     LABS:  CAPILLARY BLOOD GLUCOSE      POCT Blood Glucose.: 296 mg/dL (20 Feb 2023 21:21)  POCT Blood Glucose.: 197 mg/dL (20 Feb 2023 16:48)  POCT Blood Glucose.: 215 mg/dL (20 Feb 2023 13:16)  POCT Blood Glucose.: 241 mg/dL (20 Feb 2023 11:47)  POCT Blood Glucose.: 268 mg/dL (20 Feb 2023 07:44)                              7.1    7.09  )-----------( 506      ( 20 Feb 2023 06:54 )             23.8       WBC Trend: 7.09<--, 8.25<--, 9.15<--  Hb Trend: 7.1<--, 7.1<--, 4.8<--, 7.4<--, 8.1<--    02-20    138  |  105  |  32<H>  ----------------------------<  255<H>  4.8   |  21<L>  |  0.46<L>    Ca    9.5      20 Feb 2023 06:54  Phos  4.8     02-20  Mg     1.7     02-20                Culture - Acid Fast - Sputum w/Smear (collected 18 Feb 2023 09:44)  Source: .Sputum          RADIOLOGY & ADDITIONAL TESTS: Reviewed

## 2023-02-21 NOTE — PROGRESS NOTE ADULT - SUBJECTIVE AND OBJECTIVE BOX
CC: F/U for Abnormal finding on imaging    Saw/spoke to patient. Generally well appearing. No new complaints.    Allergies  aspirin (Anaphylaxis)  aspirin (Rash)  clindamycin (Unknown)  contrast media (iron oxide-based) (Nephrotoxicity)  fish (Hives)  IV Contrast (Anaphylaxis)  sulfa drugs (Rash)  vancomycin (Rash)  walnut (Anaphylaxis)    ANTIMICROBIALS:  ampicillin/sulbactam  IVPB 3 every 6 hours    PE:    Vital Signs Last 24 Hrs  T(C): 36.6 (21 Feb 2023 12:30), Max: 36.8 (21 Feb 2023 04:50)  T(F): 97.9 (21 Feb 2023 12:30), Max: 98.3 (21 Feb 2023 04:50)  HR: 98 (21 Feb 2023 12:30) (98 - 112)  BP: 115/76 (21 Feb 2023 12:30) (114/71 - 130/82)  RR: 18 (21 Feb 2023 12:30) (18 - 18)  SpO2: 97% (21 Feb 2023 12:30) (96% - 97%)    Gen: AOx3, NAD, non-toxic  CV: Nontachycardic  Resp: Breathing comfortably, RA  Abd: Soft, nontender  IV/Skin: No thrombophlebitis    LABS:                        7.2    7.81  )-----------( 577      ( 21 Feb 2023 06:37 )             24.7     02-21    143  |  108  |  31<H>  ----------------------------<  193<H>  4.6   |  21<L>  |  0.45<L>    Ca    9.3      21 Feb 2023 06:38  Phos  4.3     02-21  Mg     1.8     02-21    TPro  6.6  /  Alb  3.2<L>  /  TBili  0.1<L>  /  DBili  x   /  AST  152<H>  /  ALT  304<H>  /  AlkPhos  116  02-21    MICROBIOLOGY:    .Sputum  02-18-23 --  --  --    .Sputum Sputum  02-17-23 --  --  --    .Sputum Sputum  02-17-23 --  --  --    .Sputum Sputum  02-12-23   Culture is being performed.  --  --    .Sputum Sputum  02-12-23   Normal Respiratory Janis present  --    Few Squamous epithelial cells per low power field  Moderate polymorphonuclear leukocytes per low power field  Few Gram positive cocci in pairs per oil power field    .Stool Feces  02-10-23   No Protozoa seen by trichrome stain  No Helminths or Protozoa seen in formalin concentrate  performed by iodine stain  (routine O+P not evaluated for Microsporidia,  Cryptosporidia or Cyclospora)  One negative sample does not necessarily rule  out the presence of a parasitic infection.  --  --    .Stool Feces  02-10-23   No enteric pathogens isolated.  (Stool culture examined for Salmonella,  Shigella, Campylobacter, Aeromonas, Plesiomonas,  Vibrio, E.coli O157 and Yersinia)  --  --    Clean Catch Clean Catch (Midstream)  02-09-23   50,000 - 99,000 CFU/mL Enterococcus faecium  50,000 - 99,000 CFU/mL Enterococcus faecalis  --  Enterococcus faecium  Enterococcus faecalis    HIV-1 RNA Quantitative, Viral Load Log: NOT DET. lg /mL (02-14-23 @ 06:38)    (otherwise reviewed)    RADIOLOGY:    2/20 CT:    IMPRESSION:    Redemonstrated bilateral nodular opacities with a lower lung predominant.   Since the prior examination, there is interval predominant resolution of   the cavitary components of the opacities since February 9, 2023 with   minimal if any interval decrease in size of some of the lung nodules.   Findings are likely of infectious etiology, slightly improved since the   prior exam. Considerations include aspiration given the distribution.   Follow-up resolution is recommended.

## 2023-02-21 NOTE — PROGRESS NOTE ADULT - PROBLEM SELECTOR PLAN 1
Leukocytosis + tachycardia = SIRS+ in conjunction with CT imaging with patchy/nodular opacities in setting of coughing concerning for sepsis secondary to pneumonia likely HAP given her recent surgery and [place] placement. Ddx: legionella pneumonia given patient with reported watery stools; however does not meet diarrhea definition of at least 3 unformed stools per day.  CTAP (2/9): Nodular and patchy opacities predominantly in the bilateral lower lobes with many that are centrally necrotic concerning for infection. Differential diagnosis includes metastatic disease and septic emboli. TTE (2/10): EF 67%, grossly normal findings. BCx (NGTD), UCx (E. faecium), urine legionella (negative), MRSA (negative), fungitel (negative)  > consults: ID, pulm   - started Unasyn for cavitary PNA per ID - will clarify the duration   - Quant TB indeterminant x2  - sputum produced w/ induction x3 - pending results can d/c isolation precautions   - initiate discharge planning w/ isolation precautions removed Leukocytosis + tachycardia = SIRS+ in conjunction with CT imaging with patchy/nodular opacities in setting of coughing concerning for sepsis secondary to pneumonia likely HAP given her recent surgery and [place] placement. Ddx: legionella pneumonia given patient with reported watery stools; however does not meet diarrhea definition of at least 3 unformed stools per day.  CTAP (2/9): Nodular and patchy opacities predominantly in the bilateral lower lobes with many that are centrally necrotic concerning for infection. Differential diagnosis includes metastatic disease and septic emboli. TTE (2/10): EF 67%, grossly normal findings. BCx (NGTD), UCx (E. faecium), urine legionella (negative), MRSA (negative), fungitel (negative). Quant TB indeterminant x3.   > consults: ID, pulm   - d/c isolation precautions as per ID   - On Unasyn for cavitary PNA per ID - will clarify the duration, patient with LFT elevations   - initiate discharge planning w/ isolation precautions removed

## 2023-02-21 NOTE — PROGRESS NOTE ADULT - PROBLEM SELECTOR PLAN 10
DVT prophylaxis: Lovenox 40mg qd + SCDs  Diet: consistent carbs  Disposition: d/c planning to rehab  Code Status: pending further family discussion

## 2023-02-22 DIAGNOSIS — D64.9 ANEMIA, UNSPECIFIED: ICD-10-CM

## 2023-02-22 LAB
ALBUMIN SERPL ELPH-MCNC: 3.4 G/DL — SIGNIFICANT CHANGE UP (ref 3.3–5)
ALP SERPL-CCNC: 121 U/L — HIGH (ref 40–120)
ALT FLD-CCNC: 353 U/L — HIGH (ref 10–45)
ANION GAP SERPL CALC-SCNC: 11 MMOL/L — SIGNIFICANT CHANGE UP (ref 5–17)
AST SERPL-CCNC: 193 U/L — HIGH (ref 10–40)
BASOPHILS # BLD AUTO: 0 K/UL — SIGNIFICANT CHANGE UP (ref 0–0.2)
BASOPHILS NFR BLD AUTO: 0 % — SIGNIFICANT CHANGE UP (ref 0–2)
BILIRUB SERPL-MCNC: 0.2 MG/DL — SIGNIFICANT CHANGE UP (ref 0.2–1.2)
BLD GP AB SCN SERPL QL: NEGATIVE — SIGNIFICANT CHANGE UP
BUN SERPL-MCNC: 34 MG/DL — HIGH (ref 7–23)
CALCIUM SERPL-MCNC: 9.4 MG/DL — SIGNIFICANT CHANGE UP (ref 8.4–10.5)
CHLORIDE SERPL-SCNC: 107 MMOL/L — SIGNIFICANT CHANGE UP (ref 96–108)
CO2 SERPL-SCNC: 22 MMOL/L — SIGNIFICANT CHANGE UP (ref 22–31)
CREAT SERPL-MCNC: 0.57 MG/DL — SIGNIFICANT CHANGE UP (ref 0.5–1.3)
EGFR: 98 ML/MIN/1.73M2 — SIGNIFICANT CHANGE UP
EOSINOPHIL # BLD AUTO: 0.36 K/UL — SIGNIFICANT CHANGE UP (ref 0–0.5)
EOSINOPHIL NFR BLD AUTO: 4.3 % — SIGNIFICANT CHANGE UP (ref 0–6)
GLUCOSE BLDC GLUCOMTR-MCNC: 156 MG/DL — HIGH (ref 70–99)
GLUCOSE BLDC GLUCOMTR-MCNC: 224 MG/DL — HIGH (ref 70–99)
GLUCOSE BLDC GLUCOMTR-MCNC: 248 MG/DL — HIGH (ref 70–99)
GLUCOSE BLDC GLUCOMTR-MCNC: 283 MG/DL — HIGH (ref 70–99)
GLUCOSE SERPL-MCNC: 171 MG/DL — HIGH (ref 70–99)
HCT VFR BLD CALC: 22.5 % — LOW (ref 34.5–45)
HCT VFR BLD CALC: 22.8 % — LOW (ref 34.5–45)
HCT VFR BLD CALC: 31 % — LOW (ref 34.5–45)
HGB BLD-MCNC: 6.6 G/DL — CRITICAL LOW (ref 11.5–15.5)
HGB BLD-MCNC: 6.7 G/DL — CRITICAL LOW (ref 11.5–15.5)
HGB BLD-MCNC: 9.4 G/DL — LOW (ref 11.5–15.5)
IGG SERPL-MCNC: 1182 MG/DL — SIGNIFICANT CHANGE UP (ref 586–1602)
IGG1 SER-MCNC: 665 MG/DL — SIGNIFICANT CHANGE UP (ref 248–810)
IGG2 SER-MCNC: 221 MG/DL — SIGNIFICANT CHANGE UP (ref 130–555)
IGG3 SER-MCNC: 83 MG/DL — SIGNIFICANT CHANGE UP (ref 15–102)
IGG4 SER-MCNC: 120 MG/DL — HIGH (ref 2–96)
LYMPHOCYTES # BLD AUTO: 2.88 K/UL — SIGNIFICANT CHANGE UP (ref 1–3.3)
LYMPHOCYTES # BLD AUTO: 34.5 % — SIGNIFICANT CHANGE UP (ref 13–44)
MAGNESIUM SERPL-MCNC: 1.8 MG/DL — SIGNIFICANT CHANGE UP (ref 1.6–2.6)
MANUAL SMEAR VERIFICATION: SIGNIFICANT CHANGE UP
MCHC RBC-ENTMCNC: 21.1 PG — LOW (ref 27–34)
MCHC RBC-ENTMCNC: 21.1 PG — LOW (ref 27–34)
MCHC RBC-ENTMCNC: 23.1 PG — LOW (ref 27–34)
MCHC RBC-ENTMCNC: 29.3 GM/DL — LOW (ref 32–36)
MCHC RBC-ENTMCNC: 29.4 GM/DL — LOW (ref 32–36)
MCHC RBC-ENTMCNC: 30.3 GM/DL — LOW (ref 32–36)
MCV RBC AUTO: 71.7 FL — LOW (ref 80–100)
MCV RBC AUTO: 71.9 FL — LOW (ref 80–100)
MCV RBC AUTO: 76.2 FL — LOW (ref 80–100)
MONOCYTES # BLD AUTO: 0.36 K/UL — SIGNIFICANT CHANGE UP (ref 0–0.9)
MONOCYTES NFR BLD AUTO: 4.3 % — SIGNIFICANT CHANGE UP (ref 2–14)
NEUTROPHILS # BLD AUTO: 4.75 K/UL — SIGNIFICANT CHANGE UP (ref 1.8–7.4)
NEUTROPHILS NFR BLD AUTO: 56.9 % — SIGNIFICANT CHANGE UP (ref 43–77)
NRBC # BLD: 0 /100 WBCS — SIGNIFICANT CHANGE UP (ref 0–0)
PHOSPHATE SERPL-MCNC: 4.6 MG/DL — HIGH (ref 2.5–4.5)
PLAT MORPH BLD: NORMAL — SIGNIFICANT CHANGE UP
PLATELET # BLD AUTO: 468 K/UL — HIGH (ref 150–400)
PLATELET # BLD AUTO: 504 K/UL — HIGH (ref 150–400)
PLATELET # BLD AUTO: 543 K/UL — HIGH (ref 150–400)
POTASSIUM SERPL-MCNC: 4.6 MMOL/L — SIGNIFICANT CHANGE UP (ref 3.5–5.3)
POTASSIUM SERPL-SCNC: 4.6 MMOL/L — SIGNIFICANT CHANGE UP (ref 3.5–5.3)
PROT SERPL-MCNC: 6.7 G/DL — SIGNIFICANT CHANGE UP (ref 6–8.3)
RBC # BLD: 3.13 M/UL — LOW (ref 3.8–5.2)
RBC # BLD: 3.18 M/UL — LOW (ref 3.8–5.2)
RBC # BLD: 4.07 M/UL — SIGNIFICANT CHANGE UP (ref 3.8–5.2)
RBC # FLD: 18.8 % — HIGH (ref 10.3–14.5)
RBC # FLD: 18.8 % — HIGH (ref 10.3–14.5)
RBC # FLD: 23.8 % — HIGH (ref 10.3–14.5)
RBC BLD AUTO: SIGNIFICANT CHANGE UP
RH IG SCN BLD-IMP: POSITIVE — SIGNIFICANT CHANGE UP
SODIUM SERPL-SCNC: 140 MMOL/L — SIGNIFICANT CHANGE UP (ref 135–145)
WBC # BLD: 8.27 K/UL — SIGNIFICANT CHANGE UP (ref 3.8–10.5)
WBC # BLD: 8.35 K/UL — SIGNIFICANT CHANGE UP (ref 3.8–10.5)
WBC # BLD: 8.75 K/UL — SIGNIFICANT CHANGE UP (ref 3.8–10.5)
WBC # FLD AUTO: 8.27 K/UL — SIGNIFICANT CHANGE UP (ref 3.8–10.5)
WBC # FLD AUTO: 8.35 K/UL — SIGNIFICANT CHANGE UP (ref 3.8–10.5)
WBC # FLD AUTO: 8.75 K/UL — SIGNIFICANT CHANGE UP (ref 3.8–10.5)

## 2023-02-22 PROCEDURE — 99232 SBSQ HOSP IP/OBS MODERATE 35: CPT

## 2023-02-22 PROCEDURE — 99233 SBSQ HOSP IP/OBS HIGH 50: CPT | Mod: GC

## 2023-02-22 RX ORDER — LEVOFLOXACIN 5 MG/ML
1 INJECTION, SOLUTION INTRAVENOUS
Qty: 16 | Refills: 0
Start: 2023-02-22

## 2023-02-22 RX ADMIN — LIDOCAINE 1 PATCH: 4 CREAM TOPICAL at 12:17

## 2023-02-22 RX ADMIN — GABAPENTIN 400 MILLIGRAM(S): 400 CAPSULE ORAL at 21:46

## 2023-02-22 RX ADMIN — LIDOCAINE 1 PATCH: 4 CREAM TOPICAL at 22:00

## 2023-02-22 RX ADMIN — Medication 1 TABLET(S): at 12:18

## 2023-02-22 RX ADMIN — Medication 2: at 17:33

## 2023-02-22 RX ADMIN — AMLODIPINE BESYLATE 5 MILLIGRAM(S): 2.5 TABLET ORAL at 06:01

## 2023-02-22 RX ADMIN — Medication 18 UNIT(S): at 08:49

## 2023-02-22 RX ADMIN — GABAPENTIN 400 MILLIGRAM(S): 400 CAPSULE ORAL at 06:01

## 2023-02-22 RX ADMIN — Medication 18 UNIT(S): at 12:18

## 2023-02-22 RX ADMIN — Medication 6: at 08:50

## 2023-02-22 RX ADMIN — Medication 75 MILLIGRAM(S): at 06:02

## 2023-02-22 RX ADMIN — GABAPENTIN 400 MILLIGRAM(S): 400 CAPSULE ORAL at 13:04

## 2023-02-22 RX ADMIN — LIDOCAINE 1 PATCH: 4 CREAM TOPICAL at 19:00

## 2023-02-22 RX ADMIN — Medication 18 UNIT(S): at 17:33

## 2023-02-22 RX ADMIN — Medication 5 MILLIGRAM(S): at 21:46

## 2023-02-22 RX ADMIN — Medication 1 MILLIGRAM(S): at 12:18

## 2023-02-22 RX ADMIN — INSULIN GLARGINE 31 UNIT(S): 100 INJECTION, SOLUTION SUBCUTANEOUS at 21:45

## 2023-02-22 RX ADMIN — Medication 4: at 12:19

## 2023-02-22 RX ADMIN — ENOXAPARIN SODIUM 40 MILLIGRAM(S): 100 INJECTION SUBCUTANEOUS at 06:01

## 2023-02-22 NOTE — PROGRESS NOTE ADULT - PROBLEM SELECTOR PLAN 10
DVT prophylaxis: Lovenox 40mg qd + SCDs  Diet: consistent carbs  Disposition: d/c planning to rehab  Code Status: pending further family discussion - c/w atorvastatin 40mg qhs

## 2023-02-22 NOTE — PROGRESS NOTE ADULT - PROBLEM SELECTOR PLAN 4
Reported spinal surgery for herniated disc in patient with lumbar radiculopathy history per chart review.  > MR lumbar/pelvis with concerning stenosis, however, as per Ortho, patient without acute new findings and so can defer consult for outpatient evaluation by primary surgeon  - c/w hydromorphone 4mg for severe pain q4hrs prn  - c/w Tylenol 1000mg q8hrs for pain prn  - c/w Lidocaine patch for pain prn  - increased gabapentin 400mg TID Persistently tachycardic since admission. Hypoxic to 95% on room air. No symptoms, however, recent spinal surgery with significant functional impairment and immobility, wheelchair bound. EKG with sinus tachycardia, morphologically similar P waves that are upright in lead II. LE dopplers without DVTs, patient is still persistently tachycardia  > low concern for PE at this time, chart review w/ chronic tachycardia in the past without clear etiology  - Toprol XL increased to 75mg daily for beta blockade  - Gabapentin 400mg TID for neuropathic pain  - ECHO in Feb without EF dysfunction

## 2023-02-22 NOTE — PROGRESS NOTE ADULT - ASSESSMENT
67 F PMH DMT2, multiple falls, s/p lumbar microdiscectomy for radiculopathy presenting from [facility] with acute onset of malaise and wet coughing but without fevers, shortness of breath, or chest pain with CT chest non-contrast imaging with patchy/nodular opacities in bilateral lower lobes, centrally necrotic c/f infection vs. metastatic disease vs. septic emboli. Labs notable for leukocytosis and tachycardia with exam findings notable for wheezing/ronchus breath sounds.       # Pneumonia with cavitating features.  - Initial CT scan concerning for multifocal small cavitary lesions  - Work up largely negative  - Repeat CT Chest with significant improvement.   - Abx per ID  - Recommend Outpatient follow up for monitoring of pulmonary lesions  - Repeat CT Chest in 4-6 weeks to evaluate for resolution.     Patient should follow with pulmonology within 2 weeks of discharge. Please email [email or web address] and include patient's name, , MRN, telephone number, and discharge diagnosis, and allow 24 hours for scheduling. The office is located at [address] [place] [address], [address]. Number [phone].    Pulmonary to sign off. Please reconsult as needed.

## 2023-02-22 NOTE — PROGRESS NOTE ADULT - PROBLEM SELECTOR PLAN 6
History DMT2 on insulin (long acting 16units and short acting 6units), A1c 12.7 11/2022  > A1c 7.9%  - Lantus to 22U qhs, Admelog 12U TID + THOMAS Hypokalemia with serum K ~2.2 in setting of reported watery stools which may be diarrhea

## 2023-02-22 NOTE — PROGRESS NOTE ADULT - PROBLEM SELECTOR PLAN 7
well.    Plan:   Continue POC    Goals:     Long term goals  Long term goal 1: Patient will report pain 1-2 or less during functional activities  Long term goal 2: Patient  will be Indep. with all recommended HEP's, adaptive strategies, and adaptive techniques. Long term goal 3: Patient  will increase Left  strength from current by 10 lbs to increase performance with I/ADL's. Long term goal 4: Patient  will increase Left pinch strength from current by 2-3 lbs to increase performance with I/ADL's. Long term goal 5: Patient will be IND with ECWS techniques and improve stability of Left wrist  for ADL. Long term goals 6: Pt. will utilize proper positioning and lifting techniques to perform work tasks without pain.     Michel Kincaid OTR/L   5/3/2021  4:15 PM On Amlodipine 5mg qd and metoprolol 25mg qd at home  - c/w amlodipine 5mg qd  - increased Toprol XL 50mg History DMT2 on insulin (long acting 16units and short acting 6units), A1c 12.7 11/2022  > A1c 7.9%  - Lantus to 22U qhs, Admelog 12U TID + THOMAS

## 2023-02-22 NOTE — PROGRESS NOTE ADULT - SUBJECTIVE AND OBJECTIVE BOX
CHIEF COMPLAINT:Patient is a 67y old  Female who presents with a chief complaint of Pneumonia (16 Feb 2023 05:11)    Interval Events:   no overnight events.     Doing well and symptomatically improving.     Repeat CT Chest shows improvement in opacities and resolution of cavitation.    REVIEW OF SYSTEMS: Patient denies fevers, chills, chest pain, nausea, abdominal pain, diarrhea, constipation, dysuria, leg swelling, headache, light headedness  [x] All other systems negative except per HPI   [ ] Unable to assess ROS because ________    OBJECTIVE:  ICU Vital Signs Last 24 Hrs  T(C): 36.7 (16 Feb 2023 05:09), Max: 36.8 (15 Feb 2023 11:30)  T(F): 98.1 (16 Feb 2023 05:09), Max: 98.2 (15 Feb 2023 11:30)  HR: 117 (16 Feb 2023 05:09) (100 - 118)  BP: 130/80 (16 Feb 2023 05:09) (113/67 - 130/80)  BP(mean): --  ABP: --  ABP(mean): --  RR: 18 (16 Feb 2023 05:09) (18 - 18)  SpO2: 97% (16 Feb 2023 05:09) (97% - 98%)    O2 Parameters below as of 16 Feb 2023 05:09  Patient On (Oxygen Delivery Method): room air              02-15 @ 07:01  -  02-16 @ 07:00  --------------------------------------------------------  IN: 480 mL / OUT: 850 mL / NET: -370 mL        PHYSICAL EXAM:  GENERAL: NAD, well-groomed, well-developed  HEAD:  Atraumatic, Normocephalic  EYES: EOMI, conjunctiva and sclera clear  ENMT:  Moist mucous membranes,   NECK: Supple, No JVD  CHEST/LUNG: Clear to auscultation bilaterally; No rales, rhonchi, wheezing, or rubs  HEART: Regular rate and rhythm; No murmurs, rubs, or gallops  ABDOMEN: Soft, Nontender, Nondistended; Bowel sounds present  VASCULAR:  2+ Peripheral Pulses, No clubbing, cyanosis, or edema  LYMPH: No lymphadenopathy noted  SKIN: No rashes or lesions  NERVOUS SYSTEM:  Alert & Oriented X3, Good concentration    HOSPITAL MEDICATIONS:  MEDICATIONS  (STANDING):  amLODIPine   Tablet 5 milliGRAM(s) Oral daily  dextrose 5%. 1000 milliLiter(s) (50 mL/Hr) IV Continuous <Continuous>  dextrose 5%. 1000 milliLiter(s) (100 mL/Hr) IV Continuous <Continuous>  dextrose 50% Injectable 25 Gram(s) IV Push once  dextrose 50% Injectable 12.5 Gram(s) IV Push once  dextrose 50% Injectable 25 Gram(s) IV Push once  enoxaparin Injectable 40 milliGRAM(s) SubCutaneous every 24 hours  folic acid 1 milliGRAM(s) Oral daily  gabapentin 300 milliGRAM(s) Oral three times a day  glucagon  Injectable 1 milliGRAM(s) IntraMuscular once  insulin glargine Injectable (LANTUS) 22 Unit(s) SubCutaneous at bedtime  insulin lispro (ADMELOG) corrective regimen sliding scale   SubCutaneous at bedtime  insulin lispro (ADMELOG) corrective regimen sliding scale   SubCutaneous three times a day before meals  insulin lispro Injectable (ADMELOG) 12 Unit(s) SubCutaneous three times a day before meals  lactobacillus acidophilus 1 Tablet(s) Oral daily  lidocaine   4% Patch 1 Patch Transdermal daily  metoprolol succinate ER 25 milliGRAM(s) Oral daily  piperacillin/tazobactam IVPB.. 3.375 Gram(s) IV Intermittent every 8 hours  potassium chloride    Tablet ER 20 milliEquivalent(s) Oral daily  simvastatin 40 milliGRAM(s) Oral at bedtime  sodium chloride 3%  Inhalation 4 milliLiter(s) Inhalation every 12 hours    MEDICATIONS  (PRN):  acetaminophen     Tablet .. 1000 milliGRAM(s) Oral every 8 hours PRN Severe Pain (7 - 10)  dextrose Oral Gel 15 Gram(s) Oral once PRN Blood Glucose LESS THAN 70 milliGRAM(s)/deciliter  melatonin 3 milliGRAM(s) Oral at bedtime PRN Insomnia      LABS:    The Labs were reviewed by me   The Radiology was reviewed by me    EKG tracing reviewed by me    02-16    136  |  105  |  33<H>  ----------------------------<  163<H>  4.9   |  21<L>  |  0.51  02-15    134<L>  |  101  |  38<H>  ----------------------------<  248<H>  5.3   |  20<L>  |  0.64  02-14    134<L>  |  100  |  19  ----------------------------<  350<H>  5.2   |  23  |  0.40<L>    Ca    8.7      16 Feb 2023 07:06  Ca    9.2      15 Feb 2023 07:01  Ca    8.7      14 Feb 2023 06:40  Phos  3.9     02-16  Mg     1.6     02-16      Magnesium, Serum: 1.6 mg/dL (02-16-23 @ 07:06)  Magnesium, Serum: 2.1 mg/dL (02-15-23 @ 07:01)  Magnesium, Serum: 1.5 mg/dL (02-14-23 @ 06:40)  Magnesium, Serum: 1.9 mg/dL (02-13-23 @ 12:48)    Phosphorus Level, Serum: 3.9 mg/dL (02-16-23 @ 07:06)  Phosphorus Level, Serum: 4.0 mg/dL (02-15-23 @ 07:01)  Phosphorus Level, Serum: 3.3 mg/dL (02-14-23 @ 06:40)                                              7.7    13.70 )-----------( 620      ( 16 Feb 2023 07:03 )             24.6                         7.8    17.91 )-----------( 701      ( 15 Feb 2023 07:03 )             25.1                         8.0    20.12 )-----------( 601      ( 14 Feb 2023 06:38 )             25.0     CAPILLARY BLOOD GLUCOSE      POCT Blood Glucose.: 181 mg/dL (16 Feb 2023 06:21)  POCT Blood Glucose.: 307 mg/dL (16 Feb 2023 00:38)  POCT Blood Glucose.: 344 mg/dL (15 Feb 2023 21:34)  POCT Blood Glucose.: 197 mg/dL (15 Feb 2023 16:48)  POCT Blood Glucose.: 376 mg/dL (15 Feb 2023 13:45)  POCT Blood Glucose.: 255 mg/dL (15 Feb 2023 08:14)        MICROBIOLOGY:     RADIOLOGY:  [ ] Reviewed and interpreted by me    Point of Care Ultrasound Findings:    PFT:    EKG:

## 2023-02-22 NOTE — PROVIDER CONTACT NOTE (CRITICAL VALUE NOTIFICATION) - ACTION/TREATMENT ORDERED:
Dr Bailey updated and aware. New order for IVBP mag and recheck mag level at  1300
PA UPDATED AND NEW ORDER 1 UNIT PLT.
DR. TOMLIN UPDATED AND AWARE. NO NEW ORDERS GIVEN.
MD Rivers notified, ordered 1U PRBC. Will continue to monitor.
ACP Heather Kang made aware. CBC, type and screen ordered.

## 2023-02-22 NOTE — PROGRESS NOTE ADULT - SUBJECTIVE AND OBJECTIVE BOX
Progress Note    02-09-23 (13d)    Patient is a 67y old  Female who presents with a chief complaint of Pneumonia (21 Feb 2023 08:45)      Subjective / Overnight Events :  - No acute events overnight.  - Pt seen and examined at bedside.     Additional ROS (if any):    MEDICATIONS  (STANDING):  amLODIPine   Tablet 5 milliGRAM(s) Oral daily  dextrose 5%. 1000 milliLiter(s) (50 mL/Hr) IV Continuous <Continuous>  dextrose 5%. 1000 milliLiter(s) (100 mL/Hr) IV Continuous <Continuous>  dextrose 50% Injectable 25 Gram(s) IV Push once  dextrose 50% Injectable 12.5 Gram(s) IV Push once  dextrose 50% Injectable 25 Gram(s) IV Push once  enoxaparin Injectable 40 milliGRAM(s) SubCutaneous every 24 hours  folic acid 1 milliGRAM(s) Oral daily  gabapentin 400 milliGRAM(s) Oral three times a day  glucagon  Injectable 1 milliGRAM(s) IntraMuscular once  insulin glargine Injectable (LANTUS) 31 Unit(s) SubCutaneous at bedtime  insulin lispro (ADMELOG) corrective regimen sliding scale   SubCutaneous three times a day before meals  insulin lispro (ADMELOG) corrective regimen sliding scale   SubCutaneous at bedtime  insulin lispro Injectable (ADMELOG) 18 Unit(s) SubCutaneous three times a day before meals  lactobacillus acidophilus 1 Tablet(s) Oral daily  levoFLOXacin  Tablet 500 milliGRAM(s) Oral every 24 hours  lidocaine   4% Patch 1 Patch Transdermal daily  melatonin 5 milliGRAM(s) Oral at bedtime  metoprolol succinate ER 75 milliGRAM(s) Oral daily    MEDICATIONS  (PRN):  acetaminophen     Tablet .. 1000 milliGRAM(s) Oral every 8 hours PRN Severe Pain (7 - 10)  dextrose Oral Gel 15 Gram(s) Oral once PRN Blood Glucose LESS THAN 70 milliGRAM(s)/deciliter          PHYSICAL EXAM:  Vital Signs Last 24 Hrs  T(C): 36.6 (21 Feb 2023 12:30), Max: 36.8 (21 Feb 2023 04:50)  T(F): 97.9 (21 Feb 2023 12:30), Max: 98.3 (21 Feb 2023 04:50)  HR: 98 (21 Feb 2023 12:30) (98 - 103)  BP: 115/76 (21 Feb 2023 12:30) (115/76 - 130/82)  BP(mean): --  RR: 18 (21 Feb 2023 12:30) (18 - 18)  SpO2: 97% (21 Feb 2023 12:30) (96% - 97%)    Parameters below as of 21 Feb 2023 12:30  Patient On (Oxygen Delivery Method): room air        I&O's Summary    20 Feb 2023 07:01  -  21 Feb 2023 07:00  --------------------------------------------------------  IN: 720 mL / OUT: 0 mL / NET: 720 mL    21 Feb 2023 07:01  -  22 Feb 2023 04:30  --------------------------------------------------------  IN: 340 mL / OUT: 0 mL / NET: 340 mL        General: NAD, non-toxic appearing   HEENT: PERRLA, EOMi, no scleral icterus  CV: RRR, normal S1 and S2, no m/r/g  Lungs: normal respiratory effort. CTAB, no wheezes, rales, or rhonchi  Abd: soft, nontender, nondistended  Ext: no edema, 2+ peripheral pulses   Pysch: AAOx3, appropriate affect   Neuro: grossly non-focal, moving all extremities spontaneously   Skin: no rashes or lesions     LABS:  CAPILLARY BLOOD GLUCOSE      POCT Blood Glucose.: 208 mg/dL (21 Feb 2023 22:10)  POCT Blood Glucose.: 131 mg/dL (21 Feb 2023 17:35)  POCT Blood Glucose.: 134 mg/dL (21 Feb 2023 11:55)  POCT Blood Glucose.: 197 mg/dL (21 Feb 2023 08:35)                              7.2    7.81  )-----------( 577      ( 21 Feb 2023 06:37 )             24.7       WBC Trend: 7.81<--, 7.09<--, 8.25<--  Hb Trend: 7.2<--, 7.1<--, 7.1<--, 4.8<--, 7.4<--    02-21    143  |  108  |  31<H>  ----------------------------<  193<H>  4.6   |  21<L>  |  0.45<L>    Ca    9.3      21 Feb 2023 06:38  Phos  4.3     02-21  Mg     1.8     02-21    TPro  6.6  /  Alb  3.2<L>  /  TBili  0.1<L>  /  DBili  x   /  AST  152<H>  /  ALT  304<H>  /  AlkPhos  116  02-21                  RADIOLOGY & ADDITIONAL TESTS: Reviewed Progress Note    02-09-23 (13d)    Patient is a 67y old  Female who presents with a chief complaint of Pneumonia (21 Feb 2023 08:45)      Subjective / Overnight Events :  - No acute events overnight.  - Pt seen and examined at bedside. No complaints.     Additional ROS (if any):    MEDICATIONS  (STANDING):  amLODIPine   Tablet 5 milliGRAM(s) Oral daily  dextrose 5%. 1000 milliLiter(s) (50 mL/Hr) IV Continuous <Continuous>  dextrose 5%. 1000 milliLiter(s) (100 mL/Hr) IV Continuous <Continuous>  dextrose 50% Injectable 25 Gram(s) IV Push once  dextrose 50% Injectable 12.5 Gram(s) IV Push once  dextrose 50% Injectable 25 Gram(s) IV Push once  enoxaparin Injectable 40 milliGRAM(s) SubCutaneous every 24 hours  folic acid 1 milliGRAM(s) Oral daily  gabapentin 400 milliGRAM(s) Oral three times a day  glucagon  Injectable 1 milliGRAM(s) IntraMuscular once  insulin glargine Injectable (LANTUS) 31 Unit(s) SubCutaneous at bedtime  insulin lispro (ADMELOG) corrective regimen sliding scale   SubCutaneous three times a day before meals  insulin lispro (ADMELOG) corrective regimen sliding scale   SubCutaneous at bedtime  insulin lispro Injectable (ADMELOG) 18 Unit(s) SubCutaneous three times a day before meals  lactobacillus acidophilus 1 Tablet(s) Oral daily  levoFLOXacin  Tablet 500 milliGRAM(s) Oral every 24 hours  lidocaine   4% Patch 1 Patch Transdermal daily  melatonin 5 milliGRAM(s) Oral at bedtime  metoprolol succinate ER 75 milliGRAM(s) Oral daily    MEDICATIONS  (PRN):  acetaminophen     Tablet .. 1000 milliGRAM(s) Oral every 8 hours PRN Severe Pain (7 - 10)  dextrose Oral Gel 15 Gram(s) Oral once PRN Blood Glucose LESS THAN 70 milliGRAM(s)/deciliter          PHYSICAL EXAM:  Vital Signs Last 24 Hrs  T(C): 36.6 (21 Feb 2023 12:30), Max: 36.8 (21 Feb 2023 04:50)  T(F): 97.9 (21 Feb 2023 12:30), Max: 98.3 (21 Feb 2023 04:50)  HR: 98 (21 Feb 2023 12:30) (98 - 103)  BP: 115/76 (21 Feb 2023 12:30) (115/76 - 130/82)  BP(mean): --  RR: 18 (21 Feb 2023 12:30) (18 - 18)  SpO2: 97% (21 Feb 2023 12:30) (96% - 97%)    Parameters below as of 21 Feb 2023 12:30  Patient On (Oxygen Delivery Method): room air        I&O's Summary    20 Feb 2023 07:01  -  21 Feb 2023 07:00  --------------------------------------------------------  IN: 720 mL / OUT: 0 mL / NET: 720 mL    21 Feb 2023 07:01  -  22 Feb 2023 04:30  --------------------------------------------------------  IN: 340 mL / OUT: 0 mL / NET: 340 mL        General: NAD, non-toxic appearing   HEENT: PERRLA, EOMi, no scleral icterus  CV: RRR, normal S1 and S2, no m/r/g  Lungs: limited, clear to auscultation   Abd: soft, nontender, nondistended  Ext: no edema, 2+ peripheral pulses   Pysch: AAOx3, appropriate affect   Neuro: 0/5 hips, 3/5 knees, 5/5 ankles. Sensation 5/5 throughout.    Skin: no rashes or lesions     LABS:  CAPILLARY BLOOD GLUCOSE      POCT Blood Glucose.: 208 mg/dL (21 Feb 2023 22:10)  POCT Blood Glucose.: 131 mg/dL (21 Feb 2023 17:35)  POCT Blood Glucose.: 134 mg/dL (21 Feb 2023 11:55)  POCT Blood Glucose.: 197 mg/dL (21 Feb 2023 08:35)                              7.2    7.81  )-----------( 577      ( 21 Feb 2023 06:37 )             24.7       WBC Trend: 7.81<--, 7.09<--, 8.25<--  Hb Trend: 7.2<--, 7.1<--, 7.1<--, 4.8<--, 7.4<--    02-21    143  |  108  |  31<H>  ----------------------------<  193<H>  4.6   |  21<L>  |  0.45<L>    Ca    9.3      21 Feb 2023 06:38  Phos  4.3     02-21  Mg     1.8     02-21    TPro  6.6  /  Alb  3.2<L>  /  TBili  0.1<L>  /  DBili  x   /  AST  152<H>  /  ALT  304<H>  /  AlkPhos  116  02-21                  RADIOLOGY & ADDITIONAL TESTS: Reviewed

## 2023-02-22 NOTE — PROGRESS NOTE ADULT - PROBLEM SELECTOR PLAN 3
Persistently tachycardic since admission. Hypoxic to 95% on room air. No symptoms, however, recent spinal surgery with significant functional impairment and immobility, wheelchair bound. EKG with sinus tachycardia, morphologically similar P waves that are upright in lead II. LE dopplers without DVTs, patient is still persistently tachycardia  > low concern for PE at this time, chart review w/ chronic tachycardia in the past without clear etiology  - Toprol XL increased to 75mg daily for beta blockade  - Gabapentin 400mg TID for neuropathic pain  - ECHO in Feb without EF dysfunction Increased to 492 w/o signs of DKA. S/p methylprednisolone for pre-medication for MRI.   - persistently above goal  - basal 31 units, premeal 18 units

## 2023-02-22 NOTE — PROGRESS NOTE ADULT - ASSESSMENT
67 F PMH DMT2, multiple falls, s/p lumbar microdiscectomy for radiculopathy presenting from [facility] with acute onset of malaise and wet coughing but without fevers, shortness of breath, or chest pain with CT chest non-contrast imaging with patchy/nodular opacities in bilateral lower lobes, centrally necrotic c/f infection vs. metastatic disease vs. septic emboli. S/p 7 day treatment with broad spectrum abx with resolution of symptoms, but without elucidating underlying etiology. Pending discharge planning to rehab.

## 2023-02-22 NOTE — PROGRESS NOTE ADULT - PROBLEM SELECTOR PLAN 1
Leukocytosis + tachycardia = SIRS+ in conjunction with CT imaging with patchy/nodular opacities in setting of coughing concerning for sepsis secondary to pneumonia likely HAP given her recent surgery and [place] placement. Ddx: legionella pneumonia given patient with reported watery stools; however does not meet diarrhea definition of at least 3 unformed stools per day.  CTAP (2/9): Nodular and patchy opacities predominantly in the bilateral lower lobes with many that are centrally necrotic concerning for infection. Differential diagnosis includes metastatic disease and septic emboli. TTE (2/10): EF 67%, grossly normal findings. BCx (NGTD), UCx (E. faecium), urine legionella (negative), MRSA (negative), fungitel (negative). Quant TB indeterminant x3.   > consults: ID, pulm   - d/c isolation precautions as per ID   - On Unasyn for cavitary PNA per ID - will clarify the duration, patient with LFT elevations   - initiate discharge planning w/ isolation precautions removed Leukocytosis + tachycardia = SIRS+ in conjunction with CT imaging with patchy/nodular opacities in setting of coughing concerning for sepsis secondary to pneumonia likely HAP given her recent surgery and [place] placement. Ddx: legionella pneumonia given patient with reported watery stools; however does not meet diarrhea definition of at least 3 unformed stools per day.  CTAP (2/9): Nodular and patchy opacities predominantly in the bilateral lower lobes with many that are centrally necrotic concerning for infection. Differential diagnosis includes metastatic disease and septic emboli. TTE (2/10): EF 67%, grossly normal findings. BCx (NGTD), UCx (E. faecium), urine legionella (negative), MRSA (negative), fungitel (negative). Quant TB indeterminant x3. Off isolation precautions.   > consults: ID, pulm   - was on Unasyn for cavitary PNA, however, concern for rising LFTS   - switched to Levaquin with goal of 4 week total abx duration   - if LFTs trending in correct direction, plan to d/c today to [facility] Hgb 2/22 AM at 6.6, repeat 6.7. No indication for acute blood loss. Iron labs consistent with deficiency however increased ferritin.   - transfusion 1u pRBC, goal transfusions hgb < 7, maintain active type and screen

## 2023-02-22 NOTE — PROGRESS NOTE ADULT - ASSESSMENT
68 yo F DM2, falls, microdiscectomy, from [facility] with cough, fevers  No fever, has leukocytosis  Initially from [place] with cough/fevers, given abx, then high volume diarrhea  CT with nodular/patchy opacities in bilateral lower lobes; thickening of duodenum, duodenitis, renal stone  PCT minimally elevated  RVP neg  GI PCR neg  Legionella urine ag neg  UA neg/equivocal  Quant gold IND  From [facility] (decades ago), no history of exposure to TB  Uncertain etiology pulmonary lesions at this point  CT repeated with bilateral nodular opacities, resolution of cavitary components, still presence of nodules  Treat for infectious cause cavitary pneumonia (improving on antibiotics?)  Overall,  1) Lung Lesions  - Septic emboli vs metastatic disease by radiology; lesions not present on CT in 11/2022  - Levaquin 500mg q 24 (note rise in LFTs, avoid B lactam for now)  - AFB neg x3, okay to DC isolation  - F/U Pulm  - Noninfectious etiologies of lesions such as malignancy per team/pulm  2) Elevated LFTs  - DILI?  - Hold on Unasyn  - Trend to normal  - Would obtain Hepatology eval if unclear etiology/not improving  3) Leukocytosis  - Reactive to underlying lung process?  - Trend to normal    Fidel Lester MD  Contact on TEAMS messaging from 9am - 5pm  From 5pm-9am, on weekends, or if no response call [phone]

## 2023-02-22 NOTE — PROGRESS NOTE ADULT - ATTENDING COMMENTS
67 F PMH DMT2, multiple falls, s/p lumbar microdiscectomy for radiculopathy presenting from [facility] with acute onset of malaise and wet coughing but without fevers, shortness of breath, or chest pain with CT chest non-contrast imaging with patchy/nodular opacities in bilateral lower lobes, centrally necrotic c/f infection vs. metastatic disease vs. septic emboli. Labs notable for leukocytosis and tachycardia with exam findings notable for wheezing/ronchus breath sounds. These findings concerning for sepsis secondary to pneumonia. Pulm consulted for cavitary lung lesions.  Infectious workup has been negative.  patient treated with Levaquin and CT Chest repeated 2/20/23.  Many of the nodules are smaller and cavitation has resolved.  Lungs are clear on exam.  Would complete antibiotic course as per ID and will need repeat imaging in 4-6 weeks to ensure complete resolution.  Can follow up with Pulmonary as outpatient at [address] [place] [address].  [phone].  Please reconsult as needed.

## 2023-02-22 NOTE — PROGRESS NOTE ADULT - ATTENDING COMMENTS
Patient seen and examined at bedside. No acute events overnight. Oxygenation is stable. She will require 4 weeks of total antibiotics. LFTs unchanged and still with a hepatocellular pattern. She now has anemia with prior studies consistent with AOCD. Will transfuse 1 unit.    For now we will need to continue monitor the patient. She is not on any hepatotoxins. Send CBC w/ differential & coags. Let's see if there is any eosinophilia. NO skin rashes at this time. Eventually will discharge to [facility] and will need outpatient chest CT to check for interval improvement.

## 2023-02-22 NOTE — PROGRESS NOTE ADULT - SUBJECTIVE AND OBJECTIVE BOX
CC: F/U for PNA    Saw/spoke to patient. No fevers, no chills. No new complaints.    Allergies  aspirin (Anaphylaxis)  clindamycin (Unknown)  contrast media (iron oxide-based) (Nephrotoxicity)  fish (Hives)  IV Contrast (Anaphylaxis)  sulfa drugs (Rash)  vancomycin (Rash)  walnut (Anaphylaxis)    ANTIMICROBIALS:  levoFLOXacin  Tablet 500 every 24 hours    PE:    Vital Signs Last 24 Hrs  T(C): 36.9 (22 Feb 2023 12:30), Max: 36.9 (22 Feb 2023 12:30)  T(F): 98.4 (22 Feb 2023 12:30), Max: 98.4 (22 Feb 2023 12:30)  HR: 99 (22 Feb 2023 12:30) (99 - 99)  BP: 100/57 (22 Feb 2023 12:30) (100/57 - 123/74)  RR: 18 (22 Feb 2023 12:30) (18 - 18)  SpO2: 98% (22 Feb 2023 12:30) (97% - 98%)    Gen: AOx3, NAD, non-toxic  CV: Nontachycardic  Resp: Breathing comfortably, RA  Abd: Soft, nontender  IV/Skin: No thrombophlebitis    LABS:                        6.7    8.75  )-----------( 504      ( 22 Feb 2023 10:19 )             22.8     02-22    140  |  107  |  34<H>  ----------------------------<  171<H>  4.6   |  22  |  0.57    Ca    9.4      22 Feb 2023 07:15  Phos  4.6     02-22  Mg     1.8     02-22    TPro  6.7  /  Alb  3.4  /  TBili  0.2  /  DBili  x   /  AST  193<H>  /  ALT  353<H>  /  AlkPhos  121<H>  02-22    MICROBIOLOGY:    .Sputum  02-18-23 --  --  --    .Sputum Sputum  02-17-23 --  --  --    .Sputum Sputum  02-17-23 --  --  --    .Sputum Sputum  02-12-23   Culture is being performed.  --  --    .Sputum Sputum  02-12-23   Normal Respiratory Janis present  --    Few Squamous epithelial cells per low power field  Moderate polymorphonuclear leukocytes per low power field  Few Gram positive cocci in pairs per oil power field    .Stool Feces  02-10-23   No Protozoa seen by trichrome stain  No Helminths or Protozoa seen in formalin concentrate  performed by iodine stain  (routine O+P not evaluated for Microsporidia,  Cryptosporidia or Cyclospora)  One negative sample does not necessarily rule  out the presence of a parasitic infection.  --  --    .Stool Feces  02-10-23   No enteric pathogens isolated.  (Stool culture examined for Salmonella,  Shigella, Campylobacter, Aeromonas, Plesiomonas,  Vibrio, E.coli O157 and Yersinia)  --  --    Clean Catch Clean Catch (Midstream)  02-09-23   50,000 - 99,000 CFU/mL Enterococcus faecium  50,000 - 99,000 CFU/mL Enterococcus faecalis  --  Enterococcus faecium  Enterococcus faecalis    .Blood Blood-Peripheral  02-09-23   No Growth Final  --  --    .Blood Blood-Peripheral  02-09-23   No Growth Final  --  --    HIV-1 RNA Quantitative, Viral Load Log: NOT DET. lg /mL (02-14-23 @ 06:38)    (otherwise reviewed)    RADIOLOGY:    2/20 CT:    IMPRESSION:    Redemonstrated bilateral nodular opacities with a lower lung predominant.   Since the prior examination, there is interval predominant resolution of   the cavitary components of the opacities since February 9, 2023 with   minimal if any interval decrease in size of some of the lung nodules.   Findings are likely of infectious etiology, slightly improved since the   prior exam. Considerations include aspiration given the distribution.   Follow-up resolution is recommended.

## 2023-02-22 NOTE — PROGRESS NOTE ADULT - PROBLEM SELECTOR PLAN 5
Hypokalemia with serum K ~2.2 in setting of reported watery stools which may be diarrhea Reported spinal surgery for herniated disc in patient with lumbar radiculopathy history per chart review.  > MR lumbar/pelvis with concerning stenosis, however, as per Ortho, patient without acute new findings and so can defer consult for outpatient evaluation by primary surgeon  - c/w hydromorphone 4mg for severe pain q4hrs prn  - c/w Tylenol 1000mg q8hrs for pain prn  - c/w Lidocaine patch for pain prn  - increased gabapentin 400mg TID

## 2023-02-22 NOTE — PROVIDER CONTACT NOTE (CRITICAL VALUE NOTIFICATION) - BACKGROUND
67 F PMH DMT2, multiple falls, s/p lumbar microdiscectomy for radiculopathy presenting from [facility] with acute onset of malaise and wet coughing

## 2023-02-22 NOTE — PROGRESS NOTE ADULT - TIME BILLING
Personal review of data, imaging and discussion with medical team.
Review of patient records, including history, laboratory data, and imaging. Patient evaluation and assessment. Coordination of care.

## 2023-02-22 NOTE — PROGRESS NOTE ADULT - PROBLEM SELECTOR PLAN 2
Increased to 492 w/o signs of DKA. S/p methylprednisolone for pre-medication for MRI.   - persistently above goal  - basal 31 units, premeal 18 units Leukocytosis + tachycardia = SIRS+ in conjunction with CT imaging with patchy/nodular opacities in setting of coughing concerning for sepsis secondary to pneumonia likely HAP given her recent surgery and [place] placement. Ddx: legionella pneumonia given patient with reported watery stools; however does not meet diarrhea definition of at least 3 unformed stools per day.  CTAP (2/9): Nodular and patchy opacities predominantly in the bilateral lower lobes with many that are centrally necrotic concerning for infection. Differential diagnosis includes metastatic disease and septic emboli. TTE (2/10): EF 67%, grossly normal findings. BCx (NGTD), UCx (E. faecium), urine legionella (negative), MRSA (negative), fungitel (negative). Quant TB indeterminant x3. Off isolation precautions.   > consults: ID, pulm   - was on Unasyn for cavitary PNA, however, concern for rising LFTS   - switched to Levaquin with goal of 4 week total abx duration   - LFTs with increase today, will c/w trend

## 2023-02-23 DIAGNOSIS — R79.89 OTHER SPECIFIED ABNORMAL FINDINGS OF BLOOD CHEMISTRY: ICD-10-CM

## 2023-02-23 LAB
ALBUMIN SERPL ELPH-MCNC: 3.2 G/DL — LOW (ref 3.3–5)
ALP SERPL-CCNC: 125 U/L — HIGH (ref 40–120)
ALT FLD-CCNC: 442 U/L — HIGH (ref 10–45)
ANION GAP SERPL CALC-SCNC: 14 MMOL/L — SIGNIFICANT CHANGE UP (ref 5–17)
APTT BLD: 25.2 SEC — LOW (ref 27.5–35.5)
AST SERPL-CCNC: 372 U/L — HIGH (ref 10–40)
BASOPHILS # BLD AUTO: 0.04 K/UL — SIGNIFICANT CHANGE UP (ref 0–0.2)
BASOPHILS NFR BLD AUTO: 0.5 % — SIGNIFICANT CHANGE UP (ref 0–2)
BILIRUB SERPL-MCNC: 0.2 MG/DL — SIGNIFICANT CHANGE UP (ref 0.2–1.2)
BUN SERPL-MCNC: 35 MG/DL — HIGH (ref 7–23)
CALCIUM SERPL-MCNC: 8.9 MG/DL — SIGNIFICANT CHANGE UP (ref 8.4–10.5)
CHLORIDE SERPL-SCNC: 109 MMOL/L — HIGH (ref 96–108)
CO2 SERPL-SCNC: 18 MMOL/L — LOW (ref 22–31)
CREAT SERPL-MCNC: 0.52 MG/DL — SIGNIFICANT CHANGE UP (ref 0.5–1.3)
EGFR: 101 ML/MIN/1.73M2 — SIGNIFICANT CHANGE UP
EOSINOPHIL # BLD AUTO: 0.27 K/UL — SIGNIFICANT CHANGE UP (ref 0–0.5)
EOSINOPHIL NFR BLD AUTO: 3.4 % — SIGNIFICANT CHANGE UP (ref 0–6)
GLUCOSE BLDC GLUCOMTR-MCNC: 112 MG/DL — HIGH (ref 70–99)
GLUCOSE BLDC GLUCOMTR-MCNC: 255 MG/DL — HIGH (ref 70–99)
GLUCOSE BLDC GLUCOMTR-MCNC: 285 MG/DL — HIGH (ref 70–99)
GLUCOSE BLDC GLUCOMTR-MCNC: 86 MG/DL — SIGNIFICANT CHANGE UP (ref 70–99)
GLUCOSE SERPL-MCNC: 126 MG/DL — HIGH (ref 70–99)
HAPTOGLOB SERPL-MCNC: 406 MG/DL — HIGH (ref 34–200)
HAV IGM SER-ACNC: SIGNIFICANT CHANGE UP
HBV CORE IGM SER-ACNC: SIGNIFICANT CHANGE UP
HBV SURFACE AG SER-ACNC: SIGNIFICANT CHANGE UP
HCT VFR BLD CALC: 27.8 % — LOW (ref 34.5–45)
HCV AB S/CO SERPL IA: 0.1 S/CO — SIGNIFICANT CHANGE UP (ref 0–0.99)
HCV AB SERPL-IMP: SIGNIFICANT CHANGE UP
HGB BLD-MCNC: 8.5 G/DL — LOW (ref 11.5–15.5)
IMM GRANULOCYTES NFR BLD AUTO: 0.5 % — SIGNIFICANT CHANGE UP (ref 0–0.9)
INR BLD: 1 RATIO — SIGNIFICANT CHANGE UP (ref 0.88–1.16)
LDH SERPL L TO P-CCNC: 404 U/L — HIGH (ref 50–242)
LYMPHOCYTES # BLD AUTO: 3.23 K/UL — SIGNIFICANT CHANGE UP (ref 1–3.3)
LYMPHOCYTES # BLD AUTO: 40.1 % — SIGNIFICANT CHANGE UP (ref 13–44)
MAGNESIUM SERPL-MCNC: 1.9 MG/DL — SIGNIFICANT CHANGE UP (ref 1.6–2.6)
MCHC RBC-ENTMCNC: 23.4 PG — LOW (ref 27–34)
MCHC RBC-ENTMCNC: 30.6 GM/DL — LOW (ref 32–36)
MCV RBC AUTO: 76.6 FL — LOW (ref 80–100)
MONOCYTES # BLD AUTO: 0.58 K/UL — SIGNIFICANT CHANGE UP (ref 0–0.9)
MONOCYTES NFR BLD AUTO: 7.2 % — SIGNIFICANT CHANGE UP (ref 2–14)
NEUTROPHILS # BLD AUTO: 3.89 K/UL — SIGNIFICANT CHANGE UP (ref 1.8–7.4)
NEUTROPHILS NFR BLD AUTO: 48.3 % — SIGNIFICANT CHANGE UP (ref 43–77)
NRBC # BLD: 0 /100 WBCS — SIGNIFICANT CHANGE UP (ref 0–0)
PHOSPHATE SERPL-MCNC: 3.4 MG/DL — SIGNIFICANT CHANGE UP (ref 2.5–4.5)
PLATELET # BLD AUTO: 418 K/UL — HIGH (ref 150–400)
POTASSIUM SERPL-MCNC: 4.6 MMOL/L — SIGNIFICANT CHANGE UP (ref 3.5–5.3)
POTASSIUM SERPL-SCNC: 4.6 MMOL/L — SIGNIFICANT CHANGE UP (ref 3.5–5.3)
PROT SERPL-MCNC: 6.6 G/DL — SIGNIFICANT CHANGE UP (ref 6–8.3)
PROTHROM AB SERPL-ACNC: 11.5 SEC — SIGNIFICANT CHANGE UP (ref 10.5–13.4)
RBC # BLD: 3.63 M/UL — LOW (ref 3.8–5.2)
RBC # BLD: 3.63 M/UL — LOW (ref 3.8–5.2)
RBC # FLD: 22.6 % — HIGH (ref 10.3–14.5)
RETICS #: 137.2 K/UL — HIGH (ref 25–125)
RETICS/RBC NFR: 3.8 % — HIGH (ref 0.5–2.5)
SODIUM SERPL-SCNC: 141 MMOL/L — SIGNIFICANT CHANGE UP (ref 135–145)
WBC # BLD: 8.05 K/UL — SIGNIFICANT CHANGE UP (ref 3.8–10.5)
WBC # FLD AUTO: 8.05 K/UL — SIGNIFICANT CHANGE UP (ref 3.8–10.5)

## 2023-02-23 PROCEDURE — 76705 ECHO EXAM OF ABDOMEN: CPT | Mod: 26

## 2023-02-23 PROCEDURE — 99232 SBSQ HOSP IP/OBS MODERATE 35: CPT

## 2023-02-23 PROCEDURE — 99233 SBSQ HOSP IP/OBS HIGH 50: CPT | Mod: GC

## 2023-02-23 PROCEDURE — 99222 1ST HOSP IP/OBS MODERATE 55: CPT | Mod: GC

## 2023-02-23 RX ADMIN — Medication 5 MILLIGRAM(S): at 22:34

## 2023-02-23 RX ADMIN — Medication 1 MILLIGRAM(S): at 14:31

## 2023-02-23 RX ADMIN — AMLODIPINE BESYLATE 5 MILLIGRAM(S): 2.5 TABLET ORAL at 06:25

## 2023-02-23 RX ADMIN — Medication 18 UNIT(S): at 18:10

## 2023-02-23 RX ADMIN — Medication 75 MILLIGRAM(S): at 06:25

## 2023-02-23 RX ADMIN — INSULIN GLARGINE 31 UNIT(S): 100 INJECTION, SOLUTION SUBCUTANEOUS at 22:35

## 2023-02-23 RX ADMIN — Medication 1 TABLET(S): at 14:31

## 2023-02-23 RX ADMIN — Medication 2: at 22:35

## 2023-02-23 RX ADMIN — GABAPENTIN 400 MILLIGRAM(S): 400 CAPSULE ORAL at 06:27

## 2023-02-23 RX ADMIN — Medication 6: at 18:10

## 2023-02-23 RX ADMIN — LIDOCAINE 1 PATCH: 4 CREAM TOPICAL at 20:00

## 2023-02-23 RX ADMIN — LIDOCAINE 1 PATCH: 4 CREAM TOPICAL at 14:31

## 2023-02-23 RX ADMIN — GABAPENTIN 400 MILLIGRAM(S): 400 CAPSULE ORAL at 22:36

## 2023-02-23 RX ADMIN — GABAPENTIN 400 MILLIGRAM(S): 400 CAPSULE ORAL at 14:32

## 2023-02-23 NOTE — PROGRESS NOTE ADULT - PROBLEM SELECTOR PLAN 2
Leukocytosis + tachycardia = SIRS+ in conjunction with CT imaging with patchy/nodular opacities in setting of coughing concerning for sepsis secondary to pneumonia likely HAP given her recent surgery and [place] placement. Ddx: legionella pneumonia given patient with reported watery stools; however does not meet diarrhea definition of at least 3 unformed stools per day.  CTAP (2/9): Nodular and patchy opacities predominantly in the bilateral lower lobes with many that are centrally necrotic concerning for infection. Differential diagnosis includes metastatic disease and septic emboli. TTE (2/10): EF 67%, grossly normal findings. BCx (NGTD), UCx (E. faecium), urine legionella (negative), MRSA (negative), fungitel (negative). Quant TB indeterminant x3. Off isolation precautions.   > consults: ID, pulm   - was on Unasyn for cavitary PNA, however, concern for rising LFTS   - switched to Levaquin with goal of 4 week total abx duration   - LFTs with increase today, will c/w trend Leukocytosis + tachycardia = SIRS+ in conjunction with CT imaging with patchy/nodular opacities in setting of coughing concerning for sepsis secondary to pneumonia likely HAP given her recent surgery and [place] placement. Ddx: legionella pneumonia given patient with reported watery stools; however does not meet diarrhea definition of at least 3 unformed stools per day.  CTAP (2/9): Nodular and patchy opacities predominantly in the bilateral lower lobes with many that are centrally necrotic concerning for infection. Differential diagnosis includes metastatic disease and septic emboli. TTE (2/10): EF 67%, grossly normal findings. BCx (NGTD), UCx (E. faecium), urine legionella (negative), MRSA (negative), fungitel (negative). Quant TB indeterminant x3. Off isolation precautions.   > consults: ID, pulm, hepatology   - was on Unasyn for cavitary PNA, however, concern for rising LFTS   - switched to Levaquin with goal of 4 week total abx duration

## 2023-02-23 NOTE — CONSULT NOTE ADULT - ATTENDING COMMENTS
67F with metabolic risk factors admitted for suspected pneumonia and malaise. Normal liver tests during admission and prior to this illness  Liver tests were not checked for a week and they are elevated now  Unlikely patient contracted an infection while in hospital  No evidence of ischemia or hypotension  Certainly possibility of AIH is possible but timing is odd at this time  More likely related to DILI from one of the antibiotics patient received though none are clear precipitants    Would continue to monitor labs and send serologies  If worse comes to worse can perform liver bx
Pt is a 67F with PMHx DMII and hx recent lumbar microdiscectomy now presenting from [facility] to [facility] on 2/9/23 with productive cough and lethargy likely 2/2 cavitary PNA. Pt clinically well appearing and in no respiratory distress on RA, remains hemodynamically stable with low-grade fevers and persistent leukocytosis. Pt initiated on empiric abx therapy.   -AFB x3 pending, would induce sputum with collection.   -Please obtain secondary serologies and urine studies as noted above in fellow note  -Will f/u sputum and blood cx   -Agree with continuation of empiric antibiotic therapy   -If pt does not respond to antibiotic therapy will consider diagnostic bronchoscopy   -Will need serial imaging to ensure radiographic resolution in 4-6 weeks  -Pulmonary will continue to follow

## 2023-02-23 NOTE — PROGRESS NOTE ADULT - SUBJECTIVE AND OBJECTIVE BOX
Progress Note    02-09-23 (14d)    Patient is a 67y old  Female who presents with a chief complaint of Pneumonia (22 Feb 2023 12:53)      Subjective / Overnight Events :  - No acute events overnight.  - Pt seen and examined at bedside.     Additional ROS (if any):    MEDICATIONS  (STANDING):  amLODIPine   Tablet 5 milliGRAM(s) Oral daily  dextrose 5%. 1000 milliLiter(s) (50 mL/Hr) IV Continuous <Continuous>  dextrose 5%. 1000 milliLiter(s) (100 mL/Hr) IV Continuous <Continuous>  dextrose 50% Injectable 25 Gram(s) IV Push once  dextrose 50% Injectable 12.5 Gram(s) IV Push once  dextrose 50% Injectable 25 Gram(s) IV Push once  folic acid 1 milliGRAM(s) Oral daily  gabapentin 400 milliGRAM(s) Oral three times a day  glucagon  Injectable 1 milliGRAM(s) IntraMuscular once  insulin glargine Injectable (LANTUS) 31 Unit(s) SubCutaneous at bedtime  insulin lispro (ADMELOG) corrective regimen sliding scale   SubCutaneous at bedtime  insulin lispro (ADMELOG) corrective regimen sliding scale   SubCutaneous three times a day before meals  insulin lispro Injectable (ADMELOG) 18 Unit(s) SubCutaneous three times a day before meals  lactobacillus acidophilus 1 Tablet(s) Oral daily  levoFLOXacin  Tablet 500 milliGRAM(s) Oral every 24 hours  lidocaine   4% Patch 1 Patch Transdermal daily  melatonin 5 milliGRAM(s) Oral at bedtime  metoprolol succinate ER 75 milliGRAM(s) Oral daily    MEDICATIONS  (PRN):  acetaminophen     Tablet .. 1000 milliGRAM(s) Oral every 8 hours PRN Severe Pain (7 - 10)  dextrose Oral Gel 15 Gram(s) Oral once PRN Blood Glucose LESS THAN 70 milliGRAM(s)/deciliter          PHYSICAL EXAM:  Vital Signs Last 24 Hrs  T(C): 37 (22 Feb 2023 20:37), Max: 37 (22 Feb 2023 20:37)  T(F): 98.6 (22 Feb 2023 20:37), Max: 98.6 (22 Feb 2023 20:37)  HR: 99 (22 Feb 2023 20:37) (95 - 99)  BP: 118/72 (22 Feb 2023 20:37) (100/57 - 118/72)  BP(mean): --  RR: 18 (22 Feb 2023 20:37) (18 - 18)  SpO2: 97% (22 Feb 2023 20:37) (97% - 99%)    Parameters below as of 22 Feb 2023 20:37  Patient On (Oxygen Delivery Method): room air        I&O's Summary    21 Feb 2023 07:01  -  22 Feb 2023 07:00  --------------------------------------------------------  IN: 340 mL / OUT: 0 mL / NET: 340 mL        General: NAD, non-toxic appearing   HEENT: PERRLA, EOMi, no scleral icterus  CV: RRR, normal S1 and S2, no m/r/g  Lungs: normal respiratory effort. CTAB, no wheezes, rales, or rhonchi  Abd: soft, nontender, nondistended  Ext: no edema, 2+ peripheral pulses   Pysch: AAOx3, appropriate affect   Neuro: grossly non-focal, moving all extremities spontaneously   Skin: no rashes or lesions     LABS:  CAPILLARY BLOOD GLUCOSE      POCT Blood Glucose.: 248 mg/dL (22 Feb 2023 21:23)  POCT Blood Glucose.: 156 mg/dL (22 Feb 2023 17:18)  POCT Blood Glucose.: 224 mg/dL (22 Feb 2023 12:16)  POCT Blood Glucose.: 283 mg/dL (22 Feb 2023 08:45)                              9.4    8.27  )-----------( 468      ( 22 Feb 2023 16:35 )             31.0       WBC Trend: 8.27<--, 8.75<--, 8.35<--  Hb Trend: 9.4<--, 6.7<--, 6.6<--, 7.2<--, 7.1<--    02-22    140  |  107  |  34<H>  ----------------------------<  171<H>  4.6   |  22  |  0.57    Ca    9.4      22 Feb 2023 07:15  Phos  4.6     02-22  Mg     1.8     02-22    TPro  6.7  /  Alb  3.4  /  TBili  0.2  /  DBili  x   /  AST  193<H>  /  ALT  353<H>  /  AlkPhos  121<H>  02-22                  RADIOLOGY & ADDITIONAL TESTS: Reviewed Progress Note    02-09-23 (14d)    Patient is a 67y old  Female who presents with a chief complaint of Pneumonia (22 Feb 2023 12:53)      Subjective / Overnight Events :  - No acute events overnight.  - Pt seen and examined at bedside. No complaints. Denies n/v/c/d. No abdominal pain, still tolerating PO intake without issues. No fever, chills.     Additional ROS (if any):    MEDICATIONS  (STANDING):  amLODIPine   Tablet 5 milliGRAM(s) Oral daily  dextrose 5%. 1000 milliLiter(s) (50 mL/Hr) IV Continuous <Continuous>  dextrose 5%. 1000 milliLiter(s) (100 mL/Hr) IV Continuous <Continuous>  dextrose 50% Injectable 25 Gram(s) IV Push once  dextrose 50% Injectable 12.5 Gram(s) IV Push once  dextrose 50% Injectable 25 Gram(s) IV Push once  folic acid 1 milliGRAM(s) Oral daily  gabapentin 400 milliGRAM(s) Oral three times a day  glucagon  Injectable 1 milliGRAM(s) IntraMuscular once  insulin glargine Injectable (LANTUS) 31 Unit(s) SubCutaneous at bedtime  insulin lispro (ADMELOG) corrective regimen sliding scale   SubCutaneous at bedtime  insulin lispro (ADMELOG) corrective regimen sliding scale   SubCutaneous three times a day before meals  insulin lispro Injectable (ADMELOG) 18 Unit(s) SubCutaneous three times a day before meals  lactobacillus acidophilus 1 Tablet(s) Oral daily  levoFLOXacin  Tablet 500 milliGRAM(s) Oral every 24 hours  lidocaine   4% Patch 1 Patch Transdermal daily  melatonin 5 milliGRAM(s) Oral at bedtime  metoprolol succinate ER 75 milliGRAM(s) Oral daily    MEDICATIONS  (PRN):  acetaminophen     Tablet .. 1000 milliGRAM(s) Oral every 8 hours PRN Severe Pain (7 - 10)  dextrose Oral Gel 15 Gram(s) Oral once PRN Blood Glucose LESS THAN 70 milliGRAM(s)/deciliter          PHYSICAL EXAM:  Vital Signs Last 24 Hrs  T(C): 37 (22 Feb 2023 20:37), Max: 37 (22 Feb 2023 20:37)  T(F): 98.6 (22 Feb 2023 20:37), Max: 98.6 (22 Feb 2023 20:37)  HR: 99 (22 Feb 2023 20:37) (95 - 99)  BP: 118/72 (22 Feb 2023 20:37) (100/57 - 118/72)  BP(mean): --  RR: 18 (22 Feb 2023 20:37) (18 - 18)  SpO2: 97% (22 Feb 2023 20:37) (97% - 99%)    Parameters below as of 22 Feb 2023 20:37  Patient On (Oxygen Delivery Method): room air        I&O's Summary    21 Feb 2023 07:01  -  22 Feb 2023 07:00  --------------------------------------------------------  IN: 340 mL / OUT: 0 mL / NET: 340 mL        General: NAD, non-toxic appearing   HEENT: PERRLA, EOMi, no scleral icterus  CV: RRR, normal S1 and S2, no m/r/g  Lungs: normal respiratory effort. CTAB, no wheezes, rales, or rhonchi  Abd: soft, nontender, nondistended  Ext: no edema, 2+ peripheral pulses   Pysch: AAOx3, appropriate affect   Neuro: 0/5 hips, 3/5 knees, 5/5 ankle flexion  Skin: no rashes or lesions     LABS:  CAPILLARY BLOOD GLUCOSE      POCT Blood Glucose.: 248 mg/dL (22 Feb 2023 21:23)  POCT Blood Glucose.: 156 mg/dL (22 Feb 2023 17:18)  POCT Blood Glucose.: 224 mg/dL (22 Feb 2023 12:16)  POCT Blood Glucose.: 283 mg/dL (22 Feb 2023 08:45)                              9.4    8.27  )-----------( 468      ( 22 Feb 2023 16:35 )             31.0       WBC Trend: 8.27<--, 8.75<--, 8.35<--  Hb Trend: 9.4<--, 6.7<--, 6.6<--, 7.2<--, 7.1<--    02-22    140  |  107  |  34<H>  ----------------------------<  171<H>  4.6   |  22  |  0.57    Ca    9.4      22 Feb 2023 07:15  Phos  4.6     02-22  Mg     1.8     02-22    TPro  6.7  /  Alb  3.4  /  TBili  0.2  /  DBili  x   /  AST  193<H>  /  ALT  353<H>  /  AlkPhos  121<H>  02-22                  RADIOLOGY & ADDITIONAL TESTS: Reviewed

## 2023-02-23 NOTE — CONSULT NOTE ADULT - ASSESSMENT
Impression:     #elevated transaminases   DDx DILI vs ischemic     Recommendations:     All recommendations are tentative until note is attested by attending.     Lily Milton, PGY-4   Gastroenterology/Hepatology Fellow  Available on [employer] Teams  [phone] ([facility] Short Range Pager)  [phone] ([facility] Long Range Pager)    After 5pm, please contact the on-call GI fellow. [phone] Impression:     #elevated transaminases   DDx DILI vs ischemic. Less likely autoimmune.   - aztreonam: Asymptomatic serum aminotransferase elevations are common during high dose, intravenous aztreonam therapy (10% to 38%). The enzyme abnormalities are usually mild-to-moderate, asymptomatic, self-limited and not requiring drug discontinuation  - doxycycline: Doxycycline has been associated with rare instances of hepatic injury, generally arising within 1 to 2 weeks of starting therapy, sometimes with a history of previous administration of the agent without injury. The pattern of injury ranges from hepatocellular to cholestatic and is probably most commonly mixed.  - unasyn: Parenteral therapy with ampicillin-sulbactam has been reported to cause transient low level elevations in serum aminotransferase levels in 5% to 10% of patients, but these resolve rapidly once the therapy is stopped, and similar rates of enzyme elevations are found with comparable agents. Clinically apparent liver injury due to ampicillin-sulbactam is rare  - levaquin: levofloxacin has been associated with minor elevations in serum ALT and AST levels in 2% to 5% of patients. The abnormalities were usually asymptomatic and transient and rarely require dose modification  Reference: livertox    Recommendations:   - please send HAV, HBVsAb, HBVsAg, HBVcAb, HCV Ab, HCV RNA for viral etiologies  - please send GLORIA, anti-mitochondrial antibody, anti-smooth muscle antibody, immunoglobulins (IgG, IgM, IgA quantitative) for autoimmune etiologies     All recommendations are tentative until note is attested by attending.     Lily Milton, PGY-4   Gastroenterology/Hepatology Fellow  Available on [employer] Teams  [phone] ([facility] Short Range Pager)  [phone] ([facility] Long Range Pager)    After 5pm, please contact the on-call GI fellow. [phone]

## 2023-02-23 NOTE — CONSULT NOTE ADULT - SUBJECTIVE AND OBJECTIVE BOX
HPI: 67 F PMH DMT2, HTN, HLD, multiple falls, s/p lumbar microdiscectomy for radiculopathy presenting from [facility] with 5days of acute onset of malaise and wet coughing but without fevers, shortness of breath, or chest pain. Hepatology consulted for elevated transaminases.     Patient was started on meropenem at OSH, aztreonam and doxycycline x1 in ED, Zosyn, Unasyn (-), Levaquin (started ). Home medications include simvastatin, amlodipine, Cymbalta, hydromorphone.      Labs on admission with Tbili 0.3, alk phosp 67, AST 23, ALT 25. Patient with elevated transaminases started  with Tbili 0.1, Alk phosp 116, , . INR 1.     Allergies:  aspirin (Anaphylaxis)  clindamycin (Unknown)  contrast media (iron oxide-based) (Nephrotoxicity)  fish (Hives)  IV Contrast (Anaphylaxis)  sulfa drugs (Rash)  vancomycin (Rash)  walnut (Anaphylaxis)      Home Medications:    Hospital Medications:  acetaminophen     Tablet .. 1000 milliGRAM(s) Oral every 8 hours PRN  amLODIPine   Tablet 5 milliGRAM(s) Oral daily  dextrose 5%. 1000 milliLiter(s) IV Continuous <Continuous>  dextrose 5%. 1000 milliLiter(s) IV Continuous <Continuous>  dextrose 50% Injectable 25 Gram(s) IV Push once  dextrose 50% Injectable 12.5 Gram(s) IV Push once  dextrose 50% Injectable 25 Gram(s) IV Push once  dextrose Oral Gel 15 Gram(s) Oral once PRN  folic acid 1 milliGRAM(s) Oral daily  gabapentin 400 milliGRAM(s) Oral three times a day  glucagon  Injectable 1 milliGRAM(s) IntraMuscular once  insulin glargine Injectable (LANTUS) 31 Unit(s) SubCutaneous at bedtime  insulin lispro (ADMELOG) corrective regimen sliding scale   SubCutaneous at bedtime  insulin lispro (ADMELOG) corrective regimen sliding scale   SubCutaneous three times a day before meals  insulin lispro Injectable (ADMELOG) 18 Unit(s) SubCutaneous three times a day before meals  lactobacillus acidophilus 1 Tablet(s) Oral daily  levoFLOXacin  Tablet 500 milliGRAM(s) Oral every 24 hours  lidocaine   4% Patch 1 Patch Transdermal daily  melatonin 5 milliGRAM(s) Oral at bedtime  metoprolol succinate ER 75 milliGRAM(s) Oral daily      PMHX/PSHX:  H/O: hypertension    HLD (hyperlipidemia)    Type 2 diabetes mellitus    Depression    Psoriasis-like skin disease    Eczema    Muscle wasting and atrophy, not elsewhere classified, unspecified site    Lumbar radiculopathy    S/P hysterectomy    S/P hemorrhoidectomy    S/P  Section    Grafts        Family history:  Family history of cerebrovascular accident (CVA) (Mother, Sibling)    Family history of coronary artery disease (Mother, Father)    FH: diabetes mellitus    FH: hypertension        Denies family history of colon cancer/polyps, stomach cancer/polyps, pancreatic cancer/masses, liver cancer/disease, ovarian cancer and endometrial cancer.    Social History:   Tob: Denies  EtOH: Denies  Illicit Drugs: Denies    ROS:     General:  No wt loss, fevers, chills, night sweats, fatigue  Eyes:  Good vision, no reported pain  ENT:  No sore throat, pain, runny nose, dysphagia  CV:  No pain, palpitations, hypo/hypertension  Pulm:  No dyspnea, cough, tachypnea, wheezing  GI:  see HPI  :  No pain, bleeding, incontinence, nocturia  Muscle:  No pain, weakness  Neuro:  No weakness, tingling, memory problems  Psych:  No fatigue, insomnia, mood problems, depression  Endocrine:  No polyuria, polydipsia, cold/heat intolerance  Heme:  No petechiae, ecchymosis, easy bruisability  Skin:  No rash, tattoos, scars, edema    PHYSICAL EXAM:     GENERAL:  No acute distress  HEENT:  NCAT, no scleral icterus   CHEST:  no respiratory distress  HEART:  Regular rate and rhythm  ABDOMEN:  Soft, non-tender, non-distended, normoactive bowel sounds,  no masses  EXTREMITIES: No edema  SKIN:  No rash/erythema/ecchymoses/petechiae/wounds/abscess/warm/dry  NEURO:  Alert and oriented x 3, no asterixis    Vital Signs:  Vital Signs Last 24 Hrs  T(C): 36.8 (2023 04:30), Max: 37 (2023 20:37)  T(F): 98.2 (2023 04:30), Max: 98.6 (2023 20:37)  HR: 97 (2023 04:30) (95 - 99)  BP: 130/82 (2023 04:30) (100/57 - 130/82)  BP(mean): --  RR: 18 (2023 04:30) (18 - 18)  SpO2: 97% (2023 04:30) (97% - 99%)    Parameters below as of 2023 04:30  Patient On (Oxygen Delivery Method): room air      Daily     Daily     LABS:                        8.5    8.05  )-----------( 418      ( 2023 05:33 )             27.8     Mean Cell Volume: 76.6 fl (- @ 05:33)        141  |  109<H>  |  35<H>  ----------------------------<  126<H>  4.6   |  18<L>  |  0.52    Ca    8.9      2023 05:30  Phos  3.4       Mg     1.9         TPro  6.6  /  Alb  3.2<L>  /  TBili  0.2  /  DBili  x   /  AST  372<H>  /  ALT  442<H>  /  AlkPhos  125<H>      LIVER FUNCTIONS - ( 2023 05:30 )  Alb: 3.2 g/dL / Pro: 6.6 g/dL / ALK PHOS: 125 U/L / ALT: 442 U/L / AST: 372 U/L / GGT: x           PT/INR - ( 2023 05:33 )   PT: 11.5 sec;   INR: 1.00 ratio         PTT - ( 2023 05:33 )  PTT:25.2 sec                            8.5    8.05  )-----------( 418      ( 2023 05:33 )             27.8                         9.4    8.27  )-----------( 468      ( 2023 16:35 )             31.0                         6.7    8.75  )-----------( 504      ( 2023 10:19 )             22.8                         6.6    8.35  )-----------( 543      ( 2023 07:12 )             22.5                         7.2    7.81  )-----------( 577      ( 2023 06:37 )             24.7       Imaging:             HPI: 67 F PMH DMT2, HTN, HLD, multiple falls, s/p lumbar microdiscectomy for radiculopathy presenting from [facility] with 5days of acute onset of malaise and wet coughing but without fevers, shortness of breath, or chest pain. Hepatology consulted for elevated transaminases.     Patient reports feeling better and would like to go home. Denies alcohol use or recreational drug use. No previous history of liver disease. No FH of liver disease.     Patient was started on meropenem at OSH, aztreonam and doxycycline x1 in ED, Zosyn, Unasyn (-), Levaquin (started ). Home medications include simvastatin, amlodipine, Cymbalta, hydromorphone. Reports that none of home medications were started in the last 6 months.      Labs on admission with Tbili 0.3, alk phosp 67, AST 23, ALT 25. Patient with elevated transaminases started  with Tbili 0.1, Alk phosp 116, , . INR 1.     Allergies:  aspirin (Anaphylaxis)  clindamycin (Unknown)  contrast media (iron oxide-based) (Nephrotoxicity)  fish (Hives)  IV Contrast (Anaphylaxis)  sulfa drugs (Rash)  vancomycin (Rash)  walnut (Anaphylaxis)      Home Medications:    Hospital Medications:  acetaminophen     Tablet .. 1000 milliGRAM(s) Oral every 8 hours PRN  amLODIPine   Tablet 5 milliGRAM(s) Oral daily  dextrose 5%. 1000 milliLiter(s) IV Continuous <Continuous>  dextrose 5%. 1000 milliLiter(s) IV Continuous <Continuous>  dextrose 50% Injectable 25 Gram(s) IV Push once  dextrose 50% Injectable 12.5 Gram(s) IV Push once  dextrose 50% Injectable 25 Gram(s) IV Push once  dextrose Oral Gel 15 Gram(s) Oral once PRN  folic acid 1 milliGRAM(s) Oral daily  gabapentin 400 milliGRAM(s) Oral three times a day  glucagon  Injectable 1 milliGRAM(s) IntraMuscular once  insulin glargine Injectable (LANTUS) 31 Unit(s) SubCutaneous at bedtime  insulin lispro (ADMELOG) corrective regimen sliding scale   SubCutaneous at bedtime  insulin lispro (ADMELOG) corrective regimen sliding scale   SubCutaneous three times a day before meals  insulin lispro Injectable (ADMELOG) 18 Unit(s) SubCutaneous three times a day before meals  lactobacillus acidophilus 1 Tablet(s) Oral daily  levoFLOXacin  Tablet 500 milliGRAM(s) Oral every 24 hours  lidocaine   4% Patch 1 Patch Transdermal daily  melatonin 5 milliGRAM(s) Oral at bedtime  metoprolol succinate ER 75 milliGRAM(s) Oral daily      PMHX/PSHX:  H/O: hypertension    HLD (hyperlipidemia)    Type 2 diabetes mellitus    Depression    Psoriasis-like skin disease    Eczema    Muscle wasting and atrophy, not elsewhere classified, unspecified site    Lumbar radiculopathy    S/P hysterectomy    S/P hemorrhoidectomy    S/P  Section    Grafts        Family history:  Family history of cerebrovascular accident (CVA) (Mother, Sibling)    Family history of coronary artery disease (Mother, Father)    FH: diabetes mellitus    FH: hypertension        Denies family history of colon cancer/polyps, stomach cancer/polyps, pancreatic cancer/masses, liver cancer/disease, ovarian cancer and endometrial cancer.    Social History:   Tob: Denies  EtOH: Denies  Illicit Drugs: Denies    ROS:     General:  No wt loss, fevers, chills, night sweats, fatigue  Eyes:  Good vision, no reported pain  ENT:  No sore throat, pain, runny nose, dysphagia  CV:  No pain, palpitations, hypo/hypertension  Pulm:  No dyspnea, cough, tachypnea, wheezing  GI:  see HPI  :  No pain, bleeding, incontinence, nocturia  Muscle:  No pain, weakness  Neuro:  No weakness, tingling, memory problems  Psych:  No fatigue, insomnia, mood problems, depression  Endocrine:  No polyuria, polydipsia, cold/heat intolerance  Heme:  No petechiae, ecchymosis, easy bruisability  Skin:  No rash, tattoos, scars, edema    PHYSICAL EXAM:     GENERAL:  No acute distress, patient lying comfortably on hospital bed   HEENT:  NCAT, no scleral icterus   CHEST:  no respiratory distress  HEART:  Regular rate and rhythm  ABDOMEN:  Soft, non-tender, non-distended, normoactive bowel sounds,  no masses  EXTREMITIES: No edema, patient with previous skin graft on right extremity   SKIN:  No rash/erythema/ecchymoses/petechiae/wounds/abscess/warm/dry  NEURO:  Alert and oriented x 3, no asterixis    Vital Signs:  Vital Signs Last 24 Hrs  T(C): 36.8 (2023 04:30), Max: 37 (2023 20:37)  T(F): 98.2 (2023 04:30), Max: 98.6 (2023 20:37)  HR: 97 (:30) (95 - 99)  BP: 130/82 (:30) (100/57 - 130/82)  BP(mean): --  RR: 18 (:30) (18 - 18)  SpO2: 97% (:30) (97% - 99%)    Parameters below as of :30  Patient On (Oxygen Delivery Method): room air      Daily     Daily     LABS:                        8.5    8.05  )-----------( 418      ( 2023 05:33 )             27.8     Mean Cell Volume: 76.6 fl (- @ 05:33)        141  |  109<H>  |  35<H>  ----------------------------<  126<H>  4.6   |  18<L>  |  0.52    Ca    8.9      2023 05:30  Phos  3.4       Mg     1.9         TPro  6.6  /  Alb  3.2<L>  /  TBili  0.2  /  DBili  x   /  AST  372<H>  /  ALT  442<H>  /  AlkPhos  125<H>      LIVER FUNCTIONS - ( 2023 05:30 )  Alb: 3.2 g/dL / Pro: 6.6 g/dL / ALK PHOS: 125 U/L / ALT: 442 U/L / AST: 372 U/L / GGT: x           PT/INR - ( 2023 05:33 )   PT: 11.5 sec;   INR: 1.00 ratio         PTT - ( 2023 05:33 )  PTT:25.2 sec                            8.5    8.05  )-----------( 418      ( 2023 05:33 )             27.8                         9.4    8.27  )-----------( 468      ( 2023 16:35 )             31.0                         6.7    8.75  )-----------( 504      ( 2023 10:19 )             22.8                         6.6    8.35  )-----------( 543      ( 2023 07:12 )             22.5                         7.2    7.81  )-----------( 577      ( 2023 06:37 )             24.7       Imaging:

## 2023-02-23 NOTE — PROGRESS NOTE ADULT - PROBLEM SELECTOR PLAN 1
Hgb 2/22 AM at 6.6, repeat 6.7. No indication for acute blood loss. Iron labs consistent with deficiency however increased ferritin.   - transfusion 1u pRBC, goal transfusions hgb < 7, maintain active type and screen Elevated LFTs w/ ASL/ALT to 372/442 consistent with hepatocellular damage. Pt without any clinical signs, no abdominal pain, fevers, n/v or jaundice. Recent CT abdomen without liver pathology.   > consults: hepatology   > etiology most consistent with DILI 2/2 recent B-lactam use (zosyn and unasyn associated w/ common LFT increases)  - abx switched as below to avoid further increases, if indeed DILI can take several days/week to resolve   - appreciate hepatology recs  - U/S ordered Elevated LFTs w/ ASL/ALT to 372/442 consistent with hepatocellular damage, synthetic function intact w/ INR 1. Pt without any clinical signs, no abdominal pain, fevers, n/v or jaundice. Recent CT abdomen without liver pathology.   > consults: hepatology   > etiology most consistent with DILI 2/2 recent B-lactam use (zosyn and unasyn associated w/ common LFT increases)  - abx switched as below to avoid further increases, if indeed DILI can take several days/week to resolve   - appreciate hepatology recs  - U/S ordered Elevated LFTs w/ ASL/ALT to 372/442 consistent with hepatocellular damage, synthetic function intact w/ INR 1. Pt without any clinical signs, no abdominal pain, fevers, n/v or jaundice. Recent CT abdomen without liver pathology.   > consults: hepatology   > etiology most consistent with DILI 2/2 recent B-lactam use (zosyn and Unasyn associated w/ common LFT increases)  - abx switched as below to avoid further increases, if indeed DILI can take several days/week to resolve   - appreciate hepatology recs  - U/S ordered  - Acute hepatitis panel ordered

## 2023-02-23 NOTE — PROGRESS NOTE ADULT - PROBLEM SELECTOR PLAN 7
History DMT2 on insulin (long acting 16units and short acting 6units), A1c 12.7 11/2022  > A1c 7.9%  - Lantus to 22U qhs, Admelog 12U TID + THOMAS Hypokalemia with serum K ~2.2 in setting of reported watery stools which may be diarrhea Hypokalemia with serum K ~2.2 in setting of reported watery stools which may be diarrhea    RESOLVED Yes

## 2023-02-23 NOTE — PROGRESS NOTE ADULT - ATTENDING COMMENTS
Patient seen and examined at bedside. No acute events overnight. Feeling okay. She has persistent hepatocellular transaminitis with what seems to be preserved synthetic function. No respiratory complaints and likely her pneumonia is improving. Hg stable. For now plan to have hepatology evaluate as transaminitis has not improved. Eventually for [place], patient quite understanding this morning.

## 2023-02-23 NOTE — PROGRESS NOTE ADULT - ASSESSMENT
67 F PMH DMT2, multiple falls, s/p lumbar microdiscectomy for radiculopathy presenting from [facility] with acute onset of malaise and wet coughing but without fevers, shortness of breath, or chest pain with CT chest non-contrast imaging with patchy/nodular opacities in bilateral lower lobes, centrally necrotic c/f infection vs. metastatic disease vs. septic emboli. S/p 7 day treatment with broad spectrum abx with resolution of symptoms, but without elucidating underlying etiology. Pending discharge planning to rehab.  67 F PMH DMT2, multiple falls, s/p lumbar microdiscectomy for radiculopathy presenting from [facility] with acute onset of malaise and wet coughing but without fevers, shortness of breath, or chest pain with CT chest non-contrast imaging with patchy/nodular opacities in bilateral lower lobes, centrally necrotic c/f infection vs. metastatic disease vs. septic emboli. S/p 7 day treatment with broad spectrum abx with resolution of symptoms, now on Levaquin. C/b elevated LFTs.

## 2023-02-23 NOTE — PROGRESS NOTE ADULT - PROBLEM SELECTOR PLAN 3
Increased to 492 w/o signs of DKA. S/p methylprednisolone for pre-medication for MRI.   - persistently above goal  - basal 31 units, premeal 18 units Hgb 2/22 AM at 6.6, repeat 6.7. No indication for acute blood loss. Iron labs consistent with AOCD. Received 1u pRBC.   > goal transfusions hgb < 7, maintain active type and screen

## 2023-02-23 NOTE — PROGRESS NOTE ADULT - PROBLEM SELECTOR PLAN 11
DVT prophylaxis: Lovenox 40mg qd + SCDs  Diet: consistent carbs  Disposition: d/c planning to rehab  Code Status: pending further family discussion - c/w atorvastatin 40mg qhs - Holding home statin due to transaminitis

## 2023-02-23 NOTE — PROGRESS NOTE ADULT - PROBLEM SELECTOR PLAN 12
DVT prophylaxis: Lovenox 40mg qd + SCDs  Diet: consistent carbs  Disposition: d/c planning to rehab  Code Status: full code DVT SCDs  Diet: consistent carbs  Disposition: d/c planning to [place]  Code Status: full code

## 2023-02-23 NOTE — PROGRESS NOTE ADULT - PROBLEM SELECTOR PLAN 9
Noted to have right thyroid nodule on imaging. TSH wnl.   > Thyroid US showing TIRADS 3 nodule >2.5cm, indicates need for FNA for further evaluation  - thyroid FNA can be completed outpatient On Amlodipine 5mg qd and metoprolol 25mg qd at home  - c/w amlodipine 5mg qd  - increased Toprol XL 50mg On Amlodipine 5mg qd and metoprolol 25mg qd at home  - c/w amlodipine 5mg qd  - increased Toprol XL 75mg

## 2023-02-23 NOTE — PROGRESS NOTE ADULT - SUBJECTIVE AND OBJECTIVE BOX
CC: F/U for PNA    Saw/spoke to patient. Unchanged. No new complaints. Asking to go home.    Allergies  aspirin (Anaphylaxis)  clindamycin (Unknown)  contrast media (iron oxide-based) (Nephrotoxicity)  fish (Hives)  IV Contrast (Anaphylaxis)  sulfa drugs (Rash)  vancomycin (Rash)  walnut (Anaphylaxis)    ANTIMICROBIALS:  levoFLOXacin  Tablet 500 every 24 hours    PE:    Vital Signs Last 24 Hrs  T(C): 36.8 (23 Feb 2023 13:15), Max: 37 (22 Feb 2023 20:37)  T(F): 98.2 (23 Feb 2023 13:15), Max: 98.6 (22 Feb 2023 20:37)  HR: 84 (23 Feb 2023 13:15) (84 - 99)  BP: 113/73 (23 Feb 2023 13:15) (111/71 - 130/82)  RR: 18 (23 Feb 2023 13:15) (18 - 18)  SpO2: 97% (23 Feb 2023 13:15) (97% - 98%)    Gen: AOx3, NAD, non-toxic  CV: Nontachycardic  Resp: Breathing comfortably, RA  Abd: Soft, nontender  : No Hua  IV/Skin: No thrombophlebitis    LABS:                        8.5    8.05  )-----------( 418      ( 23 Feb 2023 05:33 )             27.8     02-23    141  |  109<H>  |  35<H>  ----------------------------<  126<H>  4.6   |  18<L>  |  0.52    Ca    8.9      23 Feb 2023 05:30  Phos  3.4     02-23  Mg     1.9     02-23    TPro  6.6  /  Alb  3.2<L>  /  TBili  0.2  /  DBili  x   /  AST  372<H>  /  ALT  442<H>  /  AlkPhos  125<H>  02-23    MICROBIOLOGY:    .Sputum  02-18-23   Culture is being performed.  --  --    .Sputum Sputum  02-17-23   Culture is being performed.  --  --    .Sputum Sputum  02-17-23   Culture is being performed.  --  --    .Sputum Sputum  02-12-23   No acid-fast bacilli isolated at 1 week. ****Acid-fast cultures are held  for 6 weeks.****  --  --    .Sputum Sputum  02-12-23   Normal Respiratory Janis present  --    Few Squamous epithelial cells per low power field  Moderate polymorphonuclear leukocytes per low power field  Few Gram positive cocci in pairs per oil power field    .Stool Feces  02-10-23   No Protozoa seen by trichrome stain  No Helminths or Protozoa seen in formalin concentrate  performed by iodine stain  (routine O+P not evaluated for Microsporidia,  Cryptosporidia or Cyclospora)  One negative sample does not necessarily rule  out the presence of a parasitic infection.  --  --    .Stool Feces  02-10-23   No enteric pathogens isolated.  (Stool culture examined for Salmonella,  Shigella, Campylobacter, Aeromonas, Plesiomonas,  Vibrio, E.coli O157 and Yersinia)  --  --    Clean Catch Clean Catch (Midstream)  02-09-23   50,000 - 99,000 CFU/mL Enterococcus faecium  50,000 - 99,000 CFU/mL Enterococcus faecalis  --  Enterococcus faecium  Enterococcus faecalis    HIV-1 RNA Quantitative, Viral Load Log: NOT DET. lg /mL (02-14-23 @ 06:38)    RADIOLOGY:    2/23 USG:    IMPRESSION:  No sonographic evidence of hepatobiliary disease.

## 2023-02-23 NOTE — PROGRESS NOTE ADULT - ASSESSMENT
66 yo F DM2, falls, microdiscectomy, from [facility] with cough, fevers  No fever, has leukocytosis  Initially from [place] with cough/fevers, given abx, then high volume diarrhea  CT with nodular/patchy opacities in bilateral lower lobes; thickening of duodenum, duodenitis, renal stone  PCT minimally elevated  RVP neg  GI PCR neg  Legionella urine ag neg  UA neg/equivocal  Quant gold IND  From [facility] (decades ago), no history of exposure to TB  Uncertain etiology pulmonary lesions at this point  CT repeated with bilateral nodular opacities, resolution of cavitary components, still presence of nodules  Treat for infectious cause cavitary pneumonia (improving on antibiotics?)  Overall,  1) Lung Lesions  - Septic emboli vs metastatic disease by radiology; lesions not present on CT in 11/2022  - Levaquin 500mg q 24 (note rise in LFTs, avoid B lactam for now)  - AFB neg x3, okay to DC isolation  - F/U Pulm  - Noninfectious etiologies of lesions such as malignancy per team/pulm  2) Elevated LFTs  - DILI?  - Hold on Unasyn  - Trend to normal  - Would obtain Hepatology eval if unclear etiology/not improving  3) Leukocytosis  - Reactive to underlying lung process?  - Trend to normal    Fidel Lester MD  Contact on TEAMS messaging from 9am - 5pm  From 5pm-9am, on weekends, or if no response call [phone]

## 2023-02-23 NOTE — PROGRESS NOTE ADULT - PROBLEM SELECTOR PLAN 8
On Amlodipine 5mg qd and metoprolol 25mg qd at home  - c/w amlodipine 5mg qd  - increased Toprol XL 50mg History DMT2 on insulin (long acting 16units and short acting 6units), A1c 12.7 11/2022  > A1c 7.9%  - Lantus to 22U qhs, Admelog 12U TID + THOMAS History DMT2 on insulin (long acting 16units and short acting 6units), A1c 12.7 11/2022  > A1c 7.9%  - Basal-bolus as ordered + THOMAS

## 2023-02-24 ENCOUNTER — APPOINTMENT (OUTPATIENT)
Dept: UROLOGY | Facility: CLINIC | Age: 68
End: 2023-02-24

## 2023-02-24 ENCOUNTER — TRANSCRIPTION ENCOUNTER (OUTPATIENT)
Age: 68
End: 2023-02-24

## 2023-02-24 VITALS
TEMPERATURE: 98 F | OXYGEN SATURATION: 97 % | HEART RATE: 97 BPM | SYSTOLIC BLOOD PRESSURE: 107 MMHG | RESPIRATION RATE: 18 BRPM | DIASTOLIC BLOOD PRESSURE: 65 MMHG

## 2023-02-24 LAB
ALBUMIN SERPL ELPH-MCNC: 3.4 G/DL — SIGNIFICANT CHANGE UP (ref 3.3–5)
ALP SERPL-CCNC: 129 U/L — HIGH (ref 40–120)
ALT FLD-CCNC: 422 U/L — HIGH (ref 10–45)
ANION GAP SERPL CALC-SCNC: 13 MMOL/L — SIGNIFICANT CHANGE UP (ref 5–17)
AST SERPL-CCNC: 233 U/L — HIGH (ref 10–40)
BILIRUB SERPL-MCNC: 0.2 MG/DL — SIGNIFICANT CHANGE UP (ref 0.2–1.2)
BUN SERPL-MCNC: 32 MG/DL — HIGH (ref 7–23)
CALCIUM SERPL-MCNC: 9 MG/DL — SIGNIFICANT CHANGE UP (ref 8.4–10.5)
CHLORIDE SERPL-SCNC: 110 MMOL/L — HIGH (ref 96–108)
CO2 SERPL-SCNC: 19 MMOL/L — LOW (ref 22–31)
CREAT SERPL-MCNC: 0.52 MG/DL — SIGNIFICANT CHANGE UP (ref 0.5–1.3)
EGFR: 101 ML/MIN/1.73M2 — SIGNIFICANT CHANGE UP
GLUCOSE BLDC GLUCOMTR-MCNC: 117 MG/DL — HIGH (ref 70–99)
GLUCOSE BLDC GLUCOMTR-MCNC: 93 MG/DL — SIGNIFICANT CHANGE UP (ref 70–99)
GLUCOSE SERPL-MCNC: 93 MG/DL — SIGNIFICANT CHANGE UP (ref 70–99)
HAV IGM SER-ACNC: SIGNIFICANT CHANGE UP
HBV SURFACE AB SER-ACNC: <3 MIU/ML — LOW
HBV SURFACE AB SER-ACNC: SIGNIFICANT CHANGE UP
HBV SURFACE AG SER-ACNC: SIGNIFICANT CHANGE UP
HCT VFR BLD CALC: 30.2 % — LOW (ref 34.5–45)
HCV AB S/CO SERPL IA: 0.09 S/CO — SIGNIFICANT CHANGE UP (ref 0–0.99)
HCV AB SERPL-IMP: SIGNIFICANT CHANGE UP
HCV RNA FLD QL NAA+PROBE: SIGNIFICANT CHANGE UP
HCV RNA SPEC QL PROBE+SIG AMP: SIGNIFICANT CHANGE UP
HGB BLD-MCNC: 9.1 G/DL — LOW (ref 11.5–15.5)
IGA FLD-MCNC: 228 MG/DL — SIGNIFICANT CHANGE UP (ref 84–499)
IGG FLD-MCNC: 1068 MG/DL — SIGNIFICANT CHANGE UP (ref 610–1660)
IGM SERPL-MCNC: 82 MG/DL — SIGNIFICANT CHANGE UP (ref 35–242)
KAPPA LC SER QL IFE: 3.35 MG/DL — HIGH (ref 0.33–1.94)
KAPPA/LAMBDA FREE LIGHT CHAIN RATIO, SERUM: 1.15 RATIO — SIGNIFICANT CHANGE UP (ref 0.26–1.65)
LAMBDA LC SER QL IFE: 2.92 MG/DL — HIGH (ref 0.57–2.63)
MAGNESIUM SERPL-MCNC: 2.1 MG/DL — SIGNIFICANT CHANGE UP (ref 1.6–2.6)
MCHC RBC-ENTMCNC: 23.6 PG — LOW (ref 27–34)
MCHC RBC-ENTMCNC: 30.1 GM/DL — LOW (ref 32–36)
MCV RBC AUTO: 78.2 FL — LOW (ref 80–100)
NRBC # BLD: 0 /100 WBCS — SIGNIFICANT CHANGE UP (ref 0–0)
PHOSPHATE SERPL-MCNC: 3.7 MG/DL — SIGNIFICANT CHANGE UP (ref 2.5–4.5)
PLATELET # BLD AUTO: 466 K/UL — HIGH (ref 150–400)
POTASSIUM SERPL-MCNC: 4.3 MMOL/L — SIGNIFICANT CHANGE UP (ref 3.5–5.3)
POTASSIUM SERPL-SCNC: 4.3 MMOL/L — SIGNIFICANT CHANGE UP (ref 3.5–5.3)
PROT SERPL-MCNC: 6.9 G/DL — SIGNIFICANT CHANGE UP (ref 6–8.3)
RBC # BLD: 3.86 M/UL — SIGNIFICANT CHANGE UP (ref 3.8–5.2)
RBC # FLD: 23.4 % — HIGH (ref 10.3–14.5)
SARS-COV-2 RNA SPEC QL NAA+PROBE: SIGNIFICANT CHANGE UP
SODIUM SERPL-SCNC: 142 MMOL/L — SIGNIFICANT CHANGE UP (ref 135–145)
WBC # BLD: 8.49 K/UL — SIGNIFICANT CHANGE UP (ref 3.8–10.5)
WBC # FLD AUTO: 8.49 K/UL — SIGNIFICANT CHANGE UP (ref 3.8–10.5)

## 2023-02-24 PROCEDURE — 99232 SBSQ HOSP IP/OBS MODERATE 35: CPT

## 2023-02-24 PROCEDURE — 80048 BASIC METABOLIC PNL TOTAL CA: CPT

## 2023-02-24 PROCEDURE — 86709 HEPATITIS A IGM ANTIBODY: CPT

## 2023-02-24 PROCEDURE — 87206 SMEAR FLUORESCENT/ACID STAI: CPT

## 2023-02-24 PROCEDURE — 99239 HOSP IP/OBS DSCHRG MGMT >30: CPT | Mod: GC

## 2023-02-24 PROCEDURE — 97110 THERAPEUTIC EXERCISES: CPT

## 2023-02-24 PROCEDURE — 87507 IADNA-DNA/RNA PROBE TQ 12-25: CPT

## 2023-02-24 PROCEDURE — 72158 MRI LUMBAR SPINE W/O & W/DYE: CPT

## 2023-02-24 PROCEDURE — 86706 HEP B SURFACE ANTIBODY: CPT

## 2023-02-24 PROCEDURE — 87389 HIV-1 AG W/HIV-1&-2 AB AG IA: CPT

## 2023-02-24 PROCEDURE — 71250 CT THORAX DX C-: CPT

## 2023-02-24 PROCEDURE — 85014 HEMATOCRIT: CPT

## 2023-02-24 PROCEDURE — 86850 RBC ANTIBODY SCREEN: CPT

## 2023-02-24 PROCEDURE — 96375 TX/PRO/DX INJ NEW DRUG ADDON: CPT

## 2023-02-24 PROCEDURE — 83540 ASSAY OF IRON: CPT

## 2023-02-24 PROCEDURE — 93306 TTE W/DOPPLER COMPLETE: CPT

## 2023-02-24 PROCEDURE — 83735 ASSAY OF MAGNESIUM: CPT

## 2023-02-24 PROCEDURE — 85045 AUTOMATED RETICULOCYTE COUNT: CPT

## 2023-02-24 PROCEDURE — 36430 TRANSFUSION BLD/BLD COMPNT: CPT

## 2023-02-24 PROCEDURE — 81001 URINALYSIS AUTO W/SCOPE: CPT

## 2023-02-24 PROCEDURE — 87385 HISTOPLASMA CAPSUL AG IA: CPT

## 2023-02-24 PROCEDURE — 82607 VITAMIN B-12: CPT

## 2023-02-24 PROCEDURE — 87521 HEPATITIS C PROBE&RVRS TRNSC: CPT

## 2023-02-24 PROCEDURE — 87177 OVA AND PARASITES SMEARS: CPT

## 2023-02-24 PROCEDURE — 87340 HEPATITIS B SURFACE AG IA: CPT

## 2023-02-24 PROCEDURE — 85730 THROMBOPLASTIN TIME PARTIAL: CPT

## 2023-02-24 PROCEDURE — 93970 EXTREMITY STUDY: CPT

## 2023-02-24 PROCEDURE — 36415 COLL VENOUS BLD VENIPUNCTURE: CPT

## 2023-02-24 PROCEDURE — 86140 C-REACTIVE PROTEIN: CPT

## 2023-02-24 PROCEDURE — 82947 ASSAY GLUCOSE BLOOD QUANT: CPT

## 2023-02-24 PROCEDURE — 86360 T CELL ABSOLUTE COUNT/RATIO: CPT

## 2023-02-24 PROCEDURE — 82784 ASSAY IGA/IGD/IGG/IGM EACH: CPT

## 2023-02-24 PROCEDURE — P9016: CPT

## 2023-02-24 PROCEDURE — 85652 RBC SED RATE AUTOMATED: CPT

## 2023-02-24 PROCEDURE — 83010 ASSAY OF HAPTOGLOBIN QUANT: CPT

## 2023-02-24 PROCEDURE — 83550 IRON BINDING TEST: CPT

## 2023-02-24 PROCEDURE — A9585: CPT

## 2023-02-24 PROCEDURE — 82330 ASSAY OF CALCIUM: CPT

## 2023-02-24 PROCEDURE — 82803 BLOOD GASES ANY COMBINATION: CPT

## 2023-02-24 PROCEDURE — 87045 FECES CULTURE AEROBIC BACT: CPT

## 2023-02-24 PROCEDURE — 74176 CT ABD & PELVIS W/O CONTRAST: CPT | Mod: MD

## 2023-02-24 PROCEDURE — 86923 COMPATIBILITY TEST ELECTRIC: CPT

## 2023-02-24 PROCEDURE — 84295 ASSAY OF SERUM SODIUM: CPT

## 2023-02-24 PROCEDURE — 86036 ANCA SCREEN EACH ANTIBODY: CPT

## 2023-02-24 PROCEDURE — 80061 LIPID PANEL: CPT

## 2023-02-24 PROCEDURE — 80053 COMPREHEN METABOLIC PANEL: CPT

## 2023-02-24 PROCEDURE — 96376 TX/PRO/DX INJ SAME DRUG ADON: CPT

## 2023-02-24 PROCEDURE — 86900 BLOOD TYPING SEROLOGIC ABO: CPT

## 2023-02-24 PROCEDURE — 87449 NOS EACH ORGANISM AG IA: CPT

## 2023-02-24 PROCEDURE — 82728 ASSAY OF FERRITIN: CPT

## 2023-02-24 PROCEDURE — 96374 THER/PROPH/DIAG INJ IV PUSH: CPT

## 2023-02-24 PROCEDURE — 76705 ECHO EXAM OF ABDOMEN: CPT

## 2023-02-24 PROCEDURE — 86480 TB TEST CELL IMMUN MEASURE: CPT

## 2023-02-24 PROCEDURE — 84443 ASSAY THYROID STIM HORMONE: CPT

## 2023-02-24 PROCEDURE — 86381 MITOCHONDRIAL ANTIBODY EACH: CPT

## 2023-02-24 PROCEDURE — 87536 HIV-1 QUANT&REVRSE TRNSCRPJ: CPT

## 2023-02-24 PROCEDURE — 86803 HEPATITIS C AB TEST: CPT

## 2023-02-24 PROCEDURE — 85018 HEMOGLOBIN: CPT

## 2023-02-24 PROCEDURE — 80074 ACUTE HEPATITIS PANEL: CPT

## 2023-02-24 PROCEDURE — 94640 AIRWAY INHALATION TREATMENT: CPT

## 2023-02-24 PROCEDURE — 87015 SPECIMEN INFECT AGNT CONCNTJ: CPT

## 2023-02-24 PROCEDURE — 86901 BLOOD TYPING SEROLOGIC RH(D): CPT

## 2023-02-24 PROCEDURE — 86359 T CELLS TOTAL COUNT: CPT

## 2023-02-24 PROCEDURE — 87040 BLOOD CULTURE FOR BACTERIA: CPT

## 2023-02-24 PROCEDURE — U0005: CPT

## 2023-02-24 PROCEDURE — U0003: CPT

## 2023-02-24 PROCEDURE — 83036 HEMOGLOBIN GLYCOSYLATED A1C: CPT

## 2023-02-24 PROCEDURE — 84100 ASSAY OF PHOSPHORUS: CPT

## 2023-02-24 PROCEDURE — 86038 ANTINUCLEAR ANTIBODIES: CPT

## 2023-02-24 PROCEDURE — 72197 MRI PELVIS W/O & W/DYE: CPT

## 2023-02-24 PROCEDURE — 85610 PROTHROMBIN TIME: CPT

## 2023-02-24 PROCEDURE — 87641 MR-STAPH DNA AMP PROBE: CPT

## 2023-02-24 PROCEDURE — 82435 ASSAY OF BLOOD CHLORIDE: CPT

## 2023-02-24 PROCEDURE — 0225U NFCT DS DNA&RNA 21 SARSCOV2: CPT

## 2023-02-24 PROCEDURE — 84466 ASSAY OF TRANSFERRIN: CPT

## 2023-02-24 PROCEDURE — 87640 STAPH A DNA AMP PROBE: CPT

## 2023-02-24 PROCEDURE — 87324 CLOSTRIDIUM AG IA: CPT

## 2023-02-24 PROCEDURE — 82787 IGG 1 2 3 OR 4 EACH: CPT

## 2023-02-24 PROCEDURE — 84132 ASSAY OF SERUM POTASSIUM: CPT

## 2023-02-24 PROCEDURE — 82746 ASSAY OF FOLIC ACID SERUM: CPT

## 2023-02-24 PROCEDURE — 85027 COMPLETE CBC AUTOMATED: CPT

## 2023-02-24 PROCEDURE — 97530 THERAPEUTIC ACTIVITIES: CPT

## 2023-02-24 PROCEDURE — 87186 SC STD MICRODIL/AGAR DIL: CPT

## 2023-02-24 PROCEDURE — 87070 CULTURE OTHR SPECIMN AEROBIC: CPT

## 2023-02-24 PROCEDURE — 86255 FLUORESCENT ANTIBODY SCREEN: CPT

## 2023-02-24 PROCEDURE — 99285 EMERGENCY DEPT VISIT HI MDM: CPT | Mod: 25

## 2023-02-24 PROCEDURE — 71045 X-RAY EXAM CHEST 1 VIEW: CPT

## 2023-02-24 PROCEDURE — 87116 MYCOBACTERIA CULTURE: CPT

## 2023-02-24 PROCEDURE — 85025 COMPLETE CBC W/AUTO DIFF WBC: CPT

## 2023-02-24 PROCEDURE — 97161 PT EVAL LOW COMPLEX 20 MIN: CPT

## 2023-02-24 PROCEDURE — 82962 GLUCOSE BLOOD TEST: CPT

## 2023-02-24 PROCEDURE — 83605 ASSAY OF LACTIC ACID: CPT

## 2023-02-24 PROCEDURE — 87086 URINE CULTURE/COLONY COUNT: CPT

## 2023-02-24 PROCEDURE — 83615 LACTATE (LD) (LDH) ENZYME: CPT

## 2023-02-24 PROCEDURE — 84145 PROCALCITONIN (PCT): CPT

## 2023-02-24 PROCEDURE — 76536 US EXAM OF HEAD AND NECK: CPT

## 2023-02-24 PROCEDURE — 87899 AGENT NOS ASSAY W/OPTIC: CPT

## 2023-02-24 PROCEDURE — 93005 ELECTROCARDIOGRAM TRACING: CPT

## 2023-02-24 PROCEDURE — 87046 STOOL CULTR AEROBIC BACT EA: CPT

## 2023-02-24 RX ORDER — POTASSIUM CHLORIDE 20 MEQ
1 PACKET (EA) ORAL
Qty: 0 | Refills: 0 | DISCHARGE
End: 2023-02-12

## 2023-02-24 RX ORDER — INSULIN LISPRO 100/ML
6 VIAL (ML) SUBCUTANEOUS
Qty: 0 | Refills: 0 | DISCHARGE

## 2023-02-24 RX ORDER — INSULIN GLARGINE 100 [IU]/ML
31 INJECTION, SOLUTION SUBCUTANEOUS
Qty: 0 | Refills: 0 | DISCHARGE
Start: 2023-02-24

## 2023-02-24 RX ORDER — INSULIN LISPRO 100/ML
3 VIAL (ML) SUBCUTANEOUS
Qty: 0 | Refills: 0 | DISCHARGE
Start: 2023-02-24

## 2023-02-24 RX ORDER — INSULIN LISPRO 100/ML
18 VIAL (ML) SUBCUTANEOUS
Qty: 0 | Refills: 0 | DISCHARGE
Start: 2023-02-24

## 2023-02-24 RX ORDER — INSULIN GLARGINE 100 [IU]/ML
18 INJECTION, SOLUTION SUBCUTANEOUS
Qty: 0 | Refills: 0 | DISCHARGE

## 2023-02-24 RX ORDER — LEVOFLOXACIN 5 MG/ML
1 INJECTION, SOLUTION INTRAVENOUS
Qty: 0 | Refills: 0 | DISCHARGE
Start: 2023-02-24

## 2023-02-24 RX ORDER — INSULIN GLARGINE 100 [IU]/ML
10 INJECTION, SOLUTION SUBCUTANEOUS
Qty: 0 | Refills: 0 | DISCHARGE
Start: 2023-02-24

## 2023-02-24 RX ADMIN — Medication 18 UNIT(S): at 12:47

## 2023-02-24 RX ADMIN — Medication 1 TABLET(S): at 11:04

## 2023-02-24 RX ADMIN — Medication 18 UNIT(S): at 08:47

## 2023-02-24 RX ADMIN — GABAPENTIN 400 MILLIGRAM(S): 400 CAPSULE ORAL at 06:16

## 2023-02-24 RX ADMIN — LIDOCAINE 1 PATCH: 4 CREAM TOPICAL at 11:04

## 2023-02-24 RX ADMIN — AMLODIPINE BESYLATE 5 MILLIGRAM(S): 2.5 TABLET ORAL at 06:15

## 2023-02-24 RX ADMIN — Medication 75 MILLIGRAM(S): at 06:15

## 2023-02-24 RX ADMIN — Medication 1 MILLIGRAM(S): at 14:10

## 2023-02-24 NOTE — PROGRESS NOTE ADULT - REASON FOR ADMISSION
Pneumonia

## 2023-02-24 NOTE — PROGRESS NOTE ADULT - PROBLEM SELECTOR PLAN 3
Hgb 2/22 AM at 6.6, repeat 6.7. No indication for acute blood loss. Iron labs consistent with AOCD. Received 1u pRBC.   > goal transfusions hgb < 7, maintain active type and screen

## 2023-02-24 NOTE — PROGRESS NOTE ADULT - PROBLEM SELECTOR PLAN 4
Increased to 492 w/o signs of DKA. S/p methylprednisolone for pre-medication for MRI.   - persistently above goal  - basal 31 units, premeal 18 units

## 2023-02-24 NOTE — PROGRESS NOTE ADULT - PROBLEM SELECTOR PLAN 1
Elevated LFTs w/ ASL/ALT to 372/442 consistent with hepatocellular damage, synthetic function intact w/ INR 1. Pt without any clinical signs, no abdominal pain, fevers, n/v or jaundice. Recent CT abdomen without liver pathology.   > consults: hepatology   > etiology most consistent with DILI 2/2 recent B-lactam use (zosyn and Unasyn associated w/ common LFT increases)  - abx switched as below to avoid further increases, if indeed DILI can take several days/week to resolve   - hepatology labs ordered  - U/S abdomen w/o concern Elevated LFTs w/ ASL/ALT to 372/442 consistent with hepatocellular damage, synthetic function intact w/ INR 1. Pt without any clinical signs, no abdominal pain, fevers, n/v or jaundice. Recent CT abdomen without liver pathology.   > consults: hepatology   > etiology most consistent with DILI 2/2 recent B-lactam use (zosyn and Unasyn associated w/ common LFT increases)  - abx switched as below to avoid further increases, if indeed DILI can take several days/week to resolve   - hepatology labs ordered, U/S abdomen w/o concern   - I anticipate that if DILI and no other findings, patient can still proceed to rehab as long as LFTs stabilize or decrease, if continue to increase may need a biopsy as per hepatology

## 2023-02-24 NOTE — PROGRESS NOTE ADULT - PROBLEM SELECTOR PLAN 9
On Amlodipine 5mg qd and metoprolol 25mg qd at home  - c/w amlodipine 5mg qd  - increased Toprol XL 75mg

## 2023-02-24 NOTE — PATIENT PROFILE ADULT - ...
47 yo male with PMHX CVA 2019 s/p TPA at [facility],  HTN compliant diagnosed at age 22 yo. Patient presents with right facial droop and slurred speech. He was not a candidate for TNK or thrombectomy. CTH, and CTA as reported above. MR done and showed acute stroke in the Lt basal ganglia/MCA territory     Neuro  #Stroke workup  - continue aspirin 81mg and plavix 75mg daily  - continue atorvastatin 80mg daily  - q4hr stroke neuro checks and vitals  - Stroke education  - Vasculitic work up   - Hypercoagulable profile     Cards  #HTN  - permissive hypertension, Goal -180  - hold home blood pressure medication for now  - obtain TTE with bubble, probably will need MICH       #HLD  - high dose statin as above in CVA  - LDL results: 88    Pulm  - call provider if SPO2 < 94%    GI  #Nutrition/Fluids/Electrolytes   - replete K<4 and Mg <2  - Diet: DASH after swallow test   - IVF: none    Renal  - daily BMP      Endocrine  # A1C results: 5.2    - TSH results: pending     DVT Prophylaxis  - Heparin 5000 sq for DVT prophylaxis   - SCDs for DVT prophylaxis          Discussed daily hospital plans and goals with patient at bedside.     Discussed with Neurology Attending   45 yo male with PMHX CVA 2019 s/p TPA at [facility],  HTN compliant diagnosed at age 24 yo. Patient presents with right facial droop and slurred speech. He was not a candidate for TNK or thrombectomy. CTH, and CTA as reported above. MR done and showed acute stroke in the Lt basal ganglia/MCA territory     #Stroke workup  - continue aspirin 81mg and plavix 75mg daily  - continue atorvastatin 80mg daily  - q4hr stroke neuro checks and vitals  - Stroke education  - Vasculitic work up , pending   - Hypercoagulable profile , pending   Focal acute infarct involving the left posterior limb of the internal   capsule. No evidence of hemorrhage.    #HTN  - permissive hypertension, Goal -180  - hold home blood pressure medication for now  - echo pending      #HLD  - high dose statin as above in CVA  - LDL results: 88    #Nutrition/Fluids/Electrolytes   - replete K<4 and Mg <2  - Diet: DASH after swallow test   - IVF: none      Endocrine  # A1C results: 5.2    - TSH results: pending     DVT Prophylaxis  - Heparin 5000 sq for DVT prophylaxis   - SCDs for DVT prophylaxis        follow up: pending echo, discharge planning as per neurology      Imp: rehab of L CVA / dysarthria / HTN, old CVA         High functioning   Plan: continue bedside therapy as tolerated           may ambulate on unit          d/c home when medically cleared           no need for acute inpatient or subacute rehab  03-Nov-2022 03:36:08

## 2023-02-24 NOTE — PROGRESS NOTE ADULT - PROBLEM SELECTOR PROBLEM 10
[FreeTextEntry1] : Gael Segal MD \par [address], [address], \[place] [address]\par [phone]\par \par Dear Dr. Segal, \par \par Reason for Visit: Abnormal examination. Perineal discomfort. \par \par This is a 56 year-old gentleman with a history of thyroid cancer presenting with perineal discomfort. The patient is referred for evaluation of his condition. He reports perineal pain and discomfort. He notes on examination he discovered a lump on his perineum. His scrotal ultrasound in March 2022 was unremarkable. The patient denies any gross hematuria or dysuria or urinary incontinence. He denies any aggravating or relieving factors. The patient denies any interference of function. All other review of systems are negative. He has no cancer in his family medical history. He has no previous surgical history. Past medical history, family history and social history were inquired and were noncontributory to current condition. The patient does not use tobacco. He drinks alcohol. Medications and allergies were reviewed. He has no known allergies to medication. \par \par On examination, the patient is a healthy-appearing gentleman in no acute distress. He is alert and oriented follows commands. He has normal mood and affect. He is normocephalic. Oral no thrush. Neck is supple. Respirations are unlabored. His abdomen is soft and nontender. Liver is nonpalpable. Bladder is nonpalpable. No CVA tenderness. Neurologically he is grossly intact. No peripheral edema. Skin without gross abnormality. He has normal male external genitalia. Normal meatus. Bilateral testes are descended intrascrotally and normal to palpation. On rectal examination, there is normal sphincter tone. The prostate is clinically benign without focal induration or nodularity. There is a cyst on his perineum. \par \par I personally reviewed ultrasound images with the patient today and images demonstrated a normal sonogram of the testes. No evidence of testicular torsion or scrotal masses. \par \par Assessment: Abnormal examination. Perineal discomfort. Perineal cyst. \par \par I counseled the patient. I discussed the various etiologies of his symptoms. His physical examination demonstrated a cyst on the perineum. I recommended he begin applying Bacitracin ointment. I discussed the potential side effects of the medication. I counseled the patient on its use and side effects. If the patient develops any side effects, the patient will discontinue the medication and contact me.  Risks and alternatives were discussed. I answered the patient questions. The patient will follow-up as directed and will contact me with any questions or concerns. Thank you for the opportunity to participate in the care of this patient. I'll keep you updated on his progress.\par \par Plan: Bacitracin ointment. Follow up as directed.  Thyroid nodule

## 2023-02-24 NOTE — PROGRESS NOTE ADULT - SUBJECTIVE AND OBJECTIVE BOX
Progress Note    02-09-23 (15d)    Patient is a 67y old  Female who presents with a chief complaint of Pneumonia (23 Feb 2023 08:01)      Subjective / Overnight Events :  - No acute events overnight.  - Pt seen and examined at bedside.     Additional ROS (if any):    MEDICATIONS  (STANDING):  amLODIPine   Tablet 5 milliGRAM(s) Oral daily  dextrose 5%. 1000 milliLiter(s) (50 mL/Hr) IV Continuous <Continuous>  dextrose 5%. 1000 milliLiter(s) (100 mL/Hr) IV Continuous <Continuous>  dextrose 50% Injectable 25 Gram(s) IV Push once  dextrose 50% Injectable 12.5 Gram(s) IV Push once  dextrose 50% Injectable 25 Gram(s) IV Push once  folic acid 1 milliGRAM(s) Oral daily  gabapentin 400 milliGRAM(s) Oral three times a day  glucagon  Injectable 1 milliGRAM(s) IntraMuscular once  insulin glargine Injectable (LANTUS) 31 Unit(s) SubCutaneous at bedtime  insulin lispro (ADMELOG) corrective regimen sliding scale   SubCutaneous three times a day before meals  insulin lispro (ADMELOG) corrective regimen sliding scale   SubCutaneous at bedtime  insulin lispro Injectable (ADMELOG) 18 Unit(s) SubCutaneous three times a day before meals  lactobacillus acidophilus 1 Tablet(s) Oral daily  levoFLOXacin  Tablet 500 milliGRAM(s) Oral every 24 hours  lidocaine   4% Patch 1 Patch Transdermal daily  melatonin 5 milliGRAM(s) Oral at bedtime  metoprolol succinate ER 75 milliGRAM(s) Oral daily    MEDICATIONS  (PRN):  acetaminophen     Tablet .. 1000 milliGRAM(s) Oral every 8 hours PRN Severe Pain (7 - 10)  dextrose Oral Gel 15 Gram(s) Oral once PRN Blood Glucose LESS THAN 70 milliGRAM(s)/deciliter          PHYSICAL EXAM:  Vital Signs Last 24 Hrs  T(C): 36 (23 Feb 2023 21:18), Max: 36.8 (23 Feb 2023 13:15)  T(F): 96.8 (23 Feb 2023 21:18), Max: 98.2 (23 Feb 2023 13:15)  HR: 112 (23 Feb 2023 21:18) (84 - 112)  BP: 110/72 (23 Feb 2023 21:18) (110/72 - 113/73)  BP(mean): --  RR: 18 (23 Feb 2023 21:18) (18 - 18)  SpO2: 100% (23 Feb 2023 21:18) (97% - 100%)    Parameters below as of 23 Feb 2023 21:18  Patient On (Oxygen Delivery Method): room air        I&O's Summary    23 Feb 2023 07:01  -  24 Feb 2023 04:51  --------------------------------------------------------  IN: 240 mL / OUT: 0 mL / NET: 240 mL        General: NAD, non-toxic appearing   HEENT: PERRLA, EOMi, no scleral icterus  CV: RRR, normal S1 and S2, no m/r/g  Lungs: normal respiratory effort. CTAB, no wheezes, rales, or rhonchi  Abd: soft, nontender, nondistended  Ext: no edema, 2+ peripheral pulses   Pysch: AAOx3, appropriate affect   Neuro: grossly non-focal, moving all extremities spontaneously   Skin: no rashes or lesions     LABS:  CAPILLARY BLOOD GLUCOSE      POCT Blood Glucose.: 255 mg/dL (23 Feb 2023 22:06)  POCT Blood Glucose.: 285 mg/dL (23 Feb 2023 17:54)  POCT Blood Glucose.: 86 mg/dL (23 Feb 2023 12:22)  POCT Blood Glucose.: 112 mg/dL (23 Feb 2023 08:21)                              8.5    8.05  )-----------( 418      ( 23 Feb 2023 05:33 )             27.8       WBC Trend: 8.05<--, 8.27<--, 8.75<--  Hb Trend: 8.5<--, 9.4<--, 6.7<--, 6.6<--, 7.2<--    02-23    141  |  109<H>  |  35<H>  ----------------------------<  126<H>  4.6   |  18<L>  |  0.52    Ca    8.9      23 Feb 2023 05:30  Phos  3.4     02-23  Mg     1.9     02-23    TPro  6.6  /  Alb  3.2<L>  /  TBili  0.2  /  DBili  x   /  AST  372<H>  /  ALT  442<H>  /  AlkPhos  125<H>  02-23    PT/INR - ( 23 Feb 2023 05:33 )   PT: 11.5 sec;   INR: 1.00 ratio         PTT - ( 23 Feb 2023 05:33 )  PTT:25.2 sec              RADIOLOGY & ADDITIONAL TESTS: Reviewed Progress Note    02-09-23 (15d)    Patient is a 67y old  Female who presents with a chief complaint of Pneumonia (23 Feb 2023 08:01)      Subjective / Overnight Events :  - No acute events overnight.  - Pt seen and examined at bedside. No complaints, was brushing her teeth when I walked in. Denies any abdominal pain, n/v/c/d. Wants to go to rehab as she feels she needs to start moving around.     Additional ROS (if any):    MEDICATIONS  (STANDING):  amLODIPine   Tablet 5 milliGRAM(s) Oral daily  dextrose 5%. 1000 milliLiter(s) (50 mL/Hr) IV Continuous <Continuous>  dextrose 5%. 1000 milliLiter(s) (100 mL/Hr) IV Continuous <Continuous>  dextrose 50% Injectable 25 Gram(s) IV Push once  dextrose 50% Injectable 12.5 Gram(s) IV Push once  dextrose 50% Injectable 25 Gram(s) IV Push once  folic acid 1 milliGRAM(s) Oral daily  gabapentin 400 milliGRAM(s) Oral three times a day  glucagon  Injectable 1 milliGRAM(s) IntraMuscular once  insulin glargine Injectable (LANTUS) 31 Unit(s) SubCutaneous at bedtime  insulin lispro (ADMELOG) corrective regimen sliding scale   SubCutaneous three times a day before meals  insulin lispro (ADMELOG) corrective regimen sliding scale   SubCutaneous at bedtime  insulin lispro Injectable (ADMELOG) 18 Unit(s) SubCutaneous three times a day before meals  lactobacillus acidophilus 1 Tablet(s) Oral daily  levoFLOXacin  Tablet 500 milliGRAM(s) Oral every 24 hours  lidocaine   4% Patch 1 Patch Transdermal daily  melatonin 5 milliGRAM(s) Oral at bedtime  metoprolol succinate ER 75 milliGRAM(s) Oral daily    MEDICATIONS  (PRN):  acetaminophen     Tablet .. 1000 milliGRAM(s) Oral every 8 hours PRN Severe Pain (7 - 10)  dextrose Oral Gel 15 Gram(s) Oral once PRN Blood Glucose LESS THAN 70 milliGRAM(s)/deciliter          PHYSICAL EXAM:  Vital Signs Last 24 Hrs  T(C): 36 (23 Feb 2023 21:18), Max: 36.8 (23 Feb 2023 13:15)  T(F): 96.8 (23 Feb 2023 21:18), Max: 98.2 (23 Feb 2023 13:15)  HR: 112 (23 Feb 2023 21:18) (84 - 112)  BP: 110/72 (23 Feb 2023 21:18) (110/72 - 113/73)  BP(mean): --  RR: 18 (23 Feb 2023 21:18) (18 - 18)  SpO2: 100% (23 Feb 2023 21:18) (97% - 100%)    Parameters below as of 23 Feb 2023 21:18  Patient On (Oxygen Delivery Method): room air        I&O's Summary    23 Feb 2023 07:01  -  24 Feb 2023 04:51  --------------------------------------------------------  IN: 240 mL / OUT: 0 mL / NET: 240 mL        General: NAD, non-toxic appearing   HEENT: PERRLA, EOMi, no scleral icterus  CV: RRR, normal S1 and S2, no m/r/g  Lungs: limited but clear to auscultation   Abd: soft, nontender, nondistended  Ext: no edema, 2+ peripheral pulses   Pysch: AAOx3, appropriate affect   Neuro: 0/5 hip flexion, 3/5 knee flexion, 5/5 ankle flexion  Skin: no rashes or lesions     LABS:  CAPILLARY BLOOD GLUCOSE      POCT Blood Glucose.: 255 mg/dL (23 Feb 2023 22:06)  POCT Blood Glucose.: 285 mg/dL (23 Feb 2023 17:54)  POCT Blood Glucose.: 86 mg/dL (23 Feb 2023 12:22)  POCT Blood Glucose.: 112 mg/dL (23 Feb 2023 08:21)                              8.5    8.05  )-----------( 418      ( 23 Feb 2023 05:33 )             27.8       WBC Trend: 8.05<--, 8.27<--, 8.75<--  Hb Trend: 8.5<--, 9.4<--, 6.7<--, 6.6<--, 7.2<--    02-23    141  |  109<H>  |  35<H>  ----------------------------<  126<H>  4.6   |  18<L>  |  0.52    Ca    8.9      23 Feb 2023 05:30  Phos  3.4     02-23  Mg     1.9     02-23    TPro  6.6  /  Alb  3.2<L>  /  TBili  0.2  /  DBili  x   /  AST  372<H>  /  ALT  442<H>  /  AlkPhos  125<H>  02-23    PT/INR - ( 23 Feb 2023 05:33 )   PT: 11.5 sec;   INR: 1.00 ratio         PTT - ( 23 Feb 2023 05:33 )  PTT:25.2 sec              RADIOLOGY & ADDITIONAL TESTS: Reviewed Progress Note    02-09-23 (15d)    Patient is a 67y old  Female who presents with a chief complaint of Pneumonia (23 Feb 2023 08:01)      Subjective / Overnight Events :  - No acute events overnight.  - Pt seen and examined at bedside. No complaints, was brushing her teeth when I walked in. Denies any abdominal pain, n/v/c/d. Wants to go to rehab as she feels she needs to start moving around.     Additional ROS (if any):    MEDICATIONS  (STANDING):  amLODIPine   Tablet 5 milliGRAM(s) Oral daily  dextrose 5%. 1000 milliLiter(s) (50 mL/Hr) IV Continuous <Continuous>  dextrose 5%. 1000 milliLiter(s) (100 mL/Hr) IV Continuous <Continuous>  dextrose 50% Injectable 25 Gram(s) IV Push once  dextrose 50% Injectable 12.5 Gram(s) IV Push once  dextrose 50% Injectable 25 Gram(s) IV Push once  folic acid 1 milliGRAM(s) Oral daily  gabapentin 400 milliGRAM(s) Oral three times a day  glucagon  Injectable 1 milliGRAM(s) IntraMuscular once  insulin glargine Injectable (LANTUS) 31 Unit(s) SubCutaneous at bedtime  insulin lispro (ADMELOG) corrective regimen sliding scale   SubCutaneous three times a day before meals  insulin lispro (ADMELOG) corrective regimen sliding scale   SubCutaneous at bedtime  insulin lispro Injectable (ADMELOG) 18 Unit(s) SubCutaneous three times a day before meals  lactobacillus acidophilus 1 Tablet(s) Oral daily  levoFLOXacin  Tablet 500 milliGRAM(s) Oral every 24 hours  lidocaine   4% Patch 1 Patch Transdermal daily  melatonin 5 milliGRAM(s) Oral at bedtime  metoprolol succinate ER 75 milliGRAM(s) Oral daily    MEDICATIONS  (PRN):  acetaminophen     Tablet .. 1000 milliGRAM(s) Oral every 8 hours PRN Severe Pain (7 - 10)  dextrose Oral Gel 15 Gram(s) Oral once PRN Blood Glucose LESS THAN 70 milliGRAM(s)/deciliter          PHYSICAL EXAM:  Vital Signs Last 24 Hrs  T(C): 36 (23 Feb 2023 21:18), Max: 36.8 (23 Feb 2023 13:15)  T(F): 96.8 (23 Feb 2023 21:18), Max: 98.2 (23 Feb 2023 13:15)  HR: 112 (23 Feb 2023 21:18) (84 - 112)  BP: 110/72 (23 Feb 2023 21:18) (110/72 - 113/73)  BP(mean): --  RR: 18 (23 Feb 2023 21:18) (18 - 18)  SpO2: 100% (23 Feb 2023 21:18) (97% - 100%)    Parameters below as of 23 Feb 2023 21:18  Patient On (Oxygen Delivery Method): room air        I&O's Summary    23 Feb 2023 07:01  -  24 Feb 2023 04:51  --------------------------------------------------------  IN: 240 mL / OUT: 0 mL / NET: 240 mL        General: NAD, non-toxic appearing   HEENT: PERRLA, EOMi, no scleral icterus  CV: RRR, normal S1 and S2, no m/r/g  Lungs: limited but clear to auscultation   Abd: soft, nontender, nondistended  Ext: no edema, 2+ peripheral pulses   Pysch: AAOx3, appropriate affect   Neuro: 0/5 hip flexion, 3/5 knee flexion, 5/5 ankle flexion. Sensation intact 5/5 throughout.   Skin: no rashes or lesions     LABS:  CAPILLARY BLOOD GLUCOSE      POCT Blood Glucose.: 255 mg/dL (23 Feb 2023 22:06)  POCT Blood Glucose.: 285 mg/dL (23 Feb 2023 17:54)  POCT Blood Glucose.: 86 mg/dL (23 Feb 2023 12:22)  POCT Blood Glucose.: 112 mg/dL (23 Feb 2023 08:21)                              8.5    8.05  )-----------( 418      ( 23 Feb 2023 05:33 )             27.8       WBC Trend: 8.05<--, 8.27<--, 8.75<--  Hb Trend: 8.5<--, 9.4<--, 6.7<--, 6.6<--, 7.2<--    02-23    141  |  109<H>  |  35<H>  ----------------------------<  126<H>  4.6   |  18<L>  |  0.52    Ca    8.9      23 Feb 2023 05:30  Phos  3.4     02-23  Mg     1.9     02-23    TPro  6.6  /  Alb  3.2<L>  /  TBili  0.2  /  DBili  x   /  AST  372<H>  /  ALT  442<H>  /  AlkPhos  125<H>  02-23    PT/INR - ( 23 Feb 2023 05:33 )   PT: 11.5 sec;   INR: 1.00 ratio         PTT - ( 23 Feb 2023 05:33 )  PTT:25.2 sec              RADIOLOGY & ADDITIONAL TESTS: Reviewed

## 2023-02-24 NOTE — PROGRESS NOTE ADULT - ASSESSMENT
67 F PMH DMT2, multiple falls, s/p lumbar microdiscectomy for radiculopathy presenting from [facility] with acute onset of malaise and wet coughing but without fevers, shortness of breath, or chest pain with CT chest non-contrast imaging with patchy/nodular opacities in bilateral lower lobes, centrally necrotic c/f infection vs. metastatic disease vs. septic emboli. S/p 7 day treatment with broad spectrum abx with resolution of symptoms, now on Levaquin. C/b elevated LFTs.

## 2023-02-24 NOTE — PROGRESS NOTE ADULT - PROBLEM SELECTOR PLAN 7
Hypokalemia with serum K ~2.2 in setting of reported watery stools which may be diarrhea    RESOLVED

## 2023-02-24 NOTE — PROGRESS NOTE ADULT - PROVIDER SPECIALTY LIST ADULT
Infectious Disease
Internal Medicine
Pulmonology
Pulmonology
Infectious Disease
Internal Medicine
Pulmonology
Internal Medicine

## 2023-02-24 NOTE — DISCHARGE NOTE NURSING/CASE MANAGEMENT/SOCIAL WORK - PATIENT PORTAL LINK FT
You can access the FollowMyHealth Patient Portal offered by [facility] by registering at the following website: [email or web address]. By joining [facility]’s FollowMyHealth portal, you will also be able to view your health information using other applications (apps) compatible with our system.

## 2023-02-24 NOTE — DISCHARGE NOTE NURSING/CASE MANAGEMENT/SOCIAL WORK - NSDCFUADDAPPT_GEN_ALL_CORE_FT
APPTS ARE READY TO BE MADE: [ ] YES    Best Family or Patient Contact (if needed):    Additional Information about above appointments (if needed):    1: PCP  2: ID  3: Pulmonary   4. Endocrinology  5. Hepatology    Other comments or requests:

## 2023-02-24 NOTE — PROGRESS NOTE ADULT - ASSESSMENT
66 yo F DM2, falls, microdiscectomy, from [place] with cough, fevers  No fever, has leukocytosis  Initially from [place] with cough/fevers, given abx, then high volume diarrhea  CT with nodular/patchy opacities in bilateral lower lobes; thickening of duodenum, duodenitis, renal stone  PCT minimally elevated  RVP neg  GI PCR neg  Legionella urine ag neg  UA neg/equivocal  Quant gold IND  From [facility] (decades ago), no history of exposure to TB  Uncertain etiology pulmonary lesions at this point  CT repeated with bilateral nodular opacities, resolution of cavitary components, still presence of nodules  Treat for infectious cause cavitary pneumonia (improving on antibiotics?)  Overall,  1) Lung Lesions  - Septic emboli vs metastatic disease by radiology; lesions not present on CT in 11/2022  - Levaquin 500mg q 24 (note rise in LFTs, avoid B lactam for now), complete 4 week total course (including Zosyn/unasyn days)  - AFB neg x3, okay to DC isolation  - F/U Pulm  - Noninfectious etiologies of lesions such as malignancy per team/pulm  2) Elevated LFTs  - DILI?  - Hold on Unasyn  - Trend to normal  - Further care/eval per team  3) Leukocytosis  - Reactive to underlying lung process?  - Trend to normal    Fidel Lester MD  Contact on TEAMS messaging from 9am - 5pm  From 5pm-9am, on weekends, or if no response call [phone]

## 2023-02-24 NOTE — PROGRESS NOTE ADULT - SUBJECTIVE AND OBJECTIVE BOX
CC: F/U for PNA    Saw/spoke to patient. No fevers, no chills. No new complaints.    Allergies  aspirin (Anaphylaxis)  clindamycin (Unknown)  contrast media (iron oxide-based) (Nephrotoxicity)  fish (Hives)  IV Contrast (Anaphylaxis)  sulfa drugs (Rash)  vancomycin (Rash)  walnut (Anaphylaxis)    ANTIMICROBIALS:  levoFLOXacin  Tablet 500 every 24 hours    PE:    Vital Signs Last 24 Hrs  T(C): 36.7 (24 Feb 2023 12:07), Max: 36.8 (24 Feb 2023 05:28)  T(F): 98 (24 Feb 2023 12:07), Max: 98.2 (24 Feb 2023 05:28)  HR: 97 (24 Feb 2023 12:07) (96 - 112)  BP: 107/65 (24 Feb 2023 12:07) (107/65 - 112/77)  RR: 18 (24 Feb 2023 12:07) (18 - 18)  SpO2: 97% (24 Feb 2023 12:07) (97% - 100%)    Gen: AOx3, NAD, non-toxic  CV: Nontachycardic  Resp: Breathing comfortably, RA  Abd: Soft, nontender  IV/Skin: No thrombophlebitis    LABS:                        9.1    8.49  )-----------( 466      ( 24 Feb 2023 06:48 )             30.2     02-24    142  |  110<H>  |  32<H>  ----------------------------<  93  4.3   |  19<L>  |  0.52    Ca    9.0      24 Feb 2023 06:48  Phos  3.7     02-24  Mg     2.1     02-24    TPro  6.9  /  Alb  3.4  /  TBili  0.2  /  DBili  x   /  AST  233<H>  /  ALT  422<H>  /  AlkPhos  129<H>  02-24    MICROBIOLOGY:    .Sputum  02-18-23   Culture is being performed.  --  --    .Sputum Sputum  02-17-23   Culture is being performed.  --  --    .Sputum Sputum  02-17-23   Culture is being performed.  --  --    .Sputum Sputum  02-12-23   No acid-fast bacilli isolated at 1 week. ****Acid-fast cultures are held  for 6 weeks.****  --  --    .Sputum Sputum  02-12-23   Normal Respiratory Janis present  --    Few Squamous epithelial cells per low power field  Moderate polymorphonuclear leukocytes per low power field  Few Gram positive cocci in pairs per oil power field    .Stool Feces  02-10-23   No Protozoa seen by trichrome stain  No Helminths or Protozoa seen in formalin concentrate  performed by iodine stain  (routine O+P not evaluated for Microsporidia,  Cryptosporidia or Cyclospora)  One negative sample does not necessarily rule  out the presence of a parasitic infection.  --  --    .Stool Feces  02-10-23   No enteric pathogens isolated.  (Stool culture examined for Salmonella,  Shigella, Campylobacter, Aeromonas, Plesiomonas,  Vibrio, E.coli O157 and Yersinia)  --  --    Clean Catch Clean Catch (Midstream)  02-09-23   50,000 - 99,000 CFU/mL Enterococcus faecium  50,000 - 99,000 CFU/mL Enterococcus faecalis  --  Enterococcus faecium  Enterococcus faecalis    .Blood Blood-Peripheral  02-09-23   No Growth Final  --  --    .Blood Blood-Peripheral  02-09-23   No Growth Final  --  --    HIV-1 RNA Quantitative, Viral Load Log: NOT DET. lg /mL (02-14-23 @ 06:38)    RADIOLOGY:    2/23 US:    IMPRESSION:  No sonographic evidence of hepatobiliary disease.

## 2023-02-24 NOTE — PROGRESS NOTE ADULT - ATTENDING SUPERVISION STATEMENT
Fellow
Fellow
Resident

## 2023-02-24 NOTE — PROGRESS NOTE ADULT - PROBLEM SELECTOR PLAN 2
Leukocytosis + tachycardia = SIRS+ in conjunction with CT imaging with patchy/nodular opacities in setting of coughing concerning for sepsis secondary to pneumonia likely HAP given her recent surgery and [place] placement. Ddx: legionella pneumonia given patient with reported watery stools; however does not meet diarrhea definition of at least 3 unformed stools per day.  CTAP (2/9): Nodular and patchy opacities predominantly in the bilateral lower lobes with many that are centrally necrotic concerning for infection. Differential diagnosis includes metastatic disease and septic emboli. TTE (2/10): EF 67%, grossly normal findings. BCx (NGTD), UCx (E. faecium), urine legionella (negative), MRSA (negative), fungitel (negative). Quant TB indeterminant x3. Off isolation precautions.   > consults: ID, pulm, hepatology   - was on Unasyn for cavitary PNA, however, concern for rising LFTS   - switched to Levaquin with goal of 4 week total abx duration

## 2023-02-24 NOTE — PROGRESS NOTE ADULT - PROBLEM SELECTOR PLAN 8
History DMT2 on insulin (long acting 16units and short acting 6units), A1c 12.7 11/2022  > A1c 7.9%  - Basal-bolus as ordered + THOMAS

## 2023-02-24 NOTE — DISCHARGE NOTE NURSING/CASE MANAGEMENT/SOCIAL WORK - NSDCPEFALRISK_GEN_ALL_CORE
For information on Fall & Injury Prevention, visit: [email or web address] OR  [email or web address] OR  https://www.cdc.gov/steadi/patient.html

## 2023-02-24 NOTE — PROGRESS NOTE ADULT - ATTENDING COMMENTS
Patient seen and examined at bedside. No acute events overnight. LFTs dowtrending and likely DILI. Low concern for infectious or autoimmune etiology. She should avoid beta lactam antibiotics and will be on prolonged course of Abx for one month. She needs to follow up outpatient for LFT check and pulm for repeat chest CT given cavitary lesions.    Discharge to [facility].    Discharge Time: 40 minutes

## 2023-02-26 ENCOUNTER — TRANSCRIPTION ENCOUNTER (OUTPATIENT)
Age: 68
End: 2023-02-26

## 2023-02-26 LAB
MITOCHONDRIA AB SER-ACNC: SIGNIFICANT CHANGE UP
SMOOTH MUSCLE AB SER-ACNC: ABNORMAL

## 2023-02-27 LAB
ANA PAT FLD IF-IMP: ABNORMAL
ANA TITR SER: ABNORMAL

## 2023-03-02 ENCOUNTER — TRANSCRIPTION ENCOUNTER (OUTPATIENT)
Age: 68
End: 2023-03-02

## 2023-03-03 NOTE — BRIEF OPERATIVE NOTE - PRIMARY SURGEON
[FreeTextEntry1] : Benign breast and pelvic exam. Pap done today. Patient aware that if her pap smear is abnormal this year, she will need a colposcopy. \par \par Prescription for birth control pills were electronically sent to the pharmacy. She has no contraindications to birth control pill use. Instructions on pill use, precautions, and side effects were discussed in detail. She is aware that the birth control pill is not perfect for preventing pregnancy even it she is compliant with taking the pill and therefore she needs condoms for back up. She was also made aware that the birth control pill does not protect her against sexual transmitted diseases and she still requires condom use. Questions answered.\par \par Self-breast exam reviewed.\par \par STI blood work collected today. \par \par She will follow up annually and as needed. FADIA Leal

## 2023-03-16 ENCOUNTER — TRANSCRIPTION ENCOUNTER (OUTPATIENT)
Age: 68
End: 2023-03-16

## 2023-03-22 ENCOUNTER — EMERGENCY (EMERGENCY)
Facility: HOSPITAL | Age: 68
LOS: 1 days | Discharge: HOME | End: 2023-03-22
Attending: EMERGENCY MEDICINE
Payer: MEDICAID

## 2023-03-22 VITALS
HEART RATE: 95 BPM | TEMPERATURE: 98 F | SYSTOLIC BLOOD PRESSURE: 110 MMHG | OXYGEN SATURATION: 96 % | RESPIRATION RATE: 17 BRPM | DIASTOLIC BLOOD PRESSURE: 71 MMHG

## 2023-03-22 VITALS
HEART RATE: 92 BPM | RESPIRATION RATE: 18 BRPM | WEIGHT: 102.96 LBS | SYSTOLIC BLOOD PRESSURE: 121 MMHG | TEMPERATURE: 99 F | OXYGEN SATURATION: 96 % | DIASTOLIC BLOOD PRESSURE: 67 MMHG | HEIGHT: 56 IN

## 2023-03-22 PROCEDURE — 73590 X-RAY EXAM OF LOWER LEG: CPT | Mod: 26,RT

## 2023-03-22 PROCEDURE — 73551 X-RAY EXAM OF FEMUR 1: CPT | Mod: 26,RT

## 2023-03-22 PROCEDURE — 73590 X-RAY EXAM OF LOWER LEG: CPT

## 2023-03-22 PROCEDURE — 99284 EMERGENCY DEPT VISIT MOD MDM: CPT

## 2023-03-22 PROCEDURE — 73562 X-RAY EXAM OF KNEE 3: CPT

## 2023-03-22 PROCEDURE — 73562 X-RAY EXAM OF KNEE 3: CPT | Mod: 26,RT

## 2023-03-22 PROCEDURE — 99284 EMERGENCY DEPT VISIT MOD MDM: CPT | Mod: 25

## 2023-03-22 PROCEDURE — 73551 X-RAY EXAM OF FEMUR 1: CPT

## 2023-03-22 RX ORDER — ACETAMINOPHEN 500 MG
975 TABLET ORAL ONCE
Refills: 0 | Status: COMPLETED | OUTPATIENT
Start: 2023-03-22 | End: 2023-03-22

## 2023-03-22 RX ADMIN — Medication 975 MILLIGRAM(S): at 21:44

## 2023-03-22 RX ADMIN — Medication 975 MILLIGRAM(S): at 21:13

## 2023-03-22 NOTE — ED PROVIDER NOTE - PATIENT PORTAL LINK FT
You can access the FollowMyHealth Patient Portal offered by [facility] by registering at the following website: [email or web address]. By joining [email or web address]’s FollowMyHealth portal, you will also be able to view your health information using other applications (apps) compatible with our system.
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
## 2023-03-22 NOTE — ED PROVIDER NOTE - PHYSICAL EXAMINATION
CONSTITUTIONAL: Well appearing and in no apparent distress.  ENT: Airway patent, moist mucous membranes.   EYES: Pupils equal, round and reactive to light. EOMI. Conjunctiva normal appearing.   CARDIAC: Normal rate, regular rhythm.  Heart sounds S1, S2.    RESPIRATORY: Breath sounds clear and equal bilaterally.   GASTROINTESTINAL: Abdomen soft, non-tender, not distended.  MUSCULOSKELETAL: Spine appears normal. No midline TTP.  Decreased ROM of R knee 2/2 pain. Pt placed back in knee brace. DP pulse palpable. Able to move all toes.   NEUROLOGICAL: Alert and oriented x3, no focal deficits, no motor or sensory deficits. 5/5 muscle strength throughout.  SKIN: Skin normal color, warm, dry and intact.   PSYCHIATRIC: Normal mood and affect.

## 2023-03-22 NOTE — ED PROVIDER NOTE - NS ED ATTENDING STATEMENT MOD
This was a shared visit with the ARIE. I reviewed and verified the documentation and independently performed the documented:

## 2023-03-22 NOTE — ED ADULT NURSE REASSESSMENT NOTE - NS ED NURSE REASSESS COMMENT FT1
Report received from STEPHANIE Oliveira. Pt received A&Ox4. Currently pending non-emergent ambulanz for discharge of pt, pt informed and verbalized understanding. Bed locked and in lowest position, side rails raised, call bell within reach.

## 2023-03-22 NOTE — ED PROVIDER NOTE - NSFOLLOWUPINSTRUCTIONS_ED_ALL_ED_FT
Please make sure to follow up with your primary care doctor within 1-2 days and with the ORTHOPEDIC specialist OUTPATIENT.   Bring a copy of all of your results with you to your follow up appointments.   Return to the ER as discussed if you develop any new or worsening symptoms.     You may take Tylenol as needed for pain control.

## 2023-03-22 NOTE — ED PROVIDER NOTE - CLINICAL SUMMARY MEDICAL DECISION MAKING FREE TEXT BOX
Fall with knee pain.  Will get xray of knee.  of note, pt had fibula fracture of the knee in December.  knee immobilizer in place.  will discuss with orthopedics.

## 2023-03-22 NOTE — ED PROVIDER NOTE - NSICDXPASTMEDICALHX_GEN_ALL_CORE_FT
PAST MEDICAL HISTORY:  Depression     Eczema     H/O: hypertension     HLD (hyperlipidemia)     Lumbar radiculopathy     Psoriasis-like skin disease     Type 2 diabetes mellitus

## 2023-03-22 NOTE — ED CLERICAL - NS ED CLERK NOTE PRE-ARRIVAL INFORMATION; ADDITIONAL PRE-ARRIVAL INFORMATION
This patient is enrolled in the COPD or PNA STARS readmission program and has active care navigation.  Please call the contact number above to speak with the Care navigation team to obtain additional clinical information regarding this patient and/or to arrange close clinical follow up within 24 hours.     Between the hours of 8A and 5P, Monday through Friday, a Hospital Medicine attending will meet with the ED care team to provide relevant clinical information, and expedite appropriate follow-up if there is an opportunity for discharge from the ED. Off hours, you may contact the Hospitalist-In-Charge at pager [phone] for assistance.     For expedited pulmonary follow-up appointments, you may email [email or web address]. Please include in this email: patient name, date of birth, MRN and contact information.

## 2023-03-22 NOTE — ED PROVIDER NOTE - PROGRESS NOTE DETAILS
Spoke with ortho, they reviewed xray imaging  No acute fracture identified  Fibular fx not usually operative as they are non weight bearing bones. Can ambulate as tolerated.  To f/u with ortho outpt. Information relayed to pt. Agrees with plan  Copy of results provided. Non emergent transport ordered. Georgie Ramon PA-C

## 2023-03-23 ENCOUNTER — TRANSCRIPTION ENCOUNTER (OUTPATIENT)
Age: 68
End: 2023-03-23

## 2023-03-28 ENCOUNTER — APPOINTMENT (OUTPATIENT)
Dept: ORTHOPEDIC SURGERY | Facility: CLINIC | Age: 68
End: 2023-03-28

## 2023-03-29 ENCOUNTER — INPATIENT (INPATIENT)
Facility: HOSPITAL | Age: 68
LOS: 4 days | Discharge: NURSING, REHAB OR LONG-TERM CARE | DRG: 605 | End: 2023-04-03
Attending: INTERNAL MEDICINE | Admitting: INTERNAL MEDICINE
Payer: MEDICARE

## 2023-03-29 VITALS
DIASTOLIC BLOOD PRESSURE: 62 MMHG | RESPIRATION RATE: 16 BRPM | SYSTOLIC BLOOD PRESSURE: 106 MMHG | WEIGHT: 104.94 LBS | OXYGEN SATURATION: 96 % | HEART RATE: 74 BPM | TEMPERATURE: 98 F | HEIGHT: 56 IN

## 2023-03-29 DIAGNOSIS — S82.409A UNSPECIFIED FRACTURE OF SHAFT OF UNSPECIFIED FIBULA, INITIAL ENCOUNTER FOR CLOSED FRACTURE: ICD-10-CM

## 2023-03-29 LAB
GLUCOSE BLDC GLUCOMTR-MCNC: 107 MG/DL — HIGH (ref 70–99)
GLUCOSE BLDC GLUCOMTR-MCNC: 167 MG/DL — HIGH (ref 70–99)

## 2023-03-29 PROCEDURE — 73502 X-RAY EXAM HIP UNI 2-3 VIEWS: CPT | Mod: 26,RT

## 2023-03-29 PROCEDURE — 73562 X-RAY EXAM OF KNEE 3: CPT | Mod: 26,RT

## 2023-03-29 PROCEDURE — 73590 X-RAY EXAM OF LOWER LEG: CPT | Mod: 26,RT

## 2023-03-29 PROCEDURE — L9991: CPT

## 2023-03-29 PROCEDURE — 73552 X-RAY EXAM OF FEMUR 2/>: CPT | Mod: 26,RT

## 2023-03-29 RX ORDER — METOPROLOL TARTRATE 50 MG
75 TABLET ORAL DAILY
Refills: 0 | Status: DISCONTINUED | OUTPATIENT
Start: 2023-03-29 | End: 2023-04-03

## 2023-03-29 RX ORDER — GABAPENTIN 400 MG/1
400 CAPSULE ORAL THREE TIMES A DAY
Refills: 0 | Status: DISCONTINUED | OUTPATIENT
Start: 2023-03-29 | End: 2023-04-03

## 2023-03-29 RX ORDER — DEXTROSE 50 % IN WATER 50 %
25 SYRINGE (ML) INTRAVENOUS ONCE
Refills: 0 | Status: DISCONTINUED | OUTPATIENT
Start: 2023-03-29 | End: 2023-04-03

## 2023-03-29 RX ORDER — SODIUM CHLORIDE 9 MG/ML
1000 INJECTION, SOLUTION INTRAVENOUS
Refills: 0 | Status: DISCONTINUED | OUTPATIENT
Start: 2023-03-29 | End: 2023-04-03

## 2023-03-29 RX ORDER — GLUCAGON INJECTION, SOLUTION 0.5 MG/.1ML
1 INJECTION, SOLUTION SUBCUTANEOUS ONCE
Refills: 0 | Status: DISCONTINUED | OUTPATIENT
Start: 2023-03-29 | End: 2023-04-03

## 2023-03-29 RX ORDER — INSULIN LISPRO 100/ML
VIAL (ML) SUBCUTANEOUS AT BEDTIME
Refills: 0 | Status: DISCONTINUED | OUTPATIENT
Start: 2023-03-29 | End: 2023-03-30

## 2023-03-29 RX ORDER — DEXTROSE 50 % IN WATER 50 %
12.5 SYRINGE (ML) INTRAVENOUS ONCE
Refills: 0 | Status: DISCONTINUED | OUTPATIENT
Start: 2023-03-29 | End: 2023-04-03

## 2023-03-29 RX ORDER — HYDROMORPHONE HYDROCHLORIDE 2 MG/ML
4 INJECTION INTRAMUSCULAR; INTRAVENOUS; SUBCUTANEOUS EVERY 4 HOURS
Refills: 0 | Status: DISCONTINUED | OUTPATIENT
Start: 2023-03-29 | End: 2023-03-30

## 2023-03-29 RX ORDER — DEXTROSE 50 % IN WATER 50 %
15 SYRINGE (ML) INTRAVENOUS ONCE
Refills: 0 | Status: DISCONTINUED | OUTPATIENT
Start: 2023-03-29 | End: 2023-04-03

## 2023-03-29 RX ORDER — PANTOPRAZOLE SODIUM 20 MG/1
40 TABLET, DELAYED RELEASE ORAL
Refills: 0 | Status: DISCONTINUED | OUTPATIENT
Start: 2023-03-29 | End: 2023-04-03

## 2023-03-29 RX ORDER — DULOXETINE HYDROCHLORIDE 30 MG/1
60 CAPSULE, DELAYED RELEASE ORAL
Refills: 0 | Status: DISCONTINUED | OUTPATIENT
Start: 2023-03-29 | End: 2023-04-03

## 2023-03-29 RX ORDER — INSULIN LISPRO 100/ML
VIAL (ML) SUBCUTANEOUS
Refills: 0 | Status: DISCONTINUED | OUTPATIENT
Start: 2023-03-29 | End: 2023-03-30

## 2023-03-29 RX ORDER — HEPARIN SODIUM 5000 [USP'U]/ML
5000 INJECTION INTRAVENOUS; SUBCUTANEOUS EVERY 12 HOURS
Refills: 0 | Status: DISCONTINUED | OUTPATIENT
Start: 2023-03-29 | End: 2023-04-03

## 2023-03-29 RX ORDER — AMLODIPINE BESYLATE 2.5 MG/1
5 TABLET ORAL DAILY
Refills: 0 | Status: DISCONTINUED | OUTPATIENT
Start: 2023-03-29 | End: 2023-03-31

## 2023-03-29 RX ADMIN — GABAPENTIN 400 MILLIGRAM(S): 400 CAPSULE ORAL at 22:08

## 2023-03-29 RX ADMIN — HYDROMORPHONE HYDROCHLORIDE 4 MILLIGRAM(S): 2 INJECTION INTRAMUSCULAR; INTRAVENOUS; SUBCUTANEOUS at 22:08

## 2023-03-29 RX ADMIN — HYDROMORPHONE HYDROCHLORIDE 4 MILLIGRAM(S): 2 INJECTION INTRAMUSCULAR; INTRAVENOUS; SUBCUTANEOUS at 22:40

## 2023-03-29 NOTE — H&P ADULT - NSHPLABSRESULTS_GEN_ALL_CORE
8.2    7.64  )-----------( 420      ( 30 Mar 2023 06:00 )             27.6       CBC Full  -  ( 30 Mar 2023 06:00 )  WBC Count : 7.64 K/uL  RBC Count : 3.92 M/uL  Hemoglobin : 8.2 g/dL  Hematocrit : 27.6 %  Platelet Count - Automated : 420 K/uL  Mean Cell Volume : 70.4 fl  Mean Cell Hemoglobin : 20.9 pg  Mean Cell Hemoglobin Concentration : 29.7 gm/dL  Auto Neutrophil # : x  Auto Lymphocyte # : x  Auto Monocyte # : x  Auto Eosinophil # : x  Auto Basophil # : x  Auto Neutrophil % : x  Auto Lymphocyte % : x  Auto Monocyte % : x  Auto Eosinophil % : x  Auto Basophil % : x      03-30    141  |  105  |  24<H>  ----------------------------<  149<H>  5.0   |  28  |  0.62    Ca    8.9      30 Mar 2023 06:00  Mg     1.6     30    TPro  6.5  /  Alb  2.7<L>  /  TBili  0.2  /  DBili  0.0  /  AST  34  /  ALT  27  /  AlkPhos  56  0330      CAPILLARY BLOOD GLUCOSE      POCT Blood Glucose.: 159 mg/dL (30 Mar 2023 21:18)  POCT Blood Glucose.: 203 mg/dL (30 Mar 2023 16:58)  POCT Blood Glucose.: 242 mg/dL (30 Mar 2023 11:57)  POCT Blood Glucose.: 143 mg/dL (30 Mar 2023 07:49)      Vital Signs Last 24 Hrs  T(C): 36.9 (30 Mar 2023 21:47), Max: 36.9 (30 Mar 2023 21:47)  T(F): 98.5 (30 Mar 2023 21:47), Max: 98.5 (30 Mar 2023 21:47)  HR: 82 (30 Mar 2023 21:47) (82 - 95)  BP: 102/67 (30 Mar 2023 21:47) (102/67 - 120/72)  BP(mean): --  RR: 18 (30 Mar 2023 21:47) (18 - 18)  SpO2: 97% (30 Mar 2023 21:47) (96% - 97%)    Parameters below as of 30 Mar 2023 21:47  Patient On (Oxygen Delivery Method): room air        Urinalysis Basic - ( 30 Mar 2023 07:35 )    Color: Yellow / Appearance: Clear / S.015 / pH: x  Gluc: x / Ketone: Negative  / Bili: Negative / Urobili: Negative mg/dL   Blood: x / Protein: 15 mg/dL / Nitrite: Negative   Leuk Esterase: Negative / RBC: 0-2 /HPF / WBC 0-2   Sq Epi: x / Non Sq Epi: Occasional / Bacteria: Negative
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
## 2023-03-29 NOTE — H&P ADULT - PROBLEM SELECTOR PLAN 2
pain management , pt/ot   MRI 02/2023  1.  Lumbosacral plexus appears maintained.  2.  Muscle edema may reflect a nonspecific myositis.  3.  Multilevel degenerative disc disease of the lumbar spine with a   degree of congenital spinal stenosis.  4.  Postsurgical changes of L5-S1 with moderate right and severe left   lateral recess narrowing and mild-to-moderate central stenosis.   Nonenhancing left paracentral disc extrusion or fragment is present that   abuts the descending left S1 and S2 nerve root.  5.  Mild to moderate lower lumbar neural foraminal stenosis ispresent.

## 2023-03-29 NOTE — H&P ADULT - PROBLEM SELECTOR PLAN 1
2/2 to R KNEE CONTUSION ,POA -Pain control PRN  -WBAT   -ICE prn   -Do not remove dressing/splint until follow up in the office.   -DVT ppx as per medicine   -N/V Checks to monitor for compartment signs  -no further orthopedic intervention at this time.   -patient reommended to follow up with an orthopedist (Dr. Du or Jeannette) for further evaluation of the knee upon discharge  -recommended neurologist follow up in regard to worsening lumbar pathology

## 2023-03-29 NOTE — ED ADULT NURSE NOTE - PRO INTERPRETER NEED 2
Progress Note        Date:4/21/2022       Room:45/4524-01  Patient Name:Ty Meyer     YOB: 1962     Age:59 y.o. Chief Complaint   Patient presents with    Altered Mental Status            Subjective   Interval History Status: not changed. Intubated       Review of Systems   ROS as mentioned above. Medications   Scheduled Meds:    heparin (porcine)  5,000 Units SubCUTAneous 3 times per day    insulin lispro  0-12 Units SubCUTAneous TID WC    insulin lispro  0-6 Units SubCUTAneous Nightly    magnesium sulfate  2,000 mg IntraVENous Once    sodium chloride flush  5-40 mL IntraVENous 2 times per day    [Held by provider] amitriptyline  25 mg Oral Nightly    [Held by provider] aspirin  81 mg Oral Daily    insulin glargine  20 Units SubCUTAneous Nightly    [Held by provider] losartan-hydroCHLOROthiazide  1 tablet Oral Daily    [Held by provider] atorvastatin  10 mg Oral Daily    [Held by provider] spironolactone  25 mg Oral Daily    levetiracetam  500 mg IntraVENous Q12H     Continuous Infusions:    fentaNYL 25 mcg/hr (04/21/22 1154)    sodium chloride      dextrose      propofol Stopped (04/21/22 1154)     PRN Meds: perflutren lipid microspheres, sodium chloride flush, sodium chloride, ondansetron **OR** ondansetron, polyethylene glycol, acetaminophen **OR** acetaminophen, glucagon (rDNA), dextrose, glucose, dextrose bolus (hypoglycemia) **OR** dextrose bolus (hypoglycemia), labetalol    Past History    Past Medical History:   has a past medical history of Back pain, CAD (coronary artery disease), CKD (chronic kidney disease) stage 4, GFR 15-29 ml/min ([facility] [address] Diabetes mellitus ([facility] [address] Hyperlipidemia, Hypertension, and MRSA (methicillin resistant staph aureus) culture positive. Social History:   reports that he has never smoked. He has never used smokeless tobacco. He reports that he does not drink alcohol and does not use drugs.      Family History:   Family History Problem Relation Age of Onset    Heart Disease Mother     High Blood Pressure Mother     Stroke Mother     Arthritis Mother     Diabetes Father     Arthritis Father     Diabetes Brother        Physical Examination      Vitals:  /62   Pulse 85   Temp 98.4 °F (36.9 °C) (Bladder)   Resp 14   Ht 6' (1.829 m)   Wt 286 lb 9.6 oz (130 kg)   SpO2 100%   BMI 38.87 kg/m²   Temp (24hrs), Av.9 °F (36.6 °C), Min:97.5 °F (36.4 °C), Max:98.4 °F (36.9 °C)      I/O (24Hr): Intake/Output Summary (Last 24 hours) at 2022 1214  Last data filed at 2022 0600  Gross per 24 hour   Intake 1049.97 ml   Output 700 ml   Net 349.97 ml       Physical Exam  Constitutional:       Appearance: He is ill-appearing. Comments: Intubated     HENT:      Head: Normocephalic and atraumatic. Cardiovascular:      Rate and Rhythm: Normal rate and regular rhythm. Pulses: Normal pulses. Heart sounds: Normal heart sounds. Pulmonary:      Effort: Pulmonary effort is normal.      Breath sounds: Normal breath sounds. Musculoskeletal:         General: Normal range of motion. Skin:     General: Skin is warm and dry. Capillary Refill: Capillary refill takes less than 2 seconds. Neurological:      Comments: Intubated                   Assessment      Principal Problem:    Acute encephalopathy  Resolved Problems:    * No resolved hospital problems. *       Plan:        Acute encephalopathy  Intubated and sedated  Unclear etiology  MRI brain ordered    Resp failure  Intubated and sedated  Intubated for airway protection    HUNTER  Improving  Monitor. ?Seizure  Keppra for seizure  Unsure if had seizures    SSI    Dispo: Pending improvement.          Avery Mccarthy MD  12:14 PM  22 English

## 2023-03-29 NOTE — H&P ADULT - ASSESSMENT
Patient is a 68y Female with pmhx of HTN, HL DMII who presents with right knee pain from a fall a week ago. Patient is s/p microdiscectomy of L5/s1 which was done 11/22 by Dr. Leal. Since that surgery she has been at [facility]. She notes that since the surgery she has declined in her ability to ambulate and is essentially wheechair bound. She notes that in the past few months she has had falls due to losing her balance during transfers from wheelchair to bed. She states that after her fall she was having knee pain and was seen in the ED. Currently her pain is 4/10 worse with movement. Denies CP/SOB/N/V. she does not take any blood thinners at home. She has been seen several times in the last few months for falls and was recommended to see an orthopedist. She notes that she had planned to see Dr. Machado but had issues with her insurance. Since she has been in rehab she states that she saw Dr. Leal once post-operatively. She also notes that due to her progressing weakness in rehab she had seen a neurologist who recommended an MRI of the lumbar spine which was done in february 2023.     < from: CT Lumbar Spine No Cont (03.30.23 @ 14:04) >    ACC: [id number] EXAM:  CT LUMBAR SPINE   ORDERED BY: AARTI HASKINS     PROCEDURE DATE:  03/30/2023          INTERPRETATION:  CT lumbar spine without IV contrast    CLINICAL INFORMATION: leg weakness    TECHNIQUE:  Contiguous axial sections were obtained through the lumbar   spine.   Additional sagittal and coronal reformats were obtained.    FINDINGS: Comparison is made to MRI lumbar of 2/14/2023.    Chronic fracture of the anterior superior corner of the L3 vertebral body   with an associated degenerative Schmorl node is reidentified without   change. No new fracture is identified.    Alignment is maintained.    At T12/L1 through L4/L5, there are small disc bulges without significant   foraminal or central spinal canal stenosis.    At L5/S1, there is a large posterior disc osteophyte complex with   inferior migration of a calcified disc fragment, resulting in compression   upon the left-sided descending nerve roots and severe left foraminal   stenosis and moderate to severe right foraminal stenosis.    Postsurgical changes in the left posterior paraspinal subcutaneous   tissues.      IMPRESSION:    At L5/S1, there is a large posterior disc osteophyte complex with   inferior migration of a calcified disc fragment, resulting in compression   upon the left-sided descending nerve roots and severe left foraminal   stenosis and moderate to severe right foraminal stenosis. This was seen   on prior MRI lumbar spine of 2/14/2023.    --- End of Report ---            CORINNE BASURTO MD; Attending Radiologist  This document has been electronically si    < end of copied text >  < from: Xray Chest 1 View- PORTABLE-Routine (Xray Chest 1 View- PORTABLE-Routine .) (03.30.23 @ 08:01) >    ACC: [id number] EXAM:  XR CHEST PORTABLE ROUTINE 1V   ORDERED BY: AMY A   SCHMUTER     PROCEDURE DATE:  03/30/2023          INTERPRETATION:  AP semierect chest on March 30, 2023 at 7:03 AM. Patient   had right leg trauma.    Heart size normal for projection.    Lungs remain clear. No fracture.    Chest is similar to February 9 this year.    IMPRESSION: No acute finding or change.    --- End of Report ---            KARLA LOWRY MD; Attending Radiologist  This document has been electronically signed. Mar 30 2023 10:43AM    < end of copied text >  < from: Xray Chest 1 View- PORTABLE-Routine (Xray Chest 1 View- PORTABLE-Routine .) (03.30.23 @ 08:01) >    ACC: [id number] EXAM:  XR CHEST PORTABLE ROUTINE 1V   ORDERED BY: AMY A   SCHMUTER     PROCEDURE DATE:  03/30/2023          INTERPRETATION:  AP semierect chest on March 30, 2023 at 7:03 AM. Patient   had right leg trauma.    Heart size normal for projection.    Lungs remain clear. No fracture.    Chest is similar to February 9 this year.    IMPRESSION: No acute finding or change.    --- End of Report ---            KARLA LOWRY MD; Attending Radiologist  This document has been electronically signed. Mar 30 2023 10:43AM    < end of copied text >    < from: Xray Knee 3 Views, Right (03.29.23 @ 15:06) >    ACC: [id number] EXAM:  XR FEMUR 2 VIEWS RT   ORDERED BY: VERONICA DIGGSWEIG     ACC: [id number] EXAM:  XR KNEE AP LAT OBL 3 VIEWS RT   ORDERED BY: VERONICASTERLING DIGGSWEIG     ACC: [id number] EXAM:  XR HIP WITH PELV 2-3V RT   ORDERED BY: VERONICA ESPARZA     ACC: [id number] EXAM:  XR TIB FIB AP LAT 2 VIEWS RT   ORDERED BY: VERONICA ESPARZA     PROCEDURE DATE:  03/29/2023          INTERPRETATION:  Right hip with pelvis, right femur, right knee, and   tib-fib. Patient had a fall.    Right hip with pelvis. 3 views.    There is slight symmetric hip degeneration.    No bone destruction or fracture.    Findings are similar to December 2, 2022.    Right femur. 4 views.    The femur is intact.    Right knee. 3 views.    Horizontal screws in the upper tibia are similar to December 21, 2022.    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    No effusion. Mild degeneration. No acute fracture. Findings are similar   to December 21, 2022.    Right tib-fib. AP and lateral views. 3 images.    Ankle is unremarkable. No acute fracture.    IMPRESSION: Old trauma with repair around the right knee. No acute   finding.    --- End of Report ---            KARLA LOWRY MD; Attending Radiologist  This document has been electronically signed. Mar 29 2023  3:11PM    < end of copied text >

## 2023-03-29 NOTE — H&P ADULT - NSHPADDITIONALINFOADULT_GEN_ALL_CORE
MEDICATIONS  (STANDING):  acetaminophen     Tablet .. 1000 milliGRAM(s) Oral every 8 hours  amLODIPine   Tablet 5 milliGRAM(s) Oral daily  dextrose 5%. 1000 milliLiter(s) (50 mL/Hr) IV Continuous <Continuous>  dextrose 5%. 1000 milliLiter(s) (100 mL/Hr) IV Continuous <Continuous>  dextrose 50% Injectable 25 Gram(s) IV Push once  dextrose 50% Injectable 12.5 Gram(s) IV Push once  dextrose 50% Injectable 25 Gram(s) IV Push once  DULoxetine 60 milliGRAM(s) Oral two times a day  folic acid 1 milliGRAM(s) Oral daily  gabapentin 400 milliGRAM(s) Oral three times a day  glucagon  Injectable 1 milliGRAM(s) IntraMuscular once  heparin   Injectable 5000 Unit(s) SubCutaneous every 12 hours  HYDROmorphone   Tablet 4 milliGRAM(s) Oral every 4 hours  insulin glargine Injectable (LANTUS) 10 Unit(s) SubCutaneous at bedtime  insulin lispro (ADMELOG) corrective regimen sliding scale   SubCutaneous three times a day before meals  insulin lispro (ADMELOG) corrective regimen sliding scale   SubCutaneous at bedtime  insulin lispro Injectable (ADMELOG) 3 Unit(s) SubCutaneous three times a day before meals  lidocaine   4% Patch 1 Patch Transdermal daily  magnesium hydroxide Suspension 30 milliLiter(s) Oral at bedtime  metoprolol succinate ER 75 milliGRAM(s) Oral daily  pantoprazole    Tablet 40 milliGRAM(s) Oral before breakfast  polyethylene glycol 3350 17 Gram(s) Oral daily  simvastatin 40 milliGRAM(s) Oral at bedtime

## 2023-03-29 NOTE — H&P ADULT - HISTORY OF PRESENT ILLNESS
Patient is a 68y Female with pmhx of HTN, HL DMII who presents with right knee pain from a fall a week ago. Patient is s/p microdiscectomy of L5/s1 which was done 11/22 by Dr. Leal. Since that surgery she has been at [facility]. She notes that since the surgery she has declined in her ability to ambulate and is essentially wheechair bound. She notes that in the past few months she has had falls due to losing her balance during transfers from wheelchair to bed. She states that after her fall she was having knee pain and was seen in the ED. Currently her pain is 4/10 worse with movement. Denies CP/SOB/N/V. she does not take any blood thinners at home. She has been seen several times in the last few months for falls and was recommended to see an orthopedist. She notes that she had planned to see Dr. Machado but had issues with her insurance. Since she has been in rehab she states that she saw Dr. Leal once post-operatively. She also notes that due to her progressing weakness in rehab she had seen a neurologist who recommended an MRI of the lumbar spine which was done in february 2023.     < from: CT Lumbar Spine No Cont (03.30.23 @ 14:04) >    ACC: [id number] EXAM:  CT LUMBAR SPINE   ORDERED BY: AARTI HASKINS     PROCEDURE DATE:  03/30/2023          INTERPRETATION:  CT lumbar spine without IV contrast    CLINICAL INFORMATION: leg weakness    TECHNIQUE:  Contiguous axial sections were obtained through the lumbar   spine.   Additional sagittal and coronal reformats were obtained.    FINDINGS: Comparison is made to MRI lumbar of 2/14/2023.    Chronic fracture of the anterior superior corner of the L3 vertebral body   with an associated degenerative Schmorl node is reidentified without   change. No new fracture is identified.    Alignment is maintained.    At T12/L1 through L4/L5, there are small disc bulges without significant   foraminal or central spinal canal stenosis.    At L5/S1, there is a large posterior disc osteophyte complex with   inferior migration of a calcified disc fragment, resulting in compression   upon the left-sided descending nerve roots and severe left foraminal   stenosis and moderate to severe right foraminal stenosis.    Postsurgical changes in the left posterior paraspinal subcutaneous   tissues.      IMPRESSION:    At L5/S1, there is a large posterior disc osteophyte complex with   inferior migration of a calcified disc fragment, resulting in compression   upon the left-sided descending nerve roots and severe left foraminal   stenosis and moderate to severe right foraminal stenosis. This was seen   on prior MRI lumbar spine of 2/14/2023.    --- End of Report ---            CORINNE BASURTO MD; Attending Radiologist  This document has been electronically signed. Mar 30 2023  4:11PM    < end of copied text >  < from: Xray Chest 1 View- PORTABLE-Routine (Xray Chest 1 View- PORTABLE-Routine .) (03.30.23 @ 08:01) >    ACC: [id number] EXAM:  XR CHEST PORTABLE ROUTINE 1V   ORDERED BY: AMY ANDERSON     PROCEDURE DATE:  03/30/2023          INTERPRETATION:  AP semierect chest on March 30, 2023 at 7:03 AM. Patient   had right leg trauma.    Heart size normal for projection.    Lungs remain clear. No fracture.    Chest is similar to February 9 this year.    IMPRESSION: No acute finding or change.    --- End of Report ---            KARLA LOWRY MD; Attending Radiologist  This document has been electronically signed. Mar 30 2023 10:43AM    < end of copied text >

## 2023-03-29 NOTE — H&P ADULT - NSHPSOCIALHISTORY_GEN_ALL_CORE
Social History:    Marital Status:   Occupation:   Lives with:     Substance Use :  Tobacco Usage:  (   ) never smoked   (   ) former smoker   (   ) current smoker  (     ) pack year  (        ) last tobacco use date  Alcohol Usage:      Health Management     For female:   Last Mammo:   Last Pap:     For male:  Last prostate exam:          [  ] date:            (  ) findings      Immunization Hx:   (  ) flu shot                               (     ) date   (  ) pneumonia shot               (     ) date  (  ) tetanus                               (     ) date     (     ) Advanced Directives: (     ) declined   [  ] health care proxy Social History:    Marital Status:   Occupation:   Lives with:     Substance Use :  none   Tobacco Usage:  (   ) never smoked   (   ) former smoker   ( no   ) current smoker  (     ) pack year  (        ) last tobacco use date  Alcohol Usage:  none       Health Management     For female:   Last Mammo:   Last Pap:     For male:  Last prostate exam:          [  ] date:            (  ) findings      Immunization Hx:   (  ) flu shot                               (     ) date   (  ) pneumonia shot               (     ) date  (  ) tetanus                               (     ) date     (     ) Advanced Directives: (     ) declined   [  ] health care proxy

## 2023-03-29 NOTE — PATIENT PROFILE ADULT - FALL HARM RISK - RISK INTERVENTIONS

## 2023-03-30 DIAGNOSIS — R29.898 OTHER SYMPTOMS AND SIGNS INVOLVING THE MUSCULOSKELETAL SYSTEM: ICD-10-CM

## 2023-03-30 DIAGNOSIS — E87.5 HYPERKALEMIA: ICD-10-CM

## 2023-03-30 DIAGNOSIS — E11.9 TYPE 2 DIABETES MELLITUS WITHOUT COMPLICATIONS: ICD-10-CM

## 2023-03-30 DIAGNOSIS — L30.8 OTHER SPECIFIED DERMATITIS: ICD-10-CM

## 2023-03-30 DIAGNOSIS — Z29.9 ENCOUNTER FOR PROPHYLACTIC MEASURES, UNSPECIFIED: ICD-10-CM

## 2023-03-30 DIAGNOSIS — I10 ESSENTIAL (PRIMARY) HYPERTENSION: ICD-10-CM

## 2023-03-30 DIAGNOSIS — M54.16 RADICULOPATHY, LUMBAR REGION: ICD-10-CM

## 2023-03-30 DIAGNOSIS — M25.561 PAIN IN RIGHT KNEE: ICD-10-CM

## 2023-03-30 DIAGNOSIS — R26.2 DIFFICULTY IN WALKING, NOT ELSEWHERE CLASSIFIED: ICD-10-CM

## 2023-03-30 DIAGNOSIS — S80.01XA CONTUSION OF RIGHT KNEE, INITIAL ENCOUNTER: ICD-10-CM

## 2023-03-30 LAB
ALBUMIN SERPL ELPH-MCNC: 2.7 G/DL — LOW (ref 3.3–5)
ALBUMIN, RANDOM URINE: 3.3 MG/DL — SIGNIFICANT CHANGE UP
ALBUMIN/CREATININE RATIO (ACR): 54 MG/G — HIGH (ref 0–30)
ALP SERPL-CCNC: 56 U/L — SIGNIFICANT CHANGE UP (ref 30–120)
ALT FLD-CCNC: 27 U/L DA — SIGNIFICANT CHANGE UP (ref 10–60)
ANION GAP SERPL CALC-SCNC: 8 MMOL/L — SIGNIFICANT CHANGE UP (ref 5–17)
APPEARANCE UR: CLEAR — SIGNIFICANT CHANGE UP
AST SERPL-CCNC: 34 U/L — SIGNIFICANT CHANGE UP (ref 10–40)
BILIRUB DIRECT SERPL-MCNC: 0 MG/DL — SIGNIFICANT CHANGE UP (ref 0–0.3)
BILIRUB INDIRECT FLD-MCNC: 0.2 MG/DL — SIGNIFICANT CHANGE UP (ref 0.2–1)
BILIRUB SERPL-MCNC: 0.2 MG/DL — SIGNIFICANT CHANGE UP (ref 0.2–1.2)
BILIRUB UR-MCNC: NEGATIVE — SIGNIFICANT CHANGE UP
BUN SERPL-MCNC: 24 MG/DL — HIGH (ref 7–23)
CALCIUM SERPL-MCNC: 8.9 MG/DL — SIGNIFICANT CHANGE UP (ref 8.4–10.5)
CHLORIDE SERPL-SCNC: 105 MMOL/L — SIGNIFICANT CHANGE UP (ref 96–108)
CHOLEST SERPL-MCNC: 115 MG/DL — SIGNIFICANT CHANGE UP
CO2 SERPL-SCNC: 28 MMOL/L — SIGNIFICANT CHANGE UP (ref 22–31)
COLOR SPEC: YELLOW — SIGNIFICANT CHANGE UP
CREAT ?TM UR-MCNC: 62 MG/DL — SIGNIFICANT CHANGE UP
CREAT SERPL-MCNC: 0.62 MG/DL — SIGNIFICANT CHANGE UP (ref 0.5–1.3)
DIFF PNL FLD: NEGATIVE — SIGNIFICANT CHANGE UP
EGFR: 96 ML/MIN/1.73M2 — SIGNIFICANT CHANGE UP
GLUCOSE BLDC GLUCOMTR-MCNC: 143 MG/DL — HIGH (ref 70–99)
GLUCOSE BLDC GLUCOMTR-MCNC: 159 MG/DL — HIGH (ref 70–99)
GLUCOSE BLDC GLUCOMTR-MCNC: 203 MG/DL — HIGH (ref 70–99)
GLUCOSE BLDC GLUCOMTR-MCNC: 242 MG/DL — HIGH (ref 70–99)
GLUCOSE SERPL-MCNC: 149 MG/DL — HIGH (ref 70–99)
GLUCOSE UR QL: NEGATIVE MG/DL — SIGNIFICANT CHANGE UP
HCT VFR BLD CALC: 27.6 % — LOW (ref 34.5–45)
HDLC SERPL-MCNC: 21 MG/DL — LOW
HGB BLD-MCNC: 8.2 G/DL — LOW (ref 11.5–15.5)
IRON SATN MFR SERPL: 27 UG/DL — LOW (ref 30–160)
IRON SATN MFR SERPL: 9 % — LOW (ref 14–50)
KETONES UR-MCNC: NEGATIVE — SIGNIFICANT CHANGE UP
LEUKOCYTE ESTERASE UR-ACNC: NEGATIVE — SIGNIFICANT CHANGE UP
LIPID PNL WITH DIRECT LDL SERPL: 62 MG/DL — SIGNIFICANT CHANGE UP
MAGNESIUM SERPL-MCNC: 1.6 MG/DL — SIGNIFICANT CHANGE UP (ref 1.6–2.6)
MCHC RBC-ENTMCNC: 20.9 PG — LOW (ref 27–34)
MCHC RBC-ENTMCNC: 29.7 GM/DL — LOW (ref 32–36)
MCV RBC AUTO: 70.4 FL — LOW (ref 80–100)
NITRITE UR-MCNC: NEGATIVE — SIGNIFICANT CHANGE UP
NON HDL CHOLESTEROL: 94 MG/DL — SIGNIFICANT CHANGE UP
NRBC # BLD: 0 /100 WBCS — SIGNIFICANT CHANGE UP (ref 0–0)
PH UR: 5 — SIGNIFICANT CHANGE UP (ref 5–8)
PLATELET # BLD AUTO: 420 K/UL — HIGH (ref 150–400)
POTASSIUM SERPL-MCNC: 5 MMOL/L — SIGNIFICANT CHANGE UP (ref 3.5–5.3)
POTASSIUM SERPL-SCNC: 5 MMOL/L — SIGNIFICANT CHANGE UP (ref 3.5–5.3)
PROT SERPL-MCNC: 6.5 G/DL — SIGNIFICANT CHANGE UP (ref 6–8.3)
PROT UR-MCNC: 15 MG/DL
RBC # BLD: 3.92 M/UL — SIGNIFICANT CHANGE UP (ref 3.8–5.2)
RBC # FLD: 19 % — HIGH (ref 10.3–14.5)
SARS-COV-2 RNA SPEC QL NAA+PROBE: SIGNIFICANT CHANGE UP
SODIUM SERPL-SCNC: 141 MMOL/L — SIGNIFICANT CHANGE UP (ref 135–145)
SP GR SPEC: 1.01 — SIGNIFICANT CHANGE UP (ref 1.01–1.02)
TIBC SERPL-MCNC: 309 UG/DL — SIGNIFICANT CHANGE UP (ref 220–430)
TRIGL SERPL-MCNC: 160 MG/DL — HIGH
TSH SERPL-MCNC: 2.62 UIU/ML — SIGNIFICANT CHANGE UP (ref 0.27–4.2)
UIBC SERPL-MCNC: 282 UG/DL — SIGNIFICANT CHANGE UP (ref 110–370)
URATE SERPL-MCNC: 6.5 MG/DL — SIGNIFICANT CHANGE UP (ref 2.5–7)
UROBILINOGEN FLD QL: NEGATIVE MG/DL — SIGNIFICANT CHANGE UP
VIT B12 SERPL-MCNC: 362 PG/ML — SIGNIFICANT CHANGE UP (ref 232–1245)
WBC # BLD: 7.64 K/UL — SIGNIFICANT CHANGE UP (ref 3.8–10.5)
WBC # FLD AUTO: 7.64 K/UL — SIGNIFICANT CHANGE UP (ref 3.8–10.5)

## 2023-03-30 PROCEDURE — 93010 ELECTROCARDIOGRAM REPORT: CPT

## 2023-03-30 PROCEDURE — 71045 X-RAY EXAM CHEST 1 VIEW: CPT | Mod: 26

## 2023-03-30 PROCEDURE — 72131 CT LUMBAR SPINE W/O DYE: CPT | Mod: 26

## 2023-03-30 PROCEDURE — 99221 1ST HOSP IP/OBS SF/LOW 40: CPT

## 2023-03-30 RX ORDER — SIMVASTATIN 20 MG/1
40 TABLET, FILM COATED ORAL AT BEDTIME
Refills: 0 | Status: DISCONTINUED | OUTPATIENT
Start: 2023-03-30 | End: 2023-04-03

## 2023-03-30 RX ORDER — INSULIN LISPRO 100/ML
VIAL (ML) SUBCUTANEOUS AT BEDTIME
Refills: 0 | Status: DISCONTINUED | OUTPATIENT
Start: 2023-03-30 | End: 2023-04-03

## 2023-03-30 RX ORDER — INSULIN LISPRO 100/ML
VIAL (ML) SUBCUTANEOUS
Refills: 0 | Status: DISCONTINUED | OUTPATIENT
Start: 2023-03-30 | End: 2023-04-01

## 2023-03-30 RX ORDER — POLYETHYLENE GLYCOL 3350 17 G/17G
17 POWDER, FOR SOLUTION ORAL DAILY
Refills: 0 | Status: DISCONTINUED | OUTPATIENT
Start: 2023-03-30 | End: 2023-04-03

## 2023-03-30 RX ORDER — ACETAMINOPHEN 500 MG
1000 TABLET ORAL EVERY 8 HOURS
Refills: 0 | Status: DISCONTINUED | OUTPATIENT
Start: 2023-03-30 | End: 2023-04-03

## 2023-03-30 RX ORDER — ONDANSETRON 8 MG/1
4 TABLET, FILM COATED ORAL EVERY 8 HOURS
Refills: 0 | Status: DISCONTINUED | OUTPATIENT
Start: 2023-03-30 | End: 2023-04-03

## 2023-03-30 RX ORDER — HYDROMORPHONE HYDROCHLORIDE 2 MG/ML
4 INJECTION INTRAMUSCULAR; INTRAVENOUS; SUBCUTANEOUS EVERY 4 HOURS
Refills: 0 | Status: DISCONTINUED | OUTPATIENT
Start: 2023-03-30 | End: 2023-04-03

## 2023-03-30 RX ORDER — FOLIC ACID 0.8 MG
1 TABLET ORAL DAILY
Refills: 0 | Status: DISCONTINUED | OUTPATIENT
Start: 2023-03-30 | End: 2023-04-03

## 2023-03-30 RX ORDER — HYDROMORPHONE HYDROCHLORIDE 2 MG/ML
2 INJECTION INTRAMUSCULAR; INTRAVENOUS; SUBCUTANEOUS
Refills: 0 | Status: DISCONTINUED | OUTPATIENT
Start: 2023-03-30 | End: 2023-03-30

## 2023-03-30 RX ORDER — LIDOCAINE 4 G/100G
1 CREAM TOPICAL DAILY
Refills: 0 | Status: DISCONTINUED | OUTPATIENT
Start: 2023-03-30 | End: 2023-04-03

## 2023-03-30 RX ORDER — MAGNESIUM HYDROXIDE 400 MG/1
30 TABLET, CHEWABLE ORAL AT BEDTIME
Refills: 0 | Status: DISCONTINUED | OUTPATIENT
Start: 2023-03-30 | End: 2023-04-03

## 2023-03-30 RX ORDER — INSULIN LISPRO 100/ML
3 VIAL (ML) SUBCUTANEOUS
Refills: 0 | Status: DISCONTINUED | OUTPATIENT
Start: 2023-03-30 | End: 2023-04-01

## 2023-03-30 RX ORDER — INSULIN GLARGINE 100 [IU]/ML
10 INJECTION, SOLUTION SUBCUTANEOUS AT BEDTIME
Refills: 0 | Status: DISCONTINUED | OUTPATIENT
Start: 2023-03-30 | End: 2023-04-01

## 2023-03-30 RX ADMIN — HYDROMORPHONE HYDROCHLORIDE 4 MILLIGRAM(S): 2 INJECTION INTRAMUSCULAR; INTRAVENOUS; SUBCUTANEOUS at 03:10

## 2023-03-30 RX ADMIN — HYDROMORPHONE HYDROCHLORIDE 4 MILLIGRAM(S): 2 INJECTION INTRAMUSCULAR; INTRAVENOUS; SUBCUTANEOUS at 16:28

## 2023-03-30 RX ADMIN — Medication 3 UNIT(S): at 17:13

## 2023-03-30 RX ADMIN — Medication 1000 MILLIGRAM(S): at 21:44

## 2023-03-30 RX ADMIN — Medication 2: at 12:04

## 2023-03-30 RX ADMIN — Medication 1000 MILLIGRAM(S): at 14:23

## 2023-03-30 RX ADMIN — POLYETHYLENE GLYCOL 3350 17 GRAM(S): 17 POWDER, FOR SOLUTION ORAL at 11:56

## 2023-03-30 RX ADMIN — HYDROMORPHONE HYDROCHLORIDE 4 MILLIGRAM(S): 2 INJECTION INTRAMUSCULAR; INTRAVENOUS; SUBCUTANEOUS at 22:12

## 2023-03-30 RX ADMIN — PANTOPRAZOLE SODIUM 40 MILLIGRAM(S): 20 TABLET, DELAYED RELEASE ORAL at 05:15

## 2023-03-30 RX ADMIN — Medication 2: at 17:13

## 2023-03-30 RX ADMIN — GABAPENTIN 400 MILLIGRAM(S): 400 CAPSULE ORAL at 14:23

## 2023-03-30 RX ADMIN — AMLODIPINE BESYLATE 5 MILLIGRAM(S): 2.5 TABLET ORAL at 05:15

## 2023-03-30 RX ADMIN — Medication 75 MILLIGRAM(S): at 05:15

## 2023-03-30 RX ADMIN — HYDROMORPHONE HYDROCHLORIDE 4 MILLIGRAM(S): 2 INJECTION INTRAMUSCULAR; INTRAVENOUS; SUBCUTANEOUS at 02:40

## 2023-03-30 RX ADMIN — INSULIN GLARGINE 10 UNIT(S): 100 INJECTION, SOLUTION SUBCUTANEOUS at 21:19

## 2023-03-30 RX ADMIN — HYDROMORPHONE HYDROCHLORIDE 4 MILLIGRAM(S): 2 INJECTION INTRAMUSCULAR; INTRAVENOUS; SUBCUTANEOUS at 07:11

## 2023-03-30 RX ADMIN — HEPARIN SODIUM 5000 UNIT(S): 5000 INJECTION INTRAVENOUS; SUBCUTANEOUS at 17:14

## 2023-03-30 RX ADMIN — HYDROMORPHONE HYDROCHLORIDE 4 MILLIGRAM(S): 2 INJECTION INTRAMUSCULAR; INTRAVENOUS; SUBCUTANEOUS at 14:59

## 2023-03-30 RX ADMIN — LIDOCAINE 1 PATCH: 4 CREAM TOPICAL at 23:10

## 2023-03-30 RX ADMIN — GABAPENTIN 400 MILLIGRAM(S): 400 CAPSULE ORAL at 05:15

## 2023-03-30 RX ADMIN — HEPARIN SODIUM 5000 UNIT(S): 5000 INJECTION INTRAVENOUS; SUBCUTANEOUS at 05:16

## 2023-03-30 RX ADMIN — HYDROMORPHONE HYDROCHLORIDE 4 MILLIGRAM(S): 2 INJECTION INTRAMUSCULAR; INTRAVENOUS; SUBCUTANEOUS at 12:04

## 2023-03-30 RX ADMIN — LIDOCAINE 1 PATCH: 4 CREAM TOPICAL at 19:17

## 2023-03-30 RX ADMIN — DULOXETINE HYDROCHLORIDE 60 MILLIGRAM(S): 30 CAPSULE, DELAYED RELEASE ORAL at 05:15

## 2023-03-30 RX ADMIN — Medication 1000 MILLIGRAM(S): at 21:14

## 2023-03-30 RX ADMIN — GABAPENTIN 400 MILLIGRAM(S): 400 CAPSULE ORAL at 21:15

## 2023-03-30 RX ADMIN — HYDROMORPHONE HYDROCHLORIDE 4 MILLIGRAM(S): 2 INJECTION INTRAMUSCULAR; INTRAVENOUS; SUBCUTANEOUS at 06:45

## 2023-03-30 RX ADMIN — SIMVASTATIN 40 MILLIGRAM(S): 20 TABLET, FILM COATED ORAL at 21:16

## 2023-03-30 RX ADMIN — Medication 1 MILLIGRAM(S): at 11:56

## 2023-03-30 RX ADMIN — LIDOCAINE 1 PATCH: 4 CREAM TOPICAL at 11:56

## 2023-03-30 RX ADMIN — HYDROMORPHONE HYDROCHLORIDE 4 MILLIGRAM(S): 2 INJECTION INTRAMUSCULAR; INTRAVENOUS; SUBCUTANEOUS at 22:42

## 2023-03-30 RX ADMIN — DULOXETINE HYDROCHLORIDE 60 MILLIGRAM(S): 30 CAPSULE, DELAYED RELEASE ORAL at 17:14

## 2023-03-30 RX ADMIN — HYDROMORPHONE HYDROCHLORIDE 4 MILLIGRAM(S): 2 INJECTION INTRAMUSCULAR; INTRAVENOUS; SUBCUTANEOUS at 17:20

## 2023-03-30 NOTE — DIETITIAN INITIAL EVALUATION ADULT - ORAL INTAKE PTA/DIET HISTORY
Per transfer document, pt from [facility] was on a no added salt, no concentrated sweet diet. Ordered for Glucerna 8oz (220 kcal, 10 g protein) bid, proform adv BID. Appetite/po intake was fair. No vitamin/mineral or other nutrition supplements reported. Noted previous admitted 11/3/2022 with A1c 12.7%, dx with severe malnutrition at time.

## 2023-03-30 NOTE — CARE COORDINATION ASSESSMENT. - NSCAREPROVIDERS_GEN_ALL_CORE_FT
CARE PROVIDERS:  Admitting: Marybeth Reveles  Attending: Marybeth Reveles  Case Management: Trisha Hackett  Consultant: Daryl Du  Consultant: Weil, Patricia  Consultant: Al Palacios  Consultant: Perlman, Craig  Consultant: Yazan Rodriguez  Consultant: Santhosh Head  Consultant: Randal Dickerson Team: Lucas Mcmullen ED Nurse: Flavio Corrigan  Infection Control: Peyton Nicole  Nurse: Scarlet Fallon  Nurse: Estefany Umanzor Ray  Ordered: Pahlavan, Mohsen  Ordered: Physician, Ordering  Override: Mariana Cochran  Override: Flavio Corrigan  PCA/Nursing Assistant: Maryse Palacio  Primary Team: Leonidas Pretty  Primary Team: Gonzalez Jones  Primary Team: Priya Reza  Registered Dietitian: Daysi Perez  Registered Dietitian: Walter Zaman  : Chiquita Fraser  : Nathalie Loco  Technician: Snehal Land// Supp. Assoc.: Cash Fletcher
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
## 2023-03-30 NOTE — CONSULT NOTE ADULT - PROBLEM SELECTOR RECOMMENDATION 9
Type  A1c % adm  Recommend endocrine-Perlman onconsult  FU appt: TBA  DSC recommendations: return to home regimen and glucose monitoring  diabetes education provided  Diabetes support info and cell # [phone] given   Goal 100-180 mg/dL; 140-180 mg/dL in critical care areas Type 2 A1c 7.9% adm Fall and ortho evaluation  FU appt: As per SW either to [facility] [place]- ? LTF  DSC recommendations: return to home regimen and glucose monitoring  diabetes education provided  Diabetes support info and cell # [phone] given   Goal 100-180 mg/dL; 140-180 mg/dL in critical care areas Type 2 A1c 7.9% adm Fall and ortho evaluation  Lantus 10 units HS start tonight and resume daily  Lispro 3 units AC x3 day- cont low corrective scale AC and low HS  FU appt: As per SW either to [facility] [place]- ? LTF  DSC recommendations: return to home regimen and glucose monitoring  diabetes education provided  Diabetes support info and cell # [phone] given   Goal 100-180 mg/dL; 140-180 mg/dL in critical care areas

## 2023-03-30 NOTE — DIETITIAN INITIAL EVALUATION ADULT - PERTINENT LABORATORY DATA
03-30    141  |  105  |  24<H>  ----------------------------<  149<H>  5.0   |  28  |  0.62    Ca    8.9      30 Mar 2023 06:00  Mg     1.6     03-30    TPro  6.5  /  Alb  2.7<L>  /  TBili  0.2  /  DBili  0.0  /  AST  34  /  ALT  27  /  AlkPhos  56  03-30  POCT Blood Glucose.: 242 mg/dL (03-30-23 @ 11:57)  A1C with Estimated Average Glucose Result: 7.9 % (02-10-23 @ 22:14)  A1C with Estimated Average Glucose Result: 7.8 % (02-10-23 @ 05:36)  A1C with Estimated Average Glucose Result: 12.7 % (11-03-22 @ 06:00)

## 2023-03-30 NOTE — CONSULT NOTE ADULT - SUBJECTIVE AND OBJECTIVE BOX
Physical Medicine and Rehabilitation Initial Evaluation    Patients acute care records reviewed and are summarized as follows:     Patient is a 68y Female with past medical history including depression, eczema, htn, hld, psoriasis like skin disease, DM2, who is admitted with complaints of right sided knee pain following a fall one week prior to presentation. Of note patient is s/p microdiscectomy of L5/S1 after which patient was discharged to [facility]. Patient has been having increased difficulty with ambulation and having recurrent falls. 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 Patient with knee contusion noted on exam.     Radiological studies reviewed, including    MRI of the lumbar spine from  shows:   1. Lumbosacral plexus appears maintained.  2. Muscle edema may reflect a nonspecific myositis.  3. Multilevel degenerative disc disease of the lumbar spine with a degree of congenital spinal stenosis.  4. Postsurgical changes of L5-S1 with moderate right and severe left lateral recess narrowing and mild-to-moderate central stenosis. Nonenhancing left paracentral disc extrusion or fragment is present that abuts the descending left S1 and S2 nerve root.  5. Mild to moderate lower lumbar neural foraminal stenosis is present.    Medical studies/laboratory studies reviewed, includin.2    7.64  )-----------( 420      ( 30 Mar 2023 06:00 )             27.6           141  |  105  |  24<H>  ----------------------------<  149<H>  5.0   |  28  |  0.62    Ca    8.9      30 Mar 2023 06:00  Mg     1.6         TPro  6.5  /  Alb  2.7<L>  /  TBili  0.2  /  DBili  0.0  /  AST  34  /  ALT  27  /  AlkPhos  56        The patient was seen and examined at bedside. Complains of right knee pain, improved with dilaudid but the pain seems to wear off around the 4 hour tana, patients MAR reviewed, seems to have somewhat inconsistent windows of taking the medication. Discussed changing the medication to standing order for now, she was agreeable. No night pain, night sweats, no bowel or bladder symptoms, no numbness or tingling, no radiating pain noted.    ROS:  Constitutional: Denies fevers or chills  MSK: complains of right sided knee pain.    PM, Social, Family Hx: as above in HPI, Family history reviewed and found to be non pertinent to patients current disposition, denies history of alcohol, illicit drug use, tobacco use.    Medications: following revision  acetaminophen     Tablet .. 1000 milliGRAM(s) Oral every 8 hours  amLODIPine   Tablet 5 milliGRAM(s) Oral daily  dextrose 5%. 1000 milliLiter(s) IV Continuous <Continuous>  dextrose 5%. 1000 milliLiter(s) IV Continuous <Continuous>  dextrose 50% Injectable 25 Gram(s) IV Push once  dextrose 50% Injectable 12.5 Gram(s) IV Push once  dextrose 50% Injectable 25 Gram(s) IV Push once  dextrose Oral Gel 15 Gram(s) Oral once PRN  DULoxetine 60 milliGRAM(s) Oral two times a day  folic acid 1 milliGRAM(s) Oral daily  gabapentin 400 milliGRAM(s) Oral three times a day  glucagon  Injectable 1 milliGRAM(s) IntraMuscular once  heparin   Injectable 5000 Unit(s) SubCutaneous every 12 hours  HYDROmorphone   Tablet 4 milliGRAM(s) Oral every 4 hours  insulin glargine Injectable (LANTUS) 10 Unit(s) SubCutaneous at bedtime  insulin lispro (ADMELOG) corrective regimen sliding scale   SubCutaneous three times a day before meals  insulin lispro (ADMELOG) corrective regimen sliding scale   SubCutaneous at bedtime  insulin lispro Injectable (ADMELOG) 3 Unit(s) SubCutaneous three times a day before meals  lidocaine   4% Patch 1 Patch Transdermal daily  magnesium hydroxide Suspension 30 milliLiter(s) Oral at bedtime  metoprolol succinate ER 75 milliGRAM(s) Oral daily  ondansetron    Tablet 4 milliGRAM(s) Oral every 8 hours PRN  pantoprazole    Tablet 40 milliGRAM(s) Oral before breakfast  polyethylene glycol 3350 17 Gram(s) Oral daily  simvastatin 40 milliGRAM(s) Oral at bedtime    Physical Exam:   Vitals: T(C): 36.9 (23 @ 21:47), Max: 36.9 (23 @ 21:47)  HR: 82 (23 @ 21:47) (82 - 95)  BP: 102/67 (23 @ 21:47) (102/67 - 120/72)  RR: 18 (23 @ 21:47) (18 - 18)  SpO2: 97% (23 @ 21:47) (96% - 97%)    Constitutional: Gen: In no acute distress, cooperative with exam and questioning   CV: Regular rate and rhythm, S1 S2, bilateral lower extremity pitting peripheral edema, pedal pulses intact  Resp: Good respiratory effort, Good air movement all lung fields  Neuro: Cranial Nerves II-XII intact, sensation intact to light touch in peripheral upper and lower extremities  MSK: No cyanosis or clubbing of nails, strength 4-/5 in bilateral upper and lower extremities, ROM full in all extremities passively with some pain on ROM of the right knee, mild tenderness to palpation diffusely but no particular pain of bony prominences, downgoing babinskis bilaterally  Psychiatric: Awake alert fully oriented

## 2023-03-30 NOTE — DIETITIAN NUTRITION RISK NOTIFICATION - FINDINGS BASED ON COMPREHENSIVE NUTRITION ASSESSMENT, CONSULTATION PERFORMED ON
Please use the symbicort 1 puff twice a day (atleast once in the morning)    Use the albuterol as needed     Will follow up with you regarding the breathing test    Follow up with me in 6 months    30-Mar-2023

## 2023-03-30 NOTE — PATIENT CHOICE NOTE. - NSPTCHOICENOTES_GEN_ALL_CORE
Pt stated that she wants to return to [facility] when she's stable.

## 2023-03-30 NOTE — CONSULT NOTE ADULT - SUBJECTIVE AND OBJECTIVE BOX
History of Present Illness: The patient is a 68 year old female with a history of HTN, HL, DM who presents with a fall. The patient denies chest pain, shortness of breath, palpitations.    Past Medical/Surgical History:  HTN, HL, DM    Medications:  Home Medications:  acetaminophen 500 mg oral tablet: 2 tab(s) orally every 8 hours (09 Feb 2023 23:34)  amLODIPine 5 mg oral tablet: 1 tab(s) orally once a day (09 Feb 2023 23:34)  Cymbalta 30 mg oral delayed release capsule: 2 cap(s) orally 2 times a day (09 Feb 2023 23:34)  folic acid 1 mg oral tablet: 1 tab(s) orally once a day (21 Feb 2023 05:15)  gabapentin 400 mg oral capsule: 1 cap(s) orally 3 times a day (21 Feb 2023 05:15)  HYDROmorphone 4 mg oral tablet: 1 tab(s) orally every 4 hours, As needed, Severe Pain (7 - 10) (09 Feb 2023 23:34)  insulin glargine 100 units/mL subcutaneous solution: 28 unit(s) subcutaneous once a day (at bedtime) (24 Feb 2023 10:16)  insulin lispro 100 units/mL injectable solution: 16 unit(s) subcutaneously 3 times a day (before meals) (24 Feb 2023 10:16)  levoFLOXacin 500 mg oral tablet: 1 tab(s) orally every 24 hours (24 Feb 2023 09:22)  lidocaine 4% patch: Apply topically to affected area once a day (09 Feb 2023 23:34)  metoprolol succinate 25 mg oral tablet, extended release: 3 tab(s) orally once a day (21 Feb 2023 05:15)  Milk of Magnesia 8% oral suspension: 30 milliliter(s) orally once a day (at bedtime) (09 Feb 2023 23:34)  Mucinex 600 mg oral tablet, extended release: 1 tab(s) orally every 12 hours (09 Feb 2023 23:34)  Zofran 4 mg oral tablet: 1 tab(s) orally every 8 hours (09 Feb 2023 23:34)      Family History: Non-contributory family history of premature cardiovascular atherosclerotic disease    Social History: No tobacco, alcohol or drug use    Review of Systems:  General: No fevers, chills, weight gain  Skin: No rashes, color changes  Cardiovascular: No chest pain, orthopnea  Respiratory: No shortness of breath, cough  Gastrointestinal: No nausea, abdominal pain  Genitourinary: No incontinence, pain with urination  Musculoskeletal: No pain, swelling, decreased range of motion  Neurological: No headache, weakness  Psychiatric: No depression, anxiety  Endocrine: No weight gain, increased thirst  All other systems are comprehensively negative.    Physical Exam:  Vitals:        Vital Signs Last 24 Hrs  T(C): 36.7 (30 Mar 2023 04:53), Max: 36.7 (29 Mar 2023 20:15)  T(F): 98 (30 Mar 2023 04:53), Max: 98 (29 Mar 2023 20:15)  HR: 95 (30 Mar 2023 04:53) (74 - 95)  BP: 120/72 (30 Mar 2023 04:53) (104/66 - 120/72)  BP(mean): --  RR: 18 (30 Mar 2023 04:53) (16 - 18)  SpO2: 97% (30 Mar 2023 04:53) (93% - 97%)    Parameters below as of 30 Mar 2023 04:53  Patient On (Oxygen Delivery Method): room air      General: NAD  HEENT: MMM  Neck: No JVD, no carotid bruit  Lungs: CTAB  CV: RRR, nl S1/S2, no M/R/G  Abdomen: S/NT/ND, +BS  Extremities: No LE edema, no cyanosis  Neuro: AAOx3, non-focal  Skin: No rash    Labs:                        8.2    7.64  )-----------( 420      ( 30 Mar 2023 06:00 )             27.6     03-30    141  |  105  |  24<H>  ----------------------------<  149<H>  5.0   |  28  |  0.62    Ca    8.9      30 Mar 2023 06:00  Mg     1.6     03-30    TPro  6.5  /  Alb  2.7<L>  /  TBili  0.2  /  DBili  0.0  /  AST  34  /  ALT  27  /  AlkPhos  56  03-30            ECG/Telemetry: NSR, LAD, borderline LVH
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
## 2023-03-30 NOTE — DIETITIAN NUTRITION RISK NOTIFICATION - TREATMENT: THE FOLLOWING DIET HAS BEEN RECOMMENDED
Diet, Consistent Carbohydrate/No Snacks:   Supplement Feeding Modality:  Oral  Glucerna Shake Cans or Servings Per Day:  1       Frequency:  Daily (03-30-23 @ 16:29) [Pending Verification By Attending]  Diet, Consistent Carbohydrate/No Snacks (03-29-23 @ 22:15) [Active]
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
## 2023-03-30 NOTE — DIETITIAN INITIAL EVALUATION ADULT - OTHER INFO
Visited patient in room, presents with fair-good appetite/po intake, consuming >50-75% of meals. Denies n/v/d/c, no noted BM in house. No reported difficulty chewing or swallowing. Conformed allergic to fish, walnut, apple, pear. Reported 6# weight loss in 1 month, current adm weight 105#, 5# weight loss in 1 mon is clinically significant, will continue to monitor weight trends as able.     Pertinent medications/nutrition labs reviewed; -167 x24hr, A1c 7.9% with hx of DM, indicating good glycemic control.     Discussed the importance of consistent carbohydrate intake with pt. Educated on carbohydrate counting, avoidance of concentrated sweets/beverages. Educated on adequate protein/energy intake to prevent weight loss, prioritize protein at each meal. Food preferences obtained, will honor as able to encourage intake. Will add Glucerna 8oz (220 kcal, 10 g protein) qd to optimize intake. Pt receptive, no nutrition related questions at this time. RD to remain avaiable as needed.
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
## 2023-03-30 NOTE — CONSULT NOTE ADULT - ASSESSMENT
68y Female complaining of knee pain/injury    hx of pna  dm  ataxic gait  OP  OA  skin lesions - rash  gerd  L - S spine disease    labs and imaging reviewed  [facility] notes reviewed  [facility] notes reviewed  Pain rx regimen  Bowel rx regimen  fall prec  PT eval  assist with needs  GOC discussion in progress  
Physical Exam:   Vital Signs Last 24 Hrs  T(C): 36.7 (30 Mar 2023 04:53), Max: 36.7 (29 Mar 2023 20:15)  T(F): 98 (30 Mar 2023 04:53), Max: 98 (29 Mar 2023 20:15)  HR: 95 (30 Mar 2023 04:53) (74 - 95)  BP: 120/72 (30 Mar 2023 04:53) (104/66 - 120/72)  BP(mean): --  RR: 18 (30 Mar 2023 04:53) (16 - 18)  SpO2: 97% (30 Mar 2023 04:53) (93% - 97%)    Parameters below as of 30 Mar 2023 04:53  Patient On (Oxygen Delivery Method): room air             CAPILLARY BLOOD GLUCOSE      POCT Blood Glucose.: 143 mg/dL (30 Mar 2023 07:49)  POCT Blood Glucose.: 167 mg/dL (29 Mar 2023 21:54)  POCT Blood Glucose.: 107 mg/dL (29 Mar 2023 18:05)      Cholesterol, Serum: 113 mg/dL (05.19.21 @ 08:36)     HDL Cholesterol, Serum: 22 mg/dL (05.19.21 @ 08:36)     LDL Cholesterol Calculated: 66 mg/dL (05.19.21 @ 08:36)     DIET: CC  >50%        
The patient is a 68 year old female with a history of HTN, HL, DM who presents with a fall.    Plan:  - ECG with LVH related abnormalities  - Continue amlodipine 5 mg daily  - Continue metoprolol succinate 75 mg daily  - Pain control  - Neuro eval
A/P 68y year old Female with right knee pain    Change dilaudid to standing order to maintain more consistent serum level of medication, to allow for therapy sessions to be more productive and not as impacted by pain. She can continue the regimen until patient nears discharge from [facility] and at that point can be weaned slowly from Q4H to Q6 to Q8 to Q12, then daily, with dose reductions that can be factored in according to patients tolerance as well.     Primary team notes are reviewed  Nursing notes reviewed    Laboratory studies reviewed including those mentioned earlier/above.    Discussed management/coordinated care with primary team/referring provider.    High risk of morbidity from treatment with schedule II controlled substance medication.

## 2023-03-30 NOTE — DIETITIAN INITIAL EVALUATION ADULT - REASON FOR ADMISSION
Per chart review "Patient is a 68y Female with pmhx of HTN, HL DMII who presents with right knee pain from a fall a week ago. Patient is s/p microdiscectomy of L5/s1 which was done 11/22 by Dr. Leal. Since that surgery she has been at [facility]. She notes that since the surgery she has declined in her ability to ambulate and is essentially wheechair bound. She notes that in the past few months she has had falls due to losing her balance during transfers from wheelchair to bed. She states that after her fall she was having knee pain and was seen in the ED. Currently her pain is 4/10 worse with movement. Denies CP/SOB/N/V. she does not take any blood thinners at home. She has been seen several times in the last few months for falls and was recommended to see an orthopedist. She notes that she had planned to see Dr. Machado but had issues with her insurance. Since she has been in rehab she states that she saw Dr. Leal once post-operatively. She also notes that due to her progressing weakness in rehab she had seen a neurologist who recommended an MRI of the lumbar spine which was done in february 2023."
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
## 2023-03-30 NOTE — PATIENT CHOICE NOTE. - NSPTCHOICESTATE_GEN_ALL_CORE

## 2023-03-30 NOTE — DIETITIAN INITIAL EVALUATION ADULT - PERTINENT MEDS FT
MEDICATIONS  (STANDING):  acetaminophen     Tablet .. 1000 milliGRAM(s) Oral every 8 hours  amLODIPine   Tablet 5 milliGRAM(s) Oral daily  dextrose 5%. 1000 milliLiter(s) (50 mL/Hr) IV Continuous <Continuous>  dextrose 5%. 1000 milliLiter(s) (100 mL/Hr) IV Continuous <Continuous>  dextrose 50% Injectable 25 Gram(s) IV Push once  dextrose 50% Injectable 12.5 Gram(s) IV Push once  dextrose 50% Injectable 25 Gram(s) IV Push once  DULoxetine 60 milliGRAM(s) Oral two times a day  folic acid 1 milliGRAM(s) Oral daily  gabapentin 400 milliGRAM(s) Oral three times a day  glucagon  Injectable 1 milliGRAM(s) IntraMuscular once  heparin   Injectable 5000 Unit(s) SubCutaneous every 12 hours  insulin glargine Injectable (LANTUS) 10 Unit(s) SubCutaneous at bedtime  insulin lispro (ADMELOG) corrective regimen sliding scale   SubCutaneous three times a day before meals  insulin lispro (ADMELOG) corrective regimen sliding scale   SubCutaneous at bedtime  insulin lispro Injectable (ADMELOG) 3 Unit(s) SubCutaneous three times a day before meals  lidocaine   4% Patch 1 Patch Transdermal daily  magnesium hydroxide Suspension 30 milliLiter(s) Oral at bedtime  metoprolol succinate ER 75 milliGRAM(s) Oral daily  pantoprazole    Tablet 40 milliGRAM(s) Oral before breakfast  polyethylene glycol 3350 17 Gram(s) Oral daily  simvastatin 40 milliGRAM(s) Oral at bedtime    MEDICATIONS  (PRN):  dextrose Oral Gel 15 Gram(s) Oral once PRN Blood Glucose LESS THAN 70 milliGRAM(s)/deciliter  HYDROmorphone   Tablet 2 milliGRAM(s) Oral four times a day PRN Moderate Pain (4 - 6)  HYDROmorphone   Tablet 4 milliGRAM(s) Oral every 4 hours PRN Severe Pain (7 - 10)  ondansetron    Tablet 4 milliGRAM(s) Oral every 8 hours PRN Nausea and/or Vomiting

## 2023-03-31 ENCOUNTER — TRANSCRIPTION ENCOUNTER (OUTPATIENT)
Age: 68
End: 2023-03-31

## 2023-03-31 DIAGNOSIS — K59.03 DRUG INDUCED CONSTIPATION: ICD-10-CM

## 2023-03-31 LAB
ALBUMIN SERPL ELPH-MCNC: 2.8 G/DL — LOW (ref 3.3–5)
ALP SERPL-CCNC: 63 U/L — SIGNIFICANT CHANGE UP (ref 30–120)
ALT FLD-CCNC: 34 U/L DA — SIGNIFICANT CHANGE UP (ref 10–60)
ANION GAP SERPL CALC-SCNC: 9 MMOL/L — SIGNIFICANT CHANGE UP (ref 5–17)
AST SERPL-CCNC: 39 U/L — SIGNIFICANT CHANGE UP (ref 10–40)
BILIRUB SERPL-MCNC: 0.2 MG/DL — SIGNIFICANT CHANGE UP (ref 0.2–1.2)
BUN SERPL-MCNC: 22 MG/DL — SIGNIFICANT CHANGE UP (ref 7–23)
CALCIUM SERPL-MCNC: 9.3 MG/DL — SIGNIFICANT CHANGE UP (ref 8.4–10.5)
CHLORIDE SERPL-SCNC: 103 MMOL/L — SIGNIFICANT CHANGE UP (ref 96–108)
CO2 SERPL-SCNC: 28 MMOL/L — SIGNIFICANT CHANGE UP (ref 22–31)
CREAT SERPL-MCNC: 0.67 MG/DL — SIGNIFICANT CHANGE UP (ref 0.5–1.3)
EGFR: 94 ML/MIN/1.73M2 — SIGNIFICANT CHANGE UP
GLUCOSE BLDC GLUCOMTR-MCNC: 162 MG/DL — HIGH (ref 70–99)
GLUCOSE BLDC GLUCOMTR-MCNC: 174 MG/DL — HIGH (ref 70–99)
GLUCOSE BLDC GLUCOMTR-MCNC: 193 MG/DL — HIGH (ref 70–99)
GLUCOSE BLDC GLUCOMTR-MCNC: 225 MG/DL — HIGH (ref 70–99)
GLUCOSE SERPL-MCNC: 153 MG/DL — HIGH (ref 70–99)
HCT VFR BLD CALC: 28.1 % — LOW (ref 34.5–45)
HGB BLD-MCNC: 8.4 G/DL — LOW (ref 11.5–15.5)
MCHC RBC-ENTMCNC: 20.9 PG — LOW (ref 27–34)
MCHC RBC-ENTMCNC: 29.9 GM/DL — LOW (ref 32–36)
MCV RBC AUTO: 69.9 FL — LOW (ref 80–100)
NRBC # BLD: 0 /100 WBCS — SIGNIFICANT CHANGE UP (ref 0–0)
PLATELET # BLD AUTO: 420 K/UL — HIGH (ref 150–400)
POTASSIUM SERPL-MCNC: 4.5 MMOL/L — SIGNIFICANT CHANGE UP (ref 3.5–5.3)
POTASSIUM SERPL-SCNC: 4.5 MMOL/L — SIGNIFICANT CHANGE UP (ref 3.5–5.3)
PREALB SERPL-MCNC: 27 MG/DL — SIGNIFICANT CHANGE UP (ref 20–40)
PROT SERPL-MCNC: 6.9 G/DL — SIGNIFICANT CHANGE UP (ref 6–8.3)
RBC # BLD: 4.02 M/UL — SIGNIFICANT CHANGE UP (ref 3.8–5.2)
RBC # FLD: 18.7 % — HIGH (ref 10.3–14.5)
SODIUM SERPL-SCNC: 140 MMOL/L — SIGNIFICANT CHANGE UP (ref 135–145)
WBC # BLD: 7.45 K/UL — SIGNIFICANT CHANGE UP (ref 3.8–10.5)
WBC # FLD AUTO: 7.45 K/UL — SIGNIFICANT CHANGE UP (ref 3.8–10.5)

## 2023-03-31 RX ORDER — PANTOPRAZOLE SODIUM 20 MG/1
1 TABLET, DELAYED RELEASE ORAL
Qty: 0 | Refills: 0 | DISCHARGE
Start: 2023-03-31

## 2023-03-31 RX ORDER — HEPARIN SODIUM 5000 [USP'U]/ML
5000 INJECTION INTRAVENOUS; SUBCUTANEOUS
Qty: 0 | Refills: 0 | DISCHARGE
Start: 2023-03-31

## 2023-03-31 RX ORDER — AMLODIPINE BESYLATE 2.5 MG/1
1 TABLET ORAL
Qty: 0 | Refills: 0 | DISCHARGE

## 2023-03-31 RX ORDER — HYDROMORPHONE HYDROCHLORIDE 2 MG/ML
1 INJECTION INTRAMUSCULAR; INTRAVENOUS; SUBCUTANEOUS
Qty: 0 | Refills: 0 | DISCHARGE
Start: 2023-03-31

## 2023-03-31 RX ORDER — GABAPENTIN 400 MG/1
1 CAPSULE ORAL
Qty: 0 | Refills: 0 | DISCHARGE
Start: 2023-03-31

## 2023-03-31 RX ORDER — SENNA PLUS 8.6 MG/1
1 TABLET ORAL
Refills: 0 | Status: DISCONTINUED | OUTPATIENT
Start: 2023-03-31 | End: 2023-04-03

## 2023-03-31 RX ORDER — POLYETHYLENE GLYCOL 3350 17 G/17G
17 POWDER, FOR SOLUTION ORAL
Qty: 0 | Refills: 0 | DISCHARGE
Start: 2023-03-31

## 2023-03-31 RX ADMIN — Medication 1: at 07:41

## 2023-03-31 RX ADMIN — HYDROMORPHONE HYDROCHLORIDE 4 MILLIGRAM(S): 2 INJECTION INTRAMUSCULAR; INTRAVENOUS; SUBCUTANEOUS at 14:15

## 2023-03-31 RX ADMIN — Medication 1000 MILLIGRAM(S): at 22:08

## 2023-03-31 RX ADMIN — HYDROMORPHONE HYDROCHLORIDE 4 MILLIGRAM(S): 2 INJECTION INTRAMUSCULAR; INTRAVENOUS; SUBCUTANEOUS at 22:38

## 2023-03-31 RX ADMIN — HYDROMORPHONE HYDROCHLORIDE 4 MILLIGRAM(S): 2 INJECTION INTRAMUSCULAR; INTRAVENOUS; SUBCUTANEOUS at 13:49

## 2023-03-31 RX ADMIN — Medication 1000 MILLIGRAM(S): at 22:41

## 2023-03-31 RX ADMIN — HYDROMORPHONE HYDROCHLORIDE 4 MILLIGRAM(S): 2 INJECTION INTRAMUSCULAR; INTRAVENOUS; SUBCUTANEOUS at 17:38

## 2023-03-31 RX ADMIN — HEPARIN SODIUM 5000 UNIT(S): 5000 INJECTION INTRAVENOUS; SUBCUTANEOUS at 05:44

## 2023-03-31 RX ADMIN — Medication 1000 MILLIGRAM(S): at 14:15

## 2023-03-31 RX ADMIN — LIDOCAINE 1 PATCH: 4 CREAM TOPICAL at 12:07

## 2023-03-31 RX ADMIN — HYDROMORPHONE HYDROCHLORIDE 4 MILLIGRAM(S): 2 INJECTION INTRAMUSCULAR; INTRAVENOUS; SUBCUTANEOUS at 11:01

## 2023-03-31 RX ADMIN — HYDROMORPHONE HYDROCHLORIDE 4 MILLIGRAM(S): 2 INJECTION INTRAMUSCULAR; INTRAVENOUS; SUBCUTANEOUS at 06:16

## 2023-03-31 RX ADMIN — PANTOPRAZOLE SODIUM 40 MILLIGRAM(S): 20 TABLET, DELAYED RELEASE ORAL at 05:45

## 2023-03-31 RX ADMIN — HYDROMORPHONE HYDROCHLORIDE 4 MILLIGRAM(S): 2 INJECTION INTRAMUSCULAR; INTRAVENOUS; SUBCUTANEOUS at 10:26

## 2023-03-31 RX ADMIN — POLYETHYLENE GLYCOL 3350 17 GRAM(S): 17 POWDER, FOR SOLUTION ORAL at 12:07

## 2023-03-31 RX ADMIN — GABAPENTIN 400 MILLIGRAM(S): 400 CAPSULE ORAL at 13:49

## 2023-03-31 RX ADMIN — HYDROMORPHONE HYDROCHLORIDE 4 MILLIGRAM(S): 2 INJECTION INTRAMUSCULAR; INTRAVENOUS; SUBCUTANEOUS at 22:08

## 2023-03-31 RX ADMIN — Medication 3 UNIT(S): at 12:05

## 2023-03-31 RX ADMIN — SIMVASTATIN 40 MILLIGRAM(S): 20 TABLET, FILM COATED ORAL at 22:08

## 2023-03-31 RX ADMIN — Medication 1 MILLIGRAM(S): at 12:07

## 2023-03-31 RX ADMIN — MAGNESIUM HYDROXIDE 30 MILLILITER(S): 400 TABLET, CHEWABLE ORAL at 22:08

## 2023-03-31 RX ADMIN — Medication 3 UNIT(S): at 07:41

## 2023-03-31 RX ADMIN — SENNA PLUS 1 TABLET(S): 8.6 TABLET ORAL at 17:08

## 2023-03-31 RX ADMIN — INSULIN GLARGINE 10 UNIT(S): 100 INJECTION, SOLUTION SUBCUTANEOUS at 22:09

## 2023-03-31 RX ADMIN — DULOXETINE HYDROCHLORIDE 60 MILLIGRAM(S): 30 CAPSULE, DELAYED RELEASE ORAL at 05:44

## 2023-03-31 RX ADMIN — LIDOCAINE 1 PATCH: 4 CREAM TOPICAL at 22:42

## 2023-03-31 RX ADMIN — Medication 1: at 17:05

## 2023-03-31 RX ADMIN — Medication 1000 MILLIGRAM(S): at 13:49

## 2023-03-31 RX ADMIN — HYDROMORPHONE HYDROCHLORIDE 4 MILLIGRAM(S): 2 INJECTION INTRAMUSCULAR; INTRAVENOUS; SUBCUTANEOUS at 17:08

## 2023-03-31 RX ADMIN — GABAPENTIN 400 MILLIGRAM(S): 400 CAPSULE ORAL at 22:09

## 2023-03-31 RX ADMIN — HEPARIN SODIUM 5000 UNIT(S): 5000 INJECTION INTRAVENOUS; SUBCUTANEOUS at 17:08

## 2023-03-31 RX ADMIN — Medication 2: at 12:06

## 2023-03-31 RX ADMIN — Medication 3 UNIT(S): at 17:05

## 2023-03-31 RX ADMIN — DULOXETINE HYDROCHLORIDE 60 MILLIGRAM(S): 30 CAPSULE, DELAYED RELEASE ORAL at 17:04

## 2023-03-31 RX ADMIN — HYDROMORPHONE HYDROCHLORIDE 4 MILLIGRAM(S): 2 INJECTION INTRAMUSCULAR; INTRAVENOUS; SUBCUTANEOUS at 05:46

## 2023-03-31 RX ADMIN — GABAPENTIN 400 MILLIGRAM(S): 400 CAPSULE ORAL at 05:43

## 2023-03-31 NOTE — OCCUPATIONAL THERAPY INITIAL EVALUATION ADULT - ADL RETRAINING, OT EVAL
Patient will dress lower body with minimal assistance, AE as needed with in 2-3 sessions. while in bed

## 2023-03-31 NOTE — PHYSICAL THERAPY INITIAL EVALUATION ADULT - GAIT TRAINING, PT EVAL
Patient will ambulate 50 feet with mod Ax1 and rolling walker within 1-2 weeks for safe household ambulation.

## 2023-03-31 NOTE — PROGRESS NOTE ADULT - SUBJECTIVE AND OBJECTIVE BOX
Physical Medicine and Rehabilitation Subsequent Evaluation    Patient seen in followup for pain management    PAtient seen and examined at bedside. Tolerating regimen revisions well, no adverse effects. Feels pain is better controlled at this time. Has poor bowel movements over past few days., usually goes daily.    Medical studies/laboratory studies reviewed, includin.4    7.45  )-----------( 420      ( 31 Mar 2023 06:00 )             28.1   03-31    140  |  103  |  22  ----------------------------<  153<H>  4.5   |  28  |  0.67    Ca    9.3      31 Mar 2023 06:00  Mg     1.6     03-30    TPro  6.9  /  Alb  2.8<L>  /  TBili  0.2  /  DBili  x   /  AST  39  /  ALT  34  /  AlkPhos  63  -    ROS:  Constitutional: Denies fevers or chills  MSK: complains of right sided knee pain improved however with revisions of regimen      Medications: following revision, reviewed.    Physical Exam:   Vital Signs Last 24 Hrs  T(C): 36.6 (31 Mar 2023 14:04), Max: 36.9 (30 Mar 2023 21:47)  T(F): 97.9 (31 Mar 2023 14:04), Max: 98.5 (30 Mar 2023 21:47)  HR: 98 (31 Mar 2023 14:04) (82 - 98)  BP: 108/70 (31 Mar 2023 14:04) (102/67 - 114/69)  BP(mean): --  RR: 18 (31 Mar 2023 14:04) (18 - 18)  SpO2: 96% (31 Mar 2023 14:04) (95% - 97%)    Parameters below as of 31 Mar 2023 05:13  Patient On (Oxygen Delivery Method): room air        Constitutional: Gen: In no acute distress, cooperative with exam and questioning   CV: Regular rate and rhythm, S1 S2, bilateral lower extremity pitting peripheral edema, pedal pulses intact  Resp: Good respiratory effort, Good air movement all lung fields  Neuro: Cranial Nerves II-XII intact, sensation intact to light touch in peripheral upper and lower extremities  MSK: No cyanosis or clubbing of nails, strength 4-/5 in bilateral upper and lower extremities, ROM full in all extremities passively with some pain on ROM of the right knee, mild tenderness to palpation diffusely but no particular pain of bony prominences, downgoing babinskis bilaterally  Psychiatric: Awake alert fully oriented

## 2023-03-31 NOTE — DISCHARGE NOTE NURSING/CASE MANAGEMENT/SOCIAL WORK - NSDCPEFALRISK_GEN_ALL_CORE
For information on Fall & Injury Prevention, visit: [email or web address] OR  [email or web address] OR  https://www.cdc.gov/steadi/patient.html
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
## 2023-03-31 NOTE — DISCHARGE NOTE PROVIDER - NSDCCAREPROVSEEN_GEN_ALL_CORE_FT
Jennifer, Yazan Du, Daryl Pretty, Leonidas Mcmullen, Lucas Heard, Michael Dacosta, Mohsen Perlman, Craig D Schmuter, Marybeth Head, Santhosh Palacios, Al Dickerson, Randal WILL

## 2023-03-31 NOTE — OCCUPATIONAL THERAPY INITIAL EVALUATION ADULT - PLANNED THERAPY INTERVENTIONS, OT EVAL
Pt will demonstrate improved endurance for sitting at EOB to perform therapeutic activity 2-3 sessions/ADL retraining/bed mobility training/strengthening/transfer training

## 2023-03-31 NOTE — PHYSICAL THERAPY INITIAL EVALUATION ADULT - MANUAL MUSCLE TESTING RESULTS, REHAB EVAL
b/l hip flexion 1/5; b/l knee extension 2/5; left knee flexion 2/5; right knee flexion 1/5; b/l ankle DF/PF 4/5

## 2023-03-31 NOTE — DISCHARGE NOTE PROVIDER - HOSPITAL COURSE
Patient is a 68y Female with pmhx of HTN, HL DMII who presents with right knee pain from a fall a week ago. Patient is s/p microdiscectomy of L5/s1 which was done 11/22 by Dr. Leal. Since that surgery she has been at [facility]. She notes that since the surgery she has declined in her ability to ambulate and is essentially wheechair bound. She notes that in the past few months she has had falls due to losing her balance during transfers from wheelchair to bed. She states that after her fall she was having knee pain and was seen in the ED. Currently her pain is 4/10 worse with movement. Denies CP/SOB/N/V. she does not take any blood thinners at home. She has been seen several times in the last few months for falls and was recommended to see an orthopedist. She notes that she had planned to see Dr. Machado but had issues with her insurance. Since she has been in rehab she states that she saw Dr. Leal once post-operatively. She also notes that due to her progressing weakness in rehab she had seen a neurologist who recommended an MRI of the lumbar spine which was done in february 2023.       Nutritional Assessment:  · Nutritional Assessment  This patient has been assessed with a concern for Malnutrition and has been determined to have a diagnosis/diagnoses of Mild protein-calorie malnutrition.    This patient is being managed with:   Diet Consistent Carbohydrate/No Snacks-  Supplement Feeding Modality:  Oral  Glucerna Shake Cans or Servings Per Day:  1       Frequency:  Daily  Entered: Mar 30 2023  4:28PM    Diet Consistent Carbohydrate/No Snacks-  Entered: Mar 29 2023 10:15PM    The following pending diet order is being considered for treatment of Mild protein-calorie malnutrition:null    Problem/Plan - 1:  ·  Problem: Inability to ambulate due to knee.   ·  Plan: 2/2 to R KNEE CONTUSION ,POA -Pain control PRN  -WBAT   -ICE prn   -Do not remove dressing/splint until follow up in the office.   -DVT ppx as per medicine   -N/V Checks to monitor for compartment signs  -no further orthopedic intervention at this time.   -patient reommended to follow up with an orthopedist (Dr. Du or Jeannette) for further evaluation of the knee upon discharge  -recommended neurologist follow up in regard to worsening lumbar pathology.    Problem/Plan - 2:  ·  Problem: Lumbar radiculopathy.   ·  Plan: pain management , pt/ot   MRI 02/2023  1.  Lumbosacral plexus appears maintained.  2.  Muscle edema may reflect a nonspecific myositis.  3.  Multilevel degenerative disc disease of the lumbar spine with a   degree of congenital spinal stenosis.  4.  Postsurgical changes of L5-S1 with moderate right and severe left   lateral recess narrowing and mild-to-moderate central stenosis.   Nonenhancing left paracentral disc extrusion or fragment is present that   abuts the descending left S1 and S2 nerve root.  5.  Mild to moderate lower lumbar neural foraminal stenosis ispresent.    Problem/Plan - 3:  ·  Problem: Weakness of lower extremity.   ·  Plan: PT/OT ,FALL PRECAUTIONS.    Problem/Plan - 4:  ·  Problem: DM type 2 (diabetes mellitus, type 2).   ·  Plan: Accuchecks monitoring and insulin corrective regimen  sliding scale coverage with short acting inslulin, add longacting insulin as needed ,no concentrated sweets diet, serial labs ,HbA1C,education.    Problem/Plan - 5:  ·  Problem: Psoriasiform eczema.   ·  Plan: topical care.    Problem/Plan - 6:  ·  Problem: Prophylactic measure.   ·  Plan: Gastrointestinal stress ulcer prophylaxis and DVT prophylaxis administered.

## 2023-03-31 NOTE — PROGRESS NOTE ADULT - SUBJECTIVE AND OBJECTIVE BOX
Neurology follow up note    CHRIS [id number]      Interval History:    Patient feels ok no new complaints.    Allergies    apple (Rash)  aspirin (Anaphylaxis)  clindamycin (Unknown)  contrast media (iron oxide-based) (Nephrotoxicity)  fish (Hives)  IV Contrast (Anaphylaxis)  Pears (Rash)  sulfa drugs (Rash)  vancomycin (Rash)  walnut (Anaphylaxis)    Intolerances        MEDICATIONS    acetaminophen     Tablet .. 1000 milliGRAM(s) Oral every 8 hours  dextrose 5%. 1000 milliLiter(s) IV Continuous <Continuous>  dextrose 5%. 1000 milliLiter(s) IV Continuous <Continuous>  dextrose 50% Injectable 25 Gram(s) IV Push once  dextrose 50% Injectable 12.5 Gram(s) IV Push once  dextrose 50% Injectable 25 Gram(s) IV Push once  dextrose Oral Gel 15 Gram(s) Oral once PRN  DULoxetine 60 milliGRAM(s) Oral two times a day  folic acid 1 milliGRAM(s) Oral daily  gabapentin 400 milliGRAM(s) Oral three times a day  glucagon  Injectable 1 milliGRAM(s) IntraMuscular once  heparin   Injectable 5000 Unit(s) SubCutaneous every 12 hours  HYDROmorphone   Tablet 4 milliGRAM(s) Oral every 4 hours  insulin glargine Injectable (LANTUS) 10 Unit(s) SubCutaneous at bedtime  insulin lispro (ADMELOG) corrective regimen sliding scale   SubCutaneous three times a day before meals  insulin lispro (ADMELOG) corrective regimen sliding scale   SubCutaneous at bedtime  insulin lispro Injectable (ADMELOG) 3 Unit(s) SubCutaneous three times a day before meals  lidocaine   4% Patch 1 Patch Transdermal daily  magnesium hydroxide Suspension 30 milliLiter(s) Oral at bedtime  metoprolol succinate ER 75 milliGRAM(s) Oral daily  ondansetron    Tablet 4 milliGRAM(s) Oral every 8 hours PRN  pantoprazole    Tablet 40 milliGRAM(s) Oral before breakfast  polyethylene glycol 3350 17 Gram(s) Oral daily  simvastatin 40 milliGRAM(s) Oral at bedtime              Vital Signs Last 24 Hrs  T(C): 36.8 (31 Mar 2023 05:13), Max: 36.9 (30 Mar 2023 21:47)  T(F): 98.2 (31 Mar 2023 05:13), Max: 98.5 (30 Mar 2023 21:47)  HR: 90 (31 Mar 2023 10:36) (82 - 90)  BP: 106/65 (31 Mar 2023 10:36) (102/67 - 114/69)  BP(mean): --  RR: 18 (31 Mar 2023 10:36) (18 - 18)  SpO2: 95% (31 Mar 2023 10:36) (95% - 97%)    Parameters below as of 31 Mar 2023 05:13  Patient On (Oxygen Delivery Method): room air    REVIEW OF SYSTEMS:  Constitutionally, the patient denies fever, chills, or night sweats.  Head:  No headaches.  Eyes:  No double vision or blurry vision.  Ears:  No ringing in the ears.  Neck:  No neck pain.  Cardiovascular:  No chest pain.  Respiratory:  No shortness of breath.  Abdomen:  No nausea, vomiting, or abdominal pain.  Extremities/Neurological:  Positive history of numbness and tingling.    PHYSICAL EXAMINATION:    HEENT:  Head:  Normocephalic, atraumatic.  Eyes:  No scleral icterus.  Ears:  Hearing bilaterally was intact.  Neck:  Supple.  CARDIOVASCULAR:  S1 and S2 heard.  RESPIRATORY:  Good air entry bilaterally.  ABDOMEN:  Soft, nontender.  EXTREMITIES:  No clubbing or cyanosis was noted.      NEUROLOGIC:  The patient awake, alert, and oriented x3.  Extraocular movements were intact.  Speech was fluent.  Smile symmetric.  Motor, bilateral upper 5/5.  Right lower, no significant movement proximally.  Dorsi and plantar flexion 3/5.  Left lower, slight flexion at the hip, slight flexion at the knee in extension.  Dorsi plantar flexion was 3+/5.  Sensory:  Bilateral upper and lower appeared intact to light touch.                  LABS:  CBC Full  -  ( 31 Mar 2023 06:00 )  WBC Count : 7.45 K/uL  RBC Count : 4.02 M/uL  Hemoglobin : 8.4 g/dL  Hematocrit : 28.1 %  Platelet Count - Automated : 420 K/uL  Mean Cell Volume : 69.9 fl  Mean Cell Hemoglobin : 20.9 pg  Mean Cell Hemoglobin Concentration : 29.9 gm/dL  Auto Neutrophil # : x  Auto Lymphocyte # : x  Auto Monocyte # : x  Auto Eosinophil # : x  Auto Basophil # : x  Auto Neutrophil % : x  Auto Lymphocyte % : x  Auto Monocyte % : x  Auto Eosinophil % : x  Auto Basophil % : x    Urinalysis Basic - ( 30 Mar 2023 07:35 )    Color: Yellow / Appearance: Clear / S.015 / pH: x  Gluc: x / Ketone: Negative  / Bili: Negative / Urobili: Negative mg/dL   Blood: x / Protein: 15 mg/dL / Nitrite: Negative   Leuk Esterase: Negative / RBC: 0-2 /HPF / WBC 0-2   Sq Epi: x / Non Sq Epi: Occasional / Bacteria: Negative          140  |  103  |  22  ----------------------------<  153<H>  4.5   |  28  |  0.67    Ca    9.3      31 Mar 2023 06:00  Mg     1.6         TPro  6.9  /  Alb  2.8<L>  /  TBili  0.2  /  DBili  x   /  AST  39  /  ALT  34  /  AlkPhos  63      Hemoglobin A1C:     LIVER FUNCTIONS - ( 31 Mar 2023 06:00 )  Alb: 2.8 g/dL / Pro: 6.9 g/dL / ALK PHOS: 63 U/L / ALT: 34 U/L DA / AST: 39 U/L / GGT: x           Vitamin B12         RADIOLOGY  CT Lumbar Spine No Cont (23 @ 14:04)   IMPRESSION:    At L5/S1, there is a large posterior disc osteophyte complex with   inferior migration of a calcified disc fragment, resulting in compression   upon the left-sided descending nerve roots and severe left foraminal   stenosis and moderate to severe right foraminal stenosis. This was seen   on prior MRI lumbar spine of 2023.      ANALYSIS AND PLAN:  This is a 68-year-old with episode of fall and history of ataxia.  For episode of ataxia, it appears, upon my conversation with the patient and daughter after surgery back in 2023, she has been unable to ambulate, mostly bed bound, suspect most likely secondary to spinal disc disease.  We will check CT of the spine to make sure no new bleeding has incurred.  For history of diabetes, strict control of blood sugars.  For hypertension, monitor systolic blood pressure.  For hyperlipidemia, continue the patient on statin.  For neuropathy, continue the patient on her gabapentin and Cymbalta.  Fall precautions.  spine follow up as needed   neurologic wise no new changes in exam       Spoke with daughter, Gino, at [phone] 3/31, she understands reasoning and thought process.    Greater than 45 minutes of time was spent with the patient, plan of care, reviewing data, with greater than 50% of the visit was spent counseling and/or coordinating care with multidisciplinary healthcare team

## 2023-03-31 NOTE — OCCUPATIONAL THERAPY INITIAL EVALUATION ADULT - PERTINENT HX OF CURRENT PROBLEM, REHAB EVAL
Patient is a 68y Female with pmhx of HTN, HL DMII who presents with right knee pain from a fall a week ago. Patient is s/p microdiscectomy of L5/s1 which was done 11/22 by Dr. Leal. Since that surgery she has been at [facility]. She notes that since the surgery she has declined in her ability to ambulate and is essentially wheelchair bound. She notes that in the past few months she has had falls due to losing her balance during transfers from wheelchair to bed. She states that after her fall she was having knee pain and was seen in the ED. Currently her pain is 4/10 worse with movement. Denies CP/SOB/N/V. she does not take any blood thinners at home. She has been seen several times in the last few months for falls and was recommended to see an orthopedist. She notes that she had planned to see Dr. Machado but had issues with her insurance. Since she has been in rehab she states that she saw Dr. Leal once post-operatively. She also notes that due to her progressing weakness in rehab she had seen a neurologist who recommended an MRI of the lumbar spine which was done in february 2023.

## 2023-03-31 NOTE — DISCHARGE NOTE PROVIDER - NSDCCPCAREPLAN_GEN_ALL_CORE_FT
PRINCIPAL DISCHARGE DIAGNOSIS  Diagnosis: Inability to ambulate due to knee  Assessment and Plan of Treatment:       SECONDARY DISCHARGE DIAGNOSES  Diagnosis: Weakness of lower extremity  Assessment and Plan of Treatment:     Diagnosis: Lumbar radiculopathy  Assessment and Plan of Treatment:     Diagnosis: DM type 2 (diabetes mellitus, type 2)  Assessment and Plan of Treatment:     Diagnosis: HTN (hypertension)  Assessment and Plan of Treatment:     Diagnosis: Psoriasiform eczema  Assessment and Plan of Treatment:

## 2023-03-31 NOTE — DISCHARGE NOTE PROVIDER - NSDCFUADDINST_GEN_ALL_CORE_FT
As per physiatry /pain management MD -Change Dilaudid to standing order to maintain more consistent serum level of medication, to allow for therapy sessions to be more productive and not as impacted by pain. She can continue the regimen until patient nears discharge from [facility] and at that point can be weaned slowly from Q4H to Q6 to Q8 to Q12, then daily, with dose reductions that can be factored in according to patients tolerance as well.  1)As per physiatry /pain management MD -Change Dilaudid to standing order to maintain more consistent serum level of medication, to allow for therapy sessions to be more productive and not as impacted by pain. She can continue the regimen until patient nears discharge from [facility] and at that point can be weaned slowly from Q4H to Q6 to Q8 to Q12, then daily, with dose reductions that can be factored in according to patients tolerance as well. 2)Please add ADMELOG corrective regimen sliding scale to pre-meal insulins

## 2023-03-31 NOTE — DISCHARGE NOTE NURSING/CASE MANAGEMENT/SOCIAL WORK - PATIENT PORTAL LINK FT
You can access the FollowMyHealth Patient Portal offered by [facility] by registering at the following website: [email or web address]. By joining [facility]’s FollowMyHealth portal, you will also be able to view your health information using other applications (apps) compatible with our system.

## 2023-03-31 NOTE — DISCHARGE NOTE PROVIDER - NSDCFUADDAPPT_GEN_ALL_CORE_FT
As per physiatry /pain management MD -Change Dilaudid to standing order to maintain more consistent serum level of medication, to allow for therapy sessions to be more productive and not as impacted by pain. She can continue the regimen until patient nears discharge from [facility] and at that point can be weaned slowly from Q4H to Q6 to Q8 to Q12, then daily, with dose reductions that can be factored in according to patients tolerance as well.

## 2023-03-31 NOTE — PROGRESS NOTE ADULT - SUBJECTIVE AND OBJECTIVE BOX
Chief Complaint: Fall    Interval Events: No events overnight.    Review of Systems:  General: No fevers, chills, weight gain  Skin: No rashes, color changes  Cardiovascular: No chest pain, orthopnea  Respiratory: No shortness of breath, cough  Gastrointestinal: No nausea, abdominal pain  Genitourinary: No incontinence, pain with urination  Musculoskeletal: No pain, swelling, decreased range of motion  Neurological: No headache, weakness  Psychiatric: No depression, anxiety  Endocrine: No weight gain, increased thirst  All other systems are comprehensively negative.    Physical Exam:  Vitals:        Vital Signs Last 24 Hrs  T(C): 36.8 (31 Mar 2023 05:13), Max: 36.9 (30 Mar 2023 21:47)  T(F): 98.2 (31 Mar 2023 05:13), Max: 98.5 (30 Mar 2023 21:47)  HR: 83 (31 Mar 2023 05:13) (82 - 89)  BP: 111/68 (31 Mar 2023 05:13) (102/67 - 114/69)  BP(mean): --  RR: 18 (31 Mar 2023 05:13) (18 - 18)  SpO2: 95% (31 Mar 2023 05:13) (95% - 97%)  Parameters below as of 31 Mar 2023 05:13  Patient On (Oxygen Delivery Method): room air  General: NAD  HEENT: MMM  Neck: No JVD, no carotid bruit  Lungs: CTAB  CV: RRR, nl S1/S2, no M/R/G  Abdomen: S/NT/ND, +BS  Extremities: No LE edema, no cyanosis  Neuro: AAOx3, non-focal  Skin: No rash    Labs:                        8.4    7.45  )-----------( 420      ( 31 Mar 2023 06:00 )             28.1     03-31    140  |  103  |  22  ----------------------------<  153<H>  4.5   |  28  |  0.67    Ca    9.3      31 Mar 2023 06:00  Mg     1.6     03-30    TPro  6.9  /  Alb  2.8<L>  /  TBili  0.2  /  DBili  x   /  AST  39  /  ALT  34  /  AlkPhos  63  03-31

## 2023-03-31 NOTE — SOCIAL WORK PROGRESS NOTE - NSSWPROGRESSNOTE_GEN_ALL_CORE
Per discussion w/ inpatient interdisciplinary treatment team this AM, patient is medically cleared for transition to next level of care today, 03/31/2023.  sent updated clinical information to patient's sub-acute rehab facility from on admission, [facility], and spoke w/ Tessy PHELPS in the admissions dept who confirmed that patient has been accepted for a resumption of care date for today, 03/31/2023.  then met w/ patient @ bedside on [address] to discuss discharge, and patient reported that she is more comfortable w/ discharge tomorrow (Saturday, 04/01/2023), as her pain medication regimen was just adjusted & she would like to make sure it continues to relieve her pain effectively before she leaves hospital.  therefore notified admissions dept for [facility] of discharge tomorrow, 04/01/2023, @ 14:00 instead. Next,  spoke w/ patient's daughter Federico @ [phone] who confirmed to be understanding of and agreeable to the above discharge plan. Of note, no pre-authorization required for sub-acute rehab as patient's primary health insurance is Medicare.    Afterward,  requested non-emergent ambulance for discharge transport via sending electronic referral to [facility] -- trip assigned to transport provider ARABELLA @ [phone]. Patient reported that she was not comfortable utilizing ambulette transport as she has bilateral leg weakness & does not have her bilateral knee immobilizers, therefore ambulance was utilized due to 8-9/10 pain.    Attending MD Reveles & unit RN Christiano both aware. Packet of clinical information, including non-emergent ambulance transport certification form, left in RN station on unit to accompany patient to receiving facility.  to remain available for any continued needs.

## 2023-03-31 NOTE — DISCHARGE NOTE PROVIDER - PROVIDER TOKENS
PROVIDER:[TOKEN:[[id number]],FOLLOWUP:[2 weeks]],PROVIDER:[TOKEN:[[id number]],FOLLOWUP:[2 weeks]] PROVIDER:[TOKEN:[[id number]],FOLLOWUP:[2 weeks]],PROVIDER:[TOKEN:[[id number]],FOLLOWUP:[2 weeks]],PROVIDER:[TOKEN:[[id number]],FOLLOWUP:[2 weeks]]

## 2023-03-31 NOTE — DISCHARGE NOTE PROVIDER - DETAILS OF MALNUTRITION DIAGNOSIS/DIAGNOSES
This patient has been assessed with a concern for Malnutrition and was treated during this hospitalization for the following Nutrition diagnosis/diagnoses:     -  03/30/2023: Mild protein-calorie malnutrition

## 2023-03-31 NOTE — DISCHARGE NOTE NURSING/CASE MANAGEMENT/SOCIAL WORK - NSDCFUADDAPPT_GEN_ALL_CORE_FT
As per physiatry /pain management MD -Change Dilaudid to standing order to maintain more consistent serum level of medication, to allow for therapy sessions to be more productive and not as impacted by pain. She can continue the regimen until patient nears discharge from [facility] and at that point can be weaned slowly from Q4H to Q6 to Q8 to Q12, then daily, with dose reductions that can be factored in according to patients tolerance as well.

## 2023-03-31 NOTE — DISCHARGE NOTE PROVIDER - NSDCMRMEDTOKEN_GEN_ALL_CORE_FT
acetaminophen 500 mg oral tablet: 2 tab(s) orally every 8 hours  Cymbalta 30 mg oral delayed release capsule: 2 cap(s) orally 2 times a day  folic acid 1 mg oral tablet: 1 tab(s) orally once a day  gabapentin 400 mg oral capsule: 1 cap(s) orally 3 times a day  heparin: 5,000 unit(s) subcutaneous 2 times a day  HYDROmorphone 4 mg oral tablet: 1 tab(s) orally every 4 hours  insulin glargine 100 units/mL subcutaneous solution: 28 unit(s) subcutaneous once a day (at bedtime)  insulin lispro 100 units/mL injectable solution: 16 unit(s) subcutaneously 3 times a day (before meals)  lidocaine 4% patch: Apply topically to affected area once a day  metFORMIN 1000 mg oral tablet: 1 tab(s) orally 2 times a day  metoprolol succinate 25 mg oral tablet, extended release: 3 tab(s) orally once a day  Milk of Magnesia 8% oral suspension: 30 milliliter(s) orally once a day (at bedtime)  pantoprazole 40 mg oral delayed release tablet: 1 tab(s) orally once a day (before a meal)  polyethylene glycol 3350 oral powder for reconstitution: 17 gram(s) orally once a day  simvastatin 40 mg oral tablet: 1 tab(s) orally once a day (at bedtime)  Zofran 4 mg oral tablet: 1 tab(s) orally every 8 hours   acetaminophen 500 mg oral tablet: 2 tab(s) orally every 8 hours  Cymbalta 30 mg oral delayed release capsule: 2 cap(s) orally 2 times a day  folic acid 1 mg oral tablet: 1 tab(s) orally once a day  gabapentin 400 mg oral capsule: 1 cap(s) orally 3 times a day  heparin: 5,000 unit(s) subcutaneous 2 times a day  HYDROmorphone 4 mg oral tablet: 1 tab(s) orally every 4 hours hold for lethargy or RR below 12 /Taper slowly as per PCP to q 6hrs  ,then q 8 hrs and then q 12hrs when approaching discharge from [facility]  insulin glargine 100 units/mL subcutaneous solution: 10 unit(s) subcutaneous once a day (at bedtime)  insulin lispro 100 units/mL injectable solution: 3 unit(s) subcutaneously 3 times a day (before meals)  lidocaine 4% patch: Apply topically to affected area once a day  metFORMIN 1000 mg oral tablet: 1 tab(s) orally 2 times a day  metoprolol succinate 25 mg oral tablet, extended release: 3 tab(s) orally once a day  Milk of Magnesia 8% oral suspension: 30 milliliter(s) orally once a day (at bedtime)  pantoprazole 40 mg oral delayed release tablet: 1 tab(s) orally once a day (before a meal)  polyethylene glycol 3350 oral powder for reconstitution: 17 gram(s) orally once a day  simvastatin 40 mg oral tablet: 1 tab(s) orally once a day (at bedtime)  Zofran 4 mg oral tablet: 1 tab(s) orally every 8 hours   acetaminophen 500 mg oral tablet: 2 tab(s) orally every 8 hours as needed for  mild pain or temp above 100.4  Cymbalta 30 mg oral delayed release capsule: 2 cap(s) orally 2 times a day  folic acid 1 mg oral tablet: 1 tab(s) orally once a day  gabapentin 400 mg oral capsule: 1 cap(s) orally 3 times a day  heparin: 5,000 unit(s) subcutaneous 2 times a day  HYDROmorphone 4 mg oral tablet: 1 tab(s) orally every 4 hours hold for lethargy or RR below 12 /Taper slowly as per PCP to q 6hrs  ,then q 8 hrs and then q 12hrs when approaching discharge from [facility]  insulin glargine 100 units/mL subcutaneous solution: 10 unit(s) subcutaneous once a day (at bedtime)  insulin lispro 100 units/mL injectable solution: 3 unit(s) subcutaneously 3 times a day (before meals)  lidocaine 4% patch: Apply topically to affected area once a day  metFORMIN 1000 mg oral tablet: 1 tab(s) orally 2 times a day  metoprolol succinate 25 mg oral tablet, extended release: 3 tab(s) orally once a day  Milk of Magnesia 8% oral suspension: 30 milliliter(s) orally once a day (at bedtime)  pantoprazole 40 mg oral delayed release tablet: 1 tab(s) orally once a day (before a meal)  polyethylene glycol 3350 oral powder for reconstitution: 17 gram(s) orally once a day  simvastatin 40 mg oral tablet: 1 tab(s) orally once a day (at bedtime)  Zofran 4 mg oral tablet: 1 tab(s) orally every 8 hours   acetaminophen 500 mg oral tablet: 2 tab(s) orally every 8 hours as needed for  mild pain or temp above 100.4  Cymbalta 30 mg oral delayed release capsule: 2 cap(s) orally 2 times a day  folic acid 1 mg oral tablet: 1 tab(s) orally once a day  gabapentin 400 mg oral capsule: 1 cap(s) orally 3 times a day  heparin: 5,000 unit(s) subcutaneous 2 times a day  HYDROmorphone 4 mg oral tablet: 1 tab(s) orally every 4 hours hold for lethargy or RR below 12 /Taper slowly as per PCP to q 6hrs  ,then q 8 hrs and then q 12hrs when approaching discharge from [facility]  insulin glargine 100 units/mL subcutaneous solution: 10 unit(s) subcutaneous once a day (at bedtime)  insulin lispro 100 units/mL injectable solution: 3 unit(s) subcutaneously 3 times a day (before meals)  lidocaine 4% patch: Apply topically to affected area once a day  metFORMIN 1000 mg oral tablet: 1 tab(s) orally 2 times a day  metoprolol succinate 25 mg oral tablet, extended release: 3 tab(s) orally once a day  Milk of Magnesia 8% oral suspension: 30 milliliter(s) orally once a day (at bedtime)  ondansetron 4 mg oral tablet: 1 tab(s) orally every 8 hours As needed Nausea and/or Vomiting  pantoprazole 40 mg oral delayed release tablet: 1 tab(s) orally once a day (before a meal)  polyethylene glycol 3350 oral powder for reconstitution: 17 gram(s) orally once a day  senna leaf extract oral tablet: 1 tab(s) orally 2 times a day  simvastatin 40 mg oral tablet: 1 tab(s) orally once a day (at bedtime)   acetaminophen 500 mg oral tablet: 2 tab(s) orally every 8 hours as needed for  mild pain or temp above 100.4  Cymbalta 30 mg oral delayed release capsule: 2 cap(s) orally 2 times a day  folic acid 1 mg oral tablet: 1 tab(s) orally once a day  gabapentin 400 mg oral capsule: 1 cap(s) orally 3 times a day  heparin: 5,000 unit(s) subcutaneous 2 times a day  HYDROmorphone 4 mg oral tablet: 1 tab(s) orally every 4 hours hold for lethargy or RR below 12 /Taper slowly as per PCP to q 6hrs  ,then q 8 hrs and then q 12hrs when approaching discharge from [facility]  insulin glargine 100 units/mL subcutaneous solution: 13 unit(s) subcutaneous once a day (at bedtime)  insulin lispro 100 units/mL injectable solution: 3 unit(s) subcutaneously 3 times a day (before meals)  lidocaine 4% patch: Apply topically to affected area once a day  melatonin 3 mg oral tablet: 1 tab(s) orally once a day (at bedtime)  metFORMIN 1000 mg oral tablet: 1 tab(s) orally 2 times a day  metoprolol succinate 25 mg oral tablet, extended release: 3 tab(s) orally once a day  Milk of Magnesia 8% oral suspension: 30 milliliter(s) orally once a day (at bedtime)  ondansetron 4 mg oral tablet: 1 tab(s) orally every 8 hours As needed Nausea and/or Vomiting  pantoprazole 40 mg oral delayed release tablet: 1 tab(s) orally once a day (before a meal)  polyethylene glycol 3350 oral powder for reconstitution: 17 gram(s) orally once a day  senna leaf extract oral tablet: 1 tab(s) orally 2 times a day  simvastatin 40 mg oral tablet: 1 tab(s) orally once a day (at bedtime)

## 2023-03-31 NOTE — PHYSICAL THERAPY INITIAL EVALUATION ADULT - PERTINENT HX OF CURRENT PROBLEM, REHAB EVAL
Pt is a 67 y/o female who is admitted with complaints of right sided knee pain following a fall one week prior to presentation. Of note patient is s/p microdiscectomy of L5/S1 after which patient was discharged to [facility]. Patient has been having increased difficulty with ambulation and having recurrent falls. 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 Patient with knee contusion noted on exam. CT spine (3/30): At L5/S1, there is a large posterior disc osteophyte complex with inferior migration of a calcified disc fragment, resulting in compression upon the left-sided descending nerve roots and severe left foraminal stenosis and moderate to severe right foraminal stenosis. This was seen on prior MRI lumbar spine of 2/14/2023.

## 2023-03-31 NOTE — PHYSICAL THERAPY INITIAL EVALUATION ADULT - ADDITIONAL COMMENTS
Pt arrived from [facility]. [facility] and then transitioning to a new intermediate. PTA pt had been requiring assistance for bed to wheelchair transfers and ambulation with RW. Pt reports for OOB mobility she requires b/l knee immobilizers due to b/l knee buckling.

## 2023-03-31 NOTE — CAREGIVER ENGAGEMENT NOTE - CAREGIVER EDUCATION NOTES - FREE TEXT
Per discussion w/ inpatient interdisciplinary treatment team this AM, patient is medically cleared for transition to next level of care today, 03/31/2023.  sent updated clinical information to patient's sub-acute rehab facility from on admission, [facility], and spoke w/ Tessy PHELPS in the admissions dept who confirmed that patient has been accepted for a resumption of care date for today, 03/31/2023.  then met w/ patient @ bedside on unit [facility] to discuss discharge, and patient reported that she is more comfortable w/ discharge tomorrow (Saturday, 04/01/2023), as her pain medication regimen was just adjusted & she would like to make sure it continues to relieve her pain effectively before she leaves hospital.  therefore notified admissions dept for [facility] of discharge tomorrow, 04/01/2023, @ 14:00 instead. Next,  spoke w/ patient's daughter Federico @ [phone] who confirmed to be understanding of and agreeable to the above discharge plan. Of note, no pre-authorization required for sub-acute rehab as patient's primary health insurance is Medicare.    Afterward,  requested non-emergent ambulance for discharge transport via sending electronic referral to [facility] -- trip assigned to transport provider ARABELLA @ [phone]. Patient reported that she was not comfortable utilizing ambulette transport as she has bilateral leg weakness & does not have her bilateral knee immobilizers, therefore ambulance was utilized due to 8-9/10 pain.    Attending MD Reveles & unit RN Christiano both aware. Packet of clinical information, including non-emergent ambulance transport certification form, left in RN station on unit to accompany patient to receiving facility.  to remain available for any continued needs.
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
## 2023-03-31 NOTE — OCCUPATIONAL THERAPY INITIAL EVALUATION ADULT - ADDITIONAL COMMENTS
Admitted from [facility] [place].  s/p fall transferring to w/c. Pt wears braces bilateral knees due to buckling and hx of falls. Pt has therapy 2 hrs a day and is otherwise in bed or wheelchair. States she sponges bathes herself and can do basic ADL in bed.

## 2023-03-31 NOTE — DISCHARGE NOTE PROVIDER - CARE PROVIDERS DIRECT ADDRESSES
,DirectAddress_Unknown,DirectAddress_Unknown ,DirectAddress_Unknown,DirectAddress_Unknown,[email or web address]

## 2023-03-31 NOTE — DISCHARGE NOTE PROVIDER - CARE PROVIDER_API CALL
Daryl Du)  Orthopaedic Surgery  [address] [place] [address]  Phone: [phone]  Fax: [phone]  Follow Up Time: 2 weeks    Michael Heard (DO)  PhysicalRehab Medicine  [address] [place] [address]  Phone: [phone]  Fax: [phone]  Follow Up Time: 2 weeks   Daryl Du)  Orthopaedic Surgery  [address] [place] [address]  Phone: [phone]  Fax: [phone]  Follow Up Time: 2 weeks    Michael Heard (DO)  PhysicalRehab Medicine  [address] [place] [address]  Phone: [phone]  Fax: [phone]  Follow Up Time: 2 weeks    Hudson Leal)  Orthopaedic Surgery  [address] [place] [address] [address]  [place] [address]  Phone: [phone]  Fax: [phone]  Follow Up Time: 2 weeks

## 2023-04-01 LAB
ANION GAP SERPL CALC-SCNC: 10 MMOL/L — SIGNIFICANT CHANGE UP (ref 5–17)
BUN SERPL-MCNC: 25 MG/DL — HIGH (ref 7–23)
CALCIUM SERPL-MCNC: 8.9 MG/DL — SIGNIFICANT CHANGE UP (ref 8.4–10.5)
CHLORIDE SERPL-SCNC: 101 MMOL/L — SIGNIFICANT CHANGE UP (ref 96–108)
CO2 SERPL-SCNC: 27 MMOL/L — SIGNIFICANT CHANGE UP (ref 22–31)
CREAT SERPL-MCNC: 0.65 MG/DL — SIGNIFICANT CHANGE UP (ref 0.5–1.3)
CULTURE RESULTS: SIGNIFICANT CHANGE UP
EGFR: 95 ML/MIN/1.73M2 — SIGNIFICANT CHANGE UP
GLUCOSE BLDC GLUCOMTR-MCNC: 127 MG/DL — HIGH (ref 70–99)
GLUCOSE BLDC GLUCOMTR-MCNC: 199 MG/DL — HIGH (ref 70–99)
GLUCOSE BLDC GLUCOMTR-MCNC: 201 MG/DL — HIGH (ref 70–99)
GLUCOSE BLDC GLUCOMTR-MCNC: 421 MG/DL — HIGH (ref 70–99)
GLUCOSE BLDC GLUCOMTR-MCNC: 450 MG/DL — CRITICAL HIGH (ref 70–99)
GLUCOSE SERPL-MCNC: 172 MG/DL — HIGH (ref 70–99)
HCT VFR BLD CALC: 27 % — LOW (ref 34.5–45)
HGB BLD-MCNC: 8.1 G/DL — LOW (ref 11.5–15.5)
MCHC RBC-ENTMCNC: 20.8 PG — LOW (ref 27–34)
MCHC RBC-ENTMCNC: 30 GM/DL — LOW (ref 32–36)
MCV RBC AUTO: 69.2 FL — LOW (ref 80–100)
NRBC # BLD: 0 /100 WBCS — SIGNIFICANT CHANGE UP (ref 0–0)
PLATELET # BLD AUTO: 381 K/UL — SIGNIFICANT CHANGE UP (ref 150–400)
POTASSIUM SERPL-MCNC: 5.1 MMOL/L — SIGNIFICANT CHANGE UP (ref 3.5–5.3)
POTASSIUM SERPL-SCNC: 5.1 MMOL/L — SIGNIFICANT CHANGE UP (ref 3.5–5.3)
RBC # BLD: 3.9 M/UL — SIGNIFICANT CHANGE UP (ref 3.8–5.2)
RBC # FLD: 18.8 % — HIGH (ref 10.3–14.5)
SODIUM SERPL-SCNC: 138 MMOL/L — SIGNIFICANT CHANGE UP (ref 135–145)
SPECIMEN SOURCE: SIGNIFICANT CHANGE UP
WBC # BLD: 7.36 K/UL — SIGNIFICANT CHANGE UP (ref 3.8–10.5)
WBC # FLD AUTO: 7.36 K/UL — SIGNIFICANT CHANGE UP (ref 3.8–10.5)

## 2023-04-01 RX ORDER — INSULIN LISPRO 100/ML
VIAL (ML) SUBCUTANEOUS
Refills: 0 | Status: DISCONTINUED | OUTPATIENT
Start: 2023-04-01 | End: 2023-04-03

## 2023-04-01 RX ORDER — INSULIN LISPRO 100/ML
4 VIAL (ML) SUBCUTANEOUS
Refills: 0 | Status: DISCONTINUED | OUTPATIENT
Start: 2023-04-01 | End: 2023-04-03

## 2023-04-01 RX ORDER — INSULIN GLARGINE 100 [IU]/ML
13 INJECTION, SOLUTION SUBCUTANEOUS AT BEDTIME
Refills: 0 | Status: DISCONTINUED | OUTPATIENT
Start: 2023-04-01 | End: 2023-04-03

## 2023-04-01 RX ORDER — ONDANSETRON 8 MG/1
1 TABLET, FILM COATED ORAL
Qty: 0 | Refills: 0 | DISCHARGE
Start: 2023-04-01

## 2023-04-01 RX ORDER — SENNA PLUS 8.6 MG/1
1 TABLET ORAL
Qty: 0 | Refills: 0 | DISCHARGE
Start: 2023-04-01

## 2023-04-01 RX ORDER — ONDANSETRON 8 MG/1
1 TABLET, FILM COATED ORAL
Qty: 0 | Refills: 0 | DISCHARGE

## 2023-04-01 RX ORDER — INSULIN LISPRO 100/ML
8 VIAL (ML) SUBCUTANEOUS ONCE
Refills: 0 | Status: COMPLETED | OUTPATIENT
Start: 2023-04-01 | End: 2023-04-01

## 2023-04-01 RX ADMIN — HYDROMORPHONE HYDROCHLORIDE 4 MILLIGRAM(S): 2 INJECTION INTRAMUSCULAR; INTRAVENOUS; SUBCUTANEOUS at 15:10

## 2023-04-01 RX ADMIN — DULOXETINE HYDROCHLORIDE 60 MILLIGRAM(S): 30 CAPSULE, DELAYED RELEASE ORAL at 05:57

## 2023-04-01 RX ADMIN — HYDROMORPHONE HYDROCHLORIDE 4 MILLIGRAM(S): 2 INJECTION INTRAMUSCULAR; INTRAVENOUS; SUBCUTANEOUS at 06:00

## 2023-04-01 RX ADMIN — PANTOPRAZOLE SODIUM 40 MILLIGRAM(S): 20 TABLET, DELAYED RELEASE ORAL at 05:57

## 2023-04-01 RX ADMIN — SIMVASTATIN 40 MILLIGRAM(S): 20 TABLET, FILM COATED ORAL at 21:18

## 2023-04-01 RX ADMIN — LIDOCAINE 1 PATCH: 4 CREAM TOPICAL at 00:43

## 2023-04-01 RX ADMIN — Medication 75 MILLIGRAM(S): at 05:56

## 2023-04-01 RX ADMIN — SENNA PLUS 1 TABLET(S): 8.6 TABLET ORAL at 18:15

## 2023-04-01 RX ADMIN — SENNA PLUS 1 TABLET(S): 8.6 TABLET ORAL at 05:56

## 2023-04-01 RX ADMIN — Medication 3 UNIT(S): at 07:49

## 2023-04-01 RX ADMIN — GABAPENTIN 400 MILLIGRAM(S): 400 CAPSULE ORAL at 05:57

## 2023-04-01 RX ADMIN — Medication 1000 MILLIGRAM(S): at 06:00

## 2023-04-01 RX ADMIN — HYDROMORPHONE HYDROCHLORIDE 4 MILLIGRAM(S): 2 INJECTION INTRAMUSCULAR; INTRAVENOUS; SUBCUTANEOUS at 05:56

## 2023-04-01 RX ADMIN — HYDROMORPHONE HYDROCHLORIDE 4 MILLIGRAM(S): 2 INJECTION INTRAMUSCULAR; INTRAVENOUS; SUBCUTANEOUS at 18:40

## 2023-04-01 RX ADMIN — Medication 1000 MILLIGRAM(S): at 21:15

## 2023-04-01 RX ADMIN — LIDOCAINE 1 PATCH: 4 CREAM TOPICAL at 12:00

## 2023-04-01 RX ADMIN — HYDROMORPHONE HYDROCHLORIDE 4 MILLIGRAM(S): 2 INJECTION INTRAMUSCULAR; INTRAVENOUS; SUBCUTANEOUS at 18:13

## 2023-04-01 RX ADMIN — HEPARIN SODIUM 5000 UNIT(S): 5000 INJECTION INTRAVENOUS; SUBCUTANEOUS at 05:56

## 2023-04-01 RX ADMIN — LIDOCAINE 1 PATCH: 4 CREAM TOPICAL at 18:19

## 2023-04-01 RX ADMIN — Medication 8 UNIT(S): at 12:11

## 2023-04-01 RX ADMIN — Medication 1000 MILLIGRAM(S): at 14:44

## 2023-04-01 RX ADMIN — HYDROMORPHONE HYDROCHLORIDE 4 MILLIGRAM(S): 2 INJECTION INTRAMUSCULAR; INTRAVENOUS; SUBCUTANEOUS at 21:18

## 2023-04-01 RX ADMIN — LIDOCAINE 1 PATCH: 4 CREAM TOPICAL at 23:12

## 2023-04-01 RX ADMIN — Medication 1000 MILLIGRAM(S): at 05:57

## 2023-04-01 RX ADMIN — Medication 1 MILLIGRAM(S): at 12:11

## 2023-04-01 RX ADMIN — HEPARIN SODIUM 5000 UNIT(S): 5000 INJECTION INTRAVENOUS; SUBCUTANEOUS at 18:14

## 2023-04-01 RX ADMIN — Medication 1000 MILLIGRAM(S): at 15:10

## 2023-04-01 RX ADMIN — Medication 1000 MILLIGRAM(S): at 20:45

## 2023-04-01 RX ADMIN — HYDROMORPHONE HYDROCHLORIDE 4 MILLIGRAM(S): 2 INJECTION INTRAMUSCULAR; INTRAVENOUS; SUBCUTANEOUS at 14:43

## 2023-04-01 RX ADMIN — GABAPENTIN 400 MILLIGRAM(S): 400 CAPSULE ORAL at 21:16

## 2023-04-01 RX ADMIN — HYDROMORPHONE HYDROCHLORIDE 4 MILLIGRAM(S): 2 INJECTION INTRAMUSCULAR; INTRAVENOUS; SUBCUTANEOUS at 21:48

## 2023-04-01 RX ADMIN — Medication 4 UNIT(S): at 16:44

## 2023-04-01 RX ADMIN — INSULIN GLARGINE 13 UNIT(S): 100 INJECTION, SOLUTION SUBCUTANEOUS at 21:17

## 2023-04-01 RX ADMIN — GABAPENTIN 400 MILLIGRAM(S): 400 CAPSULE ORAL at 14:44

## 2023-04-01 RX ADMIN — Medication 2: at 07:48

## 2023-04-01 RX ADMIN — DULOXETINE HYDROCHLORIDE 60 MILLIGRAM(S): 30 CAPSULE, DELAYED RELEASE ORAL at 18:14

## 2023-04-01 RX ADMIN — Medication 3 UNIT(S): at 11:59

## 2023-04-01 RX ADMIN — POLYETHYLENE GLYCOL 3350 17 GRAM(S): 17 POWDER, FOR SOLUTION ORAL at 12:02

## 2023-04-01 NOTE — PROGRESS NOTE ADULT - SUBJECTIVE AND OBJECTIVE BOX
Patient is a 68y Female with a known history of :  Type 2 diabetes mellitus [E11.9]    Contusion of right knee [S80.01XA]    Weakness of lower extremity [R29.898]    HTN (hypertension) [I10]    DM type 2 (diabetes mellitus, type 2) [E11.9]    Psoriasiform eczema [L30.8]    Prophylactic measure [Z29.9]    Lumbar radiculopathy [M54.16]    Hyperkalemia [E87.5]    Inability to ambulate due to knee [R26.2]    Right knee pain [M25.561]    Therapeutic opioid induced constipation [K59.03]      HPI:  Patient is a 68y Female with pmhx of HTN, HL DMII who presents with right knee pain from a fall a week ago. Patient is s/p microdiscectomy of L5/s1 which was done 11/22 by Dr. Leal. Since that surgery she has been at [facility]. She notes that since the surgery she has declined in her ability to ambulate and is essentially wheechair bound. She notes that in the past few months she has had falls due to losing her balance during transfers from wheelchair to bed. She states that after her fall she was having knee pain and was seen in the ED. Currently her pain is 4/10 worse with movement. Denies CP/SOB/N/V. she does not take any blood thinners at home. She has been seen several times in the last few months for falls and was recommended to see an orthopedist. She notes that she had planned to see Dr. Machado but had issues with her insurance. Since she has been in rehab she states that she saw Dr. Leal once post-operatively. She also notes that due to her progressing weakness in rehab she had seen a neurologist who recommended an MRI of the lumbar spine which was done in february 2023.     < from: CT Lumbar Spine No Cont (03.30.23 @ 14:04) >    ACC: [id number] EXAM:  CT LUMBAR SPINE   ORDERED BY: AARTI HASKINS     PROCEDURE DATE:  03/30/2023          INTERPRETATION:  CT lumbar spine without IV contrast    CLINICAL INFORMATION: leg weakness    TECHNIQUE:  Contiguous axial sections were obtained through the lumbar   spine.   Additional sagittal and coronal reformats were obtained.    FINDINGS: Comparison is made to MRI lumbar of 2/14/2023.    Chronic fracture of the anterior superior corner of the L3 vertebral body   with an associated degenerative Schmorl node is reidentified without   change. No new fracture is identified.    Alignment is maintained.    At T12/L1 through L4/L5, there are small disc bulges without significant   foraminal or central spinal canal stenosis.    At L5/S1, there is a large posterior disc osteophyte complex with   inferior migration of a calcified disc fragment, resulting in compression   upon the left-sided descending nerve roots and severe left foraminal   stenosis and moderate to severe right foraminal stenosis.    Postsurgical changes in the left posterior paraspinal subcutaneous   tissues.      IMPRESSION:    At L5/S1, there is a large posterior disc osteophyte complex with   inferior migration of a calcified disc fragment, resulting in compression   upon the left-sided descending nerve roots and severe left foraminal   stenosis and moderate to severe right foraminal stenosis. This was seen   on prior MRI lumbar spine of 2/14/2023.    --- End of Report ---            CORINNE BASURTO MD; Attending Radiologist  This document has been electronically signed. Mar 30 2023  4:11PM    < end of copied text >  < from: Xray Chest 1 View- PORTABLE-Routine (Xray Chest 1 View- PORTABLE-Routine .) (03.30.23 @ 08:01) >    ACC: [id number] EXAM:  XR CHEST PORTABLE ROUTINE 1V   ORDERED BY: AMY ANDERSON     PROCEDURE DATE:  03/30/2023          INTERPRETATION:  AP semierect chest on March 30, 2023 at 7:03 AM. Patient   had right leg trauma.    Heart size normal for projection.    Lungs remain clear. No fracture.    Chest is similar to February 9 this year.    IMPRESSION: No acute finding or change.    --- End of Report ---            KARLA LOWRY MD; Attending Radiologist  This document has been electronically signed. Mar 30 2023 10:43AM    < end of copied text >     (29 Mar 2023 21:46)      REVIEW OF SYSTEMS:    CONSTITUTIONAL: No fever, weight loss, or fatigue  EYES: No eye pain, visual disturbances, or discharge  ENMT:  No difficulty hearing, tinnitus, vertigo; No sinus or throat pain  NECK: No pain or stiffness  BREASTS: No pain, masses, or nipple discharge  RESPIRATORY: No cough, wheezing, chills or hemoptysis; No shortness of breath  CARDIOVASCULAR: No chest pain, palpitations, dizziness, or leg swelling  GASTROINTESTINAL: No abdominal or epigastric pain. No nausea, vomiting, or hematemesis; No diarrhea or constipation. No melena or hematochezia.  GENITOURINARY: No dysuria, frequency, hematuria, or incontinence  NEUROLOGICAL: No headaches, memory loss, loss of strength, numbness, or tremors  SKIN: No itching, burning, rashes, or lesions   LYMPH NODES: No enlarged glands  ENDOCRINE: No heat or cold intolerance; No hair loss  MUSCULOSKELETAL: No joint pain or swelling; No muscle, back, or extremity pain  PSYCHIATRIC: No depression, anxiety, mood swings, or difficulty sleeping  HEME/LYMPH: No easy bruising, or bleeding gums  ALLERGY AND IMMUNOLOGIC: No hives or eczema    MEDICATIONS  (STANDING):  acetaminophen     Tablet .. 1000 milliGRAM(s) Oral every 8 hours  dextrose 5%. 1000 milliLiter(s) (50 mL/Hr) IV Continuous <Continuous>  dextrose 5%. 1000 milliLiter(s) (100 mL/Hr) IV Continuous <Continuous>  dextrose 50% Injectable 25 Gram(s) IV Push once  dextrose 50% Injectable 12.5 Gram(s) IV Push once  dextrose 50% Injectable 25 Gram(s) IV Push once  DULoxetine 60 milliGRAM(s) Oral two times a day  folic acid 1 milliGRAM(s) Oral daily  gabapentin 400 milliGRAM(s) Oral three times a day  glucagon  Injectable 1 milliGRAM(s) IntraMuscular once  heparin   Injectable 5000 Unit(s) SubCutaneous every 12 hours  HYDROmorphone   Tablet 4 milliGRAM(s) Oral every 4 hours  insulin glargine Injectable (LANTUS) 10 Unit(s) SubCutaneous at bedtime  insulin lispro (ADMELOG) corrective regimen sliding scale   SubCutaneous three times a day before meals  insulin lispro (ADMELOG) corrective regimen sliding scale   SubCutaneous at bedtime  insulin lispro Injectable (ADMELOG) 3 Unit(s) SubCutaneous three times a day before meals  lidocaine   4% Patch 1 Patch Transdermal daily  magnesium hydroxide Suspension 30 milliLiter(s) Oral at bedtime  metoprolol succinate ER 75 milliGRAM(s) Oral daily  pantoprazole    Tablet 40 milliGRAM(s) Oral before breakfast  polyethylene glycol 3350 17 Gram(s) Oral daily  senna 1 Tablet(s) Oral two times a day  simvastatin 40 milliGRAM(s) Oral at bedtime    MEDICATIONS  (PRN):  dextrose Oral Gel 15 Gram(s) Oral once PRN Blood Glucose LESS THAN 70 milliGRAM(s)/deciliter  ondansetron    Tablet 4 milliGRAM(s) Oral every 8 hours PRN Nausea and/or Vomiting      ALLERGIES: apple (Rash)  aspirin (Anaphylaxis)  clindamycin (Unknown)  contrast media (iron oxide-based) (Nephrotoxicity)  fish (Hives)  IV Contrast (Anaphylaxis)  Pears (Rash)  sulfa drugs (Rash)  vancomycin (Rash)  walnut (Anaphylaxis)      FAMILY HISTORY:  Family history of cerebrovascular accident (CVA) (Mother, Sibling)    Family history of coronary artery disease (Mother, Father)    FH: diabetes mellitus    FH: hypertension        Social history:  Alochol:   Smoking:   Drug Use:   Marital Status:     PHYSICAL EXAMINATION:  -----------------------------  T(C): 36.9 (04-01-23 @ 05:00), Max: 36.9 (03-31-23 @ 21:45)  HR: 96 (04-01-23 @ 05:00) (88 - 98)  BP: 138/78 (04-01-23 @ 05:00) (108/70 - 138/78)  RR: 16 (04-01-23 @ 05:00) (16 - 18)  SpO2: 99% (04-01-23 @ 05:00) (95% - 99%)  Wt(kg): --    03-31 @ 07:01  -  04-01 @ 07:00  --------------------------------------------------------  IN:  Total IN: 0 mL    OUT:    Voided (mL): 1150 mL  Total OUT: 1150 mL    Total NET: -1150 mL            Constitutional: well developed, normal appearance, well groomed, well nourished, no deformities and no acute distress.   Eyes: the conjunctiva exhibited no abnormalities and the eyelids demonstrated no xanthelasmas.   HEENT: normal oral mucosa, no oral pallor and no oral cyanosis.   Neck: normal jugular venous A waves present, normal jugular venous V waves present and no jugular venous milton A waves.   Pulmonary: no respiratory distress, normal respiratory rhythm and effort, no accessory muscle use and lungs were clear to auscultation bilaterally. Anteriorly  Cardiovascular: heart rate and rhythm were normal, normal S1 and S2 and no murmur, gallop, rub, heave or thrill are present.   Musculoskeletal: the gait could not be assessed.   Extremities: no clubbing of the fingernails, no localized cyanosis, no petechial hemorrhages and no ischemic changes.   Skin: normal skin color and pigmentation, no rash, no venous stasis, no skin lesions, no skin ulcer and no xanthoma was observed.       LABS:   --------  04-01    138  |  101  |  25<H>  ----------------------------<  172<H>  5.1   |  27  |  0.65    Ca    8.9      01 Apr 2023 06:00    TPro  6.9  /  Alb  2.8<L>  /  TBili  0.2  /  DBili  x   /  AST  39  /  ALT  34  /  AlkPhos  63  03-31                         8.1    7.36  )-----------( 381      ( 01 Apr 2023 06:00 )             27.0                   Radiology:    < from: Transthoracic Echocardiogram (02.10.23 @ 09:49) >    Patient name: CHRIS ALAN  YOB: 1955   Age: 67 (F)   MR#: [id number]  Study Date: 2/10/2023  [facility]ust[facility]  Study quality: Technically difficult  Referring Physician: Gray Meade MD  Blood Pressure: 125/77 mmHg  Height: 142 cm  Weight: 44 kg  BSA: 1.3 m2  Heart Rate: 120 mmHg  ------------------------------------------------------------------------  PROCEDURE: Transthoracic echocardiogram with 2-D, M-Mode  and complete spectral and color flow Doppler.  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 There is no mitral  regurgitation.  Aortic Valve/Aorta: Normal trileaflet aortic valve. Peak  transaortic valve gradient equals 7mm Hg, estimated aortic  valve area equals 2.2 sqcm. Minimal aortic regurgitation.  Peak left ventricular outflow tract gradient equals 4 mm  Hg, mean gradient is equal to 2 mm Hg, LVOT velocity time  integral equals 18 cm.  Aortic Root: 3.1 cm.  Ascending Aorta: 3 cm.  LVOT diameter: 1.9 cm.  Left Atrium: LA volume index = 13 cc/m2.  Left Ventricle: Normal left ventricular systolic function.  No segmental wall motion abnormalities.  LVEF calculated  using biplane Rene's method is 67%. No regional wall  motion abnormalities. Normal left ventricular internal  dimensions and wall thicknesses. Normal diastolic function.  Right Heart: Normal right atrium. Normal right ventricular  size and function. Normal tricuspid valve. Minimal  tricuspid regurgitation. Normal pulmonic valve. No pulmonic  regurgitation.  Pericardium/Pleura: No pericardial effusion.  Hemodynamic: Estimated right atrial pressure is 8 mm Hg.  Estimated right ventricular systolic pressure equals 11 mm  Hg, assuming right atrial pressure equals 8 mm Hg,  consistent with normal pulmonary pressures. Color Doppler  demonstrates no evidence of a patent foramen ovale.  ------------------------------------------------------------------------  Conclusions:  1. Normal mitral valve. There is no mitral regurgitation.  2. Normal trileaflet aortic valve. Minimal aortic  regurgitation.  3. Normal left ventricular systolic function. No segmental  wall motion abnormalities.  LVEF calculated using biplane  Rene's method is 67%.  4. Normal diastolic function.  5. Normal right ventricular size and function.  6. No pericardial effusion.  7. No evidence of valvular vegetation is seen.  *** No previous Echo exam.  ------------------------------------------------------------------------  Revised on 2/10/2023 - 3:43 PM by Abraham Schroeder M.D.  ------------------------------------------------------------------------    < end of copied text >

## 2023-04-01 NOTE — PROGRESS NOTE ADULT - SUBJECTIVE AND OBJECTIVE BOX
Physical Medicine and Rehabilitation Subsequent Evaluation    Patient seen in followup for pain management, functional deficits    Patient seen and examined at bedside. Tolerating regimen revisions well still with improved pain control, but the morning dose of dilaudid was held for somnolence. Patient being followed by neurology as well, no neurological changes noted on todays exam on review of notes.    Medical studies/laboratory studies reviewed, includin.1    7.36  )-----------( 381      ( 2023 06:00 )             27.0   04-    138  |  101  |  25<H>  ----------------------------<  172<H>  5.1   |  27  |  0.65    Ca    8.9      2023 06:00    TPro  6.9  /  Alb  2.8<L>  /  TBili  0.2  /  DBili  x   /  AST  39  /  ALT  34  /  AlkPhos  63  03-31      ROS:  Constitutional: Denies fevers or chills  MSK: right knee pain is improved      Medications: following revision, reviewed.    Physical Exam:   Vital Signs Last 24 Hrs  T(C): 36.7 (2023 21:05), Max: 36.9 (2023 05:00)  T(F): 98.1 (2023 21:05), Max: 98.4 (2023 05:00)  HR: 83 (2023 21:05) (83 - 96)  BP: 106/66 (2023 21:05) (100/64 - 138/78)  BP(mean): --  RR: 18 (2023 21:05) (16 - 18)  SpO2: 96% (2023 21:05) (96% - 99%)    Parameters below as of 2023 21:05  Patient On (Oxygen Delivery Method): room air          Constitutional: Gen: In no acute distress, cooperative with exam and questioning   CV: Regular rate and rhythm, S1 S2, bilateral lower extremity pitting peripheral edema, pedal pulses intact  Resp: Good respiratory effort, Good air movement all lung fields  Neuro: Cranial Nerves II-XII intact, sensation intact to light touch in peripheral upper and lower extremities  MSK: No cyanosis or clubbing of nails, strength 4-/5 in bilateral upper and lower extremities, ROM full in all extremities passively with some pain on ROM of the right knee, mild tenderness to palpation diffusely but no particular pain of bony prominences, downgoing babinskis bilaterally  Psychiatric: Awake alert fully oriented

## 2023-04-01 NOTE — SOCIAL WORK PROGRESS NOTE - NSSWPROGRESSNOTE_GEN_ALL_CORE
Per Dr Reveles, pt is not stable for dc due to her blood sugar, and pt's lethargy. SW cancelled the ambulance and notified [facility], as well as left a message for the pt's dtr Gino.

## 2023-04-01 NOTE — PROGRESS NOTE ADULT - SUBJECTIVE AND OBJECTIVE BOX
Neurology follow up note    CHRIS [id number]      Interval History:    Patient feels occ pain  Allergies    apple (Rash)  aspirin (Anaphylaxis)  clindamycin (Unknown)  contrast media (iron oxide-based) (Nephrotoxicity)  fish (Hives)  IV Contrast (Anaphylaxis)  Pears (Rash)  sulfa drugs (Rash)  vancomycin (Rash)  walnut (Anaphylaxis)    Intolerances        MEDICATIONS    acetaminophen     Tablet .. 1000 milliGRAM(s) Oral every 8 hours  dextrose 5%. 1000 milliLiter(s) IV Continuous <Continuous>  dextrose 5%. 1000 milliLiter(s) IV Continuous <Continuous>  dextrose 50% Injectable 25 Gram(s) IV Push once  dextrose 50% Injectable 12.5 Gram(s) IV Push once  dextrose 50% Injectable 25 Gram(s) IV Push once  dextrose Oral Gel 15 Gram(s) Oral once PRN  DULoxetine 60 milliGRAM(s) Oral two times a day  folic acid 1 milliGRAM(s) Oral daily  gabapentin 400 milliGRAM(s) Oral three times a day  glucagon  Injectable 1 milliGRAM(s) IntraMuscular once  heparin   Injectable 5000 Unit(s) SubCutaneous every 12 hours  HYDROmorphone   Tablet 4 milliGRAM(s) Oral every 4 hours  insulin glargine Injectable (LANTUS) 10 Unit(s) SubCutaneous at bedtime  insulin lispro (ADMELOG) corrective regimen sliding scale   SubCutaneous three times a day before meals  insulin lispro (ADMELOG) corrective regimen sliding scale   SubCutaneous at bedtime  insulin lispro Injectable (ADMELOG) 3 Unit(s) SubCutaneous three times a day before meals  lidocaine   4% Patch 1 Patch Transdermal daily  magnesium hydroxide Suspension 30 milliLiter(s) Oral at bedtime  metoprolol succinate ER 75 milliGRAM(s) Oral daily  ondansetron    Tablet 4 milliGRAM(s) Oral every 8 hours PRN  pantoprazole    Tablet 40 milliGRAM(s) Oral before breakfast  polyethylene glycol 3350 17 Gram(s) Oral daily  senna 1 Tablet(s) Oral two times a day  simvastatin 40 milliGRAM(s) Oral at bedtime              Vital Signs Last 24 Hrs  T(C): 36.9 (01 Apr 2023 05:00), Max: 36.9 (31 Mar 2023 21:45)  T(F): 98.4 (01 Apr 2023 05:00), Max: 98.5 (31 Mar 2023 21:45)  HR: 96 (01 Apr 2023 05:00) (88 - 98)  BP: 138/78 (01 Apr 2023 05:00) (108/70 - 138/78)  BP(mean): --  RR: 16 (01 Apr 2023 05:00) (16 - 18)  SpO2: 99% (01 Apr 2023 05:00) (95% - 99%)    Parameters below as of 01 Apr 2023 05:00  Patient On (Oxygen Delivery Method): room air      REVIEW OF SYSTEMS:  Constitutionally, the patient denies fever, chills, or night sweats.  Head:  No headaches.  Eyes:  No double vision or blurry vision.  Ears:  No ringing in the ears.  Neck:  No neck pain.  Cardiovascular:  No chest pain.  Respiratory:  No shortness of breath.  Abdomen:  No nausea, vomiting, or abdominal pain.  Extremities/Neurological:  Positive history of numbness and tingling.    PHYSICAL EXAMINATION:    HEENT:  Head:  Normocephalic, atraumatic.  Eyes:  No scleral icterus.  Ears:  Hearing bilaterally was intact.  Neck:  Supple.  CARDIOVASCULAR:  S1 and S2 heard.  RESPIRATORY:  Good air entry bilaterally.  ABDOMEN:  Soft, nontender.  EXTREMITIES:  No clubbing or cyanosis was noted.      NEUROLOGIC:  The patient awake, alert, and oriented x3.  Extraocular movements were intact.  Speech was fluent.  Smile symmetric.  Motor, bilateral upper 5/5.  Right lower, no significant movement proximally.  Dorsi and plantar flexion 3/5.  Left lower, slight flexion at the hip, slight flexion at the knee in extension.  Dorsi plantar flexion was 3+/5.  Sensory:  Bilateral upper and lower appeared intact to light touch      LABS:  CBC Full  -  ( 01 Apr 2023 06:00 )  WBC Count : 7.36 K/uL  RBC Count : 3.90 M/uL  Hemoglobin : 8.1 g/dL  Hematocrit : 27.0 %  Platelet Count - Automated : 381 K/uL  Mean Cell Volume : 69.2 fl  Mean Cell Hemoglobin : 20.8 pg  Mean Cell Hemoglobin Concentration : 30.0 gm/dL  Auto Neutrophil # : x  Auto Lymphocyte # : x  Auto Monocyte # : x  Auto Eosinophil # : x  Auto Basophil # : x  Auto Neutrophil % : x  Auto Lymphocyte % : x  Auto Monocyte % : x  Auto Eosinophil % : x  Auto Basophil % : x      04-01    138  |  101  |  25<H>  ----------------------------<  172<H>  5.1   |  27  |  0.65    Ca    8.9      01 Apr 2023 06:00    TPro  6.9  /  Alb  2.8<L>  /  TBili  0.2  /  DBili  x   /  AST  39  /  ALT  34  /  AlkPhos  63  03-31    Hemoglobin A1C:     LIVER FUNCTIONS - ( 31 Mar 2023 06:00 )  Alb: 2.8 g/dL / Pro: 6.9 g/dL / ALK PHOS: 63 U/L / ALT: 34 U/L DA / AST: 39 U/L / GGT: x           Vitamin B12         RADIOLOGY    ANALYSIS AND PLAN:  This is a 68-year-old with episode of fall and history of ataxia.  For episode of ataxia, it appears, upon my conversation with the patient and daughter after surgery back in 11/2023, she has been unable to ambulate, mostly bed bound, suspect most likely secondary to spinal disc disease.  We will check CT of the spine to make sure no new bleeding has incurred.  For history of diabetes, strict control of blood sugars.  For hypertension, monitor systolic blood pressure.  For hyperlipidemia, continue the patient on statin.  For neuropathy, continue the patient on her gabapentin and Cymbalta.  Fall precautions.  spine follow up as needed   neurologic wise no new changes in exam   pain management appreciated adjust medications as needed       Spoke with daughter, Gino, at [phone] 3/31, she understands reasoning and thought process.    Greater than 40 minutes of time was spent with the patient, plan of care, reviewing data, with greater than 50% of the visit was spent counseling and/or coordinating care with multidisciplinary healthcare team
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
## 2023-04-02 LAB
ANION GAP SERPL CALC-SCNC: 11 MMOL/L — SIGNIFICANT CHANGE UP (ref 5–17)
BUN SERPL-MCNC: 26 MG/DL — HIGH (ref 7–23)
CALCIUM SERPL-MCNC: 9.2 MG/DL — SIGNIFICANT CHANGE UP (ref 8.4–10.5)
CHLORIDE SERPL-SCNC: 101 MMOL/L — SIGNIFICANT CHANGE UP (ref 96–108)
CO2 SERPL-SCNC: 27 MMOL/L — SIGNIFICANT CHANGE UP (ref 22–31)
CREAT SERPL-MCNC: 0.61 MG/DL — SIGNIFICANT CHANGE UP (ref 0.5–1.3)
EGFR: 96 ML/MIN/1.73M2 — SIGNIFICANT CHANGE UP
GLUCOSE BLDC GLUCOMTR-MCNC: 108 MG/DL — HIGH (ref 70–99)
GLUCOSE BLDC GLUCOMTR-MCNC: 162 MG/DL — HIGH (ref 70–99)
GLUCOSE BLDC GLUCOMTR-MCNC: 167 MG/DL — HIGH (ref 70–99)
GLUCOSE BLDC GLUCOMTR-MCNC: 225 MG/DL — HIGH (ref 70–99)
GLUCOSE SERPL-MCNC: 138 MG/DL — HIGH (ref 70–99)
HCT VFR BLD CALC: 30.3 % — LOW (ref 34.5–45)
HGB BLD-MCNC: 9 G/DL — LOW (ref 11.5–15.5)
MCHC RBC-ENTMCNC: 20.8 PG — LOW (ref 27–34)
MCHC RBC-ENTMCNC: 29.7 GM/DL — LOW (ref 32–36)
MCV RBC AUTO: 70.1 FL — LOW (ref 80–100)
NRBC # BLD: 0 /100 WBCS — SIGNIFICANT CHANGE UP (ref 0–0)
PLATELET # BLD AUTO: 397 K/UL — SIGNIFICANT CHANGE UP (ref 150–400)
POTASSIUM SERPL-MCNC: 5 MMOL/L — SIGNIFICANT CHANGE UP (ref 3.5–5.3)
POTASSIUM SERPL-SCNC: 5 MMOL/L — SIGNIFICANT CHANGE UP (ref 3.5–5.3)
RBC # BLD: 4.32 M/UL — SIGNIFICANT CHANGE UP (ref 3.8–5.2)
RBC # FLD: 19 % — HIGH (ref 10.3–14.5)
SARS-COV-2 RNA SPEC QL NAA+PROBE: SIGNIFICANT CHANGE UP
SODIUM SERPL-SCNC: 139 MMOL/L — SIGNIFICANT CHANGE UP (ref 135–145)
WBC # BLD: 7.98 K/UL — SIGNIFICANT CHANGE UP (ref 3.8–10.5)
WBC # FLD AUTO: 7.98 K/UL — SIGNIFICANT CHANGE UP (ref 3.8–10.5)

## 2023-04-02 RX ORDER — LANOLIN ALCOHOL/MO/W.PET/CERES
3 CREAM (GRAM) TOPICAL AT BEDTIME
Refills: 0 | Status: DISCONTINUED | OUTPATIENT
Start: 2023-04-02 | End: 2023-04-03

## 2023-04-02 RX ADMIN — DULOXETINE HYDROCHLORIDE 60 MILLIGRAM(S): 30 CAPSULE, DELAYED RELEASE ORAL at 05:26

## 2023-04-02 RX ADMIN — Medication 1000 MILLIGRAM(S): at 13:50

## 2023-04-02 RX ADMIN — HYDROMORPHONE HYDROCHLORIDE 4 MILLIGRAM(S): 2 INJECTION INTRAMUSCULAR; INTRAVENOUS; SUBCUTANEOUS at 21:16

## 2023-04-02 RX ADMIN — HYDROMORPHONE HYDROCHLORIDE 4 MILLIGRAM(S): 2 INJECTION INTRAMUSCULAR; INTRAVENOUS; SUBCUTANEOUS at 10:55

## 2023-04-02 RX ADMIN — LIDOCAINE 1 PATCH: 4 CREAM TOPICAL at 12:05

## 2023-04-02 RX ADMIN — HEPARIN SODIUM 5000 UNIT(S): 5000 INJECTION INTRAVENOUS; SUBCUTANEOUS at 05:26

## 2023-04-02 RX ADMIN — GABAPENTIN 400 MILLIGRAM(S): 400 CAPSULE ORAL at 21:16

## 2023-04-02 RX ADMIN — GABAPENTIN 400 MILLIGRAM(S): 400 CAPSULE ORAL at 13:19

## 2023-04-02 RX ADMIN — HYDROMORPHONE HYDROCHLORIDE 4 MILLIGRAM(S): 2 INJECTION INTRAMUSCULAR; INTRAVENOUS; SUBCUTANEOUS at 17:29

## 2023-04-02 RX ADMIN — POLYETHYLENE GLYCOL 3350 17 GRAM(S): 17 POWDER, FOR SOLUTION ORAL at 12:05

## 2023-04-02 RX ADMIN — Medication 1 MILLIGRAM(S): at 12:05

## 2023-04-02 RX ADMIN — Medication 1000 MILLIGRAM(S): at 21:46

## 2023-04-02 RX ADMIN — HEPARIN SODIUM 5000 UNIT(S): 5000 INJECTION INTRAVENOUS; SUBCUTANEOUS at 17:29

## 2023-04-02 RX ADMIN — SIMVASTATIN 40 MILLIGRAM(S): 20 TABLET, FILM COATED ORAL at 21:18

## 2023-04-02 RX ADMIN — Medication 3 MILLIGRAM(S): at 21:18

## 2023-04-02 RX ADMIN — Medication 4 UNIT(S): at 08:11

## 2023-04-02 RX ADMIN — HYDROMORPHONE HYDROCHLORIDE 4 MILLIGRAM(S): 2 INJECTION INTRAMUSCULAR; INTRAVENOUS; SUBCUTANEOUS at 21:46

## 2023-04-02 RX ADMIN — Medication 2: at 08:10

## 2023-04-02 RX ADMIN — HYDROMORPHONE HYDROCHLORIDE 4 MILLIGRAM(S): 2 INJECTION INTRAMUSCULAR; INTRAVENOUS; SUBCUTANEOUS at 05:55

## 2023-04-02 RX ADMIN — SENNA PLUS 1 TABLET(S): 8.6 TABLET ORAL at 17:29

## 2023-04-02 RX ADMIN — Medication 1000 MILLIGRAM(S): at 13:19

## 2023-04-02 RX ADMIN — Medication 4 UNIT(S): at 17:13

## 2023-04-02 RX ADMIN — HYDROMORPHONE HYDROCHLORIDE 4 MILLIGRAM(S): 2 INJECTION INTRAMUSCULAR; INTRAVENOUS; SUBCUTANEOUS at 10:27

## 2023-04-02 RX ADMIN — PANTOPRAZOLE SODIUM 40 MILLIGRAM(S): 20 TABLET, DELAYED RELEASE ORAL at 05:26

## 2023-04-02 RX ADMIN — Medication 4: at 12:04

## 2023-04-02 RX ADMIN — HYDROMORPHONE HYDROCHLORIDE 4 MILLIGRAM(S): 2 INJECTION INTRAMUSCULAR; INTRAVENOUS; SUBCUTANEOUS at 05:25

## 2023-04-02 RX ADMIN — GABAPENTIN 400 MILLIGRAM(S): 400 CAPSULE ORAL at 05:25

## 2023-04-02 RX ADMIN — LIDOCAINE 1 PATCH: 4 CREAM TOPICAL at 19:14

## 2023-04-02 RX ADMIN — Medication 4 UNIT(S): at 12:04

## 2023-04-02 RX ADMIN — INSULIN GLARGINE 13 UNIT(S): 100 INJECTION, SOLUTION SUBCUTANEOUS at 21:15

## 2023-04-02 RX ADMIN — Medication 1000 MILLIGRAM(S): at 21:16

## 2023-04-02 RX ADMIN — DULOXETINE HYDROCHLORIDE 60 MILLIGRAM(S): 30 CAPSULE, DELAYED RELEASE ORAL at 17:16

## 2023-04-02 NOTE — PROGRESS NOTE ADULT - SUBJECTIVE AND OBJECTIVE BOX
Neurology follow up note    CHRIS [id number]      Interval History:    Patient feels ok no new complaints.    Allergies    apple (Rash)  aspirin (Anaphylaxis)  clindamycin (Unknown)  contrast media (iron oxide-based) (Nephrotoxicity)  fish (Hives)  IV Contrast (Anaphylaxis)  Pears (Rash)  sulfa drugs (Rash)  vancomycin (Rash)  walnut (Anaphylaxis)    Intolerances        MEDICATIONS    acetaminophen     Tablet .. 1000 milliGRAM(s) Oral every 8 hours  dextrose 5%. 1000 milliLiter(s) IV Continuous <Continuous>  dextrose 5%. 1000 milliLiter(s) IV Continuous <Continuous>  dextrose 50% Injectable 25 Gram(s) IV Push once  dextrose 50% Injectable 12.5 Gram(s) IV Push once  dextrose 50% Injectable 25 Gram(s) IV Push once  dextrose Oral Gel 15 Gram(s) Oral once PRN  DULoxetine 60 milliGRAM(s) Oral two times a day  folic acid 1 milliGRAM(s) Oral daily  gabapentin 400 milliGRAM(s) Oral three times a day  glucagon  Injectable 1 milliGRAM(s) IntraMuscular once  heparin   Injectable 5000 Unit(s) SubCutaneous every 12 hours  HYDROmorphone   Tablet 4 milliGRAM(s) Oral every 4 hours  insulin glargine Injectable (LANTUS) 13 Unit(s) SubCutaneous at bedtime  insulin lispro (ADMELOG) corrective regimen sliding scale   SubCutaneous three times a day before meals  insulin lispro (ADMELOG) corrective regimen sliding scale   SubCutaneous at bedtime  insulin lispro Injectable (ADMELOG) 4 Unit(s) SubCutaneous three times a day before meals  lidocaine   4% Patch 1 Patch Transdermal daily  magnesium hydroxide Suspension 30 milliLiter(s) Oral at bedtime  metoprolol succinate ER 75 milliGRAM(s) Oral daily  ondansetron    Tablet 4 milliGRAM(s) Oral every 8 hours PRN  pantoprazole    Tablet 40 milliGRAM(s) Oral before breakfast  polyethylene glycol 3350 17 Gram(s) Oral daily  senna 1 Tablet(s) Oral two times a day  simvastatin 40 milliGRAM(s) Oral at bedtime              Vital Signs Last 24 Hrs  T(C): 36.7 (02 Apr 2023 05:07), Max: 36.7 (01 Apr 2023 21:05)  T(F): 98.1 (02 Apr 2023 05:07), Max: 98.1 (01 Apr 2023 21:05)  HR: 86 (02 Apr 2023 05:07) (83 - 86)  BP: 117/79 (02 Apr 2023 05:07) (100/64 - 117/79)  BP(mean): --  RR: 18 (02 Apr 2023 05:07) (17 - 18)  SpO2: 96% (02 Apr 2023 05:07) (96% - 97%)    Parameters below as of 02 Apr 2023 05:07  Patient On (Oxygen Delivery Method): room air        REVIEW OF SYSTEMS:  Constitutionally, the patient denies fever, chills, or night sweats.  Head:  No headaches.  Eyes:  No double vision or blurry vision.  Ears:  No ringing in the ears.  Neck:  No neck pain.  Cardiovascular:  No chest pain.  Respiratory:  No shortness of breath.  Abdomen:  No nausea, vomiting, or abdominal pain.  Extremities/Neurological:  Positive history of numbness and tingling.    PHYSICAL EXAMINATION:    HEENT:  Head:  Normocephalic, atraumatic.  Eyes:  No scleral icterus.  Ears:  Hearing bilaterally was intact.  Neck:  Supple.  CARDIOVASCULAR:  S1 and S2 heard.  RESPIRATORY:  Good air entry bilaterally.  ABDOMEN:  Soft, nontender.  EXTREMITIES:  No clubbing or cyanosis was noted.      NEUROLOGIC:  The patient awake, alert, and oriented x3.  Extraocular movements were intact.  Speech was fluent.  Smile symmetric.  Motor, bilateral upper 5/5.  Right lower, no significant movement proximally.  Dorsi and plantar flexion 3/5.  Left lower, slight flexion at the hip, slight flexion at the knee in extension.  Dorsi plantar flexion was 3+/5.  Sensory:  Bilateral upper and lower appeared intact to light touch                  LABS:  CBC Full  -  ( 02 Apr 2023 06:00 )  WBC Count : 7.98 K/uL  RBC Count : 4.32 M/uL  Hemoglobin : 9.0 g/dL  Hematocrit : 30.3 %  Platelet Count - Automated : 397 K/uL  Mean Cell Volume : 70.1 fl  Mean Cell Hemoglobin : 20.8 pg  Mean Cell Hemoglobin Concentration : 29.7 gm/dL  Auto Neutrophil # : x  Auto Lymphocyte # : x  Auto Monocyte # : x  Auto Eosinophil # : x  Auto Basophil # : x  Auto Neutrophil % : x  Auto Lymphocyte % : x  Auto Monocyte % : x  Auto Eosinophil % : x  Auto Basophil % : x      04-02    139  |  101  |  26<H>  ----------------------------<  138<H>  5.0   |  27  |  0.61    Ca    9.2      02 Apr 2023 06:00      Hemoglobin A1C:       Vitamin B12         RADIOLOGY      ANALYSIS AND PLAN:  This is a 68-year-old with episode of fall and history of ataxia.  For episode of ataxia, it appears, upon my conversation with the patient and daughter after surgery back in 11/2023, she has been unable to ambulate, mostly bed bound, suspect most likely secondary to spinal disc disease.  We will check CT of the spine to make sure no new bleeding has incurred.  For history of diabetes, strict control of blood sugars.  For hypertension, monitor systolic blood pressure.  For hyperlipidemia, continue the patient on statin.  For neuropathy, continue the patient on her gabapentin and Cymbalta.  Fall precautions.  spine follow up as needed   neurologic wise no new changes in exam   pain management appreciated adjust medications as needed       Spoke with daughter, Gino, at [phone] 3/31, she understands reasoning and thought process.    Greater than 40 minutes of time was spent with the patient, plan of care, reviewing data, with greater than 50% of the visit was spent counseling and/or coordinating care with multidisciplinary healthcare team     Neurology follow up note    CHRIS [id number]      Interval History:    Patient feels ok no new complaints.    Allergies    apple (Rash)  aspirin (Anaphylaxis)  clindamycin (Unknown)  contrast media (iron oxide-based) (Nephrotoxicity)  fish (Hives)  IV Contrast (Anaphylaxis)  Pears (Rash)  sulfa drugs (Rash)  vancomycin (Rash)  walnut (Anaphylaxis)    Intolerances        MEDICATIONS    acetaminophen     Tablet .. 1000 milliGRAM(s) Oral every 8 hours  dextrose 5%. 1000 milliLiter(s) IV Continuous <Continuous>  dextrose 5%. 1000 milliLiter(s) IV Continuous <Continuous>  dextrose 50% Injectable 25 Gram(s) IV Push once  dextrose 50% Injectable 12.5 Gram(s) IV Push once  dextrose 50% Injectable 25 Gram(s) IV Push once  dextrose Oral Gel 15 Gram(s) Oral once PRN  DULoxetine 60 milliGRAM(s) Oral two times a day  folic acid 1 milliGRAM(s) Oral daily  gabapentin 400 milliGRAM(s) Oral three times a day  glucagon  Injectable 1 milliGRAM(s) IntraMuscular once  heparin   Injectable 5000 Unit(s) SubCutaneous every 12 hours  HYDROmorphone   Tablet 4 milliGRAM(s) Oral every 4 hours  insulin glargine Injectable (LANTUS) 13 Unit(s) SubCutaneous at bedtime  insulin lispro (ADMELOG) corrective regimen sliding scale   SubCutaneous three times a day before meals  insulin lispro (ADMELOG) corrective regimen sliding scale   SubCutaneous at bedtime  insulin lispro Injectable (ADMELOG) 4 Unit(s) SubCutaneous three times a day before meals  lidocaine   4% Patch 1 Patch Transdermal daily  magnesium hydroxide Suspension 30 milliLiter(s) Oral at bedtime  metoprolol succinate ER 75 milliGRAM(s) Oral daily  ondansetron    Tablet 4 milliGRAM(s) Oral every 8 hours PRN  pantoprazole    Tablet 40 milliGRAM(s) Oral before breakfast  polyethylene glycol 3350 17 Gram(s) Oral daily  senna 1 Tablet(s) Oral two times a day  simvastatin 40 milliGRAM(s) Oral at bedtime              Vital Signs Last 24 Hrs  T(C): 36.7 (02 Apr 2023 05:07), Max: 36.7 (01 Apr 2023 21:05)  T(F): 98.1 (02 Apr 2023 05:07), Max: 98.1 (01 Apr 2023 21:05)  HR: 86 (02 Apr 2023 05:07) (83 - 86)  BP: 117/79 (02 Apr 2023 05:07) (100/64 - 117/79)  BP(mean): --  RR: 18 (02 Apr 2023 05:07) (17 - 18)  SpO2: 96% (02 Apr 2023 05:07) (96% - 97%)    Parameters below as of 02 Apr 2023 05:07  Patient On (Oxygen Delivery Method): room air        REVIEW OF SYSTEMS:  Constitutionally, the patient denies fever, chills, or night sweats.  Head:  No headaches.  Eyes:  No double vision or blurry vision.  Ears:  No ringing in the ears.  Neck:  No neck pain.  Cardiovascular:  No chest pain.  Respiratory:  No shortness of breath.  Abdomen:  No nausea, vomiting, or abdominal pain.  Extremities/Neurological:  Positive history of numbness and tingling.    PHYSICAL EXAMINATION:    HEENT:  Head:  Normocephalic, atraumatic.  Eyes:  No scleral icterus.  Ears:  Hearing bilaterally was intact.  Neck:  Supple.  CARDIOVASCULAR:  S1 and S2 heard.  RESPIRATORY:  Good air entry bilaterally.  ABDOMEN:  Soft, nontender.  EXTREMITIES:  No clubbing or cyanosis was noted.      NEUROLOGIC:  The patient awake, alert, and oriented x3.  Extraocular movements were intact.  Speech was fluent.  Smile symmetric.  Motor, bilateral upper 5/5.  Right lower, no significant movement proximally.  Dorsi and plantar flexion 3/5.  Left lower, slight flexion at the hip, slight flexion at the knee in extension.  Dorsi plantar flexion was 3+/5.  Sensory:  Bilateral upper and lower appeared intact to light touch                  LABS:  CBC Full  -  ( 02 Apr 2023 06:00 )  WBC Count : 7.98 K/uL  RBC Count : 4.32 M/uL  Hemoglobin : 9.0 g/dL  Hematocrit : 30.3 %  Platelet Count - Automated : 397 K/uL  Mean Cell Volume : 70.1 fl  Mean Cell Hemoglobin : 20.8 pg  Mean Cell Hemoglobin Concentration : 29.7 gm/dL  Auto Neutrophil # : x  Auto Lymphocyte # : x  Auto Monocyte # : x  Auto Eosinophil # : x  Auto Basophil # : x  Auto Neutrophil % : x  Auto Lymphocyte % : x  Auto Monocyte % : x  Auto Eosinophil % : x  Auto Basophil % : x      04-02    139  |  101  |  26<H>  ----------------------------<  138<H>  5.0   |  27  |  0.61    Ca    9.2      02 Apr 2023 06:00      Hemoglobin A1C:       Vitamin B12         RADIOLOGY      ANALYSIS AND PLAN:  This is a 68-year-old with episode of fall and history of ataxia.  For episode of ataxia, it appears, upon my conversation with the patient and daughter after surgery back in 11/2023, she has been unable to ambulate, mostly bed bound, suspect most likely secondary to spinal disc disease.  We will check CT of the spine to make sure no new bleeding has incurred.  For history of diabetes, strict control of blood sugars.  For hypertension, monitor systolic blood pressure.  For hyperlipidemia, continue the patient on statin.  For neuropathy, continue the patient on her gabapentin and Cymbalta.  Fall precautions.  spine follow up as needed   neurologic wise no new changes in exam   pain management appreciated adjust medications as needed   as per patient pain is better       Spoke with daughter, Gino, at [phone] 3/31, she understands reasoning and thought process.    Greater than 40 minutes of time was spent with the patient, plan of care, reviewing data, with greater than 50% of the visit was spent counseling and/or coordinating care with multidisciplinary healthcare team

## 2023-04-02 NOTE — CHART NOTE - NSCHARTNOTEFT_GEN_A_CORE
Assessment: Per chart, "Patient is a 68y Female with pmhx of HTN, HL DMII who presents with right knee pain from a fall a week ago. Patient is s/p microdiscectomy of L5/s1 which was done 11/22 by Dr. Leal. Since that surgery she has been at [facility]. She notes that since the surgery she has declined in her ability to ambulate and is essentially wheechair bound. She notes that in the past few months she has had falls due to losing her balance during transfers from wheelchair to bed. She states that after her fall she was having knee pain and was seen in the ED. Currently her pain is 4/10 worse with movement. Denies CP/SOB/N/V. she does not take any blood thinners at home. She has been seen several times in the last few months for falls and was recommended to see an orthopedist. She notes that she had planned to see Dr. Machado but had issues with her insurance. Since she has been in rehab she states that she saw Dr. Leal once post-operatively. She also notes that due to her progressing weakness in rehab she had seen a neurologist who recommended an MRI of the lumbar spine which was done in february 2023."    4/2  Pt due for nutrition follow-up.  [facility] no added salt, no concentrated sweet diet +Glucerna 8oz (220 kcal, 10 g protein) bid, proform adv BID per transfer documents.  Pt continues on Consistent Carbohydrate diet with reported improvement in appetite and intake.  States she had good intake of lunch meal.  During time of initial nutrition assessment, pt met clinical criteria of mild protein calorie malnutrition in context of chronic illness (wt loss PTA, mild subcutaneous fat and muscle mass loss, <PO intake >1 month PTA); glucerna supplement provided once daily with good acceptance.  Diet office aware of food allergies to fish, apple, pear and walnuts. Pt with hx DM2, A1c 7.8% Feb 2023, FS variable x 3 days *167mg/d/-450mg/dl, insulin adjusted secondary to >FS.  Nutrition strategies for management of DM discussed during time of initial assessment; pt denies nutrition related questions or concerns at time of visit. + BM 4/1. RD to follow up and will continue to monitor pt's nutrition status.     Factors impacting intake: [ ] none [ ] nausea  [ ] vomiting [ ] diarrhea [ ] constipation  [ ]chewing problems [ ] swallowing issues  [ ] other:     Diet Prescription: Diet, Consistent Carbohydrate/No Snacks (03-29-23 @ 22:15)    Intake: variable, reports good appetite at present time     Current Weight: Weight (kg): 47.6 (03-29 @ 12:50)  % Weight Change  adm wt 104.1#    Pertinent Medications: MEDICATIONS  (STANDING):  acetaminophen     Tablet .. 1000 milliGRAM(s) Oral every 8 hours  dextrose 5%. 1000 milliLiter(s) (50 mL/Hr) IV Continuous <Continuous>  dextrose 5%. 1000 milliLiter(s) (100 mL/Hr) IV Continuous <Continuous>  dextrose 50% Injectable 25 Gram(s) IV Push once  dextrose 50% Injectable 12.5 Gram(s) IV Push once  dextrose 50% Injectable 25 Gram(s) IV Push once  DULoxetine 60 milliGRAM(s) Oral two times a day  folic acid 1 milliGRAM(s) Oral daily  gabapentin 400 milliGRAM(s) Oral three times a day  glucagon  Injectable 1 milliGRAM(s) IntraMuscular once  heparin   Injectable 5000 Unit(s) SubCutaneous every 12 hours  HYDROmorphone   Tablet 4 milliGRAM(s) Oral every 4 hours  insulin glargine Injectable (LANTUS) 13 Unit(s) SubCutaneous at bedtime  insulin lispro (ADMELOG) corrective regimen sliding scale   SubCutaneous three times a day before meals  insulin lispro (ADMELOG) corrective regimen sliding scale   SubCutaneous at bedtime  insulin lispro Injectable (ADMELOG) 4 Unit(s) SubCutaneous three times a day before meals  lidocaine   4% Patch 1 Patch Transdermal daily  magnesium hydroxide Suspension 30 milliLiter(s) Oral at bedtime  metoprolol succinate ER 75 milliGRAM(s) Oral daily  pantoprazole    Tablet 40 milliGRAM(s) Oral before breakfast  polyethylene glycol 3350 17 Gram(s) Oral daily  senna 1 Tablet(s) Oral two times a day  simvastatin 40 milliGRAM(s) Oral at bedtime    MEDICATIONS  (PRN):  dextrose Oral Gel 15 Gram(s) Oral once PRN Blood Glucose LESS THAN 70 milliGRAM(s)/deciliter  ondansetron    Tablet 4 milliGRAM(s) Oral every 8 hours PRN Nausea and/or Vomiting    Pertinent Labs: 04-02 Na139 mmol/L Glu 138 mg/dL<H> K+ 5.0 mmol/L Cr  0.61 mg/dL BUN 26 mg/dL<H> 03-31 Alb 2.8 g/dL<L> 03-31 PAB 27 mg/dL 03-30 Chol 115 mg/dL LDL --    HDL 21 mg/dL<L> Trig 160 mg/dL<H>     CAPILLARY BLOOD GLUCOSE      POCT Blood Glucose.: 225 mg/dL (02 Apr 2023 11:45)  POCT Blood Glucose.: 167 mg/dL (02 Apr 2023 07:47)  POCT Blood Glucose.: 199 mg/dL (01 Apr 2023 20:40)  POCT Blood Glucose.: 127 mg/dL (01 Apr 2023 16:38)    Skin: stage I/healing to coccyx    Estimated Needs:   [x ] no change since previous assessment  [ ] recalculated:     Previous Nutrition Diagnosis:   [ ] Inadequate Energy Intake [ ]Inadequate Oral Intake [ ] Excessive Energy Intake   [ ] Underweight [ ] Increased Nutrient Needs [ ] Overweight/Obesity   [ ] Altered GI Function [ ] Unintended Weight Loss [ ] Food & Nutrition Related Knowledge Deficit [ x] Malnutrition - mild, chronic     Nutrition Diagnosis is [ x] ongoing  [ ] resolved [ ] not applicable     New Nutrition Diagnosis: [ ] not applicable       Interventions: Con CHO diet, honor food preferences as able, nutrition supplement, diet reinforcement   Recommend  [ ] Change Diet To:  [ ] Nutrition Supplement  [ ] Nutrition Support  [ ] Other:     Monitoring and Evaluation:   [x ] PO intake [ x ] Tolerance to diet prescription [ x ] weights [ x ] labs[ x ] follow up per protocol  [ ] other:

## 2023-04-02 NOTE — PROGRESS NOTE ADULT - SUBJECTIVE AND OBJECTIVE BOX
Patient is a 68y Female with a known history of :  Type 2 diabetes mellitus [E11.9]    Contusion of right knee [S80.01XA]    Weakness of lower extremity [R29.898]    HTN (hypertension) [I10]    DM type 2 (diabetes mellitus, type 2) [E11.9]    Psoriasiform eczema [L30.8]    Prophylactic measure [Z29.9]    Lumbar radiculopathy [M54.16]    Hyperkalemia [E87.5]    Inability to ambulate due to knee [R26.2]    Right knee pain [M25.561]    Therapeutic opioid induced constipation [K59.03]      HPI:  Patient is a 68y Female with pmhx of HTN, HL DMII who presents with right knee pain from a fall a week ago. Patient is s/p microdiscectomy of L5/s1 which was done 11/22 by Dr. Leal. Since that surgery she has been at [facility]. She notes that since the surgery she has declined in her ability to ambulate and is essentially wheechair bound. She notes that in the past few months she has had falls due to losing her balance during transfers from wheelchair to bed. She states that after her fall she was having knee pain and was seen in the ED. Currently her pain is 4/10 worse with movement. Denies CP/SOB/N/V. she does not take any blood thinners at home. She has been seen several times in the last few months for falls and was recommended to see an orthopedist. She notes that she had planned to see Dr. Machado but had issues with her insurance. Since she has been in rehab she states that she saw Dr. Leal once post-operatively. She also notes that due to her progressing weakness in rehab she had seen a neurologist who recommended an MRI of the lumbar spine which was done in february 2023.     < from: CT Lumbar Spine No Cont (03.30.23 @ 14:04) >    ACC: [id number] EXAM:  CT LUMBAR SPINE   ORDERED BY: AARTI HASKINS     PROCEDURE DATE:  03/30/2023          INTERPRETATION:  CT lumbar spine without IV contrast    CLINICAL INFORMATION: leg weakness    TECHNIQUE:  Contiguous axial sections were obtained through the lumbar   spine.   Additional sagittal and coronal reformats were obtained.    FINDINGS: Comparison is made to MRI lumbar of 2/14/2023.    Chronic fracture of the anterior superior corner of the L3 vertebral body   with an associated degenerative Schmorl node is reidentified without   change. No new fracture is identified.    Alignment is maintained.    At T12/L1 through L4/L5, there are small disc bulges without significant   foraminal or central spinal canal stenosis.    At L5/S1, there is a large posterior disc osteophyte complex with   inferior migration of a calcified disc fragment, resulting in compression   upon the left-sided descending nerve roots and severe left foraminal   stenosis and moderate to severe right foraminal stenosis.    Postsurgical changes in the left posterior paraspinal subcutaneous   tissues.      IMPRESSION:    At L5/S1, there is a large posterior disc osteophyte complex with   inferior migration of a calcified disc fragment, resulting in compression   upon the left-sided descending nerve roots and severe left foraminal   stenosis and moderate to severe right foraminal stenosis. This was seen   on prior MRI lumbar spine of 2/14/2023.    --- End of Report ---            CORINNE BASURTO MD; Attending Radiologist  This document has been electronically signed. Mar 30 2023  4:11PM    < end of copied text >  < from: Xray Chest 1 View- PORTABLE-Routine (Xray Chest 1 View- PORTABLE-Routine .) (03.30.23 @ 08:01) >    ACC: [id number] EXAM:  XR CHEST PORTABLE ROUTINE 1V   ORDERED BY: AMY ANDERSON     PROCEDURE DATE:  03/30/2023          INTERPRETATION:  AP semierect chest on March 30, 2023 at 7:03 AM. Patient   had right leg trauma.    Heart size normal for projection.    Lungs remain clear. No fracture.    Chest is similar to February 9 this year.    IMPRESSION: No acute finding or change.    --- End of Report ---            KARLA LOWRY MD; Attending Radiologist  This document has been electronically signed. Mar 30 2023 10:43AM    < end of copied text >     (29 Mar 2023 21:46)      REVIEW OF SYSTEMS:    CONSTITUTIONAL: No fever, weight loss, or fatigue  EYES: No eye pain, visual disturbances, or discharge  ENMT:  No difficulty hearing, tinnitus, vertigo; No sinus or throat pain  NECK: No pain or stiffness  BREASTS: No pain, masses, or nipple discharge  RESPIRATORY: No cough, wheezing, chills or hemoptysis; No shortness of breath  CARDIOVASCULAR: No chest pain, palpitations, dizziness, or leg swelling  GASTROINTESTINAL: No abdominal or epigastric pain. No nausea, vomiting, or hematemesis; No diarrhea or constipation. No melena or hematochezia.  GENITOURINARY: No dysuria, frequency, hematuria, or incontinence  NEUROLOGICAL: No headaches, memory loss, loss of strength, numbness, or tremors  SKIN: No itching, burning, rashes, or lesions   LYMPH NODES: No enlarged glands  ENDOCRINE: No heat or cold intolerance; No hair loss  MUSCULOSKELETAL: No joint pain or swelling; No muscle, back, or extremity pain  PSYCHIATRIC: No depression, anxiety, mood swings, or difficulty sleeping  HEME/LYMPH: No easy bruising, or bleeding gums  ALLERGY AND IMMUNOLOGIC: No hives or eczema    MEDICATIONS  (STANDING):  acetaminophen     Tablet .. 1000 milliGRAM(s) Oral every 8 hours  dextrose 5%. 1000 milliLiter(s) (50 mL/Hr) IV Continuous <Continuous>  dextrose 5%. 1000 milliLiter(s) (100 mL/Hr) IV Continuous <Continuous>  dextrose 50% Injectable 25 Gram(s) IV Push once  dextrose 50% Injectable 12.5 Gram(s) IV Push once  dextrose 50% Injectable 25 Gram(s) IV Push once  DULoxetine 60 milliGRAM(s) Oral two times a day  folic acid 1 milliGRAM(s) Oral daily  gabapentin 400 milliGRAM(s) Oral three times a day  glucagon  Injectable 1 milliGRAM(s) IntraMuscular once  heparin   Injectable 5000 Unit(s) SubCutaneous every 12 hours  HYDROmorphone   Tablet 4 milliGRAM(s) Oral every 4 hours  insulin glargine Injectable (LANTUS) 13 Unit(s) SubCutaneous at bedtime  insulin lispro (ADMELOG) corrective regimen sliding scale   SubCutaneous three times a day before meals  insulin lispro (ADMELOG) corrective regimen sliding scale   SubCutaneous at bedtime  insulin lispro Injectable (ADMELOG) 4 Unit(s) SubCutaneous three times a day before meals  lidocaine   4% Patch 1 Patch Transdermal daily  magnesium hydroxide Suspension 30 milliLiter(s) Oral at bedtime  metoprolol succinate ER 75 milliGRAM(s) Oral daily  pantoprazole    Tablet 40 milliGRAM(s) Oral before breakfast  polyethylene glycol 3350 17 Gram(s) Oral daily  senna 1 Tablet(s) Oral two times a day  simvastatin 40 milliGRAM(s) Oral at bedtime    MEDICATIONS  (PRN):  dextrose Oral Gel 15 Gram(s) Oral once PRN Blood Glucose LESS THAN 70 milliGRAM(s)/deciliter  ondansetron    Tablet 4 milliGRAM(s) Oral every 8 hours PRN Nausea and/or Vomiting      ALLERGIES: apple (Rash)  aspirin (Anaphylaxis)  clindamycin (Unknown)  contrast media (iron oxide-based) (Nephrotoxicity)  fish (Hives)  IV Contrast (Anaphylaxis)  Pears (Rash)  sulfa drugs (Rash)  vancomycin (Rash)  walnut (Anaphylaxis)      FAMILY HISTORY:  Family history of cerebrovascular accident (CVA) (Mother, Sibling)    Family history of coronary artery disease (Mother, Father)    FH: diabetes mellitus    FH: hypertension        Social history:  Alochol:   Smoking:   Drug Use:   Marital Status:     PHYSICAL EXAMINATION:  -----------------------------  T(C): 36.7 (04-02-23 @ 05:07), Max: 36.7 (04-01-23 @ 21:05)  HR: 86 (04-02-23 @ 05:07) (83 - 86)  BP: 117/79 (04-02-23 @ 05:07) (100/64 - 117/79)  RR: 18 (04-02-23 @ 05:07) (17 - 18)  SpO2: 96% (04-02-23 @ 05:07) (96% - 97%)  Wt(kg): --    04-01 @ 07:01  -  04-02 @ 07:00  --------------------------------------------------------  IN:  Total IN: 0 mL    OUT:    Voided (mL): 400 mL  Total OUT: 400 mL    Total NET: -400 mL            Constitutional: well developed, normal appearance, well groomed, well nourished, no deformities and no acute distress.   Eyes: the conjunctiva exhibited no abnormalities and the eyelids demonstrated no xanthelasmas.   HEENT: normal oral mucosa, no oral pallor and no oral cyanosis.   Neck: normal jugular venous A waves present, normal jugular venous V waves present and no jugular venous milton A waves.   Pulmonary: no respiratory distress, normal respiratory rhythm and effort, no accessory muscle use and lungs were clear to auscultation bilaterally. Anteriorly  Cardiovascular: heart rate and rhythm were normal, normal S1 and S2 and no murmur, gallop, rub, heave or thrill are present.   Musculoskeletal: the gait could not be assessed.   Extremities: no clubbing of the fingernails, no localized cyanosis, no petechial hemorrhages and no ischemic changes.   Skin: normal skin color and pigmentation, no rash, no venous stasis, no skin lesions, no skin ulcer and no xanthoma was observed.   Psychiatric: oriented to person, place, and time, the affect was normal, the mood was normal and not feeling anxious.     LABS:   --------  04-02    139  |  101  |  26<H>  ----------------------------<  138<H>  5.0   |  27  |  0.61    Ca    9.2      02 Apr 2023 06:00                           9.0    7.98  )-----------( 397      ( 02 Apr 2023 06:00 )             30.3                   Radiology:

## 2023-04-02 NOTE — PROGRESS NOTE ADULT - SUBJECTIVE AND OBJECTIVE BOX
Physical Medicine and Rehabilitation Subsequent Evaluation    Patient seen in followup for pain management, functional deficits    Patient seen and examined at bedside. Pain well controlled. Patient participated in therapy session today, minimal assistance with bed mobility and sit to stand as well as stand to sit transfers, patient declined gait training however. Remains a good candidate for subacute rehabilitation setting. patient complains of difficulty sleeping.    Medical studies/laboratory studies reviewed, includin.0    7.98  )-----------( 397      ( 2023 06:00 )             30.3   04-    139  |  101  |  26<H>  ----------------------------<  138<H>  5.0   |  27  |  0.61    Ca    9.2      2023 06:00        ROS:  Constitutional: Denies fevers or chills  MSK: right knee pain is improved      Medications: following revision, reviewed.    Physical Exam:   Vital Signs Last 24 Hrs  T(C): 36.7 (2023 14:00), Max: 37.2 (2023 09:00)  T(F): 98.1 (2023 14:00), Max: 98.9 (2023 09:00)  HR: 80 (2023 14:00) (80 - 110)  BP: 122/60 (2023 14:00) (99/63 - 122/60)  BP(mean): --  RR: 18 (2023 14:00) (16 - 18)  SpO2: 98% (2023 14:00) (95% - 98%)    Parameters below as of 2023 14:00  Patient On (Oxygen Delivery Method): room air              Constitutional: Gen: In no acute distress, cooperative with exam and questioning   CV: Regular rate and rhythm, S1 S2, bilateral lower extremity pitting peripheral edema, pedal pulses intact  Resp: Good respiratory effort, Good air movement all lung fields  Neuro: Cranial Nerves II-XII intact, sensation intact to light touch in peripheral upper and lower extremities  MSK: No cyanosis or clubbing of nails, strength 4-/5 in bilateral upper and lower extremities, ROM full in all extremities passively with some pain on ROM of the right knee, mild tenderness to palpation diffusely but no particular pain of bony prominences, downgoing babinskis bilaterally  Psychiatric: Awake alert fully oriented
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
## 2023-04-03 VITALS
HEART RATE: 117 BPM | TEMPERATURE: 98 F | RESPIRATION RATE: 18 BRPM | DIASTOLIC BLOOD PRESSURE: 59 MMHG | OXYGEN SATURATION: 99 % | SYSTOLIC BLOOD PRESSURE: 100 MMHG

## 2023-04-03 LAB
GLUCOSE BLDC GLUCOMTR-MCNC: 199 MG/DL — HIGH (ref 70–99)
GLUCOSE BLDC GLUCOMTR-MCNC: 265 MG/DL — HIGH (ref 70–99)

## 2023-04-03 PROCEDURE — 93005 ELECTROCARDIOGRAM TRACING: CPT

## 2023-04-03 PROCEDURE — 84134 ASSAY OF PREALBUMIN: CPT

## 2023-04-03 PROCEDURE — 97110 THERAPEUTIC EXERCISES: CPT

## 2023-04-03 PROCEDURE — 82043 UR ALBUMIN QUANTITATIVE: CPT

## 2023-04-03 PROCEDURE — 85027 COMPLETE CBC AUTOMATED: CPT

## 2023-04-03 PROCEDURE — 83550 IRON BINDING TEST: CPT

## 2023-04-03 PROCEDURE — 83735 ASSAY OF MAGNESIUM: CPT

## 2023-04-03 PROCEDURE — 97161 PT EVAL LOW COMPLEX 20 MIN: CPT

## 2023-04-03 PROCEDURE — 71045 X-RAY EXAM CHEST 1 VIEW: CPT

## 2023-04-03 PROCEDURE — 73552 X-RAY EXAM OF FEMUR 2/>: CPT

## 2023-04-03 PROCEDURE — 83540 ASSAY OF IRON: CPT

## 2023-04-03 PROCEDURE — 84443 ASSAY THYROID STIM HORMONE: CPT

## 2023-04-03 PROCEDURE — 36415 COLL VENOUS BLD VENIPUNCTURE: CPT

## 2023-04-03 PROCEDURE — 72131 CT LUMBAR SPINE W/O DYE: CPT

## 2023-04-03 PROCEDURE — 87635 SARS-COV-2 COVID-19 AMP PRB: CPT

## 2023-04-03 PROCEDURE — 80076 HEPATIC FUNCTION PANEL: CPT

## 2023-04-03 PROCEDURE — 97530 THERAPEUTIC ACTIVITIES: CPT

## 2023-04-03 PROCEDURE — 73502 X-RAY EXAM HIP UNI 2-3 VIEWS: CPT

## 2023-04-03 PROCEDURE — 82962 GLUCOSE BLOOD TEST: CPT

## 2023-04-03 PROCEDURE — 80053 COMPREHEN METABOLIC PANEL: CPT

## 2023-04-03 PROCEDURE — 84550 ASSAY OF BLOOD/URIC ACID: CPT

## 2023-04-03 PROCEDURE — 81001 URINALYSIS AUTO W/SCOPE: CPT

## 2023-04-03 PROCEDURE — 73562 X-RAY EXAM OF KNEE 3: CPT

## 2023-04-03 PROCEDURE — 97166 OT EVAL MOD COMPLEX 45 MIN: CPT

## 2023-04-03 PROCEDURE — 82607 VITAMIN B-12: CPT

## 2023-04-03 PROCEDURE — 73590 X-RAY EXAM OF LOWER LEG: CPT

## 2023-04-03 PROCEDURE — 80048 BASIC METABOLIC PNL TOTAL CA: CPT

## 2023-04-03 PROCEDURE — 80061 LIPID PANEL: CPT

## 2023-04-03 RX ORDER — LANOLIN ALCOHOL/MO/W.PET/CERES
1 CREAM (GRAM) TOPICAL
Qty: 0 | Refills: 0 | DISCHARGE
Start: 2023-04-03

## 2023-04-03 RX ORDER — METOPROLOL TARTRATE 50 MG
75 TABLET ORAL DAILY
Refills: 0 | Status: DISCONTINUED | OUTPATIENT
Start: 2023-04-03 | End: 2023-04-03

## 2023-04-03 RX ORDER — INSULIN GLARGINE 100 [IU]/ML
13 INJECTION, SOLUTION SUBCUTANEOUS
Qty: 0 | Refills: 0 | DISCHARGE
Start: 2023-04-03

## 2023-04-03 RX ADMIN — HEPARIN SODIUM 5000 UNIT(S): 5000 INJECTION INTRAVENOUS; SUBCUTANEOUS at 06:04

## 2023-04-03 RX ADMIN — LIDOCAINE 1 PATCH: 4 CREAM TOPICAL at 10:40

## 2023-04-03 RX ADMIN — Medication 6: at 12:20

## 2023-04-03 RX ADMIN — HYDROMORPHONE HYDROCHLORIDE 4 MILLIGRAM(S): 2 INJECTION INTRAMUSCULAR; INTRAVENOUS; SUBCUTANEOUS at 06:03

## 2023-04-03 RX ADMIN — Medication 4 UNIT(S): at 12:21

## 2023-04-03 RX ADMIN — Medication 75 MILLIGRAM(S): at 11:45

## 2023-04-03 RX ADMIN — Medication 2: at 07:51

## 2023-04-03 RX ADMIN — DULOXETINE HYDROCHLORIDE 60 MILLIGRAM(S): 30 CAPSULE, DELAYED RELEASE ORAL at 06:03

## 2023-04-03 RX ADMIN — GABAPENTIN 400 MILLIGRAM(S): 400 CAPSULE ORAL at 06:03

## 2023-04-03 RX ADMIN — HYDROMORPHONE HYDROCHLORIDE 4 MILLIGRAM(S): 2 INJECTION INTRAMUSCULAR; INTRAVENOUS; SUBCUTANEOUS at 10:40

## 2023-04-03 RX ADMIN — HYDROMORPHONE HYDROCHLORIDE 4 MILLIGRAM(S): 2 INJECTION INTRAMUSCULAR; INTRAVENOUS; SUBCUTANEOUS at 06:32

## 2023-04-03 RX ADMIN — LIDOCAINE 1 PATCH: 4 CREAM TOPICAL at 00:00

## 2023-04-03 RX ADMIN — PANTOPRAZOLE SODIUM 40 MILLIGRAM(S): 20 TABLET, DELAYED RELEASE ORAL at 06:03

## 2023-04-03 RX ADMIN — Medication 4 UNIT(S): at 07:52

## 2023-04-03 RX ADMIN — Medication 1 MILLIGRAM(S): at 10:41

## 2023-04-03 RX ADMIN — HYDROMORPHONE HYDROCHLORIDE 4 MILLIGRAM(S): 2 INJECTION INTRAMUSCULAR; INTRAVENOUS; SUBCUTANEOUS at 11:40
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
## 2023-04-03 NOTE — PROGRESS NOTE ADULT - PROBLEM SELECTOR PROBLEM 4
Therapeutic opioid induced constipation
DM type 2 (diabetes mellitus, type 2)

## 2023-04-03 NOTE — PROGRESS NOTE ADULT - PROBLEM SELECTOR PROBLEM 3
Weakness of lower extremity
Right knee pain
Weakness of lower extremity

## 2023-04-03 NOTE — CONSULT NOTE ADULT - PROBLEM SELECTOR RECOMMENDATION 9
cont lantus 13 units qhs  cont admelog 4 units 3x/day before meals  cont mod dose admelog corrective scale coverage qac/qhs  cont cons cho diet  goal bg 100-180 in hosp setting

## 2023-04-03 NOTE — SOCIAL WORK PROGRESS NOTE - NSSWPROGRESSNOTE_GEN_ALL_CORE
Rashmi notified that pt is medically stable for transition back to [facility] for today. RASHMI requested ambulance transportation for 1pm today with [facility]. Rashmi spoke with pt's daughter Gino [phone] who is agreeable to above plan.  Rashmi notified that pt is medically stable for transition back to [facility] for today. SW requested ambulance transportation for 1pm today with [facility]. Sw spoke with pt's daughter Gino [phone] who is agreeable to above plan. Md and RN, [facility], pt and daughter aware and agree to above plan. SW remains available to assist as needed.  Rashmi notified that pt is medically stable for transition back to [facility] for today. RASHMI requested ambulance transportation for 1pm today with [facility]. Rashmi spoke with pt's daughter Gino [phone] who is agreeable to above plan. RASHMI met with pt at bedside who is agreeable to plan.  Md and RN, [facility], pt and daughter aware and agree to above plan. SW remains available to assist as needed.

## 2023-04-03 NOTE — PROGRESS NOTE ADULT - PROBLEM/PLAN-5
DISPLAY PLAN FREE TEXT
negative...
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
## 2023-04-03 NOTE — CONSULT NOTE ADULT - PROBLEM/RECOMMENDATION-1
DISPLAY PLAN FREE TEXT
Patient awake and alert, rr unlabored, skin warm and dry. VSS, pt tachypnic, Claudia NP aware, comfortable with discharge at this time. Discharge instructions provided and explained to parent. Tylenol/motrin dosing explained - went over frequency and dosing.  Parent verbalized understanding of all instructions. Parent aware to follow up with PCP as instructed.  Aware to come back to ED if any difficulty breathing, not tolerating PO, decreased urine output or worsening symptoms. All questions were answered. Patient discharged home in stable condition. Instructed to quarantine per CDC guidelines.   
DISPLAY PLAN FREE TEXT
DISPLAY PLAN FREE TEXT

## 2023-04-03 NOTE — PROGRESS NOTE ADULT - PROBLEM SELECTOR PROBLEM 1
Weakness of lower extremity
Inability to ambulate due to knee

## 2023-04-03 NOTE — CONSULT NOTE ADULT - CONSULT REQUESTED DATE/TIME
30-Mar-2023 10:08
03-Apr-2023 07:32
30-Mar-2023 10:14
30-Mar-2023 11:23
29-Mar-2023 19:20
30-Mar-2023 22:33

## 2023-04-03 NOTE — PROGRESS NOTE ADULT - SUBJECTIVE AND OBJECTIVE BOX
Chief Complaint: Fall    Interval Events: No events overnight.    Review of Systems:  General: No fevers, chills, weight gain  Skin: No rashes, color changes  Cardiovascular: No chest pain, orthopnea  Respiratory: No shortness of breath, cough  Gastrointestinal: No nausea, abdominal pain  Genitourinary: No incontinence, pain with urination  Musculoskeletal: No pain, swelling, decreased range of motion  Neurological: No headache, weakness  Psychiatric: No depression, anxiety  Endocrine: No weight gain, increased thirst  All other systems are comprehensively negative.    Physical Exam:  Vital Signs Last 24 Hrs  T(C): 36.6 (03 Apr 2023 05:07), Max: 36.7 (02 Apr 2023 10:25)  T(F): 97.9 (03 Apr 2023 05:07), Max: 98.1 (02 Apr 2023 14:00)  HR: 88 (03 Apr 2023 05:07) (80 - 99)  BP: 110/72 (03 Apr 2023 05:07) (105/67 - 122/60)  BP(mean): --  RR: 17 (03 Apr 2023 05:07) (16 - 18)  SpO2: 97% (03 Apr 2023 05:07) (95% - 98%)  Parameters below as of 03 Apr 2023 05:07  Patient On (Oxygen Delivery Method): room air  General: NAD  HEENT: MMM  Neck: No JVD, no carotid bruit  Lungs: CTAB  CV: RRR, nl S1/S2, no M/R/G  Abdomen: S/NT/ND, +BS  Extremities: No LE edema, no cyanosis  Neuro: AAOx3, non-focal  Skin: No rash    Labs:             04-02    139  |  101  |  26<H>  ----------------------------<  138<H>  5.0   |  27  |  0.61    Ca    9.2      02 Apr 2023 06:00                          9.0    7.98  )-----------( 397      ( 02 Apr 2023 06:00 )             30.3

## 2023-04-03 NOTE — CONSULT NOTE ADULT - CONSULT REASON
right knee pain
GOC  pain  DM  HTN  OP  OA
dm2 uncontrolled
Hypertension
68y A1C with Estimated Average Glucose Result: 7.9 % (02-10-23 @ 22:14)  A1C with Estimated Average Glucose Result: 7.8 % (02-10-23 @ 05:36)   diabetes mellitus uncontrolled type 2
Pain management

## 2023-04-03 NOTE — PROGRESS NOTE ADULT - SUBJECTIVE AND OBJECTIVE BOX
Neurology follow up note    CHRIS [id number]      Interval History:    Patient feels less pain    Allergies    apple (Rash)  aspirin (Anaphylaxis)  clindamycin (Unknown)  contrast media (iron oxide-based) (Nephrotoxicity)  fish (Hives)  IV Contrast (Anaphylaxis)  Pears (Rash)  sulfa drugs (Rash)  vancomycin (Rash)  walnut (Anaphylaxis)    Intolerances        MEDICATIONS    acetaminophen     Tablet .. 1000 milliGRAM(s) Oral every 8 hours  dextrose 5%. 1000 milliLiter(s) IV Continuous <Continuous>  dextrose 5%. 1000 milliLiter(s) IV Continuous <Continuous>  dextrose 50% Injectable 25 Gram(s) IV Push once  dextrose 50% Injectable 12.5 Gram(s) IV Push once  dextrose 50% Injectable 25 Gram(s) IV Push once  dextrose Oral Gel 15 Gram(s) Oral once PRN  DULoxetine 60 milliGRAM(s) Oral two times a day  folic acid 1 milliGRAM(s) Oral daily  gabapentin 400 milliGRAM(s) Oral three times a day  glucagon  Injectable 1 milliGRAM(s) IntraMuscular once  heparin   Injectable 5000 Unit(s) SubCutaneous every 12 hours  HYDROmorphone   Tablet 4 milliGRAM(s) Oral every 4 hours  insulin glargine Injectable (LANTUS) 13 Unit(s) SubCutaneous at bedtime  insulin lispro (ADMELOG) corrective regimen sliding scale   SubCutaneous three times a day before meals  insulin lispro (ADMELOG) corrective regimen sliding scale   SubCutaneous at bedtime  insulin lispro Injectable (ADMELOG) 4 Unit(s) SubCutaneous three times a day before meals  lidocaine   4% Patch 1 Patch Transdermal daily  magnesium hydroxide Suspension 30 milliLiter(s) Oral at bedtime  melatonin 3 milliGRAM(s) Oral at bedtime  metoprolol succinate ER 75 milliGRAM(s) Oral daily  ondansetron    Tablet 4 milliGRAM(s) Oral every 8 hours PRN  pantoprazole    Tablet 40 milliGRAM(s) Oral before breakfast  polyethylene glycol 3350 17 Gram(s) Oral daily  senna 1 Tablet(s) Oral two times a day  simvastatin 40 milliGRAM(s) Oral at bedtime              Vital Signs Last 24 Hrs  T(C): 36.4 (03 Apr 2023 10:48), Max: 36.7 (02 Apr 2023 14:00)  T(F): 97.5 (03 Apr 2023 10:48), Max: 98.1 (02 Apr 2023 14:00)  HR: 107 (03 Apr 2023 10:48) (80 - 107)  BP: 103/66 (03 Apr 2023 10:48) (103/66 - 122/60)  BP(mean): --  RR: 18 (03 Apr 2023 10:48) (16 - 18)  SpO2: 96% (03 Apr 2023 10:48) (95% - 98%)    Parameters below as of 03 Apr 2023 10:48  Patient On (Oxygen Delivery Method): room air    REVIEW OF SYSTEMS:  Constitutionally, the patient denies fever, chills, or night sweats.  Head:  No headaches.  Eyes:  No double vision or blurry vision.  Ears:  No ringing in the ears.  Neck:  No neck pain.  Cardiovascular:  No chest pain.  Respiratory:  No shortness of breath.  Abdomen:  No nausea, vomiting, or abdominal pain.  Extremities/Neurological:  Positive history of numbness and tingling.    PHYSICAL EXAMINATION:    HEENT:  Head:  Normocephalic, atraumatic.  Eyes:  No scleral icterus.  Ears:  Hearing bilaterally was intact.  Neck:  Supple.  CARDIOVASCULAR:  S1 and S2 heard.  RESPIRATORY:  Good air entry bilaterally.  ABDOMEN:  Soft, nontender.  EXTREMITIES:  No clubbing or cyanosis was noted.      NEUROLOGIC:  The patient awake, alert, and oriented x3.  Extraocular movements were intact.  Speech was fluent.  Smile symmetric.  Motor, bilateral upper 5/5.  Right lower, no significant movement proximally.  Dorsi and plantar flexion 3/5.  Left lower, slight flexion at the hip, slight flexion at the knee in extension.  Dorsi plantar flexion was 3+/5.  Sensory:  Bilateral upper and lower appeared intact to light touch    LABS:  CBC Full  -  ( 02 Apr 2023 06:00 )  WBC Count : 7.98 K/uL  RBC Count : 4.32 M/uL  Hemoglobin : 9.0 g/dL  Hematocrit : 30.3 %  Platelet Count - Automated : 397 K/uL  Mean Cell Volume : 70.1 fl  Mean Cell Hemoglobin : 20.8 pg  Mean Cell Hemoglobin Concentration : 29.7 gm/dL  Auto Neutrophil # : x  Auto Lymphocyte # : x  Auto Monocyte # : x  Auto Eosinophil # : x  Auto Basophil # : x  Auto Neutrophil % : x  Auto Lymphocyte % : x  Auto Monocyte % : x  Auto Eosinophil % : x  Auto Basophil % : x      04-02    139  |  101  |  26<H>  ----------------------------<  138<H>  5.0   |  27  |  0.61    Ca    9.2      02 Apr 2023 06:00      Hemoglobin A1C:       Vitamin B12         RADIOLOGY    ANALYSIS AND PLAN:  This is a 68-year-old with episode of fall and history of ataxia.  For episode of ataxia, it appears, upon my conversation with the patient and daughter after surgery back in 11/2023, she has been unable to ambulate, mostly bed bound, suspect most likely secondary to spinal disc disease.  We will check CT of the spine to make sure no new bleeding has incurred.  For history of diabetes, strict control of blood sugars.  For hypertension, monitor systolic blood pressure.  For hyperlipidemia, continue the patient on statin.  For neuropathy, continue the patient on her gabapentin and Cymbalta.  Fall precautions.  spine follow up as needed   neurologic wise no new changes in exam   pain management appreciated adjust medications as needed   as per patient pain is better   no new events       Spoke with daughter, Gino, at [phone] 3/31, she understands reasoning and thought process.    Greater than 34 minutes of time was spent with the patient, plan of care, reviewing data, with greater than 50% of the visit was spent counseling and/or coordinating care with multidisciplinary healthcare team

## 2023-04-03 NOTE — PROGRESS NOTE ADULT - PROBLEM/PLAN-1
DISPLAY PLAN FREE TEXT
Arian Bhakta  NEPHROLOGY  [address] [place] [address] [place] [address]  Phone: [phone]  Fax: [phone]  Follow Up Time:
DISPLAY PLAN FREE TEXT

## 2023-04-03 NOTE — PROGRESS NOTE ADULT - NUTRITIONAL ASSESSMENT
This patient has been assessed with a concern for Malnutrition and has been determined to have a diagnosis/diagnoses of Mild protein-calorie malnutrition.    This patient is being managed with:   Diet Consistent Carbohydrate/No Snacks-  Supplement Feeding Modality:  Oral  Glucerna Shake Cans or Servings Per Day:  1       Frequency:  Daily  Entered: Mar 30 2023  4:28PM    Diet Consistent Carbohydrate/No Snacks-  Entered: Mar 29 2023 10:15PM    The following pending diet order is being considered for treatment of Mild protein-calorie malnutrition:null
This patient has been assessed with a concern for Malnutrition and has been determined to have a diagnosis/diagnoses of Mild protein-calorie malnutrition.    This patient is being managed with:   Diet Consistent Carbohydrate/No Snacks-  Supplement Feeding Modality:  Oral  Glucerna Shake Cans or Servings Per Day:  1       Frequency:  Daily  Entered: Mar 30 2023  4:28PM    Diet Consistent Carbohydrate/No Snacks-  Entered: Mar 29 2023 10:15PM    The following pending diet order is being considered for treatment of Mild protein-calorie malnutrition
This patient has been assessed with a concern for Malnutrition and has been determined to have a diagnosis/diagnoses of Mild protein-calorie malnutrition.    This patient is being managed with:   Diet Consistent Carbohydrate/No Snacks-  Supplement Feeding Modality:  Oral  Glucerna Shake Cans or Servings Per Day:  1       Frequency:  Daily  Entered: Mar 30 2023  4:28PM    Diet Consistent Carbohydrate/No Snacks-  Entered: Mar 29 2023 10:15PM    The following pending diet order is being considered for treatment of Mild protein-calorie malnutrition:null

## 2023-04-03 NOTE — PROGRESS NOTE ADULT - PROBLEM SELECTOR PROBLEM 2
Inability to ambulate due to knee
Lumbar radiculopathy

## 2023-04-03 NOTE — PROVIDER CONTACT NOTE (OTHER) - ASSESSMENT
AxO x4, denies pain, chest pain, no dizziness, no shortness of breath. Previously given metoprolol 75 mg PO extended release just before 11 am.

## 2023-04-03 NOTE — PROGRESS NOTE ADULT - ASSESSMENT
68y Female complaining of knee pain/injury    hx of pna  dm  ataxic gait  OP  OA  skin lesions - rash  gerd  L - S spine disease    cardio - pmr eval noted    labs and imaging reviewed  GC notes reviewed  [facility] notes reviewed  Pain rx regimen  Bowel rx regimen  fall prec  PT eval  assist with needs  GOC discussion in progress
68y Female complaining of knee pain/injury    hx of pna  dm  ataxic gait  OP  OA  skin lesions - rash  gerd  L - S spine disease    pmr follow up noted  on opioid rx regimen for pain  vs noted  bowel rx regimen    labs and imaging reviewed   notes reviewed  [facility] notes reviewed  Pain rx regimen  Bowel rx regimen  fall prec  PT eval  assist with needs  GOC discussion in progress  
68y Female complaining of knee pain/injury    hx of pna  dm  ataxic gait  OP  OA  skin lesions - rash  gerd  L - S spine disease    pmr follow up noted  on opioid rx regimen for pain  vs noted  bowel rx regimen    labs and imaging reviewed   notes reviewed  [facility] notes reviewed  Pain rx regimen  Bowel rx regimen  fall prec  PT eval  assist with needs  GOC discussion in progress
68y year old Female with right knee pain    Dilaudid held this morning, but patient seems to be back at her baseline. Neuro notes reviewed. RN notes reviewed also, pain score 0-6 today, will refrain from increasing dosing for now.  Senna BID for opioid induced constipation.  Patient discharge postponed to monday due to somnolence and blood glucose levels.     Primary team notes are reviewed  Therapy notes reviewed -- generally min to mod A for most ADL's and mobility. Lower body dressing and toilet transfers, transfers from bed to chair not able to perform. Will need [place] for PT and OT.   Nursing notes reviewed -- pain scores generally 0-6 level.    Laboratory studies reviewed including those mentioned earlier/above.    Discussed management/coordinated care with primary team/referring provider.    High risk of morbidity from treatment with schedule II controlled substance medication.  
68y year old Female with right knee pain    Tolerating regimen well. Continue with current regimen. Recommend senna BID for opioid induced constipation.    Discussed modality use including icing and heating -- ideally heating prior to therapy, icing following therapy.  USe of OTC pain medication following discharge, discussed ablation as well as potential outpatient procedure if it continues to interfere wwith quality of life and ADL's/mobility.    Primary team notes are reviewed  Therapy notes reviewed -- generally min to mod A for most ADL's and mobility. Lower body dressing and toilet transfers, transfers from bed to chair not able to perform. Will need [place] for PT and OT.   Nursing notes reviewed -- pain scores generally low with exception of very early this morning     Laboratory studies reviewed including those mentioned earlier/above.    Discussed management/coordinated care with primary team/referring provider.    High risk of morbidity from treatment with schedule II controlled substance medication.    
68y year old Female with right knee pain    continue with current pain medication regimen. Melatonin 3 mg QHS added forpatients difficulty sleeping.    Primary team notes are reviewed  Therapy notes reviewed -- remains a good candidate for subacute rehabilitation.  Nursing notes reviewed -- pain scores generally 0-5 level today, improving    Laboratory studies reviewed including those mentioned earlier/above.    Discussed management/coordinated care with primary team/referring provider.    High risk of morbidity from treatment with schedule II controlled substance medication.    
The patient is a 68 year old female with a history of HTN, HL, DM who presents with a fall.    4/1/23  Seen at [facility]  Lying flat, sleeping comfortably  Easily arousable  No complaints offered    Plan:  - ECG with LVH related abnormalities  - 2/10/23 essentially normal Echocardiogram-see above  - Continue Amlodipine-5mg OD  - Continue metoprolol succinate 75 mg daily  - Pain control  - Neuro follow-up
The patient is a 68 year old female with a history of HTN, HL, DM who presents with a fall.    4/2/23  Seen at [facility]  Lying flat, awake  No complaints offered    Plan:  - ECG with LVH related abnormalities  - 2/10/23 essentially normal Echocardiogram  - Continue Amlodipine-5mg OD  - Continue metoprolol succinate 75 mg daily  - Pain control  - Neuro follow-up
The patient is a 68 year old female with a history of HTN, HL, DM who presents with a fall.    Plan:  - ECG with LVH related abnormalities  - Blood pressure has been on low side  - Will hold amlodipine  - Continue metoprolol succinate 75 mg daily  - Pain control  - Neuro follow-up
68y Female complaining of knee pain/injury    hx of pna  dm  ataxic gait  OP  OA  skin lesions - rash  gerd  L - S spine disease    pmr follow up noted  on opioid rx regimen for pain  vs noted  bowel rx regimen  melatonin for Sleep     labs and imaging reviewed   notes reviewed  [facility] notes reviewed  Pain rx regimen  Bowel rx regimen  fall prec  PT eval  assist with needs  GOC discussion in progress
The patient is a 68 year old female with a history of HTN, HL, DM who presents with a fall.    Plan:  - ECG with LVH related abnormalities  - Blood pressure has been on low side to normal  - Remain off amlodipine  - Continue metoprolol succinate 75 mg daily  - Pain control  - Neuro follow-up
  REVIEW OF SYMPTOMS      Able to give ROS  Yes     RELIABLE +/-   CONSTITUTIONAL Weakness Yes  Chills No   ENDOCRINE  No heat or cold intolerance    ALLERGY No hives  Sore throat No stridor  RESP Coughing blood no  Shortness of breath YES   NEURO No Headache  Confusion Pain neck No   CARDIAC No Chest pain No Palpitations   GI  Pain abdomen NO   Vomiting NO     NOTABLE FINDINGS    PHYSICAL EXAM    HEENT Unremarkable  atraumatic   RESP Fair air entry EXP prolonged    Harsh breath sound Resp distres mild   CARDIAC S1 S2 No S3     NO JVD    ABDOMEN SOFT BS PRESENT NOT DISTENDED No hepatosplenomegaly PEDAL EDEMA present No calf tenderness  NO rash       GENERAL DATA .   GOC.     .. 4/2/2023 full code  ALLGY.    .. asa clinda iv contrast vanco contrast iron oxide sulfa apple fish pears walnut                       WT.   .. 4/2/2023 47   BMI.   .. 4/2/2023 23                  ICU STAY.   .. none  COVID.   ..      BEST PRACTICE ISSUES.    HOB ELEVATN.   .. Yes  DVT PPLX.   ..   3/29 hpsc    BRICE PPLX.   ..  3/29 protonix 40     INFN PPLX. ..    SP SW ELSA.         DIET.    ..  3/29 cons carb     VS/ PO/IO/ VENT/ DRIPS.   4/2/2023 afeb 99 100/60   4/2/2023 ra 97%       PROBLEM/ASSESSMENT/PLAN.  INFECTN.  .. W 4/2/2023 w 7.9   .. no active infectn suspecetd  HYTN   .. 3/29 metoprolol 75   .. bp uncer control  HEMAT.  .. Hb 4/2/2023 hb 9   .. monitor  RENAL.  .. Na 4/2/2023 na 139   .. Cr 4/2/2023 cr .6   .. monitor   CONSTIPATION.  .. 3/31/2023 senna   .. 3/30 mirlax   .. cont rx  DM   .. 4/1 insulin   .. cont rx  PAIN  .. 3/30 hydromorphone 4.6   .. 3/30 lidocaine   .. 3/29 gabapentin 400.3   DEPRESSION.  .. 3/29 duloxetoine 60.2   .     OVERALL .  .. 68y Female with pmhx of HTN, HL DMII who was admitted 4/1 with right knee pain from a fall a week ago.   .. PMH Patient is s/p microdiscectomy of L5/s1 which was done 11/22 by Dr. Leal. Since that surgery she has been at [facility]. She notes that since the surgery she has declined in her ability to ambulate and is essentially wheechair bound.   .. MRI of the lumbar spine was done in february 2023.     PROBLEMS  Constipation  Pain back   Depression  DM     PLAN  .. Pain control   .. Placement   .         TIME SPENT   Over 25 minutes aggregate care time spent on encounter; activities included   direct patient care, counseling and/or coordinating care reviewing notes, lab data/ imaging , discussion with multidisciplinary team/ patient  /family and explaining in detail risks, benefits, alternatives  of the recommendations     DAYANNA PEREZ 68 f 1955 3/29/2023 DR AMY ANDERSON  
68y Female complaining of knee pain/injury    hx of pna  dm  ataxic gait  OP  OA  skin lesions - rash  gerd  L - S spine disease    labs and imaging reviewed  GC notes reviewed  [facility] notes reviewed  Pain rx regimen  Bowel rx regimen  fall prec  PT eval  assist with needs  GOC discussion in progress
Patient is a 68y Female with pmhx of HTN, HL DMII who presents with right knee pain from a fall a week ago. Patient is s/p microdiscectomy of L5/s1 which was done 11/22 by Dr. Leal. Since that surgery she has been at [facility]. She notes that since the surgery she has declined in her ability to ambulate and is essentially wheechair bound. She notes that in the past few months she has had falls due to losing her balance during transfers from wheelchair to bed. She states that after her fall she was having knee pain and was seen in the ED. Currently her pain is 4/10 worse with movement. Denies CP/SOB/N/V. she does not take any blood thinners at home. She has been seen several times in the last few months for falls and was recommended to see an orthopedist. She notes that she had planned to see Dr. Machado but had issues with her insurance. Since she has been in rehab she states that she saw Dr. Leal once post-operatively. She also notes that due to her progressing weakness in rehab she had seen a neurologist who recommended an MRI of the lumbar spine which was done in february 2023. 
Patient is a 68y Female with pmhx of HTN, HL DMII who presents with right knee pain from a fall a week ago. Patient is s/p microdiscectomy of L5/s1 which was done 11/22 by Dr. Leal. Since that surgery she has been at [facility]. She notes that since the surgery she has declined in her ability to ambulate and is essentially wheechair bound. She notes that in the past few months she has had falls due to losing her balance during transfers from wheelchair to bed. She states that after her fall she was having knee pain and was seen in the ED. Currently her pain is 4/10 worse with movement. Denies CP/SOB/N/V. she does not take any blood thinners at home. She has been seen several times in the last few months for falls and was recommended to see an orthopedist. She notes that she had planned to see Dr. Machado but had issues with her insurance. Since she has been in rehab she states that she saw Dr. Leal once post-operatively. She also notes that due to her progressing weakness in rehab she had seen a neurologist who recommended an MRI of the lumbar spine which was done in february 2023. 
Patient is a 68y Female with pmhx of HTN, HL DMII who presents with right knee pain from a fall a week ago. Patient is s/p microdiscectomy of L5/s1 which was done 11/22 by Dr. Leal. Since that surgery she has been at [facility]. She notes that since the surgery she has declined in her ability to ambulate and is essentially wheechair bound. She notes that in the past few months she has had falls due to losing her balance during transfers from wheelchair to bed. She states that after her fall she was having knee pain and was seen in the ED. Currently her pain is 4/10 worse with movement. Denies CP/SOB/N/V. she does not take any blood thinners at home. She has been seen several times in the last few months for falls and was recommended to see an orthopedist. She notes that she had planned to see Dr. Machado but had issues with her insurance. Since she has been in rehab she states that she saw Dr. Leal once post-operatively. She also notes that due to her progressing weakness in rehab she had seen a neurologist who recommended an MRI of the lumbar spine which was done in february 2023. 
Patient is a 68y Female with pmhx of HTN, HL DMII who presents with right knee pain from a fall a week ago. Patient is s/p microdiscectomy of L5/s1 which was done 11/22 by Dr. Leal. Since that surgery she has been at [facility]. She notes that since the surgery she has declined in her ability to ambulate and is essentially wheechair bound. She notes that in the past few months she has had falls due to losing her balance during transfers from wheelchair to bed. She states that after her fall she was having knee pain and was seen in the ED. Currently her pain is 4/10 worse with movement. Denies CP/SOB/N/V. she does not take any blood thinners at home. She has been seen several times in the last few months for falls and was recommended to see an orthopedist. She notes that she had planned to see Dr. Machado but had issues with her insurance. Since she has been in rehab she states that she saw Dr. Leal once post-operatively. She also notes that due to her progressing weakness in rehab she had seen a neurologist who recommended an MRI of the lumbar spine which was done in february 2023.

## 2023-04-04 NOTE — DISCUSSION/SUMMARY
[de-identified] : The underlying pathophysiology was reviewed with the patient. XR films were reviewed with the patient. Discussed at length the nature of the patient’s condition. \par \par At this time,\par \par All questions answered, understanding verbalized. Patient in agreement with plan of care. Follow up in

## 2023-04-04 NOTE — ADDENDUM
[FreeTextEntry1] : I, Jahaira Anaya wrote this note acting as a scribe for Dr. Antonio eMnon on Mar 28, 2023.

## 2023-04-04 NOTE — PHYSICAL EXAM
[de-identified] : Patient is WDWN, alert, and in no acute distress. Breathing is unlabored. She is grossly oriented to person, place, and time.\par \par  [de-identified] : EXAM: XR TIB FIB AP LAT 2 VIEWS RT\par EXAM: XR KNEE AP LAT OBL 3 VIEWS RT\par EXAM: XR FEMUR 1 VIEW RT\par PROCEDURE DATE: 03/22/2023\par IMPRESSION:\par OSSEOUS STRUCTURES\par  Fractures: No acute fractures are seen. 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

## 2023-04-04 NOTE — END OF VISIT
[FreeTextEntry3] : All medical record entries made by the Scribe were at my,  Dr. Antonio Menon MD., direction and personally dictated by me on 03/28/2023. I have personally reviewed the chart and agree that the record accurately reflects my personal performance of the history, physical exam, assessment and plan.

## 2023-04-04 NOTE — HISTORY OF PRESENT ILLNESS
[de-identified] : Pt is a 67 y/o female with \par \par She is status post lumbar diskectomy by Dr. Leal on 11/8/22.

## 2023-04-05 LAB
CULTURE RESULTS: SIGNIFICANT CHANGE UP
SPECIMEN SOURCE: SIGNIFICANT CHANGE UP

## 2023-04-21 ENCOUNTER — APPOINTMENT (OUTPATIENT)
Dept: UROLOGY | Facility: CLINIC | Age: 68
End: 2023-04-21

## 2023-04-25 ENCOUNTER — APPOINTMENT (OUTPATIENT)
Dept: ENDOCRINOLOGY | Facility: CLINIC | Age: 68
End: 2023-04-25

## 2023-05-02 ENCOUNTER — APPOINTMENT (OUTPATIENT)
Dept: NEUROLOGY | Facility: CLINIC | Age: 68
End: 2023-05-02
Payer: MEDICAID

## 2023-05-02 VITALS
RESPIRATION RATE: 15 BRPM | HEART RATE: 102 BPM | TEMPERATURE: 97.1 F | DIASTOLIC BLOOD PRESSURE: 72 MMHG | SYSTOLIC BLOOD PRESSURE: 107 MMHG | WEIGHT: 108 LBS | HEIGHT: 56 IN | OXYGEN SATURATION: 98 % | BODY MASS INDEX: 24.3 KG/M2

## 2023-05-02 VITALS
WEIGHT: 107 LBS | HEART RATE: 102 BPM | HEIGHT: 56 IN | BODY MASS INDEX: 24.07 KG/M2 | SYSTOLIC BLOOD PRESSURE: 107 MMHG | DIASTOLIC BLOOD PRESSURE: 72 MMHG

## 2023-05-02 DIAGNOSIS — Z78.9 OTHER SPECIFIED HEALTH STATUS: ICD-10-CM

## 2023-05-02 DIAGNOSIS — E11.9 TYPE 2 DIABETES MELLITUS W/OUT COMPLICATIONS: ICD-10-CM

## 2023-05-02 DIAGNOSIS — I10 ESSENTIAL (PRIMARY) HYPERTENSION: ICD-10-CM

## 2023-05-02 DIAGNOSIS — G57.93 UNSPECIFIED MONONEUROPATHY OF BILATERAL LOWER LIMBS: ICD-10-CM

## 2023-05-02 PROCEDURE — 99205 OFFICE O/P NEW HI 60 MIN: CPT

## 2023-05-02 NOTE — REASON FOR VISIT
[Initial Evaluation] : an initial evaluation [FreeTextEntry1] : Status post lumbar laminectomy with severe bilateral lower extremity pain and hypersensitivity to touch

## 2023-05-02 NOTE — PHYSICAL EXAM
[FreeTextEntry1] : Head:  Normocephalic Neck: Supple nontender no carotid bruits.  Spine: Resist bilateral straight leg raising.\par \par Mental Status:  Alert Oriented X3 Speech normal and no aphasia or dysarthria.\par \par Cranial Nerves:  PERRL,  Visual Fields full  EOMI no diplopia no ptosis no nystagmus, V through XII intact.\par \par Motor: Bilateral proximal lower extremity paresthesias more on the right than the left at the hip flexors and quadriceps.  There is fairly good distal strength.  There were no fasciculations seen.  Tone is decreased proximally in the lowers.\par \par DTRs: Absent in the lowers.  Plantars neutral.\par \par Sensory: Prominent hyperpathia and hyperesthesia in both lower extremities is affecting thighs and legs and sparing both feet.\par \par Gait: Nonambulatory.\par

## 2023-05-02 NOTE — ASSESSMENT
[FreeTextEntry1] : Impression: This 68-year-old female patient is status post left L5-S1 microdiscectomy in November 2022.  She did have a prolonged hospitalization at [place] for sepsis with abnormal CAT scanning of the chest.  She had a recent hospitalization in [place] at which time she had a neurological consultation and was noted to be paraparetic.  She presents at this time with a chronic bilateral asymmetrical proximal lower extremity weakness and bilateral lower extremity neuropathic pain with hyperpathia and hyperesthesia.  Etiology is unclear.  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   Rule out underlying lumbar spinal stenosis.  Rule out atypical presentation of diabetic peripheral neuropathy specifically diabetic amyotrophy.  Rule out other causes of radiculoneuropathy in the lower extremities either infectious inflammatory or paraneoplastic.  Rule out myopathy.\par \par Recommendations: In my opinion further work-up should be considered.  Specifically repeat CAT scanning of chest abdomen and pelvis.  Repeat MRI scanning of the thoracolumbar spine with and without contrast.  Additional laboratory tests to rule out different causes of radiculoneuropathy and myopathy.  Consider readmission to [facility] in this regard.\par

## 2023-05-02 NOTE — HISTORY OF PRESENT ILLNESS
[FreeTextEntry1] : This patient is seen for an office consultation referred from [facility].  She is a 68-year-old female who had a left L5-S1 microdiscectomy on 11/8/2022.  Postoperatively she seemed to improve however she subsequently has developed a weakness numbness paresthesia and pain in both lower extremities involving thighs and legs but not extending to both feet.  There is a significant hyperesthesia and hyperpathia.    She has had MRI of the lumbar spine and pelvis on 2/14/2023 revealing residual disc herniation and lumbar spinal stenosis at the L5-S1 level.  Pelvic MRI was basically unremarkable.  CT scan 2/9/2023 chest abdomen and pelvis revealed multiple pulmonary nodular findings with a differential diagnosis of infection septic emboli and metastatic disease.  There was a prolonged hospitalization at [place] for suspected sepsis after which she was transferred to subacute rehabilitation in [place].  The patient readmitted to [place] recently and did see neurology.  She was noted to have proximal weakness.  CAT scan of the lumbar spine was basically unchanged from the prior MRI.\par \par She has a history of diabetes hypertension hyperlipidemia.\par \par Medications have been reviewed including insulin and for pain hydromorphone 4 mg every 4 hours as needed as well as Cymbalta 60 mg 2 times daily and gabapentin 400 mg every 8 hours.\par \par

## 2023-05-02 NOTE — PROGRESS NOTE ADULT - SUBJECTIVE AND OBJECTIVE BOX
Patient is a 67y old  Female who presents with a chief complaint of Pneumonia (12 Feb 2023 06:07)    Being followed by ID for pneumonia    Interval history:  Afebrile  No acute events      ROS:  No cough, SOB, CP  No N/V/D./abd pain  No other complaints      Antimicrobials:    piperacillin/tazobactam IVPB.. 3.375 Gram(s) IV Intermittent every 8 hours      Vital Signs Last 24 Hrs  T(C): 36.6 (02-12-23 @ 11:47), Max: 37 (02-11-23 @ 22:05)  T(F): 97.9 (02-12-23 @ 11:47), Max: 98.6 (02-11-23 @ 22:05)  HR: 103 (02-12-23 @ 11:47) (87 - 111)  BP: 105/67 (02-12-23 @ 11:47) (105/67 - 135/82)  BP(mean): --  RR: 18 (02-12-23 @ 11:47) (18 - 18)  SpO2: 98% (02-12-23 @ 11:47) (96% - 98%)    Physical Exam:    Constitutional well preserved, NAD    HEENT EOMI, No pallor or icterus    No oral exudate or erythema    Neck supple no LN    Chest Clear to auscultation    CVS S1 S2 WNl No murmur or rub or gallop    Abd soft BS normal No tenderness no masses    Ext No cyanosis clubbing or edema    IV site no erythema tenderness or discharge    Joints no swelling or LOM    CNS AAO X 3 non focal    Lab Data:                          8.7    20.21 )-----------( 611      ( 11 Feb 2023 07:05 )             27.5       02-11    136  |  98  |  6<L>  ----------------------------<  141<H>  3.4<L>   |  24  |  0.43<L>    Ca    7.1<L>      11 Feb 2023 16:26  Phos  2.2     02-11  Mg     1.9     02-11    TPro  5.9<L>  /  Alb  2.6<L>  /  TBili  0.3  /  DBili  x   /  AST  29  /  ALT  22  /  AlkPhos  67  02-11        .Stool Feces  02-10-23   Testing in progress  --  --      .Stool Feces  02-10-23   No enteric pathogens to date: Final culture pending  No enteric gram negative rods isolated  --  --      Clean Catch Clean Catch (Midstream)  02-09-23   50,000 - 99,000 CFU/mL Enterococcus species  --  --      .Blood Blood-Peripheral  02-09-23   No growth to date.  --  --      .Blood Blood-Peripheral  02-09-23   No growth to date.  --  --          Clostridium difficile GDH Interpretation: Negative for toxigenic C. Difficile.  This specimen is negative for C.  Difficile glutamate dehydrogenase (GDH) antigen and negative for C.  Difficile Toxins A & B, by EIA.  GDH is a highly sensitive screening  marker for C. Difficile that is produced in large amounts by all C.  Difficile strains, both toxigenic and nontoxigenic.  This assay has not  been validated as a test of cure.  Repeat testing during the same episode  of diarrhea is of limited value and is discouraged.  The results of this  assay should always be interpreted in conjunction with pateint's clinical  history. (02-10-23 @ 22:15)    < from: US Thyroid (02.10.23 @ 21:12) >  ACC: [id number] EXAM:  US THYROID ONLY   ORDERED BY: NORY SYED     PROCEDURE DATE:  02/10/2023          INTERPRETATION:  CLINICAL INFORMATION: Right thyroid nodule seen on prior   chest CT.    COMPARISON: Chest CT dated 02/09/2023.    TECHNIQUE: Sonography of the thyroid.    FINDINGS:  Right Lobe: 3.7 cm x  2.1 cm x 2.1 cm. Normal in size and echogenicity.    There is 2.7 x 2.0 x 1.8 cm heterogeneous predominantly isoechoic nodule   in the midpole. There are no calcifications. (TIRAD -3).    Left Lobe: 3.9 cm x 1.2 cm x 1.6 cm. Normal in size. There are scattered   subcentimeter colloid cysts measuring up to 3 mm.    Isthmus: 2 mm.    Cervical Lymph Nodes: No enlarged or abnormal morphology cervical nodes.    IMPRESSION:    Dominant right thyroid nodule. Consider further evaluation with   ultrasound-guided fine-needle aspiration.    TI-RAD 3: Mildly suspicious (FNA if > 2.5 cm, Follow if > 1.5 cm)  __________________________________  ACR Thyroid Imaging, Reporting and Data System (TI-RADS): White Paper of   the ACR TI-RADS Committee. J Am Jorge Radiol 2017;14:587-595.    TI-RAD 1: Benign (No FNA)  TI-RAD 2: Not suspicious (No FNA)  TI-RAD 3: Mildly suspicious (FNA if > 2.5 cm, Follow if >1.5 cm)  TI-RAD 4: Moderately suspicious(FNA if > 1.5 cm, Follow if > 1 cm)  TI-RAD 5: Highly suspicious (FNA if > 1 cm, Follow if > 0.5 cm)      < end of copied text >  < from: CT Abdomen and Pelvis No Cont (02.09.23 @ 18:03) >  ACC: [id number] EXAM:  CT CHEST   ORDERED BY: ANDREA JESSICA     ACC: [id number] EXAM:  CT ABDOMEN AND PELVIS   ORDERED BY: ANDREA JESSICA     PROCEDURE DATE:  02/09/2023          INTERPRETATION:  CLINICAL INFORMATION: Leukocytosis and fevers. Cough,   congestion and positive urinalysis.    COMPARISON: CT chest abdomen pelvis 11/3/2022    CONTRAST/COMPLICATIONS:  IV Contrast: NONE  Oral Contrast: NONE  Complications: None reported at time of study completion    PROCEDURE:  CT of the Chest, Abdomen and Pelvis was performed.  Sagittal and coronal reformats were performed.    FINDINGS:  CHEST:  LUNGS AND LARGE AIRWAYS: Patent central airways. Scattered nodular and   patchy opacities in the bilateral lower lobes. Many of the opacities are   centrally low attenuation. Process is more prominent in the right lower   lobe inferiorly  PLEURA: No pleural effusion.  VESSELS: Atherosclerotic calcification of the aorta.  HEART: Heart size is normal. No pericardial effusion.  MEDIASTINUM AND CASSIE: No lymphadenopathy.  CHEST WALL AND LOWER NECK: A 1.9 cm right thyroid nodule.    ABDOMEN AND PELVIS:  LIVER: Within normal limits.  BILE DUCTS: Normal caliber.  GALLBLADDER: Within normal limits.  SPLEEN: Within normal limits.  PANCREAS: Within normal limits.  ADRENALS: Within normal limits.  KIDNEYS/URETERS: No hydronephrosis. Bilateral cysts. Punctate   nonobstructing right lower pole calculus.    BLADDER: Within normal limits.  REPRODUCTIVE ORGANS: Hysterectomy.    BOWEL: Diffuse wall thickening of the proximal duodenum and periduodenal   stranding. No bowel obstruction. Appendix is normal. Small hiatal hernia.   Stool is seen throughout the colon suggestive of constipation  PERITONEUM: No ascites.  VESSELS: Atherosclerotic changes.  RETROPERITONEUM/LYMPH NODES: No lymphadenopathy.  ABDOMINAL WALL: Within normal limits.  BONES: Degenerative changes.    IMPRESSION:  Nodular and patchy opacities predominantly in the bilateral lower lobes   with many that are centrally necrotic concerning for infection.   Differential diagnosis includes metastatic disease and septic emboli.  Diffuse wall thickening of the proximal duodenum with periduodenal   stranding, concerning for duodenitis/peptic ulcer disease.  Nonobstructing right renal calculus  Right thyroid nodule may be evaluated with ultrasound on a nonemergent   basis.    < end of copied text >    < from: Transthoracic Echocardiogram (02.10.23 @ 09:49) >  Patient name: CHRIS ALAN  YOB: 1955   Age: 67 (F)   MR#: [id number]  Study Date: 2/10/2023  [facility]CS  Study quality: Technically difficult  Referring Physician: Gray Meade MD  Blood Pressure: 125/77 mmHg  Height: 142 cm  Weight: 44 kg  BSA: 1.3 m2  Heart Rate: 120 mmHg  ------------------------------------------------------------------------  PROCEDURE: Transthoracic echocardiogram with 2-D, M-Mode  and complete spectral and color flow Doppler.  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 There is no mitral  regurgitation.  Aortic Valve/Aorta: Normal trileaflet aortic valve. Peak  transaortic valve gradient equals 7mm Hg, estimated aortic  valve area equals 2.2 sqcm. Minimal aortic regurgitation.  Peak left ventricular outflow tract gradient equals 4 mm  Hg, mean gradient is equal to 2 mm Hg, LVOT velocity time  integral equals 18 cm.  Aortic Root: 3.1 cm.  Ascending Aorta: 3 cm.  LVOT diameter: 1.9 cm.  Left Atrium: LA volume index = 13 cc/m2.  Left Ventricle: Normal left ventricular systolic function.  No segmental wall motion abnormalities.  LVEF calculated  using biplane Rene's method is 67%. No regional wall  motion abnormalities. Normal left ventricular internal  dimensions and wall thicknesses. Normal diastolic function.  Right Heart: Normal right atrium. Normal right ventricular  size and function. Normal tricuspid valve. Minimal  tricuspid regurgitation. Normal pulmonic valve. No pulmonic  regurgitation.  Pericardium/Pleura: No pericardial effusion.  Hemodynamic: Estimated right atrial pressure is 8 mm Hg.  Estimated right ventricular systolic pressure equals 11 mm  Hg, assuming right atrial pressure equals 8 mm Hg,  consistent with normal pulmonary pressures. Color Doppler  demonstrates no evidence of a patent foramen ovale.  ------------------------------------------------------------------------  Conclusions:  1. Normal mitral valve. There is no mitral regurgitation.  2. Normal trileaflet aortic valve. Minimal aortic  regurgitation.  3. Normal left ventricular systolic function. No segmental  wall motion abnormalities.  LVEF calculated using biplane  Rene's method is 67%.  4. Normal diastolic function.  5. Normal right ventricular size and function.  6. No pericardial effusion.  7. No evidence of valvular vegetation is seen.  *** No previous Echo exam.    < end of copied text >             Patient is a 67y old  Female who presents with a chief complaint of Pneumonia (12 Feb 2023 06:07)    Being followed by ID for pneumonia    Interval history:  Afebrile  No acute events      ROS: improved, denies cough, SOB  Hs had loose BM  No cough, SOB, CP  No N/V/abd pain  No other complaints      Antimicrobials:    piperacillin/tazobactam IVPB.. 3.375 Gram(s) IV Intermittent every 8 hours      Vital Signs Last 24 Hrs  T(C): 36.6 (02-12-23 @ 11:47), Max: 37 (02-11-23 @ 22:05)  T(F): 97.9 (02-12-23 @ 11:47), Max: 98.6 (02-11-23 @ 22:05)  HR: 103 (02-12-23 @ 11:47) (87 - 111)  BP: 105/67 (02-12-23 @ 11:47) (105/67 - 135/82)  BP(mean): --  RR: 18 (02-12-23 @ 11:47) (18 - 18)  SpO2: 98% (02-12-23 @ 11:47) (96% - 98%)    Physical Exam:    Constitutional well preserved, NAD    HEENT EOMI, No pallor or icterus    No oral exudate or erythema    Neck supple no LN    Chest Clear to auscultation    CVS S1 S2 WNl No murmur or rub or gallop    Abd soft BS normal No tenderness no masses    Ext No cyanosis clubbing or edema    IV site no erythema tenderness or discharge    Joints no swelling or LOM    CNS AAO X 3 non focal    Lab Data:                          8.7    20.21 )-----------( 611      ( 11 Feb 2023 07:05 )             27.5       02-11    136  |  98  |  6<L>  ----------------------------<  141<H>  3.4<L>   |  24  |  0.43<L>    Ca    7.1<L>      11 Feb 2023 16:26  Phos  2.2     02-11  Mg     1.9     02-11    TPro  5.9<L>  /  Alb  2.6<L>  /  TBili  0.3  /  DBili  x   /  AST  29  /  ALT  22  /  AlkPhos  67  02-11        .Stool Feces  02-10-23   Testing in progress  --  --      .Stool Feces  02-10-23   No enteric pathogens to date: Final culture pending  No enteric gram negative rods isolated  --  --      Clean Catch Clean Catch (Midstream)  02-09-23   50,000 - 99,000 CFU/mL Enterococcus species  --  --      .Blood Blood-Peripheral  02-09-23   No growth to date.  --  --      .Blood Blood-Peripheral  02-09-23   No growth to date.  --  --          Clostridium difficile GDH Interpretation: Negative for toxigenic C. Difficile.  This specimen is negative for C.  Difficile glutamate dehydrogenase (GDH) antigen and negative for C.  Difficile Toxins A & B, by EIA.  GDH is a highly sensitive screening  marker for C. Difficile that is produced in large amounts by all C.  Difficile strains, both toxigenic and nontoxigenic.  This assay has not  been validated as a test of cure.  Repeat testing during the same episode  of diarrhea is of limited value and is discouraged.  The results of this  assay should always be interpreted in conjunction with pateint's clinical  history. (02-10-23 @ 22:15)    < from: US Thyroid (02.10.23 @ 21:12) >  ACC: [id number] EXAM:  US THYROID ONLY   ORDERED BY: NORY SYED     PROCEDURE DATE:  02/10/2023          INTERPRETATION:  CLINICAL INFORMATION: Right thyroid nodule seen on prior   chest CT.    COMPARISON: Chest CT dated 02/09/2023.    TECHNIQUE: Sonography of the thyroid.    FINDINGS:  Right Lobe: 3.7 cm x  2.1 cm x 2.1 cm. Normal in size and echogenicity.    There is 2.7 x 2.0 x 1.8 cm heterogeneous predominantly isoechoic nodule   in the midpole. There are no calcifications. (TIRAD -3).    Left Lobe: 3.9 cm x 1.2 cm x 1.6 cm. Normal in size. There are scattered   subcentimeter colloid cysts measuring up to 3 mm.    Isthmus: 2 mm.    Cervical Lymph Nodes: No enlarged or abnormal morphology cervical nodes.    IMPRESSION:    Dominant right thyroid nodule. Consider further evaluation with   ultrasound-guided fine-needle aspiration.    TI-RAD 3: Mildly suspicious (FNA if > 2.5 cm, Follow if > 1.5 cm)  __________________________________  ACR Thyroid Imaging, Reporting and Data System (TI-RADS): White Paper of   the ACR TI-RADS Committee. J Am Jorge Radiol 2017;14:587-595.    TI-RAD 1: Benign (No FNA)  TI-RAD 2: Not suspicious (No FNA)  TI-RAD 3: Mildly suspicious (FNA if > 2.5 cm, Follow if >1.5 cm)  TI-RAD 4: Moderately suspicious(FNA if > 1.5 cm, Follow if > 1 cm)  TI-RAD 5: Highly suspicious (FNA if > 1 cm, Follow if > 0.5 cm)      < end of copied text >  < from: CT Abdomen and Pelvis No Cont (02.09.23 @ 18:03) >  ACC: [id number] EXAM:  CT CHEST   ORDERED BY: ANDREA JESSICA     ACC: [id number] EXAM:  CT ABDOMEN AND PELVIS   ORDERED BY: ANDREA JESSICA     PROCEDURE DATE:  02/09/2023          INTERPRETATION:  CLINICAL INFORMATION: Leukocytosis and fevers. Cough,   congestion and positive urinalysis.    COMPARISON: CT chest abdomen pelvis 11/3/2022    CONTRAST/COMPLICATIONS:  IV Contrast: NONE  Oral Contrast: NONE  Complications: None reported at time of study completion    PROCEDURE:  CT of the Chest, Abdomen and Pelvis was performed.  Sagittal and coronal reformats were performed.    FINDINGS:  CHEST:  LUNGS AND LARGE AIRWAYS: Patent central airways. Scattered nodular and   patchy opacities in the bilateral lower lobes. Many of the opacities are   centrally low attenuation. Process is more prominent in the right lower   lobe inferiorly  PLEURA: No pleural effusion.  VESSELS: Atherosclerotic calcification of the aorta.  HEART: Heart size is normal. No pericardial effusion.  MEDIASTINUM AND CASSIE: No lymphadenopathy.  CHEST WALL AND LOWER NECK: A 1.9 cm right thyroid nodule.    ABDOMEN AND PELVIS:  LIVER: Within normal limits.  BILE DUCTS: Normal caliber.  GALLBLADDER: Within normal limits.  SPLEEN: Within normal limits.  PANCREAS: Within normal limits.  ADRENALS: Within normal limits.  KIDNEYS/URETERS: No hydronephrosis. Bilateral cysts. Punctate   nonobstructing right lower pole calculus.    BLADDER: Within normal limits.  REPRODUCTIVE ORGANS: Hysterectomy.    BOWEL: Diffuse wall thickening of the proximal duodenum and periduodenal   stranding. No bowel obstruction. Appendix is normal. Small hiatal hernia.   Stool is seen throughout the colon suggestive of constipation  PERITONEUM: No ascites.  VESSELS: Atherosclerotic changes.  RETROPERITONEUM/LYMPH NODES: No lymphadenopathy.  ABDOMINAL WALL: Within normal limits.  BONES: Degenerative changes.    IMPRESSION:  Nodular and patchy opacities predominantly in the bilateral lower lobes   with many that are centrally necrotic concerning for infection.   Differential diagnosis includes metastatic disease and septic emboli.  Diffuse wall thickening of the proximal duodenum with periduodenal   stranding, concerning for duodenitis/peptic ulcer disease.  Nonobstructing right renal calculus  Right thyroid nodule may be evaluated with ultrasound on a nonemergent   basis.    < end of copied text >    < from: Transthoracic Echocardiogram (02.10.23 @ 09:49) >  Patient name: CHRIS ALAN  YOB: 1955   Age: 67 (F)   MR#: [id number]  Study Date: 2/10/2023  [facility]OS[facility]H  Study quality: Technically difficult  Referring Physician: Gray Meade MD  Blood Pressure: 125/77 mmHg  Height: 142 cm  Weight: 44 kg  BSA: 1.3 m2  Heart Rate: 120 mmHg  ------------------------------------------------------------------------  PROCEDURE: Transthoracic echocardiogram with 2-D, M-Mode  and complete spectral and color flow Doppler.  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 There is no mitral  regurgitation.  Aortic Valve/Aorta: Normal trileaflet aortic valve. Peak  transaortic valve gradient equals 7mm Hg, estimated aortic  valve area equals 2.2 sqcm. Minimal aortic regurgitation.  Peak left ventricular outflow tract gradient equals 4 mm  Hg, mean gradient is equal to 2 mm Hg, LVOT velocity time  integral equals 18 cm.  Aortic Root: 3.1 cm.  Ascending Aorta: 3 cm.  LVOT diameter: 1.9 cm.  Left Atrium: LA volume index = 13 cc/m2.  Left Ventricle: Normal left ventricular systolic function.  No segmental wall motion abnormalities.  LVEF calculated  using biplane Rene's method is 67%. No regional wall  motion abnormalities. Normal left ventricular internal  dimensions and wall thicknesses. Normal diastolic function.  Right Heart: Normal right atrium. Normal right ventricular  size and function. Normal tricuspid valve. Minimal  tricuspid regurgitation. Normal pulmonic valve. No pulmonic  regurgitation.  Pericardium/Pleura: No pericardial effusion.  Hemodynamic: Estimated right atrial pressure is 8 mm Hg.  Estimated right ventricular systolic pressure equals 11 mm  Hg, assuming right atrial pressure equals 8 mm Hg,  consistent with normal pulmonary pressures. Color Doppler  demonstrates no evidence of a patent foramen ovale.  ------------------------------------------------------------------------  Conclusions:  1. Normal mitral valve. There is no mitral regurgitation.  2. Normal trileaflet aortic valve. Minimal aortic  regurgitation.  3. Normal left ventricular systolic function. No segmental  wall motion abnormalities.  LVEF calculated using biplane  Rene's method is 67%.  4. Normal diastolic function.  5. Normal right ventricular size and function.  6. No pericardial effusion.  7. No evidence of valvular vegetation is seen.  *** No previous Echo exam.    < end of copied text >             Retention Suture Bite Size: 3 mm

## 2023-05-12 ENCOUNTER — APPOINTMENT (OUTPATIENT)
Dept: CT IMAGING | Facility: HOSPITAL | Age: 68
End: 2023-05-12

## 2023-06-27 ENCOUNTER — APPOINTMENT (OUTPATIENT)
Dept: ENDOCRINOLOGY | Facility: CLINIC | Age: 68
End: 2023-06-27

## 2023-08-30 NOTE — DISCHARGE NOTE PROVIDER - NSDCQMCOGNITION_NEU_ALL_CORE
influenza, injectable, quadrivalent, preservative free; 31-Dec-2017 08:55; Katalina Carrasquillo (RN); [employer]; [id number]; IntraMuscular; Deltoid Right.; 0.5 milliLiter(s); VIS (VIS Published: 07-Aug-2015, VIS Presented: 31-Dec-2017);   
Difficulty remembering

## 2024-01-03 NOTE — PROGRESS NOTE ADULT - PROBLEM SELECTOR PLAN 4
[facility]  [address]  [facility] [address]    01/03/24    I have been trying to reach you by phone to see how you are doing and if I can help you in any way.     As your Care Manager, my role is to support you and make it easier to make sure you get the best health care possible.     This includes:    Helping you know your choices    Teaming up with you to arrange health care services.     Providing education and community support           Please call me to let me know how you are doing.  You can reach me at    [phone]   8:00 AM to 4:30 PM CST (Monday-Friday)     If I am not free when you call, you can leave a private message on my voicemail, and I will call you back within one business day.     I look forward to hearing from you soon.     Sincerely,     ORALIA Mcclure, RN  Care Manager  [facility]   Reported spinal surgery for herniated disc in patient with lumbar radiculopathy history per chart review.  - c/w hydromorphone 4mg for severe pain q4hrs prn  - c/w Tylenol 1000mg q8hrs for pain prn  - c/w Lidocaine patch for pain prn  - c/w gabapentin 300mg TID

## 2024-03-11 NOTE — CAREGIVER ENGAGEMENT NOTE - CAREGIVER EDUCATION - TYPES DISCUSSED
After-care skilled tasks/Discharge plan/DME/Insurance benefits/Post-acute care agency contact/Post-discharge escalation process/SNF placement process/Transportation coordination/Transportation letter provided 70

## 2024-04-01 ENCOUNTER — APPOINTMENT (OUTPATIENT)
Dept: NEUROLOGY | Facility: CLINIC | Age: 69
End: 2024-04-01
Payer: MEDICARE

## 2024-04-01 VITALS — HEART RATE: 84 BPM | DIASTOLIC BLOOD PRESSURE: 66 MMHG | SYSTOLIC BLOOD PRESSURE: 100 MMHG

## 2024-04-01 DIAGNOSIS — R29.898 OTHER SYMPTOMS AND SIGNS INVOLVING THE MUSCULOSKELETAL SYSTEM: ICD-10-CM

## 2024-04-01 DIAGNOSIS — M79.2 NEURALGIA AND NEURITIS, UNSPECIFIED: ICD-10-CM

## 2024-04-01 DIAGNOSIS — Z98.890 OTHER SPECIFIED POSTPROCEDURAL STATES: ICD-10-CM

## 2024-04-01 PROCEDURE — 99215 OFFICE O/P EST HI 40 MIN: CPT

## 2024-04-01 NOTE — HISTORY OF PRESENT ILLNESS
[FreeTextEntry1] : This patient is seen for an office visit.  She is a 69-year-old female with diabetes hypertension hyperlipidemia who was referred from [facility].  She was last seen by me on 5/2/2023.  At that time she gave a history of microdiscectomy at the L5-S1 level on 11/8/2022.  Postoperatively she developed weakness numbness and paresthesia in the lower extremities proximal and distal.  Lumbar MRI on 2/14/2023 revealed residual disc herniation and lumbar spinal stenosis at the L5-S1 level.  Pelvic MRI was negative and CAT scan of the chest abdomen and pelvis revealed some pulmonary nodules with a broad differential diagnosis.  She did have a hospitalization at that time for sepsis.  She has been at [facility] since I last saw her in May 2023.  She now presents for follow-up visit and she describes significant clinical improvement.  She describes significant decrease in her pain numbness and tingling in the lower extremities and improved power.  She is able to stand and ambulate with a walker.  She continues to have leg braces.  She has no bladder problems but she does describe decrease sensitivity to a bowel movement.  She has chronic pain for which she receives MS content and hydromorphone as needed.  Patient history also significant for hypertension hyperlipidemia diabetes.    Multiple medications have been reviewed in this regard.

## 2024-04-01 NOTE — PHYSICAL EXAM
[FreeTextEntry1] : Head:  Normocephalic Neck: Supple. Spine: Nontender and negative bilateral straight leg raising.  Mental Status:  Alert Oriented X3 Speech normal and no aphasia or dysarthria.  Cranial Nerves:  PERRL, Visual Fields full  EOMI no diplopia no ptosis no nystagmus, V through XII intact.  Motor: Improved since I last saw her on 5/2/2023 with increased strength in the proximal and distal lower extremities.  Tone is basically normal.  There were no fasciculations seen.  She does have bilateral leg braces.  DTRs: Hypoactive in the lowers with downgoing plantar responses.  Sensory: No longer demonstrating hyperpathia or hyperesthesia is noted at the time of the last visit in 2023.  Decreased vibration sense distally in the lowers.  She can appreciate pinprick throughout both lower extremities  Gait: Improved from the last exam.  She can stand and take several steps with assist.

## 2024-04-01 NOTE — REASON FOR VISIT
[Follow-Up: _____] : a [unfilled] follow-up visit [FreeTextEntry1] : Status post cervical laminectomy with bilateral lower extremity complaint of pain and weakness.

## 2024-04-01 NOTE — ASSESSMENT
[FreeTextEntry1] : Impression: This 69-year-old female patient is status post left L5-S1 microdisc ectomy in November 2022 and status post prolonged hospitalization for sepsis.  She now presents for a follow-up visit.  Her initial visit was on 5/2/2023.  There is significant clinical improvement since I last saw her.  Suspect an underlying diabetic peripheral neuropathy in combination with lumbar spine stenosis/spondylosis and the residual effects of severe sepsis with critical care neuromyopathy.  Recommendations: There is no neurological indication for the braces affecting the lower extremities.  Suggest orthopedic/spine follow-up.  Continue diabetic control and pain management.  I would make no change in medication from the neurological standpoint at this juncture.  Continue physical therapy.  Repeat lumbar spine MRI noncontrast.  Office follow-up as needed and please fax copies of the lumbar MRI repeat when available.

## 2024-04-30 ENCOUNTER — APPOINTMENT (OUTPATIENT)
Dept: ORTHOPEDIC SURGERY | Facility: CLINIC | Age: 69
End: 2024-04-30
Payer: MEDICARE

## 2024-04-30 DIAGNOSIS — M25.561 PAIN IN RIGHT KNEE: ICD-10-CM

## 2024-04-30 DIAGNOSIS — M25.562 PAIN IN LEFT KNEE: ICD-10-CM

## 2024-04-30 PROCEDURE — 99213 OFFICE O/P EST LOW 20 MIN: CPT

## 2024-04-30 PROCEDURE — 73562 X-RAY EXAM OF KNEE 3: CPT | Mod: 50

## 2024-04-30 NOTE — PHYSICAL EXAM
[de-identified] : Patient is WDWN, alert, and in no acute distress. Breathing is unlabored. She is grossly oriented to person, place, and time.  Bilateral Knees:   There is no medial/lateral joint line tenderness to palpation. No swelling, no valgus or valgus instability present on provocative testing.   Range of motion: Active flexion and extension are full and painless. No crepitus.   Tests and Signs: All tests for stability are normal.   Strength: flexion and extension 4/5 [de-identified] :  AP, lateral and oblique views of the Right Knee were obtained today and revealed a fracture of the neck of the fibula that is healed and 2 screws in the tibia plateau.    AP, lateral and oblique views of the Left Knee were obtained today and revealed no abnormalities. No acute fracture. No dislocation. Cartilage spaces are maintained.

## 2024-04-30 NOTE — DISCUSSION/SUMMARY
[de-identified] :  The underlying pathophysiology was reviewed with the patient. XR films were reviewed with the patient. Discussed at length the nature of the patient's condition.   Patient was advised to take OTC medications and topical analgesic for pain management.  Patient's forms were filled out stating that she may start to ambulate with a walker without the brace WBAT.    The patient was encouraged to take Calcium Citrate, Vitamin D3 and Vitamin C along with a high calcium diet is advised to promote bone health.  All questions answered, understanding verbalized. Patient in agreement with plan of care.   Patient was advised to follow up as needed.

## 2024-04-30 NOTE — HISTORY OF PRESENT ILLNESS
[de-identified] : Patient is a 69 year-old female who presents to the office today for initial evaluation of right knee pain. She feel 6 months ago and she have been wearing the brace since. Currently, she states she can walk without the braces, and she states that the pain has decreased.

## 2024-04-30 NOTE — ADDENDUM
[FreeTextEntry1] :  I, Dillon Valles wrote this note acting as a scribe for Dr. Antonio Menon on Apr 30, 2024.

## 2024-04-30 NOTE — END OF VISIT
[FreeTextEntry3] : All medical record entries made by the Scribe were at my,  Dr. Antonio Menon MD., direction and personally dictated by me on 04/30/2024. I have personally reviewed the chart and agree that the record accurately reflects my personal performance of the history, physical exam, assessment and plan.

## 2024-05-01 ENCOUNTER — NON-APPOINTMENT (OUTPATIENT)
Age: 69
End: 2024-05-01

## 2025-02-19 ENCOUNTER — APPOINTMENT (OUTPATIENT)
Dept: DERMATOLOGY | Facility: CLINIC | Age: 70
End: 2025-02-19
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
## 2025-03-19 ENCOUNTER — APPOINTMENT (OUTPATIENT)
Dept: DERMATOLOGY | Facility: CLINIC | Age: 70
End: 2025-03-19
Payer: MEDICARE

## 2025-03-19 DIAGNOSIS — L30.1 DYSHIDROSIS [POMPHOLYX]: ICD-10-CM

## 2025-03-19 PROCEDURE — 99203 OFFICE O/P NEW LOW 30 MIN: CPT

## 2025-03-25 NOTE — CONSULT NOTE ADULT - CONSTITUTIONAL
[facility] Medicine Services  INPATIENT PROGRESS NOTE    Patient Name: Oral Dey  Date of Admission: 3/22/2025  Today's Date: 03/25/25  Length of Stay: 3  Primary Care Physician: Jorge Shepherd MD    Subjective   Chief Complaint: follow-up abdominal pain, black stool  HPI     Had extensive discussion with patient and multiple family members at bedside regarding goal of care, patient stated he would want to go on hospice.  I have placed order for hospice consult.      Review of Systems   All pertinent negatives and positives are as above. All other systems have been reviewed and are negative unless otherwise stated.     Objective    Temp:  [98.3 °F (36.8 °C)-98.9 °F (37.2 °C)] 98.3 °F (36.8 °C)  Heart Rate:  [63-81] 81  Resp:  [16-18] 18  BP: (134-154)/(48-56) 135/52  Physical Exam  Vitals reviewed.   Constitutional:       Appearance: He is ill-appearing.      Comments: Frail and cachectic appearing   HENT:      Head: Normocephalic.      Mouth/Throat:      Mouth: Mucous membranes are moist.   Cardiovascular:      Rate and Rhythm: Normal rate.   Pulmonary:      Effort: Pulmonary effort is normal. No respiratory distress.   Abdominal:      Tenderness: There is no guarding or rebound.      Comments: Thin, mild PASTORA TTP   Musculoskeletal:      Right lower leg: Edema present.      Left lower leg: Edema present.   Skin:     General: Skin is warm.   Neurological:      Mental Status: He is alert.      Motor: Weakness present.   Psychiatric:         Mood and Affect: Mood normal.         Results Review:  I have reviewed the labs, radiology results, and diagnostic studies.    Laboratory Data:   Results from last 7 days   Lab Units 03/25/25  0349 03/24/25  0448 03/23/25  2337 03/23/25  1155 03/23/25  0554   WBC 10*3/mm3 0.45* 1.05*  --   --  2.27*   HEMOGLOBIN g/dL 7.7* 7.5* 7.5*   < > 7.6*   HEMATOCRIT % 24.2* 23.8* 23.7*   < > 23.8*   PLATELETS 10*3/mm3 124* 132*  --   --  147    < >  "= values in this interval not displayed.        Results from last 7 days   Lab Units 03/25/25  0349 03/24/25  0448 03/23/25  0554   SODIUM mmol/L 133* 135* 132*   POTASSIUM mmol/L 4.2 3.4* 3.7   CHLORIDE mmol/L 102 102 99   CO2 mmol/L 24.0 25.0 25.0   BUN mg/dL 21 23 24*   CREATININE mg/dL 0.61* 0.67* 0.65*   CALCIUM mg/dL 7.7* 7.3* 7.7*   BILIRUBIN mg/dL 0.8 0.6 1.2   ALK PHOS U/L 72 72 73   ALT (SGPT) U/L 14 14 13   AST (SGOT) U/L 13 16 14   GLUCOSE mg/dL 97 91 95       Culture Data:   No results found for: \"BLOODCX\", \"URINECX\", \"WOUNDCX\", \"MRSACX\", \"RESPCX\", \"STOOLCX\"    Radiology Data:   Imaging Results (Last 24 Hours)       ** No results found for the last 24 hours. **            I have reviewed the patient's current medications.     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    -Duodenal perforation secondary to lymphoma [complaint]  General Surgery was consulted and also recommended conservative management.  Patient and family are requesting hospice care.    -Urinary tract infection  Patient is currently on ceftriaxone, unfortunately urine culture was not done and therefore we will complete 5 to 7-day course of antibiotic.    -History of diffuse large B-cell lymphoma involving duodenal bulb  Patient was getting chemotherapy outpatient, oncologist was on consult and has recommended hospice care for patient and signed off.  Hospice    Other chronic " medical conditions-  Chronic anemia  Chronic lymphocytic leukemia  Bladder cancer  GERD  Hypertension  Macular degeneration of right eye    DVT prophylaxis-SCD    Disposition-consult hospice.      Electronically signed by Sandoval Davis MD, 03/25/25, 17:36 CDT.    normal/well-groomed/no distress

## 2025-04-25 ENCOUNTER — APPOINTMENT (OUTPATIENT)
Dept: DERMATOLOGY | Facility: CLINIC | Age: 70
End: 2025-04-25
Payer: MEDICARE

## 2025-04-25 DIAGNOSIS — L30.1 DYSHIDROSIS [POMPHOLYX]: ICD-10-CM

## 2025-04-25 PROCEDURE — 99213 OFFICE O/P EST LOW 20 MIN: CPT

## 2025-06-25 NOTE — CONSULT NOTE ADULT - CONSULT REQUESTED DATE/TIME
02-Nov-2022 23:13
05-Nov-2022 10:31
03-Nov-2022 13:58
04-Nov-2022 09:52
03-Nov-2022 06:34
03-Nov-2022 08:06
no
yes

## 2025-06-25 NOTE — DISCHARGE NOTE NURSING/CASE MANAGEMENT/SOCIAL WORK - NSDPFAC_GEN_ALL_CORE
[facility] [phone] SUBJECTIVE:  Silas Shankar is a 54 year old right-hand-dominant male who is here today with 2 separate hand complaints.  1) left hand-the patient's noticed a lump in the palmar aspect of the long finger just distal to the metacarpophalangeal joint which has been present for about a year and been increasing in size gradually.  It is now interfering with him closing his finger completely and he gets pain with gripping.  There is no known injury to the hand.  It has not been red and there is been no discharge from the area.    2) right hand-medial aspect of the wrist he has had swelling for the past (month - see phone message).  There is been pain in the area.  He pushed on this the other day and felt a pop which decreased the swelling and the pain.  There was no known injury to this area.  He does have tendon issues in the hand stemming from a previous work-related injury.    ALLERGIES:   Allergen Reactions    Codeine HIVES    Epa HIVES    Latex RASH    Lescol HIVES and SWELLING    Mometasone Other (See Comments)     Caused bleeding    Penicillins ANAPHYLAXIS    Rosuvastatin Calcium SWELLING    Nasonex Other (See Comments)     Nose Bleeds    Shellfish Allergy   (Food Or Med) Other (See Comments)     Inside of throat hurts/burns     Current Outpatient Medications   Medication Sig Dispense Refill    meloxicam (MOBIC) 15 MG tablet Take 1 tablet by mouth daily. 30 tablet 0    allopurinol (ZYLOPRIM) 100 MG tablet Take 2 tablets by mouth daily. 60 tablet 3    EVOLocumab (REPATHA SURECLICK) 140 MG/ML injection Inject 1 mL into the skin every 14 days. (Patient not taking: Reported on 4/8/2025) 6 mL 3    omeprazole (PriLOSEC) 40 MG capsule Take 1 capsule by mouth in the morning and 1 capsule in the evening. 60 capsule 11    NON FORMULARY Total Cleanse Uric Acid      Cholecalciferol (VITAMIN D3 PO) Take by mouth daily. (Patient not taking: Reported on 4/8/2025)      aspirin 81 MG EC tablet Chew 1 tablet by mouth daily.       MULTIPLE VITAMIN PO Take 1 tablet by mouth daily. Take  by mouth. Finn man multivitamin - Oral      ascorbic acid (VITAMIN C) 1000 MG tablet Take 1,000 mg by mouth daily.   - Oral       No current facility-administered medications for this visit.     Patient Active Problem List   Diagnosis    Allergic rhinitis    Familial combined hyperlipidemia    Hypercholesterolemia    CAD S/P percutaneous coronary angioplasty    Hx of non-ST elevation myocardial infarction (NSTEMI)    History of 2019 novel coronavirus disease (COVID-19)    Traumatic partial tear of right biceps tendon    Right wrist pain    Dysphagia    Gouty arthritis       Other ROS is negative.    OBJECTIVE:  Blood pressure 130/78, pulse 75, temperature 97.2 °F (36.2 °C), temperature source Temporal, height 6' 1\" (1.854 m), weight 80.4 kg (177 lb 3.2 oz), SpO2 99%.  General:  alert & oriented, pleasant and comfortable  Left hand: Palmar aspect of the long finger just distal to the third metacarpal phalangeal joint there is a well-circumscribed firm lesion associated with the tendon consistent with a ganglion cyst.  It is somewhat tender to touch.  There is no overlying skin changes.  This does interfere with full flexion of the finger.  Right hand: There is pain and swelling at the medial aspect of the right wrist over the ulnar styloid.  There is no erythema present.  There is decreased range of motion and pain with palpation.    ASSESSMENT & PLAN:  1. Ganglion of left hand (Primary)  -Will check x-ray today.  Will await Radiologist interpretation of the xrays.  Pt informed that will likely take 24-48 hours for me to get report and to call office if we have not contacted pt by 72 hours.   -Referred to Dr. Coon for possible excision.  - XR FINGER(S) 2 OR MORE VIEWS LEFT; Future  - SERVICE TO ORTHOPEDICS    2. Right wrist pain  -Check wrist x-rays today.  Will await Radiologist interpretation of the xrays.  Pt informed that will likely take 24-48 hours  for me to get report and to call office if we have not contacted pt by 72 hours.  -May need MRI of wrist.  -Trial of meloxicam 15 mg daily.  Proper use of the medication along with all possible side effects discussed with Silas who voiced understanding.  The patient was encouraged to read the drug information provided with the prescription from the pharmacy.  -Referred to Dr. Coon.  - XR WRIST 3 OR MORE VIEWS RIGHT; Future  - SERVICE TO ORTHOPEDICS    Patient voiced understanding with all aspects of care and treatment.  All questions were answered to the patient's satisfaction.  If problem worsens, new signs or symptoms develop, or current signs and symptoms do not improve call or return to office.  Follow-up: PRN  Electronically signed by: Matteo Colon DO 6/25/2025

## 2025-06-27 ENCOUNTER — APPOINTMENT (OUTPATIENT)
Dept: DERMATOLOGY | Facility: CLINIC | Age: 70
End: 2025-06-27

## 2025-08-04 ENCOUNTER — APPOINTMENT (OUTPATIENT)
Dept: GERIATRICS | Facility: CLINIC | Age: 70
End: 2025-08-04

## 2025-08-04 ENCOUNTER — LABORATORY RESULT (OUTPATIENT)
Age: 70
End: 2025-08-04

## 2025-08-04 VITALS
TEMPERATURE: 97.9 F | DIASTOLIC BLOOD PRESSURE: 87 MMHG | WEIGHT: 165 LBS | HEIGHT: 57 IN | HEART RATE: 85 BPM | RESPIRATION RATE: 16 BRPM | SYSTOLIC BLOOD PRESSURE: 132 MMHG | BODY MASS INDEX: 35.6 KG/M2

## 2025-08-04 VITALS — OXYGEN SATURATION: 95 %

## 2025-08-04 DIAGNOSIS — Z87.898 PERSONAL HISTORY OF OTHER SPECIFIED CONDITIONS: ICD-10-CM

## 2025-08-04 DIAGNOSIS — K59.00 CONSTIPATION, UNSPECIFIED: ICD-10-CM

## 2025-08-04 DIAGNOSIS — Z86.39 PERSONAL HISTORY OF OTHER ENDOCRINE, NUTRITIONAL AND METABOLIC DISEASE: ICD-10-CM

## 2025-08-04 DIAGNOSIS — L40.9 PSORIASIS, UNSPECIFIED: ICD-10-CM

## 2025-08-04 DIAGNOSIS — H90.3 SENSORINEURAL HEARING LOSS, BILATERAL: ICD-10-CM

## 2025-08-04 DIAGNOSIS — D64.9 ANEMIA, UNSPECIFIED: ICD-10-CM

## 2025-08-04 DIAGNOSIS — E11.9 TYPE 2 DIABETES MELLITUS W/OUT COMPLICATIONS: ICD-10-CM

## 2025-08-04 DIAGNOSIS — E78.5 HYPERLIPIDEMIA, UNSPECIFIED: ICD-10-CM

## 2025-08-04 DIAGNOSIS — R41.89 OTHER SYMPTOMS AND SIGNS INVOLVING COGNITIVE FUNCTIONS AND AWARENESS: ICD-10-CM

## 2025-08-04 DIAGNOSIS — Z63.6 DEPENDENT RELATIVE NEEDING CARE AT HOME: ICD-10-CM

## 2025-08-04 DIAGNOSIS — Z87.2 PERSONAL HISTORY OF DISEASES OF THE SKIN AND SUBCUTANEOUS TISSUE: ICD-10-CM

## 2025-08-04 DIAGNOSIS — E03.9 HYPOTHYROIDISM, UNSPECIFIED: ICD-10-CM

## 2025-08-04 DIAGNOSIS — M48.061 SPINAL STENOSIS, LUMBAR REGION WITHOUT NEUROGENIC CLAUDICATION: ICD-10-CM

## 2025-08-04 DIAGNOSIS — Z79.4 TYPE 2 DIABETES MELLITUS W/OUT COMPLICATIONS: ICD-10-CM

## 2025-08-04 DIAGNOSIS — H91.90 UNSPECIFIED HEARING LOSS, UNSPECIFIED EAR: ICD-10-CM

## 2025-08-04 DIAGNOSIS — G47.00 INSOMNIA, UNSPECIFIED: ICD-10-CM

## 2025-08-04 DIAGNOSIS — I10 ESSENTIAL (PRIMARY) HYPERTENSION: ICD-10-CM

## 2025-08-04 DIAGNOSIS — H52.4 PRESBYOPIA: ICD-10-CM

## 2025-08-04 DIAGNOSIS — Z87.19 PERSONAL HISTORY OF OTHER DISEASES OF THE DIGESTIVE SYSTEM: ICD-10-CM

## 2025-08-04 DIAGNOSIS — E55.9 VITAMIN D DEFICIENCY, UNSPECIFIED: ICD-10-CM

## 2025-08-04 DIAGNOSIS — J45.990 EXERCISE INDUCED BRONCHOSPASM: ICD-10-CM

## 2025-08-04 DIAGNOSIS — M51.379 OTH INTVRT DISC DEGEN, LUMBOSACR W/O LUM BCK OR LW EXTRM PN: ICD-10-CM

## 2025-08-04 DIAGNOSIS — Z86.79 PERSONAL HISTORY OF OTHER DISEASES OF THE CIRCULATORY SYSTEM: ICD-10-CM

## 2025-08-04 DIAGNOSIS — Z71.89 OTHER SPECIFIED COUNSELING: ICD-10-CM

## 2025-08-04 DIAGNOSIS — Z78.9 OTHER SPECIFIED HEALTH STATUS: ICD-10-CM

## 2025-08-04 DIAGNOSIS — Z23 ENCOUNTER FOR IMMUNIZATION: ICD-10-CM

## 2025-08-04 LAB
ALBUMIN SERPL ELPH-MCNC: 4.4 G/DL
ALP BLD-CCNC: 114 U/L
ALT SERPL-CCNC: 30 U/L
ANION GAP SERPL CALC-SCNC: 14 MMOL/L
AST SERPL-CCNC: 38 U/L
BILIRUB SERPL-MCNC: 0.2 MG/DL
BUN SERPL-MCNC: 26 MG/DL
CALCIUM SERPL-MCNC: 9.1 MG/DL
CHLORIDE SERPL-SCNC: 102 MMOL/L
CHOLEST SERPL-MCNC: 150 MG/DL
CO2 SERPL-SCNC: 25 MMOL/L
CREAT SERPL-MCNC: 1 MG/DL
EGFRCR SERPLBLD CKD-EPI 2021: 60 ML/MIN/1.73M2
GLUCOSE SERPL-MCNC: 149 MG/DL
HDLC SERPL-MCNC: 40 MG/DL
LDLC SERPL-MCNC: 86 MG/DL
NONHDLC SERPL-MCNC: 110 MG/DL
POTASSIUM SERPL-SCNC: 4.5 MMOL/L
PROT SERPL-MCNC: 7.8 G/DL
SODIUM SERPL-SCNC: 141 MMOL/L
TRIGL SERPL-MCNC: 135 MG/DL

## 2025-08-04 PROCEDURE — 93000 ELECTROCARDIOGRAM COMPLETE: CPT

## 2025-08-04 PROCEDURE — 99205 OFFICE O/P NEW HI 60 MIN: CPT

## 2025-08-04 PROCEDURE — 99497 ADVNCD CARE PLAN 30 MIN: CPT

## 2025-08-04 RX ORDER — INSULIN GLARGINE 100 [IU]/ML
100 INJECTION, SOLUTION SUBCUTANEOUS
Qty: 1 | Refills: 0 | Status: ACTIVE | COMMUNITY
Start: 2025-08-04

## 2025-08-04 RX ORDER — LORATADINE 5 MG
17 TABLET,CHEWABLE ORAL DAILY
Qty: 1 | Refills: 0 | Status: ACTIVE | COMMUNITY
Start: 2025-08-04

## 2025-08-04 RX ORDER — DULOXETINE 60 MG/1
60 CAPSULE, DELAYED RELEASE ORAL
Qty: 90 | Refills: 0 | Status: ACTIVE | COMMUNITY
Start: 2025-08-04

## 2025-08-04 RX ORDER — HYDROMORPHONE HYDROCHLORIDE 4 MG/1
4 TABLET ORAL
Qty: 84 | Refills: 0 | Status: ACTIVE | COMMUNITY
Start: 2025-08-04

## 2025-08-04 RX ORDER — HYDROCORTISONE 0.01 G/G
1 CREAM TOPICAL
Qty: 1 | Refills: 0 | Status: ACTIVE | COMMUNITY
Start: 2025-08-04

## 2025-08-04 RX ORDER — SENNOSIDES 8.6 MG/1
8.6 CAPSULE, GELATIN COATED ORAL
Qty: 90 | Refills: 0 | Status: ACTIVE | COMMUNITY
Start: 2025-08-04

## 2025-08-04 RX ORDER — TRAZODONE HYDROCHLORIDE 100 MG/1
100 TABLET ORAL
Qty: 90 | Refills: 0 | Status: ACTIVE | COMMUNITY
Start: 2025-08-04

## 2025-08-04 RX ORDER — MORPHINE SULFATE 15 MG/1
15 TABLET ORAL
Refills: 0 | Status: ACTIVE | COMMUNITY
Start: 2025-08-04

## 2025-08-04 RX ORDER — METOPROLOL SUCCINATE 25 MG/1
25 TABLET, EXTENDED RELEASE ORAL
Qty: 90 | Refills: 1 | Status: ACTIVE | COMMUNITY
Start: 2025-08-04

## 2025-08-04 RX ORDER — IPRATROPIUM BROMIDE AND ALBUTEROL SULFATE 2.5; .5 MG/3ML; MG/3ML
0.5-2.5 (3) SOLUTION RESPIRATORY (INHALATION)
Qty: 1 | Refills: 5 | Status: ACTIVE | COMMUNITY
Start: 2025-08-04

## 2025-08-04 RX ORDER — GABAPENTIN 800 MG/1
800 TABLET, COATED ORAL 3 TIMES DAILY
Qty: 270 | Refills: 2 | Status: ACTIVE | COMMUNITY
Start: 2025-08-04 | End: 1900-01-01

## 2025-08-04 RX ORDER — INSULIN ASPART 100 [IU]/ML
100 INJECTION, SOLUTION INTRAVENOUS; SUBCUTANEOUS
Qty: 1 | Refills: 0 | Status: ACTIVE | COMMUNITY
Start: 2025-08-04

## 2025-08-04 RX ORDER — FOLIC ACID 1 MG/1
1 TABLET ORAL
Qty: 90 | Refills: 0 | Status: ACTIVE | COMMUNITY
Start: 2025-08-04

## 2025-08-04 RX ORDER — GRAPE SEED EXTRACT 50 MG
1250 (500 CA) TABLET ORAL DAILY
Refills: 0 | Status: ACTIVE | COMMUNITY
Start: 2025-08-04

## 2025-08-04 RX ORDER — OMEPRAZOLE 20 MG/1
20 CAPSULE, DELAYED RELEASE ORAL
Qty: 90 | Refills: 0 | Status: ACTIVE | COMMUNITY
Start: 2025-08-04

## 2025-08-04 RX ORDER — LEVOTHYROXINE SODIUM 50 UG/1
50 CAPSULE ORAL DAILY
Qty: 90 | Refills: 0 | Status: ACTIVE | COMMUNITY
Start: 2025-08-04

## 2025-08-04 RX ORDER — CHOLECALCIFEROL (VITAMIN D3) 25 MCG
25 MCG TABLET ORAL
Qty: 90 | Refills: 0 | Status: ACTIVE | COMMUNITY
Start: 2025-08-04

## 2025-08-04 RX ORDER — LIDOCAINE 4% 40 MG/G
4 PATCH TOPICAL
Qty: 1 | Refills: 0 | Status: ACTIVE | COMMUNITY
Start: 2025-08-04

## 2025-08-04 SDOH — SOCIAL STABILITY - SOCIAL INSECURITY: DEPENDENT RELATIVE NEEDING CARE AT HOME: Z63.6

## 2025-08-05 LAB
25(OH)D3 SERPL-MCNC: 37.6 NG/ML
BASOPHILS # BLD AUTO: 0.05 K/UL
BASOPHILS NFR BLD AUTO: 0.4 %
EOSINOPHIL # BLD AUTO: 0.63 K/UL
EOSINOPHIL NFR BLD AUTO: 5.2 %
ESTIMATED AVERAGE GLUCOSE: 194 MG/DL
FOLATE SERPL-MCNC: >20 NG/ML
HBA1C MFR BLD HPLC: 8.4 %
HCT VFR BLD CALC: 35.6 %
HGB BLD-MCNC: 10.4 G/DL
IMM GRANULOCYTES NFR BLD AUTO: 0.2 %
LYMPHOCYTES # BLD AUTO: 3.79 K/UL
LYMPHOCYTES NFR BLD AUTO: 31.4 %
MAN DIFF?: NORMAL
MCHC RBC-ENTMCNC: 19 PG
MCHC RBC-ENTMCNC: 29.2 G/DL
MCV RBC AUTO: 65 FL
MONOCYTES # BLD AUTO: 0.64 K/UL
MONOCYTES NFR BLD AUTO: 5.3 %
NEUTROPHILS # BLD AUTO: 6.94 K/UL
NEUTROPHILS NFR BLD AUTO: 57.5 %
PLATELET # BLD AUTO: 252 K/UL
RBC # BLD: 5.48 M/UL
RBC # FLD: 19.4 %
TSH SERPL-ACNC: 2.97 UIU/ML
VIT B12 SERPL-MCNC: 482 PG/ML
WBC # FLD AUTO: 12.45 K/UL

## 2025-08-17 PROBLEM — E55.9 VITAMIN D DEFICIENCY: Status: ACTIVE | Noted: 2025-08-04

## 2025-08-17 PROBLEM — H91.90 HEARING LOSS, UNSPECIFIED HEARING LOSS TYPE, UNSPECIFIED LATERALITY: Status: ACTIVE | Noted: 2025-08-04

## 2025-08-17 PROBLEM — Z63.6 CAREGIVER BURDEN: Status: ACTIVE | Noted: 2025-08-17

## 2025-08-17 PROBLEM — Z87.898 HISTORY OF CHRONIC PAIN: Status: RESOLVED | Noted: 2025-08-17 | Resolved: 2025-08-17

## 2025-08-17 PROBLEM — E03.9 HYPOTHYROIDISM: Status: ACTIVE | Noted: 2025-08-04

## 2025-08-17 PROBLEM — H52.4 PRESBYOPIA: Status: RESOLVED | Noted: 2025-08-17 | Resolved: 2025-08-17

## 2025-08-17 PROBLEM — Z86.39 HISTORY OF HYPERLIPIDEMIA: Status: RESOLVED | Noted: 2025-08-17 | Resolved: 2025-08-17

## 2025-08-17 PROBLEM — E11.9 TYPE 2 DIABETES MELLITUS WITHOUT COMPLICATION, WITH LONG-TERM CURRENT USE OF INSULIN: Status: ACTIVE | Noted: 2023-05-02

## 2025-08-17 PROBLEM — R41.89 IMPAIRED COGNITION: Status: ACTIVE | Noted: 2025-08-17

## 2025-08-17 PROBLEM — Z87.19 HISTORY OF GASTROESOPHAGEAL REFLUX (GERD): Status: RESOLVED | Noted: 2025-08-04 | Resolved: 2025-08-17

## 2025-08-17 PROBLEM — Z78.9 LIVES IN LONG-TERM CARE FACILITY: Status: ACTIVE | Noted: 2025-08-17

## 2025-08-17 PROBLEM — M51.379 DISC DEGENERATION, LUMBOSACRAL: Status: ACTIVE | Noted: 2022-11-07

## 2025-08-17 PROBLEM — Z87.898 HISTORY OF UNSTEADY GAIT: Status: RESOLVED | Noted: 2025-08-17 | Resolved: 2025-08-17

## 2025-08-17 PROBLEM — H90.3 SENSORINEURAL HEARING LOSS (SNHL) OF BOTH EARS: Status: RESOLVED | Noted: 2025-08-17 | Resolved: 2025-08-17

## 2025-08-17 PROBLEM — Z87.898 HISTORY OF INSOMNIA: Status: RESOLVED | Noted: 2025-08-17 | Resolved: 2025-08-17

## 2025-08-17 PROBLEM — Z87.19 HISTORY OF CONSTIPATION: Status: RESOLVED | Noted: 2025-08-17 | Resolved: 2025-08-17

## 2025-08-17 PROBLEM — E11.9 TYPE 2 DIABETES MELLITUS WITHOUT COMPLICATION, WITH LONG-TERM CURRENT USE OF INSULIN: Status: RESOLVED | Noted: 2025-08-04 | Resolved: 2025-08-17

## 2025-08-17 PROBLEM — Z23 ENCOUNTER FOR IMMUNIZATION: Status: ACTIVE | Noted: 2025-08-17 | Resolved: 2025-08-31

## 2025-08-17 PROBLEM — Z86.79 HISTORY OF ESSENTIAL HYPERTENSION: Status: RESOLVED | Noted: 2023-05-02 | Resolved: 2025-08-17

## 2025-08-17 PROBLEM — G47.00 INSOMNIA: Status: ACTIVE | Noted: 2025-08-04

## 2025-08-17 PROBLEM — Z86.39 HISTORY OF HYPOTHYROIDISM: Status: RESOLVED | Noted: 2025-08-17 | Resolved: 2025-08-17

## 2025-08-17 PROBLEM — K59.00 CONSTIPATION: Status: ACTIVE | Noted: 2025-08-04

## 2025-08-17 PROBLEM — Z87.2 HISTORY OF ECZEMA: Status: RESOLVED | Noted: 2025-08-17 | Resolved: 2025-08-17

## 2025-08-17 PROBLEM — D64.9 ANEMIA: Status: ACTIVE | Noted: 2025-08-04

## 2025-08-17 PROBLEM — L40.9 PSORIASIS: Status: ACTIVE | Noted: 2025-08-04

## 2025-08-17 PROBLEM — J45.990 EXERCISE-INDUCED ASTHMA: Status: ACTIVE | Noted: 2025-08-04

## 2025-08-17 PROBLEM — E78.5 HLD (HYPERLIPIDEMIA): Status: ACTIVE | Noted: 2025-08-04

## 2025-08-17 PROBLEM — M48.061 LUMBAR SPINAL STENOSIS: Status: RESOLVED | Noted: 2025-08-17 | Resolved: 2025-08-17

## 2025-08-17 PROBLEM — Z71.89 ADVANCE CARE PLANNING: Status: ACTIVE | Noted: 2025-08-17

## 2025-08-17 PROBLEM — I10 HTN (HYPERTENSION), BENIGN: Status: ACTIVE | Noted: 2025-08-17

## 2025-09-05 ENCOUNTER — APPOINTMENT (OUTPATIENT)
Dept: GERIATRICS | Facility: CLINIC | Age: 70
End: 2025-09-05
Payer: MEDICARE

## 2025-09-05 VITALS
HEART RATE: 55 BPM | DIASTOLIC BLOOD PRESSURE: 87 MMHG | OXYGEN SATURATION: 95 % | HEIGHT: 57 IN | WEIGHT: 167.38 LBS | RESPIRATION RATE: 16 BRPM | SYSTOLIC BLOOD PRESSURE: 132 MMHG | BODY MASS INDEX: 36.11 KG/M2 | TEMPERATURE: 97.5 F

## 2025-09-05 DIAGNOSIS — E03.9 HYPOTHYROIDISM, UNSPECIFIED: ICD-10-CM

## 2025-09-05 DIAGNOSIS — G47.00 INSOMNIA, UNSPECIFIED: ICD-10-CM

## 2025-09-05 DIAGNOSIS — Z79.4 TYPE 2 DIABETES MELLITUS WITH DIABETIC NEUROPATHY, UNSPECIFIED: ICD-10-CM

## 2025-09-05 DIAGNOSIS — I10 ESSENTIAL (PRIMARY) HYPERTENSION: ICD-10-CM

## 2025-09-05 DIAGNOSIS — Z79.4 TYPE 2 DIABETES MELLITUS W/OUT COMPLICATIONS: ICD-10-CM

## 2025-09-05 DIAGNOSIS — H91.90 UNSPECIFIED HEARING LOSS, UNSPECIFIED EAR: ICD-10-CM

## 2025-09-05 DIAGNOSIS — Z63.6 DEPENDENT RELATIVE NEEDING CARE AT HOME: ICD-10-CM

## 2025-09-05 DIAGNOSIS — M51.379 OTH INTVRT DISC DEGEN, LUMBOSACR W/O LUM BCK OR LW EXTRM PN: ICD-10-CM

## 2025-09-05 DIAGNOSIS — E66.9 OBESITY, UNSPECIFIED: ICD-10-CM

## 2025-09-05 DIAGNOSIS — E78.5 HYPERLIPIDEMIA, UNSPECIFIED: ICD-10-CM

## 2025-09-05 DIAGNOSIS — E11.40 TYPE 2 DIABETES MELLITUS WITH DIABETIC NEUROPATHY, UNSPECIFIED: ICD-10-CM

## 2025-09-05 DIAGNOSIS — E11.9 TYPE 2 DIABETES MELLITUS W/OUT COMPLICATIONS: ICD-10-CM

## 2025-09-05 DIAGNOSIS — R41.89 OTHER SYMPTOMS AND SIGNS INVOLVING COGNITIVE FUNCTIONS AND AWARENESS: ICD-10-CM

## 2025-09-05 PROCEDURE — 99214 OFFICE O/P EST MOD 30 MIN: CPT

## 2025-09-05 PROCEDURE — G0447 BEHAVIOR COUNSEL OBESITY 15M: CPT | Mod: XU

## 2025-09-05 RX ORDER — ERGOCALCIFEROL 1.25 MG/1
1.25 MG CAPSULE, LIQUID FILLED ORAL
Qty: 12 | Refills: 1 | Status: ACTIVE | COMMUNITY
Start: 2025-09-05 | End: 1900-01-01

## 2025-09-05 RX ORDER — IPRATROPIUM BROMIDE AND ALBUTEROL 20; 100 UG/1; UG/1
20-100 SPRAY, METERED RESPIRATORY (INHALATION)
Qty: 1 | Refills: 3 | Status: ACTIVE | COMMUNITY
Start: 2025-09-05

## 2025-09-05 SDOH — SOCIAL STABILITY - SOCIAL INSECURITY: DEPENDENT RELATIVE NEEDING CARE AT HOME: Z63.6

## 2025-09-06 PROBLEM — E66.9 OBESITY (BMI 30-39.9): Status: ACTIVE | Noted: 2025-09-05

## 2025-09-06 PROBLEM — E11.40 TYPE 2 DIABETES MELLITUS WITH DIABETIC NEUROPATHY, WITH LONG-TERM CURRENT USE OF INSULIN: Status: ACTIVE | Noted: 2025-09-05

## 2025-09-06 PROBLEM — E11.9 TYPE 2 DIABETES MELLITUS WITHOUT COMPLICATION, WITH LONG-TERM CURRENT USE OF INSULIN: Status: RESOLVED | Noted: 2023-05-02 | Resolved: 2025-09-06

## 2025-09-06 RX ORDER — SERTRALINE 25 MG/1
25 TABLET, FILM COATED ORAL
Qty: 90 | Refills: 0 | Status: ACTIVE | COMMUNITY
Start: 2025-09-06 | End: 1900-01-01

## 2025-09-11 ENCOUNTER — APPOINTMENT (OUTPATIENT)
Dept: PHARMACY | Facility: CLINIC | Age: 70
End: 2025-09-11

## (undated) DEVICE — BLANKET WARMER UPPER ADULT

## (undated) DEVICE — SUT MONOCRYL 3-0 27" PS-2 UNDYED

## (undated) DEVICE — GLV 8.5 PROTEXIS

## (undated) DEVICE — ELCTR EDGE BOVIE INSULATED BLADE TIP 4"

## (undated) DEVICE — SOL IRR POUR NS 0.9% 500ML

## (undated) DEVICE — POSITIONER STRAP ARMBOARD VELCRO TS-30 12

## (undated) DEVICE — SUT POLYSORB 2-0 30" GS-11 UNDYED

## (undated) DEVICE — SOL IRR POUR H2O 1500ML

## (undated) DEVICE — DRAPE MICROSCOPE LEICA  26X150"

## (undated) DEVICE — SOL TOPICAL POVIDONE IODINE PVP-1

## (undated) DEVICE — DRSG 4X4

## (undated) DEVICE — SUT POLYSORB 1 30" GS-10 UNDYED

## (undated) DEVICE — WRAP COMPRESSION CALF MED

## (undated) DEVICE — GLV 7.5 PROTEXIS

## (undated) DEVICE — WRAP COMPRESSION CALF LG

## (undated) DEVICE — BUR STRYKER NEURO DRILL 3X3.8MM

## (undated) DEVICE — SPONGE RAYTEC 4X4 16PLY

## (undated) DEVICE — DRAPE 3/4 SHEET W REINFORCEMENT 56X77"

## (undated) DEVICE — CANISTER SUCTION LID GUARD 3000CC

## (undated) DEVICE — ELCTR EDGE BOVIE INSULATED BLADE TIP 2.75"

## (undated) DEVICE — SUT POLYSORB 1 27" GS-21 UNDYED

## (undated) DEVICE — DRAPE MAYO STAND 30"

## (undated) DEVICE — ELCTR PENCIL NEPTUNE SMOKE EVACUATION

## (undated) DEVICE — SUT MONOSOF 2-0 18" C-15

## (undated) DEVICE — POSITIONER FOAM HEAD CRADLE

## (undated) DEVICE — SOLIDIFIER CANN EXPRESS 3K

## (undated) DEVICE — NDL SPINAL 18G X 3.5"

## (undated) DEVICE — DRAPE TOWEL BLUE 17" X 24"

## (undated) DEVICE — DRAPE C ARM UNIVERSAL

## (undated) DEVICE — PACK SPINE

## (undated) DEVICE — Device

## (undated) DEVICE — GLV 8 PROTEXIS

## (undated) DEVICE — CONTAINER SPECIMEN 100ML

## (undated) DEVICE — PREP SCRUB IODO DURAPREP 26CC

## (undated) DEVICE — DRSG STERISTRIPS 0.5X4"

## (undated) DEVICE — DRAPE COVER TABLE 44X75"